# Patient Record
Sex: MALE | Race: OTHER | Employment: FULL TIME | URBAN - METROPOLITAN AREA
[De-identification: names, ages, dates, MRNs, and addresses within clinical notes are randomized per-mention and may not be internally consistent; named-entity substitution may affect disease eponyms.]

---

## 2018-10-08 ENCOUNTER — HOSPITAL ENCOUNTER (OUTPATIENT)
Dept: CT IMAGING | Age: 28
Discharge: HOME | End: 2018-10-08
Payer: COMMERCIAL

## 2018-10-08 DIAGNOSIS — N50.82 SCROTAL PAIN: ICD-10-CM

## 2018-10-08 DIAGNOSIS — R31.21 ASYMPTOMATIC MICROSCOPIC HEMATURIA: ICD-10-CM

## 2018-10-08 DIAGNOSIS — R10.9 ABDOMINAL PAIN, UNSPECIFIED ABDOMINAL LOCATION: ICD-10-CM

## 2018-10-08 PROCEDURE — 74176 CT ABD & PELVIS W/O CONTRAST: CPT

## 2018-10-16 ENCOUNTER — HOSPITAL ENCOUNTER (OUTPATIENT)
Dept: ULTRASOUND IMAGING | Age: 28
Discharge: HOME | End: 2018-10-16
Payer: COMMERCIAL

## 2018-10-16 DIAGNOSIS — N50.82 PAIN IN SCROTUM: ICD-10-CM

## 2018-10-16 PROCEDURE — 76870 US EXAM SCROTUM: CPT

## 2020-02-27 ENCOUNTER — HOSPITAL ENCOUNTER (OUTPATIENT)
Dept: PULMONOLOGY | Age: 30
Discharge: HOME | End: 2020-02-27
Payer: COMMERCIAL

## 2020-02-27 VITALS — RESPIRATION RATE: 18 BRPM | OXYGEN SATURATION: 98 % | HEART RATE: 96 BPM

## 2020-02-27 PROCEDURE — 94726 PLETHYSMOGRAPHY LUNG VOLUMES: CPT

## 2020-02-27 PROCEDURE — 94060 EVALUATION OF WHEEZING: CPT

## 2020-02-27 PROCEDURE — 94200 LUNG FUNCTION TEST (MBC/MVV): CPT

## 2020-02-27 PROCEDURE — 6360000002 HC RX W HCPCS: Performed by: FAMILY MEDICINE

## 2020-02-27 PROCEDURE — 94729 DIFFUSING CAPACITY: CPT

## 2020-02-27 PROCEDURE — 94760 N-INVAS EAR/PLS OXIMETRY 1: CPT

## 2020-02-27 RX ORDER — ALBUTEROL SULFATE 2.5 MG/3ML
2.5 SOLUTION RESPIRATORY (INHALATION) ONCE
Status: COMPLETED | OUTPATIENT
Start: 2020-02-27 | End: 2020-02-27

## 2020-02-27 RX ADMIN — ALBUTEROL SULFATE 2.5 MG: 2.5 SOLUTION RESPIRATORY (INHALATION) at 12:15

## 2020-03-12 ENCOUNTER — OFFICE VISIT (OUTPATIENT)
Dept: ENT CLINIC | Age: 30
End: 2020-03-12
Payer: COMMERCIAL

## 2020-03-12 VITALS — OXYGEN SATURATION: 98 % | DIASTOLIC BLOOD PRESSURE: 76 MMHG | HEART RATE: 64 BPM | SYSTOLIC BLOOD PRESSURE: 110 MMHG

## 2020-03-12 PROCEDURE — 1036F TOBACCO NON-USER: CPT | Performed by: OTOLARYNGOLOGY

## 2020-03-12 PROCEDURE — G8421 BMI NOT CALCULATED: HCPCS | Performed by: OTOLARYNGOLOGY

## 2020-03-12 PROCEDURE — 99203 OFFICE O/P NEW LOW 30 MIN: CPT | Performed by: OTOLARYNGOLOGY

## 2020-03-12 PROCEDURE — G8484 FLU IMMUNIZE NO ADMIN: HCPCS | Performed by: OTOLARYNGOLOGY

## 2020-03-12 PROCEDURE — G8427 DOCREV CUR MEDS BY ELIG CLIN: HCPCS | Performed by: OTOLARYNGOLOGY

## 2020-03-12 RX ORDER — DILTIAZEM HYDROCHLORIDE 60 MG/1
TABLET, FILM COATED ORAL
Status: ON HOLD | COMMUNITY
Start: 2020-03-02 | End: 2020-11-19

## 2020-03-12 RX ORDER — MONTELUKAST SODIUM 10 MG/1
TABLET ORAL
COMMUNITY
Start: 2020-03-02

## 2020-03-12 RX ORDER — CETIRIZINE HYDROCHLORIDE 10 MG/1
TABLET ORAL
COMMUNITY
Start: 2020-01-28

## 2020-03-12 NOTE — PROGRESS NOTES
medication is working but to talk about dosage   YLE- anxiety     OJD- depression See above, no SI/HI   ITX- anxiety This is an initial visit. The patient reports functioning as very difficult. The patient presents with anxious/fearful thoughts, depressed mood, difficulty concentrating, difficulty falling asleep, difficulty staying asleep, diminished interest or pleasure, excessive worry, fatigue, feelings of guilt, loss of appetite, racing thoughts and restlessness. The patient&#39;s risk factors include family history of anxiety and financial worries. The anxiety is aggravated by conflict or stress. The anxiety is associated with irritability. VQX- Asthma The initial visit date was 11/05/2018. Context: nocturnal. Symptom relief is noted with excessive use of rescue agents. Pertinent negatives include irregular heartbeat/palpitations and wheezing. DBD- Depression This is an initial visit. The patient reports functioning as very difficult. The patient presents with anxious/fearful thoughts but denies fatigue. The patient denies any headache, nausea, vomiting and weight gain. NSR- establish care Pt has history of panic attacks , also pt has asthma and takes an inhaler     Past surgical history: None  Family history: No allergic rhinitis or chronic sinusitis  Allergies: None known  Medications: Singulair, Zoloft, Ventolin, Symbicort, Zyrtec  Review of systems: Negative other than the HPI    GENERAL:   The patient appears well developed and well nourished. The head is normocephalic and atraumatic; no facial scars or weakness are noted. The facial skeleton is normal.The voice has a normal quality and tonality to it. EARS:  The pinnae are normal bilaterally. The ear canals are clear with no cerumen or narrowing. Both eardrums are normal with good aeration of the middle ear space. There is no evidence of attic retraction pocket or cholesteatoma.  The umbo and light reflex appear normal.    EYES:   The conjunctivae and lids appear normal. There is full extraocular movement                  JAWS/DENTITION:  There is full ROM of the  TM joints without crepitus either audible or palpable. The dentition is grossly normal, including the gingiva. SALIVARY GLANDS:  The parotid and submandibular glands are normal in appearance. There are no palpable stones or masses. Clear secretions emanate from both Berhane's and [place]'s ducts. There is no periglandular adenopathy. NASAL:  The external nose shows deflection of the bony nasal bridge off the midline slightly to the left. The turbinates are notably edematous and erythematous, but respond well to vasoconstriction. The middle and inferior meatuses appeared clear. No polyps, ulceration, or mass are visualized either naris. The nasal septum is     NASOPHARYNX:  The mirror exam of the nasopharynx shows no mucosal disease or obstruction. ORAL/PHARYNGEAL:   No abnormal dryness or discrete mucosal lesions are seen at the lips, oral cavity, or oropharynx. There is full tongue mobility, and the fungiform and vallate papillae appear normal. The floor of the mouth is unremarkable. . The palatoglossal and palatopharyngeal folds, uvula, and soft palate do not obstruct the oropharynx. NECK:  The neck is supple with full range of motion. The bony and cartilaginous landmarks are normal to palpation. There is no suspicious mass or adenopathy. The thyroid gland is normal to palpation, and the trachea is midline. CHEST/PULMONARY: Effort normal, no stridor. Not tachypneic. No respiratory distress    NEURO: The patient is alert and oriented. The cranial nerves are intact. SKIN: Warm and dry; no pallor    PSYCHIATRIC: Psychiatric: There is a normal mood and affect.  Behavior is normal. Judgment and thought content normal.     Impression:  Chronic nasal obstruction appears to be more physiological than anatomical  No suggestion of secondary infection  Likely contributing to his anxiety and panic issues    Plan:  The patient I had a lengthy discussion regarding the need to address his turbinate edema  Depo-Medrol 1 cc IM  Budesonide  Return in 2 to 3 weeks

## 2020-08-18 ENCOUNTER — HOSPITAL ENCOUNTER (OUTPATIENT)
Dept: MRI IMAGING | Age: 30
Discharge: HOME | End: 2020-08-18
Payer: COMMERCIAL

## 2020-08-18 PROCEDURE — 72148 MRI LUMBAR SPINE W/O DYE: CPT

## 2020-11-18 ENCOUNTER — APPOINTMENT (OUTPATIENT)
Dept: CT IMAGING | Age: 30
DRG: 871 | End: 2020-11-18
Payer: COMMERCIAL

## 2020-11-18 ENCOUNTER — HOSPITAL ENCOUNTER (INPATIENT)
Age: 30
LOS: 5 days | Discharge: HOME | DRG: 871 | End: 2020-11-23
Attending: EMERGENCY MEDICINE | Admitting: INTERNAL MEDICINE
Payer: COMMERCIAL

## 2020-11-18 ENCOUNTER — APPOINTMENT (OUTPATIENT)
Dept: MRI IMAGING | Age: 30
DRG: 871 | End: 2020-11-18
Payer: COMMERCIAL

## 2020-11-18 PROBLEM — G93.41 ACUTE METABOLIC ENCEPHALOPATHY: Status: ACTIVE | Noted: 2020-11-18

## 2020-11-18 PROBLEM — J45.909 ASTHMA: Status: ACTIVE | Noted: 2020-11-18

## 2020-11-18 PROBLEM — R93.0 ABNORMAL CT OF THE HEAD: Status: ACTIVE | Noted: 2020-11-18

## 2020-11-18 PROBLEM — R51.9 HEADACHE: Status: ACTIVE | Noted: 2020-11-18

## 2020-11-18 PROBLEM — R11.2 NAUSEA & VOMITING: Status: ACTIVE | Noted: 2020-11-18

## 2020-11-18 PROBLEM — A41.9 SEPSIS (HCC): Status: ACTIVE | Noted: 2020-11-18

## 2020-11-18 PROBLEM — R19.7 DIARRHEA: Status: ACTIVE | Noted: 2020-11-18

## 2020-11-18 PROBLEM — F41.9 ANXIETY: Status: ACTIVE | Noted: 2020-11-18

## 2020-11-18 PROBLEM — D72.829 LEUKOCYTOSIS: Status: ACTIVE | Noted: 2020-11-18

## 2020-11-18 PROBLEM — R42 DIZZINESS: Status: ACTIVE | Noted: 2020-11-18

## 2020-11-18 LAB
ALBUMIN SERPL-MCNC: 5 G/DL (ref 3.4–5)
ALP BLD-CCNC: 109 U/L (ref 40–129)
ALT SERPL-CCNC: 25 U/L (ref 10–40)
ANION GAP SERPL CALCULATED.3IONS-SCNC: 19 MMOL/L (ref 3–16)
APPEARANCE CSF: CLEAR
APPEARANCE CSF: CLEAR
AST SERPL-CCNC: 27 U/L (ref 15–37)
BASE EXCESS VENOUS: -3.4 MMOL/L (ref -3–3)
BASOPHILS ABSOLUTE: 0.1 K/UL (ref 0–0.2)
BASOPHILS RELATIVE PERCENT: 0.2 %
BETA-HYDROXYBUTYRATE: 0.2 MMOL/L (ref 0–0.27)
BILIRUB SERPL-MCNC: 0.4 MG/DL (ref 0–1)
BILIRUBIN DIRECT: <0.2 MG/DL (ref 0–0.3)
BILIRUBIN, INDIRECT: ABNORMAL MG/DL (ref 0–1)
BUN BLDV-MCNC: 23 MG/DL (ref 7–20)
C-REACTIVE PROTEIN: 2.8 MG/L (ref 0–5.1)
CALCIUM SERPL-MCNC: 10.5 MG/DL (ref 8.3–10.6)
CARBOXYHEMOGLOBIN: 2.8 % (ref 0–1.5)
CHLORIDE BLD-SCNC: 99 MMOL/L (ref 99–110)
CLOT EVALUATION CSF: ABNORMAL
CLOT EVALUATION CSF: NORMAL
CO2: 21 MMOL/L (ref 21–32)
COLOR CSF: COLORLESS
COLOR CSF: COLORLESS
CREAT SERPL-MCNC: 0.9 MG/DL (ref 0.9–1.3)
EOSINOPHILS ABSOLUTE: 0.2 K/UL (ref 0–0.6)
EOSINOPHILS RELATIVE PERCENT: 0.8 %
GFR AFRICAN AMERICAN: >60
GFR NON-AFRICAN AMERICAN: >60
GLUCOSE BLD-MCNC: 192 MG/DL (ref 70–99)
GLUCOSE, CSF: 83 MG/DL (ref 40–80)
HCO3 VENOUS: 22.9 MMOL/L (ref 23–29)
HCT VFR BLD CALC: 53.4 % (ref 40.5–52.5)
HEMOGLOBIN, VEN, REDUCED: 5 %
HEMOGLOBIN: 17.4 G/DL (ref 13.5–17.5)
LACTIC ACID, SEPSIS: 1.6 MMOL/L (ref 0.4–1.9)
LACTIC ACID: 2.6 MMOL/L (ref 0.4–2)
LIPASE: 18 U/L (ref 13–60)
LYMPHOCYTES ABSOLUTE: 2.4 K/UL (ref 1–5.1)
LYMPHOCYTES RELATIVE PERCENT: 10.2 %
MCH RBC QN AUTO: 29.6 PG (ref 26–34)
MCHC RBC AUTO-ENTMCNC: 32.6 G/DL (ref 31–36)
MCV RBC AUTO: 90.8 FL (ref 80–100)
MENINGITIS ENCEPHALITIS PANEL: NORMAL
METHEMOGLOBIN VENOUS: 0 %
MONOCYTES ABSOLUTE: 0.8 K/UL (ref 0–1.3)
MONOCYTES RELATIVE PERCENT: 3.2 %
NEUTROPHILS ABSOLUTE: 20.4 K/UL (ref 1.7–7.7)
NEUTROPHILS RELATIVE PERCENT: 85.6 %
NO DIFFERENTIAL CSF: ABNORMAL
NO DIFFERENTIAL CSF: NORMAL
O2 CONTENT, VEN: 24 VOL %
O2 SAT, VEN: 95 %
O2 THERAPY: ABNORMAL
PCO2, VEN: 44.4 MMHG (ref 40–50)
PDW BLD-RTO: 13.3 % (ref 12.4–15.4)
PH VENOUS: 7.32 (ref 7.35–7.45)
PLATELET # BLD: 229 K/UL (ref 135–450)
PMV BLD AUTO: 10.3 FL (ref 5–10.5)
PO2, VEN: 74.4 MMHG (ref 25–40)
POTASSIUM REFLEX MAGNESIUM: 4.4 MMOL/L (ref 3.5–5.1)
PRO-BNP: 12 PG/ML (ref 0–124)
PROCALCITONIN: 0.04 NG/ML (ref 0–0.15)
PROTEIN CSF: 27 MG/DL (ref 15–45)
RBC # BLD: 5.88 M/UL (ref 4.2–5.9)
RBC CSF: 0 /CUMM
RBC CSF: 1 /CUMM
REPORT: NORMAL
SODIUM BLD-SCNC: 139 MMOL/L (ref 136–145)
TCO2 CALC VENOUS: 55 MMOL/L
TOTAL PROTEIN: 8.8 G/DL (ref 6.4–8.2)
TROPONIN: <0.01 NG/ML
TUBE NUMBER CSF: ABNORMAL
TUBE NUMBER CSF: NORMAL
WBC # BLD: 23.8 K/UL (ref 4–11)
WBC CSF: 1 /CUMM (ref 0–5)
WBC CSF: 2 /CUMM (ref 0–5)

## 2020-11-18 PROCEDURE — 70553 MRI BRAIN STEM W/O & W/DYE: CPT

## 2020-11-18 PROCEDURE — 96368 THER/DIAG CONCURRENT INF: CPT

## 2020-11-18 PROCEDURE — 82010 KETONE BODYS QUAN: CPT

## 2020-11-18 PROCEDURE — 99284 EMERGENCY DEPT VISIT MOD MDM: CPT

## 2020-11-18 PROCEDURE — 96375 TX/PRO/DX INJ NEW DRUG ADDON: CPT

## 2020-11-18 PROCEDURE — 80048 BASIC METABOLIC PNL TOTAL CA: CPT

## 2020-11-18 PROCEDURE — 36415 COLL VENOUS BLD VENIPUNCTURE: CPT

## 2020-11-18 PROCEDURE — 83605 ASSAY OF LACTIC ACID: CPT

## 2020-11-18 PROCEDURE — 74177 CT ABD & PELVIS W/CONTRAST: CPT

## 2020-11-18 PROCEDURE — 70496 CT ANGIOGRAPHY HEAD: CPT

## 2020-11-18 PROCEDURE — 6370000000 HC RX 637 (ALT 250 FOR IP): Performed by: INTERNAL MEDICINE

## 2020-11-18 PROCEDURE — 2060000000 HC ICU INTERMEDIATE R&B

## 2020-11-18 PROCEDURE — 94761 N-INVAS EAR/PLS OXIMETRY MLT: CPT

## 2020-11-18 PROCEDURE — 83690 ASSAY OF LIPASE: CPT

## 2020-11-18 PROCEDURE — 6360000002 HC RX W HCPCS: Performed by: INTERNAL MEDICINE

## 2020-11-18 PROCEDURE — 72158 MRI LUMBAR SPINE W/O & W/DYE: CPT

## 2020-11-18 PROCEDURE — 87483 CNS DNA AMP PROBE TYPE 12-25: CPT

## 2020-11-18 PROCEDURE — 2580000003 HC RX 258: Performed by: INTERNAL MEDICINE

## 2020-11-18 PROCEDURE — 82945 GLUCOSE OTHER FLUID: CPT

## 2020-11-18 PROCEDURE — 6360000002 HC RX W HCPCS: Performed by: PHYSICIAN ASSISTANT

## 2020-11-18 PROCEDURE — 6360000004 HC RX CONTRAST MEDICATION: Performed by: EMERGENCY MEDICINE

## 2020-11-18 PROCEDURE — 009U3ZX DRAINAGE OF SPINAL CANAL, PERCUTANEOUS APPROACH, DIAGNOSTIC: ICD-10-PCS | Performed by: INTERNAL MEDICINE

## 2020-11-18 PROCEDURE — 70450 CT HEAD/BRAIN W/O DYE: CPT

## 2020-11-18 PROCEDURE — 87205 SMEAR GRAM STAIN: CPT

## 2020-11-18 PROCEDURE — 89050 BODY FLUID CELL COUNT: CPT

## 2020-11-18 PROCEDURE — 80076 HEPATIC FUNCTION PANEL: CPT

## 2020-11-18 PROCEDURE — 2580000003 HC RX 258: Performed by: PHYSICIAN ASSISTANT

## 2020-11-18 PROCEDURE — 93005 ELECTROCARDIOGRAM TRACING: CPT | Performed by: EMERGENCY MEDICINE

## 2020-11-18 PROCEDURE — 83036 HEMOGLOBIN GLYCOSYLATED A1C: CPT

## 2020-11-18 PROCEDURE — 84484 ASSAY OF TROPONIN QUANT: CPT

## 2020-11-18 PROCEDURE — 86140 C-REACTIVE PROTEIN: CPT

## 2020-11-18 PROCEDURE — 99223 1ST HOSP IP/OBS HIGH 75: CPT | Performed by: INTERNAL MEDICINE

## 2020-11-18 PROCEDURE — 87070 CULTURE OTHR SPECIMN AEROBIC: CPT

## 2020-11-18 PROCEDURE — 71275 CT ANGIOGRAPHY CHEST: CPT

## 2020-11-18 PROCEDURE — 84145 PROCALCITONIN (PCT): CPT

## 2020-11-18 PROCEDURE — 6360000004 HC RX CONTRAST MEDICATION: Performed by: PHYSICIAN ASSISTANT

## 2020-11-18 PROCEDURE — 2580000003 HC RX 258: Performed by: EMERGENCY MEDICINE

## 2020-11-18 PROCEDURE — 94640 AIRWAY INHALATION TREATMENT: CPT

## 2020-11-18 PROCEDURE — U0003 INFECTIOUS AGENT DETECTION BY NUCLEIC ACID (DNA OR RNA); SEVERE ACUTE RESPIRATORY SYNDROME CORONAVIRUS 2 (SARS-COV-2) (CORONAVIRUS DISEASE [COVID-19]), AMPLIFIED PROBE TECHNIQUE, MAKING USE OF HIGH THROUGHPUT TECHNOLOGIES AS DESCRIBED BY CMS-2020-01-R: HCPCS

## 2020-11-18 PROCEDURE — 84157 ASSAY OF PROTEIN OTHER: CPT

## 2020-11-18 PROCEDURE — 87040 BLOOD CULTURE FOR BACTERIA: CPT

## 2020-11-18 PROCEDURE — 6360000002 HC RX W HCPCS: Performed by: EMERGENCY MEDICINE

## 2020-11-18 PROCEDURE — 96365 THER/PROPH/DIAG IV INF INIT: CPT

## 2020-11-18 PROCEDURE — U0002 COVID-19 LAB TEST NON-CDC: HCPCS

## 2020-11-18 PROCEDURE — 85025 COMPLETE CBC W/AUTO DIFF WBC: CPT

## 2020-11-18 PROCEDURE — 83880 ASSAY OF NATRIURETIC PEPTIDE: CPT

## 2020-11-18 PROCEDURE — 82803 BLOOD GASES ANY COMBINATION: CPT

## 2020-11-18 PROCEDURE — A9577 INJ MULTIHANCE: HCPCS | Performed by: PHYSICIAN ASSISTANT

## 2020-11-18 RX ORDER — PROMETHAZINE HYDROCHLORIDE 25 MG/1
12.5 TABLET ORAL EVERY 6 HOURS PRN
Status: DISCONTINUED | OUTPATIENT
Start: 2020-11-18 | End: 2020-11-23 | Stop reason: HOSPADM

## 2020-11-18 RX ORDER — DIPHENHYDRAMINE HYDROCHLORIDE 50 MG/ML
25 INJECTION INTRAMUSCULAR; INTRAVENOUS ONCE
Status: COMPLETED | OUTPATIENT
Start: 2020-11-18 | End: 2020-11-18

## 2020-11-18 RX ORDER — BUDESONIDE AND FORMOTEROL FUMARATE DIHYDRATE 80; 4.5 UG/1; UG/1
2 AEROSOL RESPIRATORY (INHALATION) 2 TIMES DAILY
Status: DISCONTINUED | OUTPATIENT
Start: 2020-11-18 | End: 2020-11-23 | Stop reason: HOSPADM

## 2020-11-18 RX ORDER — ONDANSETRON 2 MG/ML
4 INJECTION INTRAMUSCULAR; INTRAVENOUS EVERY 6 HOURS PRN
Status: DISCONTINUED | OUTPATIENT
Start: 2020-11-18 | End: 2020-11-23 | Stop reason: HOSPADM

## 2020-11-18 RX ORDER — MONTELUKAST SODIUM 10 MG/1
10 TABLET ORAL NIGHTLY
Status: DISCONTINUED | OUTPATIENT
Start: 2020-11-18 | End: 2020-11-23 | Stop reason: HOSPADM

## 2020-11-18 RX ORDER — BENZTROPINE MESYLATE 1 MG/ML
1 INJECTION INTRAMUSCULAR; INTRAVENOUS ONCE
Status: COMPLETED | OUTPATIENT
Start: 2020-11-18 | End: 2020-11-18

## 2020-11-18 RX ORDER — ACETAMINOPHEN 325 MG/1
650 TABLET ORAL EVERY 6 HOURS PRN
Status: DISCONTINUED | OUTPATIENT
Start: 2020-11-18 | End: 2020-11-23 | Stop reason: HOSPADM

## 2020-11-18 RX ORDER — ALBUTEROL SULFATE 90 UG/1
2 AEROSOL, METERED RESPIRATORY (INHALATION) EVERY 4 HOURS PRN
Status: DISCONTINUED | OUTPATIENT
Start: 2020-11-18 | End: 2020-11-23 | Stop reason: HOSPADM

## 2020-11-18 RX ORDER — POLYETHYLENE GLYCOL 3350 17 G/17G
17 POWDER, FOR SOLUTION ORAL DAILY PRN
Status: DISCONTINUED | OUTPATIENT
Start: 2020-11-18 | End: 2020-11-23 | Stop reason: HOSPADM

## 2020-11-18 RX ORDER — PROCHLORPERAZINE EDISYLATE 5 MG/ML
10 INJECTION INTRAMUSCULAR; INTRAVENOUS ONCE
Status: COMPLETED | OUTPATIENT
Start: 2020-11-18 | End: 2020-11-18

## 2020-11-18 RX ORDER — CETIRIZINE HYDROCHLORIDE 10 MG/1
10 TABLET ORAL DAILY
Status: DISCONTINUED | OUTPATIENT
Start: 2020-11-19 | End: 2020-11-23 | Stop reason: HOSPADM

## 2020-11-18 RX ORDER — SODIUM CHLORIDE 0.9 % (FLUSH) 0.9 %
10 SYRINGE (ML) INJECTION PRN
Status: DISCONTINUED | OUTPATIENT
Start: 2020-11-18 | End: 2020-11-23 | Stop reason: HOSPADM

## 2020-11-18 RX ORDER — PROMETHAZINE HYDROCHLORIDE 25 MG/ML
12.5 INJECTION, SOLUTION INTRAMUSCULAR; INTRAVENOUS EVERY 4 HOURS PRN
Status: DISCONTINUED | OUTPATIENT
Start: 2020-11-18 | End: 2020-11-23 | Stop reason: HOSPADM

## 2020-11-18 RX ORDER — SODIUM CHLORIDE 0.9 % (FLUSH) 0.9 %
10 SYRINGE (ML) INJECTION EVERY 12 HOURS SCHEDULED
Status: DISCONTINUED | OUTPATIENT
Start: 2020-11-18 | End: 2020-11-20 | Stop reason: SDUPTHER

## 2020-11-18 RX ORDER — LORAZEPAM 2 MG/ML
1 INJECTION INTRAMUSCULAR EVERY 4 HOURS PRN
Status: DISCONTINUED | OUTPATIENT
Start: 2020-11-18 | End: 2020-11-23 | Stop reason: HOSPADM

## 2020-11-18 RX ORDER — ACETAMINOPHEN 650 MG/1
650 SUPPOSITORY RECTAL EVERY 6 HOURS PRN
Status: DISCONTINUED | OUTPATIENT
Start: 2020-11-18 | End: 2020-11-23 | Stop reason: HOSPADM

## 2020-11-18 RX ORDER — SODIUM CHLORIDE 0.9 % (FLUSH) 0.9 %
10 SYRINGE (ML) INJECTION PRN
Status: DISCONTINUED | OUTPATIENT
Start: 2020-11-18 | End: 2020-11-20 | Stop reason: SDUPTHER

## 2020-11-18 RX ORDER — SODIUM CHLORIDE, SODIUM LACTATE, POTASSIUM CHLORIDE, AND CALCIUM CHLORIDE .6; .31; .03; .02 G/100ML; G/100ML; G/100ML; G/100ML
30 INJECTION, SOLUTION INTRAVENOUS ONCE
Status: COMPLETED | OUTPATIENT
Start: 2020-11-18 | End: 2020-11-18

## 2020-11-18 RX ORDER — ONDANSETRON 2 MG/ML
4 INJECTION INTRAMUSCULAR; INTRAVENOUS ONCE
Status: COMPLETED | OUTPATIENT
Start: 2020-11-18 | End: 2020-11-18

## 2020-11-18 RX ORDER — SODIUM CHLORIDE 9 MG/ML
INJECTION, SOLUTION INTRAVENOUS CONTINUOUS
Status: DISCONTINUED | OUTPATIENT
Start: 2020-11-18 | End: 2020-11-23

## 2020-11-18 RX ORDER — SODIUM CHLORIDE 0.9 % (FLUSH) 0.9 %
10 SYRINGE (ML) INJECTION EVERY 12 HOURS SCHEDULED
Status: DISCONTINUED | OUTPATIENT
Start: 2020-11-18 | End: 2020-11-23 | Stop reason: HOSPADM

## 2020-11-18 RX ORDER — LORAZEPAM 2 MG/ML
1 INJECTION INTRAMUSCULAR ONCE
Status: COMPLETED | OUTPATIENT
Start: 2020-11-18 | End: 2020-11-18

## 2020-11-18 RX ADMIN — Medication 10 ML: at 23:01

## 2020-11-18 RX ADMIN — ACETAMINOPHEN 650 MG: 325 TABLET ORAL at 23:17

## 2020-11-18 RX ADMIN — DIPHENHYDRAMINE HYDROCHLORIDE 25 MG: 50 INJECTION, SOLUTION INTRAMUSCULAR; INTRAVENOUS at 11:29

## 2020-11-18 RX ADMIN — GADOBENATE DIMEGLUMINE 15 ML: 529 INJECTION, SOLUTION INTRAVENOUS at 20:27

## 2020-11-18 RX ADMIN — VANCOMYCIN HYDROCHLORIDE 1000 MG: 1 INJECTION, POWDER, LYOPHILIZED, FOR SOLUTION INTRAVENOUS at 18:09

## 2020-11-18 RX ADMIN — CEFTRIAXONE 2 G: 2 INJECTION, POWDER, FOR SOLUTION INTRAMUSCULAR; INTRAVENOUS at 15:13

## 2020-11-18 RX ADMIN — IOPAMIDOL 150 ML: 755 INJECTION, SOLUTION INTRAVENOUS at 12:45

## 2020-11-18 RX ADMIN — LORAZEPAM 1 MG: 2 INJECTION, SOLUTION INTRAMUSCULAR; INTRAVENOUS at 11:29

## 2020-11-18 RX ADMIN — AMPICILLIN SODIUM 2 G: 2 INJECTION, POWDER, FOR SOLUTION INTRAMUSCULAR; INTRAVENOUS at 20:37

## 2020-11-18 RX ADMIN — SODIUM CHLORIDE: 9 INJECTION, SOLUTION INTRAVENOUS at 20:37

## 2020-11-18 RX ADMIN — BENZTROPINE MESYLATE 1 MG: 1 INJECTION INTRAMUSCULAR; INTRAVENOUS at 11:29

## 2020-11-18 RX ADMIN — ONDANSETRON 4 MG: 2 INJECTION INTRAMUSCULAR; INTRAVENOUS at 23:17

## 2020-11-18 RX ADMIN — CEFEPIME HYDROCHLORIDE 2 G: 2 INJECTION, POWDER, FOR SOLUTION INTRAVENOUS at 23:00

## 2020-11-18 RX ADMIN — Medication 10 ML: at 23:02

## 2020-11-18 RX ADMIN — DIPHENHYDRAMINE HYDROCHLORIDE 25 MG: 50 INJECTION, SOLUTION INTRAMUSCULAR; INTRAVENOUS at 10:47

## 2020-11-18 RX ADMIN — SODIUM CHLORIDE, POTASSIUM CHLORIDE, SODIUM LACTATE AND CALCIUM CHLORIDE 2178 ML: 600; 310; 30; 20 INJECTION, SOLUTION INTRAVENOUS at 10:46

## 2020-11-18 RX ADMIN — Medication 10 ML: at 20:27

## 2020-11-18 RX ADMIN — MONTELUKAST SODIUM 10 MG: 10 TABLET, COATED ORAL at 20:37

## 2020-11-18 RX ADMIN — ACYCLOVIR SODIUM 750 MG: 50 INJECTION, SOLUTION INTRAVENOUS at 15:43

## 2020-11-18 RX ADMIN — Medication 2 PUFF: at 21:03

## 2020-11-18 RX ADMIN — ONDANSETRON 4 MG: 2 INJECTION INTRAMUSCULAR; INTRAVENOUS at 10:47

## 2020-11-18 RX ADMIN — PROCHLORPERAZINE EDISYLATE 10 MG: 5 INJECTION INTRAMUSCULAR; INTRAVENOUS at 10:47

## 2020-11-18 ASSESSMENT — ENCOUNTER SYMPTOMS
ABDOMINAL PAIN: 0
NAUSEA: 1
CONSTIPATION: 0
WHEEZING: 0
BACK PAIN: 0
SORE THROAT: 0
DIARRHEA: 0
DIARRHEA: 1
RHINORRHEA: 0
EYE REDNESS: 0
EYE DISCHARGE: 0
SHORTNESS OF BREATH: 0
COUGH: 0
TROUBLE SWALLOWING: 0
VOMITING: 1

## 2020-11-18 ASSESSMENT — PAIN SCALES - GENERAL
PAINLEVEL_OUTOF10: 3
PAINLEVEL_OUTOF10: 0

## 2020-11-18 ASSESSMENT — PAIN DESCRIPTION - LOCATION: LOCATION: BACK

## 2020-11-18 ASSESSMENT — PAIN DESCRIPTION - PAIN TYPE: TYPE: ACUTE PAIN

## 2020-11-18 NOTE — ED NOTES
Pt came in with c/o of dizziness that this am after taking a shower. Pt denies cp. Pt has GCS of 15. A/o x 4  Pt is Japanese speaking. Pt was diaphoretic upon arrival. Placed 20 g to left ac. Sent blood to lab. Pt is on a continuous pulse oximetry and telemetry monitoring. Pt continued on cycling blood pressure. Fall risk precautions in place, call light in reach, bed side table within reach, , will continue to monitor.          Aniya Amaya RN  11/18/20 1012

## 2020-11-18 NOTE — ED NOTES
Pt has been supine x 4 hours after LP, No redness or drainage noted to LP site. Pt resting in bed, pt waking up from ativan dose, pt states he feels better after ATB's. Ordered pt meal tray. Pt wife filled out MRI form, faxed to MRI. Pt NSR on tele monitor  Pt is on a continuous pulse oximetry and telemetry monitoring. Pt continued on cycling blood pressure. Fall risk precautions in place, call light in reach, bed side table within reach, bed alarm on, will continue to monitor.          Nereyda Escalona RN  11/18/20 [id number]

## 2020-11-18 NOTE — ACP (ADVANCE CARE PLANNING)
Advanced Care Planning Note. Purpose of Encounter: Advanced care planning in light of acute metabolic encephalopathy  Parties In Attendance: Patient, wife  Decisional Capacity: Yes  Subjective: Patient is dizzy, nauseated and had diarrhea  Objective: Cr 0.9  Goals of Care Determination: Patient wants full support (CPR, vent, surgery, HD, trach, PEG)  Plan:  IVF, IV Abx, MRI Brain and L spine, ID and Neuro consults  Code Status: Full code   Time spent on Advanced care Plannin minutes  Advanced Care Planning Documents: Completed advanced directives on chart, wife is the POA.     Sofie Cummins MD  2020 4:55 PM

## 2020-11-18 NOTE — ED PROVIDER NOTES
[facility]        Pt Name: Matteo Hawkins  MRN: [id number]  [place] 1990  Date of evaluation: 11/18/2020  Provider: Lara Yoo PA-C  PCP: GALEN Chavez CNP     I have seen and evaluated this patient with my supervising physician Trevon Peace MD.    [address] [place] [address]       Chief Complaint   Patient presents with    Emesis     pt in by squad from home- with c/o feeling dizzy/nauseated after getting out of shower this morning- no fever, no cough. HISTORY OF PRESENT ILLNESS   (Location, Timing/Onset, Context/Setting, Quality, Duration, Modifying Factors, Severity, Associated Signs and Symptoms)  Note limiting factors. Matteo Hawkins is a 34 y.o. male presents to the emergency department today for evaluation for headache, dizziness, nausea and vomiting. The patient is able to speak some English, and he would prefer that his wife help translate for him. The wife states that the patient has been experiencing pain to his lower back, and he was seen by [facility] 3 weeks ago and did get an epidural steroid injection. She states that this was to the lower back, and was for pain. She states that the patient tolerated the procedure well and otherwise did not seem to have any complications. She states that intermittently since having this injection the patient has been complaining of headaches, dizziness, nausea and vomiting. She states that the symptoms seem to wax and wane in intensity. She states that he does not like going to doctors. She states that yesterday they were watching TV and was fine. She states that today he got out of the shower and he was complaining of a headache, which was rated as a 10/10, she states that the patient was diaphoretic, and had multiple episodes of emesis. The patient denies any bilious emesis, hematemesis or coffee-ground emesis.   When I evaluate the patient he has been medicated for his headache and he states that his headache is improving at this time. Patient denies feeling dizzy at this time but does report that he felt dizzy after getting out of the shower. The patient denies any chest pain or shortness of breath. He states that he has a chronic cough because of his asthma but denies any change in the cough or sputum production. He states that he is also had some diarrhea, although denies any blood in the stool or black tarry appearance of stool. Denies any fevers. He has no neck pain. He has no back pain. The patient otherwise is asymptomatic at this time. Has no visual changes. No numbness, tingling or weakness. Nursing Notes were all reviewed and agreed with or any disagreements were addressed in the HPI. REVIEW OF SYSTEMS    (2-9 systems for level 4, 10 or more for level 5)     Review of Systems   Constitutional: Negative for activity change, appetite change, chills and fever. HENT: Negative for congestion and rhinorrhea. Respiratory: Negative for cough and shortness of breath. Cardiovascular: Negative for chest pain. Gastrointestinal: Positive for nausea and vomiting. Negative for abdominal pain and diarrhea. Genitourinary: Negative for difficulty urinating, dysuria and hematuria. Musculoskeletal: Negative for neck pain and neck stiffness. Neurological: Positive for dizziness and headaches. Negative for weakness and numbness. Positives and Pertinent negatives as per HPI. Except as noted above in the ROS, all other systems were reviewed and negative. PAST MEDICAL HISTORY   History reviewed. No pertinent past medical history. SURGICAL HISTORY   History reviewed. No pertinent surgical history.       CURRENTMEDICATIONS       Previous Medications    BUDESONIDE (RHINOCORT AQUA) 32 MCG/ACT NASAL SPRAY    2 sprays by Each Nostril route 2 times daily    CETIRIZINE (ZYRTEC) 10 MG TABLET    TK 1 T PO QD    MONTELUKAST (SINGULAIR) 10 MG TABLET    TK 1 T PO QPM    SERTRALINE (ZOLOFT) 50 MG TABLET        SYMBICORT 80-4.5 MCG/ACT AERO    INHALE 2 PUFFS BID QAM AND QPM    VENTOLIN  (90 BASE) MCG/ACT INHALER    INHALE 2 PUFFS INTO LUNGS Q 4 TO 6 H PRF BRONCOSPASMS         ALLERGIES     Patient has no known allergies. FAMILYHISTORY     History reviewed. No pertinent family history. SOCIAL HISTORY       Social History     Tobacco Use    Smoking status: Never Smoker    Smokeless tobacco: Never Used   Substance Use Topics    Alcohol use: Not Currently    Drug use: Not Currently       SCREENINGS             PHYSICAL EXAM    (up to 7 for level 4, 8 or more for level 5)     ED Triage Vitals [11/18/20 0955]   BP Temp Temp Source Pulse Resp SpO2 Height Weight   130/84 -- Oral 76 17 99 % 5' 8\" (1.727 m) 160 lb (72.6 kg)       Physical Exam  Vitals signs and nursing note reviewed. Constitutional:       Appearance: He is well-developed. He is ill-appearing. He is not diaphoretic. HENT:      Head: Normocephalic and atraumatic. Right Ear: External ear normal.      Left Ear: External ear normal.      Nose: Nose normal.      Mouth/Throat:      Mouth: Mucous membranes are moist.      Pharynx: Oropharynx is clear. No posterior oropharyngeal erythema. Eyes:      General:         Right eye: No discharge. Left eye: No discharge. Extraocular Movements: Extraocular movements intact. Conjunctiva/sclera: Conjunctivae normal.      Pupils: Pupils are equal, round, and reactive to light. Neck:      Musculoskeletal: Normal range of motion and neck supple. Trachea: No tracheal deviation. Meningeal: Brudzinski's sign absent. Cardiovascular:      Rate and Rhythm: Normal rate and regular rhythm. Heart sounds: No murmur. Pulmonary:      Effort: Pulmonary effort is normal. No respiratory distress. Breath sounds: Normal breath sounds. No wheezing or rales.    Abdominal:      General: Bowel sounds are normal. There is no distension. Palpations: Abdomen is soft. Tenderness: There is no abdominal tenderness. There is no guarding. Musculoskeletal: Normal range of motion. Skin:     General: Skin is warm and dry. Neurological:      Mental Status: He is alert and oriented to person, place, and time. GCS: GCS eye subscore is 4. GCS verbal subscore is 5. GCS motor subscore is 6. Psychiatric:         Behavior: Behavior normal.         DIAGNOSTIC RESULTS   LABS:    Labs Reviewed   CBC WITH AUTO DIFFERENTIAL - Abnormal; Notable for the following components:       Result Value    WBC 23.8 (*)     Hematocrit 53.4 (*)     Neutrophils Absolute 20.4 (*)     All other components within normal limits    Narrative:     Performed at:  [facility] [address], [place], [address]   Phone [phone]   HEPATIC FUNCTION PANEL - Abnormal; Notable for the following components:     Total Protein 8.8 (*)     All other components within normal limits    Narrative:     Performed at:  [facility] [address], [place], [address]   Phone [phone]   BLOOD GAS, VENOUS - Abnormal; Notable for the following components:    pH, Miguelito 7.322 (*)     pO2, Miguelito 74.4 (*)     HCO3, Venous 22.9 (*)     Base Excess, Miguelito -3.4 (*)     Carboxyhemoglobin 2.8 (*)     All other components within normal limits    Narrative:     Performed at:  [facility] [address], [place], [address]   Phone [phone]   LACTIC ACID, PLASMA - Abnormal; Notable for the following components:    Lactic Acid 2.6 (*)     All other components within normal limits    Narrative:     Performed at:  [facility] [address], [place], [facility] [address]   Phone [phone]   BASIC METABOLIC PANEL W/ REFLEX TO MG FOR LOW K - Abnormal; Notable for the following components:    Anion Gap 19 (*)     Glucose 192 (*)     BUN 23 (*)     All other components within normal limits    Narrative:     Performed at:  [facility]  [address] [facility], [address] [employer]   Phone [phone]   GLUCOSE, CSF - Abnormal; Notable for the following components:    Glucose, CSF 83 (*)     All other components within normal limits    Narrative:     Performed at:  [facility]  [address] [facility],  [place], [address] [employer]   Phone [phone]   CSF CELL COUNT WITH DIFFERENTIAL - Abnormal; Notable for the following components:    RBC, CSF 1 (*)     All other components within normal limits    Narrative:     Performed at:  [facility]  [address] [facility], [address] [employer]   Phone [phone]   HSV PCR    Narrative:     Referred out by:  [facility]  [address] [facility], [address]   Phone [phone]   CULTURE, BLOOD 1   CULTURE, BLOOD 2   CULTURE, CSF    Narrative:     ORDER#: [id number]                          ORDERED BY: Eduardo Venegas  SOURCE: CSF (Spinal Fluid)                 COLLECTED:  11/18/20 13:06  ANTIBIOTICS AT ISSA.:                      RECEIVED :  11/18/20 14:16  Performed at:  [place] [address]   Phone [phone]   MENINGITIS ENCEPHALITIS PANEL CSF, MOLECULAR   TROPONIN    Narrative:     Performed at:  [facility]  [address] [facility], [address]   Phone [phone]   LIPASE    Narrative:     Performed at:  [facility]  [address] [facility], [address]   Phone [phone] PEPTIDE    Narrative:     Performed at:  [facility]  [address] [facility], [address] [employer]   Phone [phone]   PROCALCITONIN    Narrative:     Performed at:  [facility]  [address] [facility], [place], [place]   Phone [phone]   PROTEIN, CSF    Narrative:     Performed at:  [facility]  [address], [place]   Phone [phone]   CSF CELL COUNT WITH DIFFERENTIAL    Narrative:     Performed at:  [facility]  [address], [place]   Phone [phone]   COVID-19   C-REACTIVE PROTEIN   BETA-HYDROXYBUTYRATE   HEMOGLOBIN A1C       All other labs were within normal range or not returned as of this dictation. EKG: All EKG's are interpreted by the Emergency Department Physician in the absence of a cardiologist.  Please see their note for interpretation of EKG. RADIOLOGY:   Non-plain film images such as CT, Ultrasound and MRI are read by the radiologist. Plain radiographic images are visualized and preliminarily interpreted by the ED Provider with the below findings:        Interpretation per the Radiologist below, if available at the time of this note:    CT ABDOMEN PELVIS W IV CONTRAST Additional Contrast? None   Final Result   1. Fluid throughout the colon and rectum should be correlated with any   history of diarrhea. No evident colonic wall thickening to indicate colitis   2. No evidence of intra abdominopelvic abscess         CTA HEAD NECK W CONTRAST   Preliminary Result   Unremarkable CTA of the head and neck. CTA CHEST W WO CONTRAST   Final Result   No acute vascular abnormality is identified. No thoracic aortic dissection   or aneurysm. Minimal bibasilar atelectasis. Otherwise, no acute cardiopulmonary disease. CT HEAD WO CONTRAST   Final Result   1. No acute intracranial abnormality. 2. 3.2 cm posterior fossa extra-axial fluid collection favoring an arachnoid   cyst.   Findings were discussed with SHAGUFTA HENDERSON at 90:19 pm on 11/18/2020.            Ct Head Wo Contrast    Result Date: 11/18/2020  EXAMINATION: CT OF THE HEAD WITHOUT CONTRAST  11/18/2020 12:26 pm TECHNIQUE: CT of the head was performed without the administration of intravenous contrast. Dose modulation, iterative reconstruction, and/or weight based adjustment of the mA/kV was utilized to reduce the radiation dose to as low as reasonably achievable. COMPARISON: None. HISTORY: ORDERING SYSTEM PROVIDED HISTORY: stewart Maguire TECHNOLOGIST PROVIDED HISTORY: Reason for exam:->william ro ich Has a \"code stroke\" or \"stroke alert\" been called? ->No Reason for Exam: syncope Acuity: Unknown Type of Exam: Unknown FINDINGS: BRAIN/VENTRICLES: There is no acute intracerebral hemorrhage. The ventricles and sulci are within normal limits. Within the midline of the posterior fossa, there is a 2.5 x 3.2 cm extra-axial fluid collection favoring an arachnoid cyst. ORBITS: The orbits are unremarkable. SINUSES: The visualized paranasal sinuses and mastoid air cells are clear. SOFT TISSUES/SKULL:  The calvarium is intact. 1. No acute intracranial abnormality. 2. 3.2 cm posterior fossa extra-axial fluid collection favoring an arachnoid cyst. Findings were discussed with SHAGUFTA HENDERSON at 09:60 pm on 11/18/2020. Cta Chest W Wo Contrast    Result Date: 11/18/2020  EXAMINATION: CTA OF THE CHEST WITH AND WITHOUT CONTRAST 11/18/2020 12:26 pm TECHNIQUE: CTA of the chest was performed before and after the administration of intravenous contrast.  Multiplanar reformatted images are provided for review. MIP images are provided for review. Dose modulation, iterative reconstruction, and/or weight based adjustment of the mA/kV was utilized to reduce the radiation dose to as low as reasonably achievable. COMPARISON: None. HISTORY: ORDERING SYSTEM PROVIDED HISTORY: ha, cp , ro prox aortic dissection TECHNOLOGIST PROVIDED HISTORY: Reason for exam:->ha, cp , ro prox aortic dissection Reason for Exam: ha, cp , ro prox aortic dissection Acuity: Unknown Type of Exam: Unknown FINDINGS: Aorta:  On the noncontrast images, there is no abnormal para-hyperdensity. The thoracic aorta is normal in caliber. There is no dissection or aneurysm. The great vessels are widely patent. Mediastinum: There is no mediastinal or hilar adenopathy. There is no pericardial effusion. Lungs/Pleura: There is mild dependent atelectasis in the lung bases. No pleural effusion is identified. Upper Abdomen: Limited images of the upper abdomen are unremarkable. Soft Tissues/Bones: No acute osseous abnormality is identified. The chest wall is unremarkable. No acute vascular abnormality is identified. No thoracic aortic dissection or aneurysm. Minimal bibasilar atelectasis. Otherwise, no acute cardiopulmonary disease. Ct Abdomen Pelvis W Iv Contrast Additional Contrast? None    Result Date: 11/18/2020  EXAMINATION: CT OF THE ABDOMEN AND PELVIS WITH CONTRAST 11/18/2020 12:27 pm TECHNIQUE: CT of the abdomen and pelvis was performed with the administration of intravenous contrast. Multiplanar reformatted images are provided for review. Dose modulation, iterative reconstruction, and/or weight based adjustment of the mA/kV was utilized to reduce the radiation dose to as low as reasonably achievable. COMPARISON: None. HISTORY: ORDERING SYSTEM PROVIDED HISTORY: VOMTING, SEPSIS, RO INTRA-ABDOMINAL ABSCESS TECHNOLOGIST PROVIDED HISTORY: Reason for exam:->VOMTING, SEPSIS, RO INTRA-ABDOMINAL ABSCESS Additional Contrast?->None Reason for Exam: VOMTING, SEPSIS, RO INTRA-ABDOMINAL ABSCESS Acuity: Unknown Type of Exam: Unknown FINDINGS: Lower Chest: Atelectasis in the lung bases Organs: Symmetric normal appearing nephrograms. No hydronephrosis. 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  5 mm left upper pole renal cyst.  Remaining solid organs and gallbladder unremarkable. GI/Bowel: No intrinsic gastrointestinal abnormality demonstrated. Fluid throughout the colon and rectum. No evident colonic wall thickening. Normal appendix Pelvis: Urinary bladder unremarkable. identified. POSTERIOR CIRCULATION:  No abnormality of the distal vertebral arteries, basilar artery, or posterior cerebral arteries are identified. OTHER: No dural venous sinus thrombosis on this non-dedicated study. BRAIN: Refer to the CT of the head from the same date. Unremarkable CTA of the head and neck. PROCEDURES   Unless otherwise noted below, none     Procedures    CRITICAL CARE TIME   Critical Care  There was a high probability of life-threatening clinical deterioration in the patient's condition requiring my urgent intervention. Total critical care time with the patient was 35 minutes excluding separately reportable procedures. Critical care required due to patients sepsis, concern for possible meningitis, requiring multiple CT scans, LP performed by my attending, broad-spectrum antibiotics.       CONSULTS:  None      EMERGENCY DEPARTMENT COURSE and DIFFERENTIAL DIAGNOSIS/MDM:   Vitals:    Vitals:    11/18/20 0955 11/18/20 1000 11/18/20 1500   BP: 130/84 133/60 108/65   Pulse: 76 84 67   Resp: 17 20 18   TempSrc: Oral     SpO2: 99% 100% 100%   Weight: 160 lb (72.6 kg)     Height: 5' 8\" (1.727 m)         Patient was given the following medications:  Medications   sodium chloride flush 0.9 % injection 10 mL (has no administration in time range)   sodium chloride flush 0.9 % injection 10 mL (has no administration in time range)   acyclovir (ZOVIRAX) 750 mg in dextrose 5 % 250 mL IVPB (has no administration in time range)   cefTRIAXone (ROCEPHIN) 2 g IVPB in D5W 50ml minibag (2 g Intravenous New Bag 11/18/20 1513)   vancomycin 1000 mg IVPB in 250 mL D5W addavial (has no administration in time range)   ondansetron (ZOFRAN) injection 4 mg (4 mg Intravenous Given 11/18/20 1047)   lactated ringers bolus (0 mL/kg × 72.6 kg Intravenous Stopped 11/18/20 1321)   prochlorperazine (COMPAZINE) injection 10 mg (10 mg Intravenous Given 11/18/20 1047)   diphenhydrAMINE (BENADRYL) injection 25 mg (25 mg Intravenous Given 11/18/20 1047)   diphenhydrAMINE (BENADRYL) injection 25 mg (25 mg Intravenous Given 11/18/20 1129)   benztropine mesylate (COGENTIN) injection 1 mg (1 mg Intravenous Given 11/18/20 1129)   LORazepam (ATIVAN) injection 1 mg (1 mg Intravenous Given 11/18/20 1129)   iopamidol (ISOVUE-370) 76 % injection 150 mL (150 mLs Intravenous Given 11/18/20 1245)           Briefly, this is a previously healthy 59-year-old male who presents to the emergency department today for evaluation for headache, dizziness, nausea and vomiting. The patient states that he received an epidural injection to his lower back 3 weeks ago and since that time has had these intermittent symptoms. Today got out of the shower was noted to be diaphoretic, worsening headache, with multiple episodes of nausea and vomiting. Physical exam, there are no focal neurological deficits. Negative Brudzinski sign. No meningeal signs. Leukocytosis of 23.8, no evidence of anemia or thrombocytopenia. Lactic acid is 2.6. Fluids, Rocephin was given. MP shows a glucose of 192, CO2 is normal.  Procalcitonin is normal.      CT of head does show a subarachnoid cyst however this is not obstructive, remainder of imaging is unremarkable. Patient was started on Rocephin, vancomycin and acyclovir for concern for possible meningitis, CSF shows 2 white blood cells, 1 red blood cell, otherwise is unremarkable. Patient does have sepsis, and will need to be admitted for further care and evaluation. The hospitalist has agreed for admission. Patient is stable for admission    FINAL IMPRESSION      1. Septicemia ([address]    2. Nonintractable headache, unspecified chronicity pattern, unspecified headache type    3. Non-intractable vomiting with nausea, unspecified vomiting type          DISPOSITION/PLAN   DISPOSITION Decision To Admit 11/18/2020 02:54:21 PM      PATIENT REFERREDTO:  No follow-up provider specified.     DISCHARGE MEDICATIONS:  New Prescriptions    No medications on file       DISCONTINUED MEDICATIONS:  Discontinued Medications    No medications on file              (Please note that portions of this note were completed with a voice recognition program.  Efforts were made to edit the dictations but occasionally words are mis-transcribed.)    Bob Ulrich PA-C (electronically signed)           Bob Ulrich PA-C  11/18/20 6461

## 2020-11-18 NOTE — PROGRESS NOTES
Clinical Pharmacy Note: Pharmacy to Dose Vancomycin    Idalia King is a 34 y.o. male started on Vancomycin for Meningitis r/o; consult received from Dr. Nuzhat Culp to manage therapy. Also receiving the following antibiotics: ampicillin, ceftriaxone. Goal Trough Level: 15-20 mcg/mL    Assessment/Plan:  Initiate vancomycin 1000 mg IV every 8 hours. A vancomycin trough has been ordered for 11/19/20 @ 1730. Changes in regimen will be determined based on culture results, renal function, and clinical response. Pharmacy will continue to monitor and adjust regimen as necessary. Allergies:  Patient has no known allergies. Recent Labs     11/18/20  1008   CREATININE 0.9       Recent Labs     11/18/20  1008   WBC 23.8*       Ht Readings from Last 1 Encounters:   11/18/20 5' 8\" (1.727 m)        Wt Readings from Last 1 Encounters:   11/18/20 160 lb (72.6 kg)         Estimated Creatinine Clearance: 117 mL/min (based on SCr of 0.9 mg/dL).       Thank you for the consult,    Brijesh Frazier, PharmD  Clinical Pharmacist [id number]  11/18/2020'

## 2020-11-18 NOTE — H&P
(ZOLOFT) 50 MG tablet       VENTOLIN  (90 Base) MCG/ACT inhaler INHALE 2 PUFFS INTO LUNGS Q 4 TO 6 H PRF BRONCOSPASMS      SYMBICORT 80-4.5 MCG/ACT AERO INHALE 2 PUFFS BID QAM AND QPM      cetirizine (ZYRTEC) 10 MG tablet TK 1 T PO QD      budesonide (RHINOCORT AQUA) 32 MCG/ACT nasal spray 2 sprays by Each Nostril route 2 times daily 1 Bottle 1       Allergies:  No Known Allergies     Social History:  Patient Lives with wife and kids   reports that he has never smoked. He has never used smokeless tobacco. He reports previous alcohol use. He reports previous drug use. Family History:  family history includes Coronary Art Dis in his mother; Diabetes in his father and mother. Physical Exam:  /65   Pulse 67   Resp 18   Ht 5' 8\" (1.727 m)   Wt 160 lb (72.6 kg)   SpO2 100%   BMI 24.33 kg/m²     General appearance:  Appears comfortable. Well nourished  Eyes: Sclera clear, pupils equal  ENT: Moist mucus membranes, no thrush. Trachea midline. Cardiovascular: Regular rhythm, normal S1, S2. No murmur, gallop, rub. No edema in lower extremities  Respiratory: Clear to auscultation bilaterally, no wheeze, good inspiratory effort  Gastrointestinal: Abdomen soft, non-tender, not distended, normal bowel sounds  Musculoskeletal: No cyanosis in digits, neck supple  Neurology: Grossly intact. Alert and oriented in time, place and person. No speech or motor deficits  Psychiatry: Appropriate affect.  Not agitated  Skin: Warm, dry, normal turgor, no rash  Brisk capillary refill, peripheral pulses palpable   Labs:  CBC:   Lab Results   Component Value Date    WBC 23.8 11/18/2020    RBC 5.88 11/18/2020    HGB 17.4 11/18/2020    HCT 53.4 11/18/2020    MCV 90.8 11/18/2020    MCH 29.6 11/18/2020    MCHC 32.6 11/18/2020    RDW 13.3 11/18/2020     11/18/2020    MPV 10.3 11/18/2020     BMP:    Lab Results   Component Value Date     11/18/2020    K 4.4 11/18/2020    CL 99 11/18/2020    CO2 21 11/18/2020 BUN 23 11/18/2020    CREATININE 0.9 11/18/2020    CALCIUM 10.5 11/18/2020    GFRAA >60 11/18/2020    LABGLOM >60 11/18/2020    GLUCOSE 192 11/18/2020     CT ABDOMEN PELVIS W IV CONTRAST Additional Contrast? None   Final Result   1. Fluid throughout the colon and rectum should be correlated with any   history of diarrhea. No evident colonic wall thickening to indicate colitis   2. No evidence of intra abdominopelvic abscess         CTA HEAD NECK W CONTRAST   Preliminary Result   Unremarkable CTA of the head and neck. CTA CHEST W WO CONTRAST   Final Result   No acute vascular abnormality is identified. No thoracic aortic dissection   or aneurysm. Minimal bibasilar atelectasis. Otherwise, no acute cardiopulmonary disease. CT HEAD WO CONTRAST   Final Result   1. No acute intracranial abnormality. 2. 3.2 cm posterior fossa extra-axial fluid collection favoring an arachnoid   cyst.   Findings were discussed with SHAGUFTA HENDERSON at 60:74 pm on 11/18/2020.         h  Problem List  Principal Problem:    Acute metabolic encephalopathy  Active Problems:    Asthma    Anxiety    Nausea & vomiting    Headache    Dizziness    Leukocytosis    Diarrhea    Abnormal CT of the head    Sepsis ([address]  Resolved Problems:    * No resolved hospital problems. *        Assessment/Plan:   1. Neuro checks  2. Check MRI Brain with contrast  3. Droplet plus isolation until COVID negative  4. Check MRI L spine with contrast to r/o epidural abscess  5. Pharmacy consult to dose Vanco  6. IV Rocephin and Ampicillin for possible meningitis  7. ID consult for leukocytosis  8. Neuro consult for HA  9. Ativan IV PRN seizure  10. Check for C diff and GI Pathogen  11. Check EEG to r/o seizure  12. Sepsis orders      DVT prophylaxis SCD  Code status Full code  Diet General  IV access Peripheral  Dee Catheter No    Admit as inpatient.  I anticipate hospitalization spanning more than two midnights for investigation and treatment of the above medically necessary diagnoses. Discussed with patient and wife. Patient is [place] and [place] speaker.       Vincent Cintron MD    11/18/2020 4:58 PM

## 2020-11-18 NOTE — ED PROVIDER NOTES
Age of Onset    Diabetes Mother     Coronary Art Dis Mother     Diabetes Father      Social History     Socioeconomic History    Marital status:      Spouse name: Not on file    Number of children: Not on file    Years of education: Not on file    Highest education level: Not on file   Occupational History    Not on file   Social Needs    Financial resource strain: Not on file    Food insecurity     Worry: Not on file     Inability: Not on file    Transportation needs     Medical: Not on file     Non-medical: Not on file   Tobacco Use    Smoking status: Never Smoker    Smokeless tobacco: Never Used   Substance and Sexual Activity    Alcohol use: Not Currently    Drug use: Not Currently    Sexual activity: Not on file   Lifestyle    Physical activity     Days per week: Not on file     Minutes per session: Not on file    Stress: Not on file   Relationships    Social connections     Talks on phone: Not on file     Gets together: Not on file     Attends Synagogue service: Not on file     Active member of club or organization: Not on file     Attends meetings of clubs or organizations: Not on file     Relationship status: Not on file    Intimate partner violence     Fear of current or ex partner: Not on file     Emotionally abused: Not on file     Physically abused: Not on file     Forced sexual activity: Not on file   Other Topics Concern    Not on file   Social History Narrative    Not on file     Current Facility-Administered Medications   Medication Dose Route Frequency Provider Last Rate Last Dose    oxyCODONE (ROXICODONE) immediate release tablet 5 mg  5 mg Oral Q6H PRN Tayo Bartholomew MD   5 mg at 11/19/20 0529    sodium chloride flush 0.9 % injection 10 mL  10 mL Intravenous 2 times per day Stefano Marshall MD   10 mL at 11/18/20 2302    sodium chloride flush 0.9 % injection 10 mL  10 mL Intravenous PRN Stefano Marshall MD        budesonide (RINOCORT AQUA) nasal spray 2 spray  2 spray Each Nostril BID PRN Ta Sommer MD        cetirizine (ZYRTEC) tablet 10 mg  10 mg Oral Daily Ta Sommer MD        montelukast (SINGULAIR) tablet 10 mg  10 mg Oral Nightly Ta Sommer MD   10 mg at 11/18/20 2037    sertraline (ZOLOFT) tablet 50 mg  50 mg Oral Daily Ta Sommer MD        budesonide-formoterol (SYMBICORT) 80-4.5 MCG/ACT inhaler 2 puff  2 puff Inhalation BID Ta Sommer MD   2 puff at 11/18/20 2103    albuterol sulfate  (90 Base) MCG/ACT inhaler 2 puff  2 puff Inhalation Q4H PRN Ta Sommer MD        sodium chloride flush 0.9 % injection 10 mL  10 mL Intravenous 2 times per day Ta Sommer MD   10 mL at 11/18/20 2302    sodium chloride flush 0.9 % injection 10 mL  10 mL Intravenous PRN Ta Sommer MD        acetaminophen (TYLENOL) tablet 650 mg  650 mg Oral Q6H PRN Ta Sommer MD   650 mg at 11/18/20 2317    Or    acetaminophen (TYLENOL) suppository 650 mg  650 mg Rectal Q6H PRN Ta Sommer MD        polyethylene glycol (GLYCOLAX) packet 17 g  17 g Oral Daily PRN Ta Sommer MD        promethazine (PHENERGAN) tablet 12.5 mg  12.5 mg Oral Q6H PRN Ta Sommer MD        Or    ondansetron (ZOFRAN) injection 4 mg  4 mg Intravenous Q6H PRN Ta Sommer MD   4 mg at 11/18/20 2317    0.9 % sodium chloride infusion   Intravenous Continuous Ta Sommer MD 75 mL/hr at 11/18/20 2037      promethazine (PHENERGAN) injection 12.5 mg  12.5 mg Intravenous Q4H PRN Ta Sommer MD   12.5 mg at 11/19/20 0529    LORazepam (ATIVAN) injection 1 mg  1 mg Intravenous Q4H PRN Ta Sommer MD        sodium chloride flush 0.9 % injection 10 mL  10 mL Intravenous 2 times per day Ta Sommer MD   10 mL at 11/18/20 2301    sodium chloride flush 0.9 % injection 10 mL  10 mL Intravenous PRN Ta Sommer MD   10 mL at 11/18/20 2027    vancomycin 1000 mg IVPB in 250 mL D5W addavial  15 mg/kg Intravenous Asemiller Turner MD   Stopped at 11/19/20 0340    cefepime (MAXIPIME) 2 g IVPB minibag  2 g Intravenous Q12H Kati Aly MD   Stopped at 11/19/20 0017     No Known Allergies    Nursing Notes Reviewed    Physical Exam:  Triage VS:    ED Triage Vitals   Enc Vitals Group      BP       Pulse       Resp       Temp       Temp src       SpO2       Weight       Height       Head Circumference       Peak Flow       Pain Score       Pain Loc       Pain Edu? Excl. in [address]? My pulse ox interpretation is - normal    General appearance: Pale, diaphoretic, appears ill on arrival  Skin:  Warm. Dry. Eye:  Extraocular movements intact. PERRL   Ears, nose, mouth and throat:  Oral mucosa moist   Neck:  Trachea midline. Extremity:  No swelling. Normal ROM     Heart:  Regular  Perfusion:  intact  Respiratory: Respirations nonlabored. Abdominal:   Non distended. Neurological:  Alert and oriented times 3.  5 out of 5 motor strength in extremities, sensation intact to light touch in extremities, cranial nerves           grossly intact, visual fields intact, negative pronator drift, rapid alternating movements intact    I have reviewed and interpreted all of the currently available lab results from this visit (if applicable):  Results for orders placed or performed during the hospital encounter of 11/18/20   Herpes simplex virus PCR    Specimen: CSF   Result Value Ref Range    HSV Source csf    Meningitis/Encephalitis Panel, CSF    Specimen: CSF   Result Value Ref Range    Meningitis Encephalitis Panel       Meningitis Encephalitis Panel PCR Result: Not Detected  See additional report for complete Meningitis Encephalitis Panel.      Meningitis Encephalitis Panel Report   Result Value Ref Range    Report SEE IMAGE    CBC Auto Differential   Result Value Ref Range    WBC 23.8 (H) 4.0 - 11.0 K/uL    RBC 5.88 4.20 - 5.90 M/uL    Hemoglobin 17.4 13.5 - 17.5 g/dL    Hematocrit 53.4 (H) 40.5 - 52.5 %    MCV 90.8 80.0 - 100.0 fL    MCH 29.6 26.0 - 34.0 pg    MCHC 32.6 31.0 - 36.0 g/dL    RDW 13.3 12.4 - 15.4 %    Platelets 563 408 - 073 K/uL    MPV 10.3 5.0 - 10.5 fL    Neutrophils % 85.6 %    Lymphocytes % 10.2 %    Monocytes % 3.2 %    Eosinophils % 0.8 %    Basophils % 0.2 %    Neutrophils Absolute 20.4 (H) 1.7 - 7.7 K/uL    Lymphocytes Absolute 2.4 1.0 - 5.1 K/uL    Monocytes Absolute 0.8 0.0 - 1.3 K/uL    Eosinophils Absolute 0.2 0.0 - 0.6 K/uL    Basophils Absolute 0.1 0.0 - 0.2 K/uL   Troponin   Result Value Ref Range    Troponin <0.01 <0.01 ng/mL   Hepatic Function Panel   Result Value Ref Range    Total Protein 8.8 (H) 6.4 - 8.2 g/dL    Alb 5.0 3.4 - 5.0 g/dL    Alkaline Phosphatase 109 40 - 129 U/L    ALT 25 10 - 40 U/L    AST 27 15 - 37 U/L    Total Bilirubin 0.4 0.0 - 1.0 mg/dL    Bilirubin, Direct <0.2 0.0 - 0.3 mg/dL    Bilirubin, Indirect see below 0.0 - 1.0 mg/dL   Lipase   Result Value Ref Range    Lipase 18.0 13.0 - 60.0 U/L   Brain Natriuretic Peptide   Result Value Ref Range    Pro-BNP 12 0 - 124 pg/mL   Blood Gas, Venous   Result Value Ref Range    pH, Miguelito 7.322 (L) 7.350 - 7.450    pCO2, Miguelito 44.4 40.0 - 50.0 mmHg    pO2, Miguelito 74.4 (H) 25.0 - 40.0 mmHg    HCO3, Venous 22.9 (L) 23.0 - 29.0 mmol/L    Base Excess, Miguelito -3.4 (L) -3.0 - 3.0 mmol/L    O2 Sat, Miguelito 95 Not Established %    Carboxyhemoglobin 2.8 (H) 0.0 - 1.5 %    MetHgb, Miguelito 0.0 <1.5 %    TC02 (Calc), Miguelito 55 Not Established mmol/L    O2 Content, Miguelito 24 Not Established VOL %    O2 Therapy Unknown     Hemoglobin, Miguelito, Reduced 5 %   Lactic Acid, Plasma   Result Value Ref Range    Lactic Acid 2.6 (H) 0.4 - 2.0 mmol/L   Procalcitonin   Result Value Ref Range    Procalcitonin 0.04 0.00 - 0.15 ng/mL   C-reactive protein   Result Value Ref Range    CRP 2.8 0.0 - 5.1 mg/L   Basic Metabolic Panel w/ Reflex to MG   Result Value Ref Range    Sodium 139 136 - 145 mmol/L    Potassium reflex Magnesium 4.4 3.5 - 5.1 mmol/L    Chloride 99 99 - 110 mmol/L    CO2 21 21 - 32 mmol/L    Anion Gap 19 (H) 3 - 16    Glucose 192 (H) 70 - 99 mg/dL BUN 23 (H) 7 - 20 mg/dL    CREATININE 0.9 0.9 - 1.3 mg/dL    GFR Non-African American >60 >60    GFR African American >60 >60    Calcium 10.5 8.3 - 10.6 mg/dL   Beta-Hydroxybutyrate   Result Value Ref Range    Beta-Hydroxybutyrate 0.20 0.00 - 0.27 mmol/L   Glucose CSF   Result Value Ref Range    Glucose, CSF 83 (H) 40 - 80 mg/dL    Appearance, CSF Clear    Protein CSF   Result Value Ref Range    Protein, CSF 27 15 - 45 mg/dL   CSF cell count with differential   Result Value Ref Range    Color, CSF Colorless Colorless    Tube Number + CELL CT + DIFF-CSF Tube 1     Clot Evaluation CSF see below     WBC, CSF 2 0 - 5 /cumm    RBC, CSF 1 (A) 0 /cumm    No differential CSF see below    CSF cell count with differential   Result Value Ref Range    Color, CSF Colorless Colorless    Appearance, CSF Clear Clear    Tube Number + CELL CT + DIFF-CSF Tube 4     Clot Evaluation CSF see below     WBC, CSF 1 0 - 5 /cumm    RBC, CSF 0 0 /cumm    No differential CSF see below    Lactate, Sepsis   Result Value Ref Range    Lactic Acid, Sepsis 1.6 0.4 - 1.9 mmol/L   Basic Metabolic Panel w/ Reflex to MG   Result Value Ref Range    Sodium 140 136 - 145 mmol/L    Potassium reflex Magnesium 3.7 3.5 - 5.1 mmol/L    Chloride 103 99 - 110 mmol/L    CO2 24 21 - 32 mmol/L    Anion Gap 13 3 - 16    Glucose 143 (H) 70 - 99 mg/dL    BUN 14 7 - 20 mg/dL    CREATININE 1.0 0.9 - 1.3 mg/dL    GFR Non-African American >60 >60    GFR African American >60 >60    Calcium 9.5 8.3 - 10.6 mg/dL   CBC auto differential   Result Value Ref Range    WBC 12.7 (H) 4.0 - 11.0 K/uL    RBC 5.01 4.20 - 5.90 M/uL    Hemoglobin 15.2 13.5 - 17.5 g/dL    Hematocrit 45.1 40.5 - 52.5 %    MCV 90.0 80.0 - 100.0 fL    MCH 30.3 26.0 - 34.0 pg    MCHC 33.7 31.0 - 36.0 g/dL    RDW 13.4 12.4 - 15.4 %    Platelets 434 561 - 665 K/uL    MPV 9.5 5.0 - 10.5 fL    Neutrophils % 76.3 %    Lymphocytes % 18.6 %    Monocytes % 4.4 %    Eosinophils % 0.5 %    Basophils % 0.2 % Neutrophils Absolute 9.7 (H) 1.7 - 7.7 K/uL    Lymphocytes Absolute 2.4 1.0 - 5.1 K/uL    Monocytes Absolute 0.6 0.0 - 1.3 K/uL    Eosinophils Absolute 0.1 0.0 - 0.6 K/uL    Basophils Absolute 0.0 0.0 - 0.2 K/uL   Urine Drug Screen   Result Value Ref Range    Amphetamine Screen, Urine Neg Negative <1000ng/mL    Barbiturate Screen, Ur Neg Negative <200 ng/mL    Benzodiazepine Screen, Urine Neg Negative <200 ng/mL    Cannabinoid Scrn, Ur POSITIVE (A) Negative <50 ng/mL    Cocaine Metabolite Screen, Urine Neg Negative <300 ng/mL    Opiate Scrn, Ur Neg Negative <300 ng/mL    PCP Screen, Urine Neg Negative <25 ng/mL    Methadone Screen, Urine Neg Negative <300 ng/mL    Propoxyphene Scrn, Ur Neg Negative <300 ng/mL    Oxycodone Urine Neg Negative <100 ng/ml    Drug Screen Comment: see below    EKG 12 Lead   Result Value Ref Range    Ventricular Rate 62 BPM    Atrial Rate 62 BPM    P-R Interval 154 ms    QRS Duration 84 ms    Q-T Interval 412 ms    QTc Calculation (Bazett) 418 ms    P Axis 57 degrees    R Axis 53 degrees    T Axis 36 degrees    Diagnosis       Normal sinus rhythm with sinus arrhythmiaST elevation, consider early repolarization, pericarditis, or injury      Radiographs (if obtained):  Radiologist's Report Reviewed:  CT head chest abdomen pelvis: No dissection no PE no abscess no pneumonia no bowel obstruction    Lumbar Puncture Procedure Note    Indication: Suspected meningitis    Consent: The patient was counseled regarding the procedure, it's indications, risks, potential complications and alternatives and any questions were answered. Consent was obtained. Procedure: The patient was placed in the sitting, supported by bed side stand, position and the appropriate landmarks were identified. The area was prepped and draped in the usual sterile fashion. Anesthesia was obtained using 5 cc of 1% Lidocaine without epinephrine.  A spinal needle was inserted at the L4- L5 level with the stylet in place until spinal fluid was returned. Opening pressure was not measured. At this point 5.0 cc of clear cerebral spinal fluid was obtained and sent for appropriate testing. The stylet was then replaced and the needle was withdrawn. A sterile dressing was placed over the site and the patient was placed in the supine position. The patient tolerated the procedure well. Complications: None        MDM:  Patient presented to the ER for evaluation of acute onset of severe headache vertigo sensation of near syncope with diffuse diaphoresis and no active chest pain. He had significant severe headache vomiting dizziness and vertigo. He had no dysmetria no nystagmus no visual field deficit. His NIH stroke scale was 0 on arrival.  He had recently had an epidural steroid injection had similar episode after the initial injection on arrival he was afebrile, but pale diaphoretic with recurrent nausea and vomiting. No rebound or guarding of his abdomen. He had no evidence of dissection PE, intra-abdominal abscess he had incidental arachnoid cyst noted. He had a markedly elevated white count with left shift and therefore the concern for possible meningoencephalitis was performed. CSF showed no infection procalcitonin was normal CRP was normal initial lactate and white count were initially elevated. He received fluid resuscitation prior to the results of the CSF he was initially placed on Rocephin and acyclovir for possible meningoencephalitis. He has no evidence of intra-abdominal abscess. He will be admitted for repeat evaluation of intractable nausea vomiting dizziness does not appear to have evidence of acute stroke he will be evaluated for possible septicemia although his rest of his data is reassuring. This may be a left shift from secondary vomiting. Case discussed with the admitting medical staff. Total critical care time provided today was 35 minutes.  This excludes seperately billable procedures and family discussion time. Critical care time provided for obtaining history, conducting a physical exam, performing and monitoring interventions, ordering, collecting and interpreting tests, and establishing medical decision-making. There was a potential for life/limb threatening pathology requiring close evaluation and intervention with concern for patient decompensation. Clinical Impression:  1. Septicemia ([facility] [address]    2. Nonintractable headache, unspecified chronicity pattern, unspecified headache type    3. Non-intractable vomiting with nausea, unspecified vomiting type    4. Subarachnoid cyst      Disposition referral (if applicable):  No follow-up provider specified. Disposition medications (if applicable):  Current Discharge Medication List          Comment: Please note this report has been produced using speech recognition software and may contain errors related to that system including errors in grammar, punctuation, and spelling, as well as words and phrases that may be inappropriate. Efforts were made to edit the dictations.       Caitie Pagan MD  65/46/40 [id number]       Caitie Pagan MD  74/65/85 [id number]

## 2020-11-19 ENCOUNTER — APPOINTMENT (OUTPATIENT)
Dept: MRI IMAGING | Age: 30
DRG: 871 | End: 2020-11-19
Payer: COMMERCIAL

## 2020-11-19 LAB
AMPHETAMINE SCREEN, URINE: ABNORMAL
ANION GAP SERPL CALCULATED.3IONS-SCNC: 13 MMOL/L (ref 3–16)
BARBITURATE SCREEN URINE: ABNORMAL
BASOPHILS ABSOLUTE: 0 K/UL (ref 0–0.2)
BASOPHILS RELATIVE PERCENT: 0.2 %
BENZODIAZEPINE SCREEN, URINE: ABNORMAL
BUN BLDV-MCNC: 14 MG/DL (ref 7–20)
C DIFF TOXIN/ANTIGEN: NORMAL
CALCIUM SERPL-MCNC: 9.5 MG/DL (ref 8.3–10.6)
CANNABINOID SCREEN URINE: POSITIVE
CHLORIDE BLD-SCNC: 103 MMOL/L (ref 99–110)
CO2: 24 MMOL/L (ref 21–32)
COCAINE METABOLITE SCREEN URINE: ABNORMAL
CREAT SERPL-MCNC: 1 MG/DL (ref 0.9–1.3)
EKG ATRIAL RATE: 62 BPM
EKG DIAGNOSIS: NORMAL
EKG P AXIS: 57 DEGREES
EKG P-R INTERVAL: 154 MS
EKG Q-T INTERVAL: 412 MS
EKG QRS DURATION: 84 MS
EKG QTC CALCULATION (BAZETT): 418 MS
EKG R AXIS: 53 DEGREES
EKG T AXIS: 36 DEGREES
EKG VENTRICULAR RATE: 62 BPM
EOSINOPHILS ABSOLUTE: 0.1 K/UL (ref 0–0.6)
EOSINOPHILS RELATIVE PERCENT: 0.5 %
ESTIMATED AVERAGE GLUCOSE: 111.2 MG/DL
GFR AFRICAN AMERICAN: >60
GFR NON-AFRICAN AMERICAN: >60
GLUCOSE BLD-MCNC: 143 MG/DL (ref 70–99)
HBA1C MFR BLD: 5.5 %
HCT VFR BLD CALC: 45.1 % (ref 40.5–52.5)
HEMOGLOBIN: 15.2 G/DL (ref 13.5–17.5)
LYMPHOCYTES ABSOLUTE: 2.4 K/UL (ref 1–5.1)
LYMPHOCYTES RELATIVE PERCENT: 18.6 %
Lab: ABNORMAL
MCH RBC QN AUTO: 30.3 PG (ref 26–34)
MCHC RBC AUTO-ENTMCNC: 33.7 G/DL (ref 31–36)
MCV RBC AUTO: 90 FL (ref 80–100)
METHADONE SCREEN, URINE: ABNORMAL
MONOCYTES ABSOLUTE: 0.6 K/UL (ref 0–1.3)
MONOCYTES RELATIVE PERCENT: 4.4 %
NEUTROPHILS ABSOLUTE: 9.7 K/UL (ref 1.7–7.7)
NEUTROPHILS RELATIVE PERCENT: 76.3 %
OPIATE SCREEN URINE: ABNORMAL
OXYCODONE URINE: ABNORMAL
PDW BLD-RTO: 13.4 % (ref 12.4–15.4)
PH UA: 5
PHENCYCLIDINE SCREEN URINE: ABNORMAL
PLATELET # BLD: 190 K/UL (ref 135–450)
PMV BLD AUTO: 9.5 FL (ref 5–10.5)
POTASSIUM REFLEX MAGNESIUM: 3.7 MMOL/L (ref 3.5–5.1)
PROPOXYPHENE SCREEN: ABNORMAL
RBC # BLD: 5.01 M/UL (ref 4.2–5.9)
SARS-COV-2, PCR: NOT DETECTED
SODIUM BLD-SCNC: 140 MMOL/L (ref 136–145)
WBC # BLD: 12.7 K/UL (ref 4–11)

## 2020-11-19 PROCEDURE — 85025 COMPLETE CBC W/AUTO DIFF WBC: CPT

## 2020-11-19 PROCEDURE — 6360000004 HC RX CONTRAST MEDICATION: Performed by: INTERNAL MEDICINE

## 2020-11-19 PROCEDURE — 2580000003 HC RX 258: Performed by: INTERNAL MEDICINE

## 2020-11-19 PROCEDURE — 80048 BASIC METABOLIC PNL TOTAL CA: CPT

## 2020-11-19 PROCEDURE — 72156 MRI NECK SPINE W/O & W/DYE: CPT

## 2020-11-19 PROCEDURE — 6360000002 HC RX W HCPCS: Performed by: INTERNAL MEDICINE

## 2020-11-19 PROCEDURE — 36415 COLL VENOUS BLD VENIPUNCTURE: CPT

## 2020-11-19 PROCEDURE — 87506 IADNA-DNA/RNA PROBE TQ 6-11: CPT

## 2020-11-19 PROCEDURE — 80307 DRUG TEST PRSMV CHEM ANLYZR: CPT

## 2020-11-19 PROCEDURE — 99223 1ST HOSP IP/OBS HIGH 75: CPT | Performed by: PSYCHIATRY & NEUROLOGY

## 2020-11-19 PROCEDURE — 6370000000 HC RX 637 (ALT 250 FOR IP): Performed by: INTERNAL MEDICINE

## 2020-11-19 PROCEDURE — 93010 ELECTROCARDIOGRAM REPORT: CPT | Performed by: INTERNAL MEDICINE

## 2020-11-19 PROCEDURE — 87324 CLOSTRIDIUM AG IA: CPT

## 2020-11-19 PROCEDURE — 99233 SBSQ HOSP IP/OBS HIGH 50: CPT | Performed by: INTERNAL MEDICINE

## 2020-11-19 PROCEDURE — 95816 EEG AWAKE AND DROWSY: CPT

## 2020-11-19 PROCEDURE — 2060000000 HC ICU INTERMEDIATE R&B

## 2020-11-19 PROCEDURE — 87505 NFCT AGENT DETECTION GI: CPT

## 2020-11-19 PROCEDURE — 2580000003 HC RX 258: Performed by: EMERGENCY MEDICINE

## 2020-11-19 PROCEDURE — A9577 INJ MULTIHANCE: HCPCS | Performed by: INTERNAL MEDICINE

## 2020-11-19 PROCEDURE — 87449 NOS EACH ORGANISM AG IA: CPT

## 2020-11-19 PROCEDURE — 95816 EEG AWAKE AND DROWSY: CPT | Performed by: PSYCHIATRY & NEUROLOGY

## 2020-11-19 PROCEDURE — 94640 AIRWAY INHALATION TREATMENT: CPT

## 2020-11-19 RX ORDER — FLUOXETINE HYDROCHLORIDE 20 MG/1
20 CAPSULE ORAL DAILY
COMMUNITY
End: 2022-02-24

## 2020-11-19 RX ORDER — ORPHENADRINE CITRATE 30 MG/ML
60 INJECTION INTRAMUSCULAR; INTRAVENOUS EVERY 12 HOURS
Status: COMPLETED | OUTPATIENT
Start: 2020-11-19 | End: 2020-11-20

## 2020-11-19 RX ORDER — KETOROLAC TROMETHAMINE 30 MG/ML
30 INJECTION, SOLUTION INTRAMUSCULAR; INTRAVENOUS EVERY 6 HOURS
Status: COMPLETED | OUTPATIENT
Start: 2020-11-19 | End: 2020-11-21

## 2020-11-19 RX ORDER — HYDROMORPHONE HYDROCHLORIDE 1 MG/ML
1 INJECTION, SOLUTION INTRAMUSCULAR; INTRAVENOUS; SUBCUTANEOUS EVERY 4 HOURS PRN
Status: DISCONTINUED | OUTPATIENT
Start: 2020-11-19 | End: 2020-11-23

## 2020-11-19 RX ORDER — SODIUM CHLORIDE 0.9 % (FLUSH) 0.9 %
10 SYRINGE (ML) INJECTION ONCE
Status: COMPLETED | OUTPATIENT
Start: 2020-11-19 | End: 2020-11-19

## 2020-11-19 RX ORDER — OXYCODONE HYDROCHLORIDE 5 MG/1
5 TABLET ORAL EVERY 6 HOURS PRN
Status: DISCONTINUED | OUTPATIENT
Start: 2020-11-19 | End: 2020-11-19

## 2020-11-19 RX ADMIN — LORAZEPAM 1 MG: 2 INJECTION INTRAMUSCULAR; INTRAVENOUS at 14:01

## 2020-11-19 RX ADMIN — Medication 10 ML: at 21:28

## 2020-11-19 RX ADMIN — OXYCODONE 5 MG: 5 TABLET ORAL at 10:44

## 2020-11-19 RX ADMIN — VANCOMYCIN HYDROCHLORIDE 1000 MG: 1 INJECTION, POWDER, LYOPHILIZED, FOR SOLUTION INTRAVENOUS at 10:40

## 2020-11-19 RX ADMIN — VANCOMYCIN HYDROCHLORIDE 1000 MG: 1 INJECTION, POWDER, LYOPHILIZED, FOR SOLUTION INTRAVENOUS at 02:19

## 2020-11-19 RX ADMIN — ONDANSETRON 4 MG: 2 INJECTION INTRAMUSCULAR; INTRAVENOUS at 14:01

## 2020-11-19 RX ADMIN — MONTELUKAST SODIUM 10 MG: 10 TABLET, COATED ORAL at 21:08

## 2020-11-19 RX ADMIN — KETOROLAC TROMETHAMINE 30 MG: 30 INJECTION, SOLUTION INTRAMUSCULAR at 21:08

## 2020-11-19 RX ADMIN — Medication 10 ML: at 21:11

## 2020-11-19 RX ADMIN — CEFEPIME HYDROCHLORIDE 2 G: 2 INJECTION, POWDER, FOR SOLUTION INTRAVENOUS at 21:21

## 2020-11-19 RX ADMIN — GADOBENATE DIMEGLUMINE 15 ML: 529 INJECTION, SOLUTION INTRAVENOUS at 15:30

## 2020-11-19 RX ADMIN — KETOROLAC TROMETHAMINE 30 MG: 30 INJECTION, SOLUTION INTRAMUSCULAR at 14:00

## 2020-11-19 RX ADMIN — HYDROMORPHONE HYDROCHLORIDE 1 MG: 1 INJECTION, SOLUTION INTRAMUSCULAR; INTRAVENOUS; SUBCUTANEOUS at 16:32

## 2020-11-19 RX ADMIN — Medication 10 ML: at 09:38

## 2020-11-19 RX ADMIN — VANCOMYCIN HYDROCHLORIDE 1000 MG: 1 INJECTION, POWDER, LYOPHILIZED, FOR SOLUTION INTRAVENOUS at 18:38

## 2020-11-19 RX ADMIN — Medication 10 ML: at 15:30

## 2020-11-19 RX ADMIN — ONDANSETRON 4 MG: 2 INJECTION INTRAMUSCULAR; INTRAVENOUS at 09:37

## 2020-11-19 RX ADMIN — Medication 2 PUFF: at 20:35

## 2020-11-19 RX ADMIN — CEFEPIME HYDROCHLORIDE 2 G: 2 INJECTION, POWDER, FOR SOLUTION INTRAVENOUS at 09:39

## 2020-11-19 RX ADMIN — SERTRALINE HYDROCHLORIDE 50 MG: 50 TABLET ORAL at 09:37

## 2020-11-19 RX ADMIN — SODIUM CHLORIDE: 9 INJECTION, SOLUTION INTRAVENOUS at 10:53

## 2020-11-19 RX ADMIN — ORPHENADRINE CITRATE 60 MG: 60 INJECTION INTRAMUSCULAR; INTRAVENOUS at 16:18

## 2020-11-19 RX ADMIN — PROMETHAZINE HYDROCHLORIDE 12.5 MG: 25 INJECTION INTRAMUSCULAR; INTRAVENOUS at 05:29

## 2020-11-19 RX ADMIN — OXYCODONE 5 MG: 5 TABLET ORAL at 05:29

## 2020-11-19 RX ADMIN — CETIRIZINE HYDROCHLORIDE 10 MG: 10 TABLET, FILM COATED ORAL at 09:37

## 2020-11-19 RX ADMIN — Medication 10 ML: at 21:09

## 2020-11-19 ASSESSMENT — ENCOUNTER SYMPTOMS
EYE REDNESS: 0
ABDOMINAL PAIN: 0
CONSTIPATION: 0
RHINORRHEA: 0
NAUSEA: 0
EYE DISCHARGE: 0
BACK PAIN: 0
DIARRHEA: 0
SORE THROAT: 0
TROUBLE SWALLOWING: 0
COUGH: 0
WHEEZING: 0
SHORTNESS OF BREATH: 0

## 2020-11-19 ASSESSMENT — PAIN SCALES - GENERAL
PAINLEVEL_OUTOF10: 9
PAINLEVEL_OUTOF10: 4
PAINLEVEL_OUTOF10: 0
PAINLEVEL_OUTOF10: 6
PAINLEVEL_OUTOF10: 0
PAINLEVEL_OUTOF10: 0
PAINLEVEL_OUTOF10: 6
PAINLEVEL_OUTOF10: 7
PAINLEVEL_OUTOF10: 7
PAINLEVEL_OUTOF10: 6

## 2020-11-19 NOTE — ED NOTES
Pt.is awake, alert  And oriented to self and surroundings. Wife at bedside. Skin warm and dry. Resp easy and regular. Denies any pain or cough at present. Pt. Updated on plan of care.       Bonita Pantoja RN  11/18/20 6821

## 2020-11-19 NOTE — PROGRESS NOTES
Infectious Diseases   Progress Note      Admission Date: 11/18/2020  Hospital Day: Hospital Day: 2   Attending: Micahel Rachel MD  Date of service: 11/19/2020     Chief complaint/ Reason for consult:     · Sepsis with bandemia, metabolic encephalopathy, lactic acidosis-underlying bacteremia suspected, likely staphylococcal  · Severe headaches  · Nausea and vomiting    Microbiology:        I have reviewed allavailable micro lab data and cultures    · Blood culture (2/2) - collected on 11/19/2020: Negative so far  · Stool C. difficile- collected on 11/19/2020: Negative      Antibiotics and immunizations:       Current antibiotics: All antibiotics and their doses were reviewed by me    Recent Abx Admin                   vancomycin 1000 mg IVPB in 250 mL D5W addavial (mg) 1,000 mg New Bag 11/19/20 1040     1,000 mg New Bag  0219    cefepime (MAXIPIME) 2 g IVPB minibag (g) 2 g New Bag 11/19/20 0939     2 g New Bag 11/18/20 2300    ampicillin 2 g ivpb mini bag (g) 2 g New Bag 11/18/20 2037    vancomycin 1000 mg IVPB in 250 mL D5W addavial (mg) 1,000 mg New Bag 11/18/20 1809    acyclovir (ZOVIRAX) 750 mg in dextrose 5 % 250 mL IVPB (mg) 750 mg New Bag 11/18/20 1543    cefTRIAXone (ROCEPHIN) 2 g IVPB in D5W 50ml minibag (g) 2 g New Bag 11/18/20 1513                  Immunization History: All immunization history was reviewed by me today. There is no immunization history on file for this patient. Known drug allergies: All allergies were reviewed and updated    No Known Allergies    Social history:     Social History:  All social andepidemiologic history was reviewed and updated by me today as needed. · Tobacco use:   reports that he has never smoked. He has never used smokeless tobacco.  · Alcohol use:   reports previous alcohol use. · Currently lives in: [address] [place] [address] [address]  reports previous drug use.          Assessment:     The patient is a 34 y.o. old male who  has a past medical history of Anxiety, Asthma, and Chronic back pain. with following problems:    · Sepsis with bandemia, metabolic encephalopathy, lactic acidosis-bacteremia suspected, blood cultures are in process  · Severe headaches-improving  · Nausea and vomiting-improving  · Low procalcitonin 0.04  · Rule out COVID-19  · Diarrhea  · Arachnoid cyst, incidental finding on CT scan of the head  · Cannabis abuse  · Overweight* due to excess calorie intake : Body mass index is 25.64 kg/m². Discussion:      The patient is afebrile. I had put him on IV vancomycin and cefepime yesterday    White cell count is come down to 12,700. Blood cultures from yesterday are in process and have no growth so far. Serum creatinine is 1.0. Urine tox screen was positive for cannabis. COVID-19 PCR test has come back negative as expected. Plan:     Diagnostic Workup:      · Continue to follow  fever curve, WBC count and blood cultures. · Continue to monitor blood counts, liver and renal function. Antimicrobials:    · Will continue empiric vancomycin intravenously  Check Vancomycin trough before the 5th dose. Target vancomycin trough of around 15. Keep vancomycin trough below 20 at all times. Avoid increasing the dose of vancomycin above a total of 4 grams in a 24-hour period in patients younger than 45 years and above 3 grams in a 24-hour period in a patient of age 39 years or older. Continue to monitor serum creatinine and Vanco levels closely, while the patient is on I/v Vancomycin. · Continue IV  cefepime 2 g every 12 hours  · Avoid PICC line. Midline okay if needed for IV access  · We will follow up on the culture results and clinical progress and will make further recommendations accordingly. · Continue close vitals monitoring. · Maintain good glycemic control. · Fall precaution. · DVT prophylaxis. · Discussed all above with patient and RN.       Drug Monitoring:    · Continue monitoring for antibiotic toxicity as follows: CBC, CMP, vancomycin trough  · Continue to watch for following: new or worsening fever, new hypotension, hives, lip swelling and redness or purulence at vascular access sites. I/v access Management:    · Continue to monitor i.v access sites for erythema, induration, discharge or tenderness. · As always, continue efforts to minimize tubes/lines/drains as clinically appropriate to reduce chances of line associated infections. Patient education and counseling:        · The patient was educated in detail about the side-effects of various antibiotics and things to watch for like new rashes, lip swelling, severe reaction, worsening diarrhea, break through fever etc.  · Discussed patient's condition and what to expect. All of the patient's questions were addressed in a satisfactory manner and patient verbalized understanding all instructions. Level of complexity of visit: High     Risk of Complications/Morbidity: High     · Illness(es)/ Infection present that pose threat to life/bodily function. · There is potential for severe exacerbation of infection/side effects of treatment. · Therapy requires intensive monitoring for antimicrobial agent toxicity. TIME SPENT TODAY:     - Spent over  36  minutes on visit (including interval history, physical exam, review of data including labs, cultures, imaging, development and implementation of treatment plan and coordination of complex care). Thank you for involving me in the care of your patient. I will continue to follow. If you have anyadditional questions, please do not hesitate to contact me. Subjective: Interval history: Interval history was obtained from chart review and patient/ RN. The patient is afebrile today. He is more awake today. He is tolerating antibiotics okay. REVIEW OF SYSTEMS:    Review of Systems   Constitutional: Negative for chills, diaphoresis and fever.    HENT: Negative for ear discharge, ear pain, rhinorrhea, sore throat and trouble swallowing. Eyes: Negative for discharge and redness. Respiratory: Negative for cough, shortness of breath and wheezing. Cardiovascular: Negative for chest pain and leg swelling. Gastrointestinal: Negative for abdominal pain, constipation, diarrhea and nausea. Endocrine: Negative for polyuria. Genitourinary: Negative for dysuria, flank pain, frequency, hematuria and urgency. Musculoskeletal: Negative for back pain and myalgias. Skin: Negative for rash. Neurological: Negative for dizziness, seizures and headaches. Hematological: Does not bruise/bleed easily. Psychiatric/Behavioral: Negative for hallucinations and suicidal ideas. All other systems reviewed and are negative. Past Medical History: All past medical history reviewed today. Past Medical History:   Diagnosis Date    Anxiety     Asthma     Chronic back pain        Past Surgical History: All past surgical history was reviewed today. Past Surgical History:   Procedure Laterality Date    MEDICATION INJECTION      Epidural        Family History: All family history was reviewed today. Problem Relation Age of Onset    Diabetes Mother     Coronary Art Dis Mother     Diabetes Father        Objective:       PHYSICAL EXAM:      Vitals:   Vitals:    11/19/20 0530 11/19/20 0815 11/19/20 0930 11/19/20 1345   BP: 124/78  115/70 130/84   Pulse: 72  72 76   Resp: 18  16 18   Temp: 97.9 °F (36.6 °C)  98.4 °F (36.9 °C) 98.1 °F (36.7 °C)   TempSrc: Oral  Oral Oral   SpO2: 98%  99% 98%   Weight:  168 lb 10.4 oz (76.5 kg)     Height:           Physical Exam  Vitals signs and nursing note reviewed. Constitutional:       Appearance: Normal appearance. He is well-developed. HENT:      Head: Normocephalic and atraumatic. Right Ear: External ear normal.      Left Ear: External ear normal.      Nose: Nose normal. No congestion or rhinorrhea.       Mouth/Throat:      Mouth: Mucous membranes are moist. Pharynx: No oropharyngeal exudate or posterior oropharyngeal erythema. Eyes:      General: No scleral icterus. Right eye: No discharge. Left eye: No discharge. Conjunctiva/sclera: Conjunctivae normal.      Pupils: Pupils are equal, round, and reactive to light. Neck:      Musculoskeletal: Normal range of motion and neck supple. No neck rigidity or muscular tenderness. Cardiovascular:      Rate and Rhythm: Normal rate and regular rhythm. Pulses: Normal pulses. Heart sounds: No murmur. No friction rub. Pulmonary:      Effort: Pulmonary effort is normal. No respiratory distress. Breath sounds: Normal breath sounds. No stridor. No wheezing, rhonchi or rales. Abdominal:      General: Bowel sounds are normal.      Palpations: Abdomen is soft. Tenderness: There is no abdominal tenderness. There is no right CVA tenderness, left CVA tenderness, guarding or rebound. Musculoskeletal: Normal range of motion. General: No swelling or tenderness. Lymphadenopathy:      Cervical: No cervical adenopathy. Skin:     General: Skin is warm and dry. Coloration: Skin is not jaundiced. Findings: No erythema or rash. Neurological:      General: No focal deficit present. Mental Status: He is alert and oriented to person, place, and time. Mental status is at baseline. Motor: No abnormal muscle tone. Psychiatric:         Mood and Affect: Mood normal.         Behavior: Behavior normal.         Thought Content: Thought content normal.            Lines: All vascular access sites are healthy with no local erythema, discharge or tenderness. Intake and output:    I/O last 3 completed shifts: In: 2878 [IV Piggyback:2878]  Out: 150 [Urine:150]    Lab Data:   All available labs and old records have been reviewed by me.     CBC:  Recent Labs     11/18/20  1008 11/19/20  0545   WBC 23.8* 12.7*   RBC 5.88 5.01   HGB 17.4 15.2   HCT 53.4* 45.1    190   MCV 90.8 90.0   MCH 29.6 30.3   MCHC 32.6 33.7   RDW 13.3 13.4        BMP:  Recent Labs     11/18/20  1008 11/19/20  0545    140   K 4.4 3.7   CL 99 103   CO2 21 24   BUN 23* 14   CREATININE 0.9 1.0   CALCIUM 10.5 9.5   GLUCOSE 192* 143*        Hepatic Function Panel:   Lab Results   Component Value Date    ALKPHOS 109 11/18/2020    ALT 25 11/18/2020    AST 27 11/18/2020    PROT 8.8 11/18/2020    BILITOT 0.4 11/18/2020    BILIDIR <0.2 11/18/2020    IBILI see below 11/18/2020    LABALBU 5.0 11/18/2020       CPK: No results found for: CKTOTAL  ESR: No results found for: SEDRATE  CRP:   Lab Results   Component Value Date    CRP 2.8 11/18/2020           Imaging: All pertinent images and reports for the current visit were reviewed by me during this visit. MRI BRAIN W WO CONTRAST   Final Result   1. No acute intracranial abnormality. 2. Scattered punctate foci of T2/FLAIR hyperintensity in the peripheral   cerebral white matter are nonspecific. Differential diagnosis includes early   sequela of chronic microvascular ischemia, sequela of prior vasculitis or   chronic migraine headaches, or much less likely demyelinating disease. MRI LUMBAR SPINE W WO CONTRAST   Final Result   1. No acute abnormality of the lumbar spine. 2. Mild left L5 neural foraminal narrowing secondary to a left foraminal   L5-S1 disc protrusion. CT ABDOMEN PELVIS W IV CONTRAST Additional Contrast? None   Final Result   1. Fluid throughout the colon and rectum should be correlated with any   history of diarrhea. No evident colonic wall thickening to indicate colitis   2. No evidence of intra abdominopelvic abscess         CTA HEAD NECK W CONTRAST   Final Result   Unremarkable CTA of the head and neck. CTA CHEST W WO CONTRAST   Final Result   No acute vascular abnormality is identified. No thoracic aortic dissection   or aneurysm. Minimal bibasilar atelectasis. Otherwise, no acute cardiopulmonary disease. CT HEAD WO CONTRAST   Final Result   1. No acute intracranial abnormality. 2. 3.2 cm posterior fossa extra-axial fluid collection favoring an arachnoid   cyst.   Findings were discussed with SHAGUFTA HENDERSON at 95:94 pm on 11/18/2020. MRI CERVICAL SPINE WITH CONTRAST    (Results Pending)       Medications: All current and past medications were reviewed.  orphenadrine  60 mg Intravenous Q12H    ketorolac  30 mg Intravenous Q6H    sodium chloride flush  10 mL Intravenous 2 times per day    cetirizine  10 mg Oral Daily    montelukast  10 mg Oral Nightly    sertraline  50 mg Oral Daily    budesonide-formoterol  2 puff Inhalation BID    sodium chloride flush  10 mL Intravenous 2 times per day    sodium chloride flush  10 mL Intravenous 2 times per day    vancomycin  15 mg/kg Intravenous Q8H    cefepime  2 g Intravenous Q12H        sodium chloride 75 mL/hr at 11/19/20 1053       oxyCODONE, sodium chloride flush, budesonide, albuterol sulfate HFA, sodium chloride flush, acetaminophen **OR** acetaminophen, polyethylene glycol, promethazine **OR** ondansetron, promethazine, LORazepam, sodium chloride flush      Problem list:       Patient Active Problem List   Diagnosis Code    Asthma J45.909    Anxiety F41.9    Nausea & vomiting R11.2    Headache R51.9    Dizziness R42    Acute metabolic encephalopathy J29.04    Leukocytosis D72.829    Diarrhea R19.7    Abnormal CT of the head R93.0    Septicemia (HCC) A41.9    Subarachnoid cyst G93.0    Lactic acid acidosis E87.2    Arachnoid cyst G93.0    Overweight (BMI 25.0-29. 9) E66.3    Cannabis abuse F12.10       Please note that this chart was generated using Dragon dictation software. Although every effort was made to ensure the accuracy of this automated transcription, some errors in transcription may have occurred inadvertently.  If you may need any clarification, please do not hesitate to contact me through [facility] or at the phone number provided below with my electronic signature. Any pictures or media included in this note were obtained after taking informed verbal consent from the patient and with their approval to include those in the patient's medical record.     Cielo Chamberlain MD, MPH  11/19/2020 , 2:12 PM   [facility] Infectious Disease   [address] [place] [address], [address] [place] [address]  Office: [phone]  Fax: [phone]  Clinic days:  Tuesday & Thursday

## 2020-11-19 NOTE — PROCEDURES
[facility] [address]                     [facility], [address]                          ELECTROENCEPHALOGRAM REPORT    PATIENT NAME: Americo Willard                      :        1990  MED REC NO:   [id number]                          ROOM:       [id number]  ACCOUNT NO:   [de-identified]                           ADMIT DATE: 2020  PROVIDER:     Chanda Sousa MD    DATE OF EE2020    SUMMARY:  The background rhythm on this recording consists of  well-developed and well-organized waveforms of 8-10 Hz frequency which  are bilaterally synchronous and symmetrical.  No focal abnormalities  were noted. No spikes or sharp waves were seen. Hyperventilation was  not performed. Photic stimulation did not produce any change. A sleep  recording was not obtained. IMPRESSION:  This EEG obtained during the awake state and drowsiness is  within normal limits.         Radha Ho MD    D: 2020 14:02:17       T: 2020 14:10:51     [email or web address]  Job#: [id number]     Doc#: [id number]    CC:

## 2020-11-19 NOTE — PROGRESS NOTES
Patient arrived from ED via stretcher, alert and orient x 4,RA ,  sat >  denies pain or dizziness, oriented to room and call light, plan of care reviewed with patient with  with pt  verbalizing understanding. All safety precaution put in place.  Plan of care continues

## 2020-11-19 NOTE — ED NOTES
3T able to visualize a rhythm on tele. Pt. Transported to floor per roge Vicente.       Nikki Peraza RN  11/18/20 [id number]

## 2020-11-19 NOTE — CONSULTS
Infectious Diseases   Consult Note        Admission Date: 11/18/2020  Hospital Day: Hospital Day: 1   Attending: Ashley Canela MD  Date of service: 11/18/20     Reason for admission: Acute metabolic encephalopathy [C81.70]  Acute metabolic encephalopathy [Q34.51]    Chief complaint/ Reason for consult: Sepsis    Microbiology:        I have reviewed allavailable micro lab data and cultures    · Blood culture (2/2) - collected on 11/18/2020  In process  · CSF culture  - collected on 11/18/2020: in process      Antibiotics and immunizations:       Current antibiotics: All antibiotics and their doses were reviewed by me    Recent Abx Admin                   ampicillin 2 g ivpb mini bag (g) 2 g New Bag 11/18/20 2037    vancomycin 1000 mg IVPB in 250 mL D5W addavial (mg) 1,000 mg New Bag 11/18/20 1809    acyclovir (ZOVIRAX) 750 mg in dextrose 5 % 250 mL IVPB (mg) 750 mg New Bag 11/18/20 1543    cefTRIAXone (ROCEPHIN) 2 g IVPB in D5W 50ml minibag (g) 2 g New Bag 11/18/20 1513                  Immunization History: All immunization history was reviewed by me today. There is no immunization history on file for this patient. Known drug allergies: All allergies were reviewed and updated    No Known Allergies    Social history:     Social History:  All social andepidemiologic history was reviewed and updated by me today as needed. · Tobacco use:   reports that he has never smoked. He has never used smokeless tobacco.  · Alcohol use:   reports previous alcohol use. · Currently lives in: [address] [place] [address]   ·  reports previous drug use. Assessment:     The patient is a 34 y.o. old male who  has a past medical history of Anxiety, Asthma, and Chronic back pain.  with following problems:    · Sepsis with bandemia, metabolic encephalopathy, lactic acidosis-underlying bacteremia suspected, likely staphylococcal  · Severe headaches  · Nausea and vomiting  · Low procalcitonin 0.04  · Rule out COVID-19  · Diarrhea  · Arachnoid cyst, incidental finding on CT scan of the head      Discussion:      The patient had a steroid injection in his back around 3 weeks ago for back pain. He has been admitted with the nausea and vomiting, headaches, lactic acidosis. The patient had CT scan of the head without contrast done which showed a possible arachnoid cyst    CT angiogram of chest abdomen and pelvis were grossly unremarkable except for diarrheal disease. He had a CT angiogram of the head and neck and MRI of the brain, MRI of the lumbar spine with and without contrast, which did not show any acute infectious process    The patient had a lumbar puncture done in the ER. CSF white cell count was 2, RBC count was 1, CSF glucose was normal at 83 and protein was low at 27. This effectively goes against meningitis or neck encephalitis. Meningitis viral PCR panel was also negative    Plan:     Diagnostic Workup:    · Agree with blood cultures  · Follow-up on CSF culture  · Follow-up on stool GI PCR and C. difficile antigen  · Continue to follow fever curve, WBC count and blood cultures  · Follow up on liverand renal functions closely  · Follow-up on COVID-19 test  · We will order urine tox screen    Antimicrobials:    · Will continue empiric IV vancomycin  Check Vancomycin trough before the 5th dose. Target vancomycin trough of around 15. Keep vancomycin trough below 20 at all times. Avoid increasing the dose of vancomycin above a total of 4 grams in a 24-hour period in patients younger than 45 years and above 3 grams in a 24-hour period in a patient of age 39 years or older. Continue to monitor serum creatinine and Vanco levels closely, while the patient is on I/v Vancomycin.    · We will stop IV ceftriaxone today  · We will start IV cefepime 2 g every 12 hour for empiric gram-negative coverage including Pseudomonas coverage as bacteremia suspected   · Will follow up on the culture results and review progress and will make further recommendations accordingly  · My suspicion for Covid infection is low. CT scan of the chest did not show any pneumonias. If COVID-19 test comes back negative, and the patient remains on room air, he can come out COVID-19 isolation from my standpoint  · Fall precautions  · Pain control  · DVT prophylaxis  · Discussed with RN      Drug Monitoring:    · Continue serial monitoring for antibiotic toxicity as follows: Vanco trough, CMP  · Continue to watch for following: new or worsening fever, hypotension, hives, lip swelling and redness or purulence at vascular access sites. I/v access Management:    · Continue to monitor i.v access sites for erythema, induration, discharge or tenderness. · As always, continue efforts to minimizetubes/lines/drains as clinically appropriate to reduce chances of line associated infections. Current isolation precautions:    Currently active isolation(s): Droplet Plus       Level of complexity of consult: High     Risk of Complications/Morbidity: High     · Illness(es)/ Infection present that pose threat to life/bodily function. · There is potential for severe exacerbation of infection/side effects of treatment. · Therapy requires intensive monitoring for antimicrobial agent toxicity. Thank you for involving me in the care of your patient. I will continue to follow. If you have any additional questions, please do not hesitate to contact me. Subjective:     Presenting complaint in ER:     Chief Complaint   Patient presents with    Emesis     pt in by squad from home- with c/o feeling dizzy/nauseated after getting out of shower this morning- no fever, no cough. HPI: Neil Gracia is a 34 y.o. male patient, who was seen at the request of Dr. Ligia Arcos MD.    History was obtained from chart review and the patient/RN. The patient was admitted on 11/18/2020.  I have been consulted to see the patient for above mentioned reason(s). The patient has multiple medical comorbidities, and presented to the ER for headache nausea vomiting and dizziness. The patient was having low back pain and was seen by orthopedics 3 weeks ago and had epidural steroid injection. He tolerated the procedure well. He was having intermittent headaches, dizziness, nausea and vomiting after the injection and the headache became severe yesterday. The patient had multiple episodes of emesis. The patient was brought to the ER by his wife where he was noted to have elevated white cell count of 22,800. He was admitted. Blood cultures were sent. Lumbar puncture was done. I have been asked for my opinion for management for this patient. Past Medical History: All past medical history reviewed today. Past Medical History:   Diagnosis Date    Anxiety     Asthma     Chronic back pain          Past Surgical History: All pastsurgical history was reviewed today. Past Surgical History:   Procedure Laterality Date    MEDICATION INJECTION      Epidural          Family History: All family history was reviewed today. Problem Relation Age of Onset    Diabetes Mother     Coronary Art Dis Mother     Diabetes Father          Medications: All current and past medications were reviewed. Not in a hospital admission.      sodium chloride flush  10 mL Intravenous 2 times per day    [START ON 11/19/2020] cetirizine  10 mg Oral Daily    montelukast  10 mg Oral Nightly    [START ON 11/19/2020] sertraline  50 mg Oral Daily    budesonide-formoterol  2 puff Inhalation BID    ampicillin IV  2 g Intravenous Q6H    [START ON 11/19/2020] cefTRIAXone (ROCEPHIN) IV  2 g Intravenous Q12H    sodium chloride flush  10 mL Intravenous 2 times per day    sodium chloride flush  10 mL Intravenous 2 times per day    [START ON 11/19/2020] vancomycin  15 mg/kg Intravenous Q8H          REVIEW OF SYSTEMS:   (Obtained with the help of RN as patient is in COVID-19 isolation)    Review of Systems   Constitutional: Positive for fatigue and fever. Negative for chills and diaphoresis. HENT: Negative for ear discharge, ear pain, rhinorrhea, sore throat and trouble swallowing. Eyes: Negative for discharge and redness. Respiratory: Negative for cough, shortness of breath and wheezing. Cardiovascular: Negative for chest pain and leg swelling. Gastrointestinal: Positive for diarrhea, nausea and vomiting. Negative for abdominal pain and constipation. Endocrine: Negative for polyuria. Genitourinary: Negative for dysuria, flank pain, frequency, hematuria and urgency. Musculoskeletal: Negative for back pain and myalgias. Skin: Negative for rash. Neurological: Positive for headaches. Negative for dizziness and seizures. Hematological: Does not bruise/bleed easily. Psychiatric/Behavioral: Negative for hallucinations and suicidal ideas. All other systems reviewed and are negative. Objective:       PHYSICAL EXAM:      Vitals:   Vitals:    11/18/20 2030 11/18/20 2031 11/18/20 2100 11/18/20 2105   BP: 115/64  115/68    Pulse:  74 80    Resp:  21 13 18   Temp:       TempSrc:       SpO2: 98% 98% 98% 98%   Weight:       Height:           Physical Exam      PHYSICAL EXAM:     In-person bedside physical examination deferred. Pursuant to the emergency declaration under the [facility], [address] [place] [address] authority and the [employer] and [employer] General Act, this clinical encounter was conducted to provide necessary medical care.    (Also consistent with new provisions and guidance offered by [facility] on March 18, 2020 in setting of COVID 19 outbreak and in order to preserve personal protective equipment in accordance with the flexibilities announced by CMS on March 30, 2020)   References: [email or web address]://[facility]. [facility]/Portals/0/Resources/COVID-19/3_18%20Telemed%20Guidance%20Updated%20March%2018. pdf?[email or web address]                      https://[facility]. [facility]/Portals/0/Resources/COVID-19/3_18%20Telemed%20Guidance%20Updated%20March%2018. pdf?[email or web address]                      [email or web address] pdf                            General: Awake and oriented to time place and person according to primary service, vitals reviewed  HEENT: Deferred  Cardiovascular: Telemetry data reviewed, rest deferred   Pulmonary: deferred  Abdomen/GI: deferred  Neuro: deferred  Skin: deferred  Musculoskeletal:  deferred  Genitourinary: Deferred  Psych: deferred  Lymphatic/Immunologic: deferred    Intake and output:     I/O last 3 completed shifts: In: 2178 [IV Piggyback:2178]  Out: -     Lab Data:   All available labs were reviewed by me today. CBC:   Recent Labs     11/18/20  1008   WBC 23.8*   RBC 5.88   HGB 17.4   HCT 53.4*      MCV 90.8   MCH 29.6   MCHC 32.6   RDW 13.3        BMP:  Recent Labs     11/18/20  1008      K 4.4   CL 99   CO2 21   BUN 23*   CREATININE 0.9   CALCIUM 10.5   GLUCOSE 192*        Hepatic FunctionPanel:   Lab Results   Component Value Date    ALKPHOS 109 11/18/2020    ALT 25 11/18/2020    AST 27 11/18/2020    PROT 8.8 11/18/2020    BILITOT 0.4 11/18/2020    BILIDIR <0.2 11/18/2020    IBILI see below 11/18/2020    LABALBU 5.0 11/18/2020       CPK: No results found for: CKTOTAL  ESR: No results found for: SEDRATE  CRP:   Lab Results   Component Value Date    CRP 2.8 11/18/2020         Imaging: All pertinent images and reports for the current visit were reviewed by meduring this visit. MRI BRAIN W WO CONTRAST   Final Result   1. No acute intracranial abnormality. 2. Scattered punctate foci of T2/FLAIR hyperintensity in the peripheral   cerebral white matter are nonspecific.   Differential diagnosis includes early sequela of chronic microvascular ischemia, sequela of prior vasculitis or   chronic migraine headaches, or much less likely demyelinating disease. CT ABDOMEN PELVIS W IV CONTRAST Additional Contrast? None   Final Result   1. Fluid throughout the colon and rectum should be correlated with any   history of diarrhea. No evident colonic wall thickening to indicate colitis   2. No evidence of intra abdominopelvic abscess         CTA HEAD NECK W CONTRAST   Final Result   Unremarkable CTA of the head and neck. CTA CHEST W WO CONTRAST   Final Result   No acute vascular abnormality is identified. No thoracic aortic dissection   or aneurysm. Minimal bibasilar atelectasis. Otherwise, no acute cardiopulmonary disease. CT HEAD WO CONTRAST   Final Result   1. No acute intracranial abnormality. 2. 3.2 cm posterior fossa extra-axial fluid collection favoring an arachnoid   cyst.   Findings were discussed with SHAGUFTA HENDERSON at 97:93 pm on 11/18/2020. MRI LUMBAR SPINE W WO CONTRAST    (Results Pending)       Outside records:    Labs, Microbiology, Radiology and pertinent results from Care everywhere, if available, were reviewed as a part ofthe consultation. Problem list:       Patient Active Problem List   Diagnosis Code    Asthma J45.909    Anxiety F41.9    Nausea & vomiting R11.2    Headache R51.9    Dizziness R42    Acute metabolic encephalopathy F77.67    Leukocytosis D72.829    Diarrhea R19.7    Abnormal CT of the head R93.0    Sepsis ([address] A41.9         Please note that this chart was generated using Dragon dictation software. Although every effort was made to ensure the accuracy of this automated transcription, some errors in transcription may have occurred inadvertently. If you may need any clarification, please do not hesitate to contact me through [employer] or at the phone number provided below with my electronic signature.   Any pictures or media included in this note were obtained after taking informed verbal consent from the patient and with their approval to include those in the patient's medical record.       Negra Francis MD, MPH  11/18/20, 9:19 PM [facility] Infectious Disease   [address] [place] [address], [address] [place] [address]  Office: [phone]  Fax: [phone]  Clinic days:  Tuesday & Thursday

## 2020-11-19 NOTE — PLAN OF CARE
Problem: Respiratory  Goal: O2 Sat > 90%  Outcome: Ongoing     Vitals:    11/19/20 0147   BP: 109/70   Pulse: 76   Resp: 18   Temp: 97.9 °F (36.6 °C)   SpO2: 98%    vss  Problem: GI  Goal: No bowel complications  Outcome: Ongoing  Patient C/O nausea , zofran given with effectiveness

## 2020-11-19 NOTE — PROGRESS NOTES
Ordered obtained for  Oxycodone for c/o back pain and headache, phenergan IV given for moderate emesis , patient had loose stool c.diff sample collected, will continue to monitor

## 2020-11-19 NOTE — CONSULTS
NEUROLOGY CONSULTATION     Patient's Name :   Daxa Alberto        YOB: 1990                    Date of Consultation : 11/19/2020 4:43 PM    Referring Physician :  Elise Sanabria MD    Reason for Consultation :  Confusion and generalized weakness    HISTORY OF PRESENT ILLNESS      Daxa Alberto is a 34 y.o. male   History was obtained from patient and dictations in the chart. Additional history was obtained from the patient's wife as well as patient's nurse at the bedside. Patient has history of chronic low back pain asthma and anxiety who was brought to the emergency room with altered mental status dizziness headache nausea and diarrhea. Patient has had watery stools but has not had received any recent antibiotics before admission. Patient had epidural steroid injection done at [facility] for L5 disc disease about 3 weeks ago. Patient apparently had headache in a similar episode after the epidural steroid injection and then improved. Patient then developed some confusion and mental status changes at home and his wife was concerned. He was also extremely anxious and nervous. There was no focal weakness numbness true vertigo or diplopia. Covid testing in the emergency room was negative. Patient had a lumbar puncture in the emergency room which was normal.  Patient has now been on IV antibiotics since she is starting to feel better. REVIEW OF SYSTEMS     He complains of generalized weakness and nausea. He has dizziness. He has episodes of profuse sweating. Denies any chest pain palpitation breathlessness fever or weight loss. Rest of the 14 system review was reviewed and was negative except for the symptoms noted above.     Past Medical History:   Diagnosis Date    Anxiety     Asthma     Chronic back pain      Family History   Problem Relation Age of Onset    Diabetes Mother     Coronary Art Dis Mother    Baudilio Sargent Diabetes Father      Social History     Socioeconomic History    Marital status:      Spouse name: None    Number of children: None    Years of education: None    Highest education level: None   Occupational History    None   Social Needs    Financial resource strain: None    Food insecurity     Worry: None     Inability: None    Transportation needs     Medical: None     Non-medical: None   Tobacco Use    Smoking status: Never Smoker    Smokeless tobacco: Never Used   Substance and Sexual Activity    Alcohol use: Not Currently    Drug use: Not Currently    Sexual activity: None   Lifestyle    Physical activity     Days per week: None     Minutes per session: None    Stress: None   Relationships    Social connections     Talks on phone: None     Gets together: None     Attends Nondenominational service: None     Active member of club or organization: None     Attends meetings of clubs or organizations: None     Relationship status: None    Intimate partner violence     Fear of current or ex partner: None     Emotionally abused: None     Physically abused: None     Forced sexual activity: None   Other Topics Concern    None   Social History Narrative    None     Current Facility-Administered Medications   Medication Dose Route Frequency Provider Last Rate Last Dose    orphenadrine (NORFLEX) injection 60 mg  60 mg Intravenous Q12H Alfonso Hendrickson MD   60 mg at 11/19/20 1618    ketorolac (TORADOL) injection 30 mg  30 mg Intravenous Q6H Alfonso Hendrickson MD   30 mg at 11/19/20 1400    HYDROmorphone HCl PF (DILAUDID) injection 1 mg  1 mg Intravenous Q4H PRN Alfonso Hendrickson MD   1 mg at 11/19/20 1632    sodium chloride flush 0.9 % injection 10 mL  10 mL Intravenous 2 times per day Ashley Canela MD   10 mL at 11/19/20 0938    sodium chloride flush 0.9 % injection 10 mL  10 mL Intravenous PRN Ashley Canela MD        budesonide (RINOCORT AQUA) nasal spray 2 spray  2 spray Each Nostril BID PRN Catracho Monico Nageotte, MD        cetirizine (ZYRTEC) tablet 10 mg  10 mg Oral Daily Jorge Luis Ndiaye MD   10 mg at 11/19/20 5155    montelukast (SINGULAIR) tablet 10 mg  10 mg Oral Nightly Jorge Luis Ndiaye MD   10 mg at 11/18/20 2037    budesonide-formoterol (SYMBICORT) 80-4.5 MCG/ACT inhaler 2 puff  2 puff Inhalation BID Jorge Luis Ndiaye MD   2 puff at 11/18/20 2103    albuterol sulfate  (90 Base) MCG/ACT inhaler 2 puff  2 puff Inhalation Q4H PRN Jorge Luis Ndiaye MD        sodium chloride flush 0.9 % injection 10 mL  10 mL Intravenous 2 times per day Jorge Luis Ndiaye MD   10 mL at 11/18/20 2302    sodium chloride flush 0.9 % injection 10 mL  10 mL Intravenous PRN Jorge Luis Ndiaye MD        acetaminophen (TYLENOL) tablet 650 mg  650 mg Oral Q6H PRN Jorge Luis Ndiaye MD   650 mg at 11/18/20 2317    Or    acetaminophen (TYLENOL) suppository 650 mg  650 mg Rectal Q6H PRN Jorge Luis Ndiaye MD        polyethylene glycol (GLYCOLAX) packet 17 g  17 g Oral Daily PRN Jorge Luis Ndiaye MD        promethazine (PHENERGAN) tablet 12.5 mg  12.5 mg Oral Q6H PRN Jorge Luis Ndiaye MD        Or    ondansetron (ZOFRAN) injection 4 mg  4 mg Intravenous Q6H PRN Jorge Luis Ndiaye MD   4 mg at 11/19/20 1401    0.9 % sodium chloride infusion   Intravenous Continuous Jorge Luis Ndiaye MD 75 mL/hr at 11/19/20 1053      promethazine (PHENERGAN) injection 12.5 mg  12.5 mg Intravenous Q4H PRN Jorge Luis Ndiaye MD   12.5 mg at 11/19/20 0529    LORazepam (ATIVAN) injection 1 mg  1 mg Intravenous Q4H PRN Jorge Luis Ndiaye MD   1 mg at 11/19/20 1401    sodium chloride flush 0.9 % injection 10 mL  10 mL Intravenous 2 times per day Jorge Luis Ndiaye MD   10 mL at 11/18/20 2301    sodium chloride flush 0.9 % injection 10 mL  10 mL Intravenous PRN Jorge Luis Ndiaye MD   10 mL at 11/18/20 2027    vancomycin 1000 mg IVPB in 250 mL D5W addavial  15 mg/kg Intravenous Karely Montoya MD   Stopped at 11/19/20 1140    cefepime (MAXIPIME) 2 g IVPB minibag  2 g Intravenous Q12H Daljit Norris MD   Stopped at 11/19/20 1009 No Known Allergies    PHYSICAL EXAMINATION:  /84   Pulse 76   Temp 98.1 °F (36.7 °C) (Oral)   Resp 18   Ht 5' 8\" (1.727 m)   Wt 168 lb 10.4 oz (76.5 kg)   SpO2 98%   BMI 25.64 kg/m²   Appearance: Well appearing, well nourished and in no distress  Mental Status Exam: Patient is alert, oriented to person, place and time. Recent and remote memory is normal  Fund of Knowledge is normal  Attention/concentration is normal.   Speech : No dysarthria  Language : No aphasia  Funduscopic Exam: sharp disc margins  Cranial Nerves:   II: Visual fields:  Full to confrontation  III: Pupils:  equal, round, reactive to light  III,IV,VI: Extra Ocular Movements are intact. No nystagmus  V: Facial sensation is intact to pin prick and light touch  VII: Facial strength and movements: intact and symmetric smile,cheek puffing and eyebrow elevation  VIII: Hearing:  Intact to finger rub bilaterally  IX: Palate  elevation is symmetric  XI: Shoulder shrug is intact  XII: Tongue movements are normal  Motor:  Muscle tone and bulk are normal.   Strength is symmetrical 5/5 in all four extremities. Sensory: Intact to light touch and  pin prick in all four extremities  Coordination:  Normal  Finger to Nose and Heel to Shin bilaterally    . Reflexes:  DTR +2 and symmetric bilaterally  Plantar response: Flexor bilaterally  Gait: Gait slightly unsteady  Romberg: negative  Vascular: No carotid bruit bilaterally        DATA:  LABS:  General Labs:    CBC:   Lab Results   Component Value Date    WBC 12.7 11/19/2020    RBC 5.01 11/19/2020    HGB 15.2 11/19/2020    HCT 45.1 11/19/2020    MCV 90.0 11/19/2020    MCH 30.3 11/19/2020    MCHC 33.7 11/19/2020    RDW 13.4 11/19/2020     11/19/2020    MPV 9.5 11/19/2020     BMP:    Lab Results   Component Value Date     11/19/2020    K 3.7 11/19/2020     11/19/2020    CO2 24 11/19/2020    BUN 14 11/19/2020    LABALBU 5.0 11/18/2020    CREATININE 1.0 11/19/2020    CALCIUM 9.5 11/19/2020    GFRAA >60 11/19/2020    LABGLOM >60 11/19/2020    GLUCOSE 143 11/19/2020     RADIOLOGY REVIEW:  I have reviewed radiology image(s) and reports(s) of: MRI brain and cervical spine    IMPRESSION :  Probable transient encephalopathy, now improved. Patient has an effusion in the epidural space from the C6 level on downwards. This was seen on the MRI cervical spine. We do not know the lower extent of this lesion since there was no MRI thoracic spine done. Infectious process cannot be totally ruled out especially given the elevated white cell count  MRI brain showed minimal white matter changes which are nonspecific in nature. COVID-19 testing was negative. Lumbar puncture did not show any evidence of CNS infection. Patient Active Problem List   Diagnosis    Asthma    Anxiety    Nausea & vomiting    Headache    Dizziness    Acute metabolic encephalopathy    Leukocytosis    Diarrhea    Abnormal CT of the head    Septicemia (HCC)    Subarachnoid cyst    Lactic acid acidosis    Arachnoid cyst    Overweight (BMI 25.0-29. 9)    Cannabis abuse     RECOMMENDATIONS ;  Discussed in detail with patient and his wife  Explained MRI cervical spine findings and reviewed the images with him  Discussed with patient's nurse. Continue IV antibiotics  I will get neurosurgery consultation as well  Thank you for this consultation. Please note a portion of this chart was generated using dragon dictation software. Although every effort was made to ensure the accuracy of this automated transcription, some errors in transcription may have occurred.

## 2020-11-19 NOTE — PROGRESS NOTES
Dr. Alvaro Fernandez at bedside, msged Dr. Delong Flurry to update on new results on cervical spine.

## 2020-11-19 NOTE — PROGRESS NOTES
Radiology called with MRI results of cervical spine as follows:   Partially imaged nonenhancing epidural fluid extending from C6-7 through    the visualized upper thoracic spine causing moderate narrowing of the thecal    sac.  This fluid has imaging characteristics most consistent with effusion,    although infection cannot be completely excluded.  MRI of the thoracic spine    is recommended to evaluate the caudal extent of this epidural fluid. 2. Mild spinal canal stenosis at C3-4 and C6-7. Paged Dr. Nelson Hands to advise of results.

## 2020-11-19 NOTE — PROGRESS NOTES
[facility]    11/19/2020 12:43 PM        Name: New Hurtado Admitted: 11/18/2020  Primary Care Provider: GALEN Everett CNP (Tel: [phone])    Brief Course:  34 y.o. male with history of asthma, anxiety, chronic LBP who came to ER with altered mental status, dizziness, HA, nausea and diarrhea. Had watery stool. No recent Abx. No recent steroids. Patient had GAVI by [facility] for L5 disc disease about 3 weeks in office. Admitted as inpatient for acute metabolic encephalopathy with sepsis, HA, neck pain and intractable nausea with vomiting. Had LP in ED for suspected meningitis. Started on IV Abx. Followed by ID and Neurology. MRI Brain, C and L spine requested to r/o abscess. C diff negative. GI pathogen sent on 11/18. COVID negative on 11/18. MRI Brain with   1. No acute intracranial abnormality. 2. Scattered punctate foci of T2/FLAIR hyperintensity in the peripheral    cerebral white matter are nonspecific.  Differential diagnosis includes early    sequela of chronic microvascular ischemia, sequela of prior vasculitis or    chronic migraine headaches, or much less likely demyelinating disease. MRI L spine with  1. No acute abnormality of the lumbar spine. 2. Mild left L5 neural foraminal narrowing secondary to a left foraminal    L5-S1 disc protrusion. EEG pending. CC:  AMS, dizziness, HA, nausea, vomiting and diarrhea    Subjective:  . Patient more dizzy with standing and sitting. Has HA and neck pain. C/O back pain. No CP, SOB or fevers. Denies diarrhea today.     Reviewed interval ancillary notes    Current Medications  oxyCODONE (ROXICODONE) immediate release tablet 5 mg, Q6H PRN  orphenadrine (NORFLEX) injection 60 mg, Q12H  ketorolac (TORADOL) injection 30 mg, Q6H  sodium chloride flush 0.9 % injection 10 mL, 2 times per day  sodium chloride flush 0.9 % injection 10 mL, PRN  budesonide (RINOCORT AQUA) nasal spray 2 spray, BID PRN  cetirizine (ZYRTEC) tablet 10 mg, Daily  montelukast (SINGULAIR) tablet 10 mg, Nightly  sertraline (ZOLOFT) tablet 50 mg, Daily  budesonide-formoterol (SYMBICORT) 80-4.5 MCG/ACT inhaler 2 puff, BID  albuterol sulfate  (90 Base) MCG/ACT inhaler 2 puff, Q4H PRN  sodium chloride flush 0.9 % injection 10 mL, 2 times per day  sodium chloride flush 0.9 % injection 10 mL, PRN  acetaminophen (TYLENOL) tablet 650 mg, Q6H PRN    Or  acetaminophen (TYLENOL) suppository 650 mg, Q6H PRN  polyethylene glycol (GLYCOLAX) packet 17 g, Daily PRN  promethazine (PHENERGAN) tablet 12.5 mg, Q6H PRN    Or  ondansetron (ZOFRAN) injection 4 mg, Q6H PRN  0.9 % sodium chloride infusion, Continuous  promethazine (PHENERGAN) injection 12.5 mg, Q4H PRN  LORazepam (ATIVAN) injection 1 mg, Q4H PRN  sodium chloride flush 0.9 % injection 10 mL, 2 times per day  sodium chloride flush 0.9 % injection 10 mL, PRN  vancomycin 1000 mg IVPB in 250 mL D5W addavial, Q8H  cefepime (MAXIPIME) 2 g IVPB minibag, Q12H        Objective:  /70   Pulse 72   Temp 98.4 °F (36.9 °C) (Oral)   Resp 16   Ht 5' 8\" (1.727 m)   Wt 168 lb 10.4 oz (76.5 kg)   SpO2 99%   BMI 25.64 kg/m²     Intake/Output Summary (Last 24 hours) at 11/19/2020 1243  Last data filed at 11/19/2020 0529  Gross per 24 hour   Intake 2878 ml   Output 150 ml   Net 2728 ml      Wt Readings from Last 3 Encounters:   11/19/20 168 lb 10.4 oz (76.5 kg)       General appearance:  Appears comfortable. Well nourished  Eyes: Sclera clear, pupils equal  ENT: Moist mucus membranes, no thrush. Trachea midline. Cardiovascular: Regular rhythm, normal S1, S2. No murmur, gallop, rub.  No edema in lower extremities  Respiratory: Clear to auscultation bilaterally, no wheeze, good inspiratory effort  Gastrointestinal: Abdomen soft, non-tender, not distended, normal bowel sounds  Musculoskeletal: Tender to palpation in BL lower paraspinal lumbar area, BL trapezius and occiput. Neurology: Grossly intact. Alert and oriented in time, place and person. No speech or motor deficits  Psychiatry: Appropriate affect. Not agitated  Skin: Warm, dry, normal turgor, no rash  Brisk capillary refill, peripheral pulses palpable     Labs and Tests:  CBC:   Recent Labs     11/18/20  1008 11/19/20  0545   WBC 23.8* 12.7*   HGB 17.4 15.2    190     BMP:    Recent Labs     11/18/20  1008 11/19/20  0545    140   K 4.4 3.7   CL 99 103   CO2 21 24   BUN 23* 14   CREATININE 0.9 1.0   GLUCOSE 192* 143*     Hepatic:   Recent Labs     11/18/20  1008   AST 27   ALT 25   BILITOT 0.4   ALKPHOS 109     MRI BRAIN W WO CONTRAST   Final Result   1. No acute intracranial abnormality. 2. Scattered punctate foci of T2/FLAIR hyperintensity in the peripheral   cerebral white matter are nonspecific. Differential diagnosis includes early   sequela of chronic microvascular ischemia, sequela of prior vasculitis or   chronic migraine headaches, or much less likely demyelinating disease. MRI LUMBAR SPINE W WO CONTRAST   Final Result   1. No acute abnormality of the lumbar spine. 2. Mild left L5 neural foraminal narrowing secondary to a left foraminal   L5-S1 disc protrusion. CT ABDOMEN PELVIS W IV CONTRAST Additional Contrast? None   Final Result   1. Fluid throughout the colon and rectum should be correlated with any   history of diarrhea. No evident colonic wall thickening to indicate colitis   2. No evidence of intra abdominopelvic abscess         CTA HEAD NECK W CONTRAST   Final Result   Unremarkable CTA of the head and neck. CTA CHEST W WO CONTRAST   Final Result   No acute vascular abnormality is identified. No thoracic aortic dissection   or aneurysm. Minimal bibasilar atelectasis. Otherwise, no acute cardiopulmonary disease. CT HEAD WO CONTRAST   Final Result   1.  No acute intracranial abnormality. 2. 3.2 cm posterior fossa extra-axial fluid collection favoring an arachnoid   cyst.   Findings were discussed with SHAGUFTA HENDERSON at 92:45 pm on 11/18/2020. MRI CERVICAL SPINE WITH CONTRAST    (Results Pending)         Problem List  Principal Problem:    Acute metabolic encephalopathy  Active Problems:    Asthma    Anxiety    Nausea & vomiting    Headache    Dizziness    Leukocytosis    Diarrhea    Abnormal CT of the head    Septicemia (HCC)    Subarachnoid cyst    Lactic acid acidosis    Arachnoid cyst  Resolved Problems:    * No resolved hospital problems. *       Assessment & Plan:   1. Neuro checks completed  2. Check MRI C spine with contrast  3.         DC droplet plus isolation as COVID negative  4. IV Cefepime and Vanco per ID   5. F/U GI pathogen  6. Await EEG to r/o seizure  7. Cont Sepsis orders  8. Start Toradol 30 mg IV q6h X 8 doses  9. Start Norflex 60 mg IV q12h X 3 doses      IV Access: Peripheral  Dee: No  Diet: DIET GENERAL;  Code:Full Code  DVT PPX SCD  Disposition Home    Discussed with patient, nursing and CM. Unclear what is primary problem here. Will see what Neuro and ID think.       Patricia Riggs MD   11/19/2020 12:43 PM

## 2020-11-20 ENCOUNTER — APPOINTMENT (OUTPATIENT)
Dept: MRI IMAGING | Age: 30
DRG: 871 | End: 2020-11-20
Payer: COMMERCIAL

## 2020-11-20 LAB
ANION GAP SERPL CALCULATED.3IONS-SCNC: 8 MMOL/L (ref 3–16)
BASOPHILS ABSOLUTE: 0 K/UL (ref 0–0.2)
BASOPHILS RELATIVE PERCENT: 0.2 %
BUN BLDV-MCNC: 11 MG/DL (ref 7–20)
CALCIUM SERPL-MCNC: 9.3 MG/DL (ref 8.3–10.6)
CHLORIDE BLD-SCNC: 103 MMOL/L (ref 99–110)
CO2: 28 MMOL/L (ref 21–32)
CREAT SERPL-MCNC: 0.8 MG/DL (ref 0.9–1.3)
EOSINOPHILS ABSOLUTE: 0.1 K/UL (ref 0–0.6)
EOSINOPHILS RELATIVE PERCENT: 1.1 %
GFR AFRICAN AMERICAN: >60
GFR NON-AFRICAN AMERICAN: >60
GLUCOSE BLD-MCNC: 104 MG/DL (ref 70–99)
HCT VFR BLD CALC: 45.9 % (ref 40.5–52.5)
HEMOGLOBIN: 15.4 G/DL (ref 13.5–17.5)
LYMPHOCYTES ABSOLUTE: 2.2 K/UL (ref 1–5.1)
LYMPHOCYTES RELATIVE PERCENT: 24.6 %
MAGNESIUM: 1.8 MG/DL (ref 1.8–2.4)
MCH RBC QN AUTO: 30.2 PG (ref 26–34)
MCHC RBC AUTO-ENTMCNC: 33.5 G/DL (ref 31–36)
MCV RBC AUTO: 90 FL (ref 80–100)
MONOCYTES ABSOLUTE: 0.5 K/UL (ref 0–1.3)
MONOCYTES RELATIVE PERCENT: 5.5 %
NEUTROPHILS ABSOLUTE: 6.2 K/UL (ref 1.7–7.7)
NEUTROPHILS RELATIVE PERCENT: 68.6 %
PDW BLD-RTO: 13.5 % (ref 12.4–15.4)
PLATELET # BLD: 190 K/UL (ref 135–450)
PMV BLD AUTO: 10 FL (ref 5–10.5)
POTASSIUM REFLEX MAGNESIUM: 3.4 MMOL/L (ref 3.5–5.1)
RBC # BLD: 5.1 M/UL (ref 4.2–5.9)
SODIUM BLD-SCNC: 139 MMOL/L (ref 136–145)
VANCOMYCIN TROUGH: 13.5 UG/ML (ref 10–20)
WBC # BLD: 9 K/UL (ref 4–11)

## 2020-11-20 PROCEDURE — 2580000003 HC RX 258: Performed by: INTERNAL MEDICINE

## 2020-11-20 PROCEDURE — 72157 MRI CHEST SPINE W/O & W/DYE: CPT

## 2020-11-20 PROCEDURE — 94640 AIRWAY INHALATION TREATMENT: CPT

## 2020-11-20 PROCEDURE — 99233 SBSQ HOSP IP/OBS HIGH 50: CPT | Performed by: INTERNAL MEDICINE

## 2020-11-20 PROCEDURE — 2060000000 HC ICU INTERMEDIATE R&B

## 2020-11-20 PROCEDURE — 99232 SBSQ HOSP IP/OBS MODERATE 35: CPT | Performed by: PSYCHIATRY & NEUROLOGY

## 2020-11-20 PROCEDURE — 94761 N-INVAS EAR/PLS OXIMETRY MLT: CPT

## 2020-11-20 PROCEDURE — 36415 COLL VENOUS BLD VENIPUNCTURE: CPT

## 2020-11-20 PROCEDURE — 6370000000 HC RX 637 (ALT 250 FOR IP): Performed by: INTERNAL MEDICINE

## 2020-11-20 PROCEDURE — 85025 COMPLETE CBC W/AUTO DIFF WBC: CPT

## 2020-11-20 PROCEDURE — A9577 INJ MULTIHANCE: HCPCS | Performed by: INTERNAL MEDICINE

## 2020-11-20 PROCEDURE — 6360000004 HC RX CONTRAST MEDICATION: Performed by: INTERNAL MEDICINE

## 2020-11-20 PROCEDURE — 6360000002 HC RX W HCPCS: Performed by: INTERNAL MEDICINE

## 2020-11-20 PROCEDURE — 80048 BASIC METABOLIC PNL TOTAL CA: CPT

## 2020-11-20 PROCEDURE — 80202 ASSAY OF VANCOMYCIN: CPT

## 2020-11-20 PROCEDURE — 83735 ASSAY OF MAGNESIUM: CPT

## 2020-11-20 RX ORDER — LIDOCAINE HYDROCHLORIDE 10 MG/ML
5 INJECTION, SOLUTION EPIDURAL; INFILTRATION; INTRACAUDAL; PERINEURAL ONCE
Status: DISCONTINUED | OUTPATIENT
Start: 2020-11-20 | End: 2020-11-23 | Stop reason: HOSPADM

## 2020-11-20 RX ORDER — CYCLOBENZAPRINE HCL 10 MG
5 TABLET ORAL 3 TIMES DAILY PRN
Status: DISCONTINUED | OUTPATIENT
Start: 2020-11-20 | End: 2020-11-23 | Stop reason: HOSPADM

## 2020-11-20 RX ORDER — SODIUM CHLORIDE 0.9 % (FLUSH) 0.9 %
10 SYRINGE (ML) INJECTION PRN
Status: DISCONTINUED | OUTPATIENT
Start: 2020-11-20 | End: 2020-11-23 | Stop reason: HOSPADM

## 2020-11-20 RX ORDER — SODIUM CHLORIDE 0.9 % (FLUSH) 0.9 %
10 SYRINGE (ML) INJECTION EVERY 12 HOURS SCHEDULED
Status: DISCONTINUED | OUTPATIENT
Start: 2020-11-20 | End: 2020-11-23 | Stop reason: HOSPADM

## 2020-11-20 RX ORDER — POTASSIUM BICARBONATE 25 MEQ/1
50 TABLET, EFFERVESCENT ORAL
Status: COMPLETED | OUTPATIENT
Start: 2020-11-20 | End: 2020-11-20

## 2020-11-20 RX ORDER — SODIUM CHLORIDE 0.9 % (FLUSH) 0.9 %
10 SYRINGE (ML) INJECTION ONCE
Status: COMPLETED | OUTPATIENT
Start: 2020-11-20 | End: 2020-11-20

## 2020-11-20 RX ADMIN — LORAZEPAM 1 MG: 2 INJECTION INTRAMUSCULAR; INTRAVENOUS at 10:26

## 2020-11-20 RX ADMIN — ORPHENADRINE CITRATE 60 MG: 60 INJECTION INTRAMUSCULAR; INTRAVENOUS at 01:53

## 2020-11-20 RX ADMIN — Medication 10 ML: at 21:44

## 2020-11-20 RX ADMIN — Medication 10 ML: at 21:00

## 2020-11-20 RX ADMIN — POTASSIUM BICARBONATE 50 MEQ: 978 TABLET, EFFERVESCENT ORAL at 12:50

## 2020-11-20 RX ADMIN — KETOROLAC TROMETHAMINE 30 MG: 30 INJECTION, SOLUTION INTRAMUSCULAR at 09:06

## 2020-11-20 RX ADMIN — MONTELUKAST SODIUM 10 MG: 10 TABLET, COATED ORAL at 20:46

## 2020-11-20 RX ADMIN — HYDROMORPHONE HYDROCHLORIDE 1 MG: 1 INJECTION, SOLUTION INTRAMUSCULAR; INTRAVENOUS; SUBCUTANEOUS at 14:40

## 2020-11-20 RX ADMIN — POTASSIUM BICARBONATE 50 MEQ: 978 TABLET, EFFERVESCENT ORAL at 13:12

## 2020-11-20 RX ADMIN — CEFEPIME HYDROCHLORIDE 2 G: 2 INJECTION, POWDER, FOR SOLUTION INTRAVENOUS at 09:41

## 2020-11-20 RX ADMIN — VANCOMYCIN HYDROCHLORIDE 1000 MG: 1 INJECTION, POWDER, LYOPHILIZED, FOR SOLUTION INTRAVENOUS at 12:56

## 2020-11-20 RX ADMIN — KETOROLAC TROMETHAMINE 30 MG: 30 INJECTION, SOLUTION INTRAMUSCULAR at 18:32

## 2020-11-20 RX ADMIN — CEFEPIME HYDROCHLORIDE 2 G: 2 INJECTION, POWDER, FOR SOLUTION INTRAVENOUS at 20:46

## 2020-11-20 RX ADMIN — Medication 2 PUFF: at 07:37

## 2020-11-20 RX ADMIN — Medication 10 ML: at 12:13

## 2020-11-20 RX ADMIN — VANCOMYCIN HYDROCHLORIDE 1000 MG: 1 INJECTION, POWDER, LYOPHILIZED, FOR SOLUTION INTRAVENOUS at 01:54

## 2020-11-20 RX ADMIN — ORPHENADRINE CITRATE 60 MG: 60 INJECTION INTRAMUSCULAR; INTRAVENOUS at 13:02

## 2020-11-20 RX ADMIN — CETIRIZINE HYDROCHLORIDE 10 MG: 10 TABLET, FILM COATED ORAL at 09:06

## 2020-11-20 RX ADMIN — KETOROLAC TROMETHAMINE 30 MG: 30 INJECTION, SOLUTION INTRAMUSCULAR at 01:53

## 2020-11-20 RX ADMIN — GADOBENATE DIMEGLUMINE 15 ML: 529 INJECTION, SOLUTION INTRAVENOUS at 12:12

## 2020-11-20 RX ADMIN — KETOROLAC TROMETHAMINE 30 MG: 30 INJECTION, SOLUTION INTRAMUSCULAR at 12:51

## 2020-11-20 RX ADMIN — Medication 2 PUFF: at 22:34

## 2020-11-20 RX ADMIN — VANCOMYCIN HYDROCHLORIDE 1000 MG: 1 INJECTION, POWDER, LYOPHILIZED, FOR SOLUTION INTRAVENOUS at 21:42

## 2020-11-20 ASSESSMENT — PAIN DESCRIPTION - LOCATION
LOCATION: NECK
LOCATION: HEAD
LOCATION: HEAD;NECK

## 2020-11-20 ASSESSMENT — PAIN SCALES - GENERAL
PAINLEVEL_OUTOF10: 4
PAINLEVEL_OUTOF10: 2
PAINLEVEL_OUTOF10: 4
PAINLEVEL_OUTOF10: 7
PAINLEVEL_OUTOF10: 8
PAINLEVEL_OUTOF10: 7

## 2020-11-20 ASSESSMENT — PAIN DESCRIPTION - ONSET
ONSET: ON-GOING

## 2020-11-20 ASSESSMENT — ENCOUNTER SYMPTOMS
SHORTNESS OF BREATH: 0
EYE DISCHARGE: 0
WHEEZING: 0
DIARRHEA: 0
EYE REDNESS: 0
BACK PAIN: 0
NAUSEA: 0
SORE THROAT: 0
RHINORRHEA: 0
CONSTIPATION: 0
COUGH: 0
ABDOMINAL PAIN: 0
TROUBLE SWALLOWING: 0

## 2020-11-20 ASSESSMENT — PAIN DESCRIPTION - DESCRIPTORS
DESCRIPTORS: HEADACHE;DULL;DISCOMFORT
DESCRIPTORS: DISCOMFORT;HEADACHE
DESCRIPTORS: OTHER (COMMENT)

## 2020-11-20 ASSESSMENT — PAIN DESCRIPTION - PAIN TYPE
TYPE: ACUTE PAIN

## 2020-11-20 ASSESSMENT — PAIN DESCRIPTION - FREQUENCY
FREQUENCY: INTERMITTENT

## 2020-11-20 ASSESSMENT — PAIN DESCRIPTION - ORIENTATION: ORIENTATION: POSTERIOR

## 2020-11-20 ASSESSMENT — PAIN DESCRIPTION - PROGRESSION
CLINICAL_PROGRESSION: NOT CHANGED
CLINICAL_PROGRESSION: NOT CHANGED
CLINICAL_PROGRESSION: GRADUALLY IMPROVING

## 2020-11-20 NOTE — PROGRESS NOTES
Patient in bed with wheels locked in lowest position wi call light and belongings within reach.  Patient says pain feel much , no c/o of nausea and no emesis , no fever or any acute distress, plan of care continue

## 2020-11-20 NOTE — CONSULTS
Neurosurgery Consult Note    Reason for Consult: epidural fluidC6-7 that flows downward. Requesting Provider: Dr. Wily Santana    Subjective:  [address] / HPI:  Nunzio Homans is a 34 y.o. male patient being seen for complaints of confusion, generalized weakness. Patient was history of chronic lower back and left-sided pain who underwent L5 epidural steroid injection about 3 weeks ago. Patient had headache after the injection which improved. Then the wife noted some confusion and mental status changes therefore brought him to the hospital. Lumbar puncture in the ER was normal.  Patient was started on IV antibiotics and symptoms are much better. Patient denies any arm pain, numbness or weakness. He denies any leg numbness or weakness. His wife is on the phone who also provides a history and assist with translation as needed as patient speaks some [place]. Because of some complaints of posterior neck and upper thoracic pain, cervical MRI was obtained and neurosurgical opinion requested. Past Medical History:   Diagnosis Date    Anxiety     Asthma     Chronic back pain      Past Surgical History:   Procedure Laterality Date    MEDICATION INJECTION      Epidural      Patient has no known allergies.     Current Facility-Administered Medications:     orphenadrine (NORFLEX) injection 60 mg, 60 mg, Intravenous, Q12H, Syliva Gowers, MD, 60 mg at 11/20/20 0153    ketorolac (TORADOL) injection 30 mg, 30 mg, Intravenous, Q6H, Syliva Gowers, MD, 30 mg at 11/20/20 0153    HYDROmorphone HCl PF (DILAUDID) injection 1 mg, 1 mg, Intravenous, Q4H PRN, Syliva Gowers, MD, 1 mg at 11/19/20 1632    sodium chloride flush 0.9 % injection 10 mL, 10 mL, Intravenous, 2 times per day, Melanie Ge MD, 10 mL at 11/19/20 2128    sodium chloride flush 0.9 % injection 10 mL, 10 mL, Intravenous, PRN, Melanie Ge MD    budesonide (RINOCORT AQUA) nasal spray 2 spray, 2 spray, Each Nostril, BID PRN, Syliva Gowers, MD Leslie Kale cetirizine (ZYRTEC) tablet 10 mg, 10 mg, Oral, Daily, Corey Owusu MD, 10 mg at 11/19/20 0937    montelukast (SINGULAIR) tablet 10 mg, 10 mg, Oral, Nightly, Corey Owusu MD, 10 mg at 11/19/20 2108    budesonide-formoterol (SYMBICORT) 80-4.5 MCG/ACT inhaler 2 puff, 2 puff, Inhalation, BID, Corey Owusu MD, 2 puff at 11/20/20 0737    albuterol sulfate  (90 Base) MCG/ACT inhaler 2 puff, 2 puff, Inhalation, Q4H PRN, Corey Owusu MD    sodium chloride flush 0.9 % injection 10 mL, 10 mL, Intravenous, 2 times per day, Corey Owusu MD, 10 mL at 11/19/20 2111    sodium chloride flush 0.9 % injection 10 mL, 10 mL, Intravenous, PRN, Corey Owusu MD    acetaminophen (TYLENOL) tablet 650 mg, 650 mg, Oral, Q6H PRN, 650 mg at 11/18/20 2317 **OR** acetaminophen (TYLENOL) suppository 650 mg, 650 mg, Rectal, Q6H PRN, Corey Owusu MD    polyethylene glycol (GLYCOLAX) packet 17 g, 17 g, Oral, Daily PRN, Corey Owusu MD    promethazine (PHENERGAN) tablet 12.5 mg, 12.5 mg, Oral, Q6H PRN **OR** ondansetron (ZOFRAN) injection 4 mg, 4 mg, Intravenous, Q6H PRN, Corey Owusu MD, 4 mg at 11/19/20 1401    0.9 % sodium chloride infusion, , Intravenous, Continuous, Corey Owusu MD, Last Rate: 75 mL/hr at 11/19/20 1053    promethazine (PHENERGAN) injection 12.5 mg, 12.5 mg, Intravenous, Q4H PRN, Corey Owusu MD, 12.5 mg at 11/19/20 0529    LORazepam (ATIVAN) injection 1 mg, 1 mg, Intravenous, Q4H PRN, Corey Owusu MD, 1 mg at 11/19/20 1401    sodium chloride flush 0.9 % injection 10 mL, 10 mL, Intravenous, 2 times per day, Corey Owusu MD, 10 mL at 11/19/20 2109    sodium chloride flush 0.9 % injection 10 mL, 10 mL, Intravenous, PRN, Corey Owusu MD, 10 mL at 11/18/20 2027    vancomycin 1000 mg IVPB in 250 mL D5W addavial, 15 mg/kg, Intravenous, Q8H, Corey Owusu MD, Stopped at 11/20/20 0300    cefepime (MAXIPIME) 2 g IVPB minibag, 2 g, Intravenous, Q12H, Megan Wahl MD, Stopped at 11/19/20 2200  Social History supple  NEUROLOGIC:  Awake, alert, oriented to name, place and time. Cranial nerves II-XII are grossly intact. Motor is 5 out of 5 bilaterally. Sensory is intact. Reflexes symmetric. LABORATORY DATA:   CBC with Differential:    Lab Results   Component Value Date    WBC 12.7 11/19/2020    RBC 5.01 11/19/2020    HGB 15.2 11/19/2020    HCT 45.1 11/19/2020     11/19/2020    MCV 90.0 11/19/2020    MCH 30.3 11/19/2020    MCHC 33.7 11/19/2020    RDW 13.4 11/19/2020    LYMPHOPCT 18.6 11/19/2020    MONOPCT 4.4 11/19/2020    BASOPCT 0.2 11/19/2020    MONOSABS 0.6 11/19/2020    LYMPHSABS 2.4 11/19/2020    EOSABS 0.1 11/19/2020    BASOSABS 0.0 11/19/2020     CMP:    Lab Results   Component Value Date     11/19/2020    K 3.7 11/19/2020     11/19/2020    CO2 24 11/19/2020    BUN 14 11/19/2020    CREATININE 1.0 11/19/2020    GFRAA >60 11/19/2020    LABGLOM >60 11/19/2020    GLUCOSE 143 11/19/2020    PROT 8.8 11/18/2020    LABALBU 5.0 11/18/2020    CALCIUM 9.5 11/19/2020    BILITOT 0.4 11/18/2020    ALKPHOS 109 11/18/2020    AST 27 11/18/2020    ALT 25 11/18/2020     PT/INR:  No results found for: PROTIME, INR  PTT:  No results found for: APTT, PTT[APTT}   CSF  And blood cultures: fluid no growth to date  Covid negative  WBC on admission: 23.8    IMAGING STUDIES:   MRI of the Brain:  Date: 11/18/20; Result:   1. No acute intracranial abnormality. 2. Scattered punctate foci of T2/FLAIR hyperintensity in the peripheral    cerebral white matter are nonspecific.  Differential diagnosis includes early    sequela of chronic microvascular ischemia, sequela of prior vasculitis        MRI of the Cervical-Spine:  Date: 11/19/20; Result:   FINDINGS:    Motion limited evaluation despite repeat imaging attempts.         BONES/ALIGNMENT: There is normal alignment of the spine.  The vertebral body    heights are maintained.  The bone marrow signal appears unremarkable.         SPINAL CORD: Visualized spinal cord has normal signal and morphology.  There    is abnormal T2 hyperintense, T1 isointense to CSF nonenhancing predominantly    dorsal epidural fluid extending from the C6-7 level through the visualized    upper thoracic spinal canal causing moderate narrowing of the thecal sac. This measures up to 7 mm in AP thickness but is incompletely imaged on axial    sequences.         SOFT TISSUES: Paraspinal soft tissues are unremarkable.  No abnormal soft    tissue enhancement is evident.         C2-C3: Disc height and signal maintained.  No neural foraminal narrowing or    spinal canal stenosis.         C3-C4: Disc height and signal maintained.  No neural foraminal narrowing. Mild spinal canal stenosis secondary to a disc bulge.         C4-C5: Disc height and signal maintained.  No neural foraminal narrowing or    spinal canal stenosis.         C5-C6: Disc height and signal maintained.  No neural foraminal narrowing or    spinal canal stenosis.         C6-C7: Disc height and signal maintained.  No neural foraminal narrowing. Mild spinal canal stenosis secondary to a central posterior disc protrusion.         C7-T1: Disc height and signal maintained.  No neural foraminal or spinal    canal stenosis.  Mild narrowing of the thecal sac secondary to predominantly    dorsal epidural fluid.              Impression    1. Partially imaged nonenhancing epidural fluid extending from C6-7 through    the visualized upper thoracic spine causing moderate narrowing of the thecal    sac.  This fluid has imaging characteristics most consistent with effusion,    although infection cannot be completely excluded.  MRI of the thoracic spine    is recommended to evaluate the caudal extent of this epidural fluid. 2. Mild spinal canal stenosis at C3-4 and C6-7.         IMPRESSION/PLAN:  Principal Problem:    Acute metabolic encephalopathy  Plan: improved  Active Problems:    Asthma    Anxiety    Nausea & vomiting  Plan: resolved Headache  Plan: resolved    Dizziness    Leukocytosis    Diarrhea    Abnormal CT of the head    Septicemia (HCC)      Overweight (BMI 25.0-29. 9)    Dr. Elvia Adames and I personally reviewed the patient's cervical MRI which does show dorsal epidural fluid extending from the V2etmmr towards the upper thoracic canal not adequately visualized in the thoracic spine. This is  most consistent with CSF, could also be epidural lipomatosis, or infection cannot be excluded. Agree with thoracic MRI for further evaluation of this. Patient neurologically intact without any motor deficits. Continue antibiotics at the direction of infectious disease. Further recommendations pending review of thoracic MRI. The patient's symptoms, exam, testing and plan of care have been discussed with Dr. Elvia Adames. Thank you again for this consultation. Anil York  11/20/2020     Addendum: I personally reviewed the patient's thoracic MRI with Dr. Elvia Adames. Nonenhancing dorsal epidural fluid signal most consistent with CSF, could consider CSF pulsation. Nonoperative.   Will sign off

## 2020-11-20 NOTE — PROGRESS NOTES
head  · Cannabis abuse  · Overweight* due to excess calorie intake : Body mass index is 25.64 kg/m². Discussion:      The patient is on empiric IV vancomycin and IV cefepime. He is tolerating the antibiotics okay. CSF cultures remain negative as expected. Blood cultures were also negative so far. He is afebrile. White cell count has normalized to 9000 today    He had MRI of cervical and thoracic spine done. C-spine MRI shows a nonenhancing epidural fluid collection extending through C6-C7 and upper thoracic spine concerning for an epidural effusion    Given his initial presentation,, I cannot rule out the possibility of infected effusion without testing date on the epidural effusion fluis    Plan:     Diagnostic Workup:    · Recommend considering diagnostic aspiration of the epidural effusion fluid and sending the fluid for cell count with differential, glucose, protein, Gram stain and bacterial culture, fungal stain and culture, AFB stain and culture  · Continue to follow  fever curve, WBC count and blood cultures. · Continue to monitor blood counts, liver and renal function. Antimicrobials:    · Will continue empiric IV vancomycin  Check Vancomycin trough before the 5th dose. Target vancomycin trough of around 15. Keep vancomycin trough below 20 at all times. Avoid increasing the dose of vancomycin above a total of 4 grams in a 24-hour period in patients younger than 45 years and above 3 grams in a 24-hour period in a patient of age 39 years or older. Continue to monitor serum creatinine and Vanco levels closely, while the patient is on I/v Vancomycin. · Continue IV cefepime 2 g every 12 hours  · Neurosurgery following.   If no surgical intervention planned, will recommend considering CT-guided aspiration of the epidural fluid collection by interventional radiology  · We will follow up on the culture results and clinical progress and will make further recommendations accordingly. · Continue close vitals monitoring. · Maintain good glycemic control. · Fall precautions. Aspiration precautions. · Continue to watch for new fever or diarrhea. · DVT prophylaxis. · Discussed all above with patient and RN. Drug Monitoring:    · Continue monitoring for antibiotic toxicity as follows: CBC, CMP, vancomycin trough  · Continue to watch for following: new or worsening fever, new hypotension, hives, lip swelling and redness or purulence at vascular access sites. I/v access Management:    · Continue to monitor i.v access sites for erythema, induration, discharge or tenderness. · As always, continue efforts to minimize tubes/lines/drains as clinically appropriate to reduce chances of line associated infections. Patient education and counseling:      · The patient was educated in detail about the side-effects of various antibiotics and things to watch for like new rashes, lip swelling, severe reaction, worsening diarrhea, break through fever etc.  · Discussed patient's condition and what to expect. All of the patient's questions were addressed in a satisfactory manner and patient verbalized understanding all instructions. Level of complexity of visit: High     Risk of Complications/Morbidity: High     · Illness(es)/ Infection present that pose threat to life/bodily function. · There is potential for severe exacerbation of infection/side effects of treatment. · Therapy requires intensive monitoring for antimicrobial agent toxicity. Thank you for involving me in the care of your patient. I will continue to follow. If you have anyadditional questions, please do not hesitate to contact me. Subjective: Interval history: Interval history was obtained from chart review and patient/ RN. He is afebrile. He is on empiric antibiotics. He is tolerating it okay     REVIEW OF SYSTEMS:    Review of Systems   Constitutional: Negative for chills, diaphoresis and fever. HENT: Negative for ear discharge, ear pain, rhinorrhea, sore throat and trouble swallowing. Eyes: Negative for discharge and redness. Respiratory: Negative for cough, shortness of breath and wheezing. Cardiovascular: Negative for chest pain and leg swelling. Gastrointestinal: Negative for abdominal pain, constipation, diarrhea and nausea. Endocrine: Negative for polyuria. Genitourinary: Negative for dysuria, flank pain, frequency, hematuria and urgency. Musculoskeletal: Negative for back pain and myalgias. Skin: Negative for rash. Neurological: Negative for dizziness, seizures and headaches. Hematological: Does not bruise/bleed easily. Psychiatric/Behavioral: Negative for hallucinations and suicidal ideas. All other systems reviewed and are negative. Past Medical History: All past medical history reviewed today. Past Medical History:   Diagnosis Date    Anxiety     Asthma     Chronic back pain        Past Surgical History: All past surgical history was reviewed today. Past Surgical History:   Procedure Laterality Date    MEDICATION INJECTION      Epidural        Family History: All family history was reviewed today. Problem Relation Age of Onset    Diabetes Mother     Coronary Art Dis Mother     Diabetes Father        Objective:       PHYSICAL EXAM:      Vitals:   Vitals:    11/20/20 0006 11/20/20 0615 11/20/20 0737 11/20/20 0845   BP: 125/65 116/78  107/69   Pulse: 61 76  68   Resp: 16 16 16 16   Temp: 98.5 °F (36.9 °C) 97.7 °F (36.5 °C)  98 °F (36.7 °C)   TempSrc: Oral Oral  Oral   SpO2: 97% 99% 98% 99%   Weight:       Height:           Physical Exam  Vitals signs and nursing note reviewed. Constitutional:       Appearance: Normal appearance. He is well-developed. HENT:      Head: Normocephalic and atraumatic. Right Ear: External ear normal.      Left Ear: External ear normal.      Nose: Nose normal. No congestion or rhinorrhea.       Mouth/Throat: Mouth: Mucous membranes are moist.      Pharynx: No oropharyngeal exudate or posterior oropharyngeal erythema. Eyes:      General: No scleral icterus. Right eye: No discharge. Left eye: No discharge. Conjunctiva/sclera: Conjunctivae normal.      Pupils: Pupils are equal, round, and reactive to light. Neck:      Musculoskeletal: Normal range of motion and neck supple. No neck rigidity or muscular tenderness. Cardiovascular:      Rate and Rhythm: Normal rate and regular rhythm. Pulses: Normal pulses. Heart sounds: No murmur. No friction rub. Pulmonary:      Effort: Pulmonary effort is normal. No respiratory distress. Breath sounds: Normal breath sounds. No stridor. No wheezing, rhonchi or rales. Abdominal:      General: Bowel sounds are normal.      Palpations: Abdomen is soft. Tenderness: There is no abdominal tenderness. There is no right CVA tenderness, left CVA tenderness, guarding or rebound. Musculoskeletal: Normal range of motion. General: No swelling or tenderness. Lymphadenopathy:      Cervical: No cervical adenopathy. Skin:     General: Skin is warm and dry. Coloration: Skin is not jaundiced. Findings: No erythema or rash. Neurological:      General: No focal deficit present. Mental Status: He is alert and oriented to person, place, and time. Mental status is at baseline. Motor: No abnormal muscle tone. Psychiatric:         Mood and Affect: Mood normal.         Behavior: Behavior normal.         Thought Content: Thought content normal.            Lines: All vascular access sites are healthy with no local erythema, discharge or tenderness. Intake and output:    No intake/output data recorded. Lab Data:   All available labs and old records have been reviewed by me.     CBC:  Recent Labs     11/18/20  1008 11/19/20  0545 11/20/20  0941   WBC 23.8* 12.7* 9.0   RBC 5.88 5.01 5.10   HGB 17.4 15.2 15.4   HCT 53.4* 45.1 45.9    190 190   MCV 90.8 90.0 90.0   MCH 29.6 30.3 30.2   MCHC 32.6 33.7 33.5   RDW 13.3 13.4 13.5        BMP:  Recent Labs     11/18/20  1008 11/19/20  0545 11/20/20  0941    140 139   K 4.4 3.7 3.4*   CL 99 103 103   CO2 21 24 28   BUN 23* 14 11   CREATININE 0.9 1.0 0.8*   CALCIUM 10.5 9.5 9.3   GLUCOSE 192* 143* 104*        Hepatic Function Panel:   Lab Results   Component Value Date    ALKPHOS 109 11/18/2020    ALT 25 11/18/2020    AST 27 11/18/2020    PROT 8.8 11/18/2020    BILITOT 0.4 11/18/2020    BILIDIR <0.2 11/18/2020    IBILI see below 11/18/2020    LABALBU 5.0 11/18/2020       CPK: No results found for: CKTOTAL  ESR: No results found for: SEDRATE  CRP:   Lab Results   Component Value Date    CRP 2.8 11/18/2020           Imaging: All pertinent images and reports for the current visit were reviewed by me during this visit. MRI THORACIC SPINE W WO CONTRAST   Preliminary Result   Nonenhancing epidural fluid intensity in the lower cervical spine and upper   thoracic spine, decreased in conspicuity in the lower cervical spine compared   to prior cervical spine MRI. Imaging features are suggestive of epidural   effusion which has redistributed in the interim. No significant thoracic spinal canal stenosis. MRI CERVICAL SPINE W WO CONTRAST   Final Result   1. Partially imaged nonenhancing epidural fluid extending from C6-7 through   the visualized upper thoracic spine causing moderate narrowing of the thecal   sac. This fluid has imaging characteristics most consistent with effusion,   although infection cannot be completely excluded. MRI of the thoracic spine   is recommended to evaluate the caudal extent of this epidural fluid. 2. Mild spinal canal stenosis at C3-4 and C6-7. MRI BRAIN W WO CONTRAST   Final Result   1. No acute intracranial abnormality. 2. Scattered punctate foci of T2/FLAIR hyperintensity in the peripheral   cerebral white matter are nonspecific. Differential diagnosis includes early   sequela of chronic microvascular ischemia, sequela of prior vasculitis or   chronic migraine headaches, or much less likely demyelinating disease. MRI LUMBAR SPINE W WO CONTRAST   Final Result   1. No acute abnormality of the lumbar spine. 2. Mild left L5 neural foraminal narrowing secondary to a left foraminal   L5-S1 disc protrusion. CT ABDOMEN PELVIS W IV CONTRAST Additional Contrast? None   Final Result   1. Fluid throughout the colon and rectum should be correlated with any   history of diarrhea. No evident colonic wall thickening to indicate colitis   2. No evidence of intra abdominopelvic abscess         CTA HEAD NECK W CONTRAST   Final Result   Unremarkable CTA of the head and neck. CTA CHEST W WO CONTRAST   Final Result   No acute vascular abnormality is identified. No thoracic aortic dissection   or aneurysm. Minimal bibasilar atelectasis. Otherwise, no acute cardiopulmonary disease. CT HEAD WO CONTRAST   Final Result   1. No acute intracranial abnormality. 2. 3.2 cm posterior fossa extra-axial fluid collection favoring an arachnoid   cyst.   Findings were discussed with SHAGUFTA HENDERSON at 16:44 pm on 11/18/2020. Medications: All current and past medications were reviewed.      potassium bicarbonate  50 mEq Oral Q1H    orphenadrine  60 mg Intravenous Q12H    ketorolac  30 mg Intravenous Q6H    cetirizine  10 mg Oral Daily    montelukast  10 mg Oral Nightly    budesonide-formoterol  2 puff Inhalation BID    sodium chloride flush  10 mL Intravenous 2 times per day    vancomycin  15 mg/kg Intravenous Q8H    cefepime  2 g Intravenous Q12H        sodium chloride 75 mL/hr at 11/19/20 1053       HYDROmorphone, budesonide, albuterol sulfate HFA, acetaminophen **OR** acetaminophen, polyethylene glycol, promethazine **OR** ondansetron, promethazine, LORazepam, sodium chloride flush      Problem list: Patient Active Problem List   Diagnosis Code    Asthma J45.909    Anxiety F41.9    Nausea & vomiting R11.2    Headache R51.9    Dizziness R42    Acute metabolic encephalopathy H17.68    Leukocytosis D72.829    Diarrhea R19.7    Abnormal CT of the head R93.0    Septicemia (HCC) A41.9    Subarachnoid cyst G93.0    Lactic acid acidosis E87.2    Arachnoid cyst G93.0    Overweight (BMI 25.0-29. 9) E66.3    Cannabis abuse F12.10       Please note that this chart was generated using Dragon dictation software. Although every effort was made to ensure the accuracy of this automated transcription, some errors in transcription may have occurred inadvertently. If you may need any clarification, please do not hesitate to contact me through EPIC or at the phone number provided below with my electronic signature. Any pictures or media included in this note were obtained after taking informed verbal consent from the patient and with their approval to include those in the patient's medical record.     Delfin Welch MD, MPH  11/20/2020 , 12:50 PM   [facility]   [address] [place] [address], [address] [place] [address]  Office: [phone]  Fax: [phone]  Clinic days:  Tuesday & Thursday

## 2020-11-20 NOTE — PROGRESS NOTES
Clinical Pharmacy Note: Pharmacy to Dose Vancomcyin    Vancomycin Day: 3  Current Dosinmg q8h      Recent Labs     20  0545 20  0941   BUN 14 11       Recent Labs     20  0545 20  0941   CREATININE 1.0 0.8*       Recent Labs     20  0545 20  0941   WBC 12.7* 9.0         Intake/Output Summary (Last 24 hours) at 2020 1332  Last data filed at 2020 1311  Gross per 24 hour   Intake 240 ml   Output --   Net 240 ml         Ht Readings from Last 1 Encounters:   20 5' 8\" (1.727 m)        Wt Readings from Last 1 Encounters:   20 168 lb 10.4 oz (76.5 kg)         Body mass index is 25.64 kg/m². Estimated Creatinine Clearance: 132 mL/min (A) (based on SCr of 0.8 mg/dL (L)). Trough: 13.5 mcg/mL    Assessment/Plan:  Vancomycin level is therapeutic. Level was drawn appropriately in respect to last dose given. A vancomycin trough has been ordered for . Will Continue vancomycin dose 1000mg q8h  Changes in regimen will be determined based on culture results, renal function, and clinical response. Pharmacy will continue to monitor and adjust regimen as necessary.     Thank you for the consult,    eHlena Church, PharmD  2020 1:57 PM

## 2020-11-20 NOTE — PROGRESS NOTES
[facility]    11/20/2020 1:57 PM        Name: Chris Tomlinson . Admitted: 11/18/2020  Primary Care Provider: GALEN Thakkar CNP (Tel: [phone])    Brief Course:  34 y.o. male with history of asthma, anxiety, chronic LBP who came to ER with altered mental status, dizziness, HA, nausea and diarrhea. Had watery stool. No recent Abx. No recent steroids. Patient had GAVI by [facility] for L5 disc disease about 3 weeks in office. Admitted as inpatient for acute metabolic encephalopathy with sepsis, HA, neck pain and intractable nausea with vomiting. Had LP in ED for suspected meningitis. Started on IV Abx. Followed by ID and Neurology. MRI Brain, C and L spine requested to r/o abscess. C diff negative. GI pathogen sent on 11/18. COVID negative on 11/18. MRI Brain with   1. No acute intracranial abnormality. 2. Scattered punctate foci of T2/FLAIR hyperintensity in the peripheral    cerebral white matter are nonspecific.  Differential diagnosis includes early    sequela of chronic microvascular ischemia, sequela of prior vasculitis or    chronic migraine headaches, or much less likely demyelinating disease. MRI L spine with  1. No acute abnormality of the lumbar spine. 2. Mild left L5 neural foraminal narrowing secondary to a left foraminal    L5-S1 disc protrusion. EEG normal.    MRI T spine with  Nonenhancing epidural fluid intensity in the lower cervical spine and upper    thoracic spine, decreased in conspicuity in the lower cervical spine compared    to prior cervical spine MRI.  Imaging features are suggestive of epidural    effusion which has redistributed in the interim.         No significant thoracic spinal canal stenosis. Reviewed with Neurosurgery who do NOT recommend surgical intervention at this time.      PICC requested on 11/20/20 for IV Abx at home per extremities  Respiratory: Clear to auscultation bilaterally, no wheeze, good inspiratory effort  Gastrointestinal: Abdomen soft, non-tender, not distended, normal bowel sounds  Musculoskeletal: Less tender to palpation in BL lower paraspinal lumbar area, BL trapezius and occiput. Neurology: Grossly intact. Alert and oriented in time, place and person. No speech or motor deficits  Psychiatry: Appropriate affect. Not agitated  Skin: Warm, dry, normal turgor, no rash  Brisk capillary refill, peripheral pulses palpable     Labs and Tests:  CBC:   Recent Labs     11/18/20  1008 11/19/20  0545 11/20/20  0941   WBC 23.8* 12.7* 9.0   HGB 17.4 15.2 15.4    190 190     BMP:    Recent Labs     11/18/20  1008 11/19/20  0545 11/20/20  0941    140 139   K 4.4 3.7 3.4*   CL 99 103 103   CO2 21 24 28   BUN 23* 14 11   CREATININE 0.9 1.0 0.8*   GLUCOSE 192* 143* 104*     Hepatic:   Recent Labs     11/18/20  1008   AST 27   ALT 25   BILITOT 0.4   ALKPHOS 109     MRI THORACIC SPINE W WO CONTRAST   Preliminary Result   Nonenhancing epidural fluid intensity in the lower cervical spine and upper   thoracic spine, decreased in conspicuity in the lower cervical spine compared   to prior cervical spine MRI. Imaging features are suggestive of epidural   effusion which has redistributed in the interim. No significant thoracic spinal canal stenosis. MRI CERVICAL SPINE W WO CONTRAST   Final Result   1. Partially imaged nonenhancing epidural fluid extending from C6-7 through   the visualized upper thoracic spine causing moderate narrowing of the thecal   sac. This fluid has imaging characteristics most consistent with effusion,   although infection cannot be completely excluded. MRI of the thoracic spine   is recommended to evaluate the caudal extent of this epidural fluid. 2. Mild spinal canal stenosis at C3-4 and C6-7. MRI BRAIN W WO CONTRAST   Final Result   1. No acute intracranial abnormality.    2. Scattered punctate foci of T2/FLAIR hyperintensity in the peripheral   cerebral white matter are nonspecific. Differential diagnosis includes early   sequela of chronic microvascular ischemia, sequela of prior vasculitis or   chronic migraine headaches, or much less likely demyelinating disease. MRI LUMBAR SPINE W WO CONTRAST   Final Result   1. No acute abnormality of the lumbar spine. 2. Mild left L5 neural foraminal narrowing secondary to a left foraminal   L5-S1 disc protrusion. CT ABDOMEN PELVIS W IV CONTRAST Additional Contrast? None   Final Result   1. Fluid throughout the colon and rectum should be correlated with any   history of diarrhea. No evident colonic wall thickening to indicate colitis   2. No evidence of intra abdominopelvic abscess         CTA HEAD NECK W CONTRAST   Final Result   Unremarkable CTA of the head and neck. CTA CHEST W WO CONTRAST   Final Result   No acute vascular abnormality is identified. No thoracic aortic dissection   or aneurysm. Minimal bibasilar atelectasis. Otherwise, no acute cardiopulmonary disease. CT HEAD WO CONTRAST   Final Result   1. No acute intracranial abnormality. 2. 3.2 cm posterior fossa extra-axial fluid collection favoring an arachnoid   cyst.   Findings were discussed with SHAGUFTA HENDERSON at 38:54 pm on 11/18/2020. Problem List  Principal Problem:    Acute metabolic encephalopathy  Active Problems:    Asthma    Anxiety    Nausea & vomiting    Headache    Dizziness    Leukocytosis    Diarrhea    Abnormal CT of the head    Septicemia (HCC)    Subarachnoid cyst    Lactic acid acidosis    Arachnoid cyst    Overweight (BMI 25.0-29. 9)    Cannabis abuse  Resolved Problems:    * No resolved hospital problems. *       Assessment & Plan:   1. STAT MRI T spine to exclude epidural abscess  2. NS following, do not plan NS intervention at this time  3. PICC line for IV Abx at home  4. IV Cefepime and Vanco per ID   5. F/U GI pathogen  6. Home when ok with all      IV Access: Peripheral  Dee: No  Diet: DIET GENERAL;  Code:Full Code  DVT PPX SCD  Disposition Home    Discussed with patient, Thomas Munoz (NS), nursing and CM. Discussed with wife extensively. Likely long course of IV Abx at home per ID. Home when ok with all.     Farhad Berman MD   11/20/2020 1:57 PM

## 2020-11-20 NOTE — PLAN OF CARE
Problem: Respiratory  Goal: No pulmonary complications  Outcome: Ongoing     Problem: Respiratory  Goal: Supplemental O2 requirements decreased  Outcome: Ongoing     Problem: GI  Goal: No bowel complications  Outcome: Ongoing     Problem: Infection:  Goal: Will remain free from infection  Description: Will remain free from infection  Outcome: Ongoing     Problem: Pain:  Goal: Pain level will decrease  Description: Pain level will decrease  Outcome: Ongoing     Pt is c/o pain. Medicated per MAR. Pt states that pain has improved. VSS on RA.

## 2020-11-20 NOTE — PROGRESS NOTES
NEUROLOGY FOLLOWUP    HISTORY OF PRESENT ILLNESS :     Sarai Conteh is a 34 y.o. male   History was obtained from the patient and from dictations in the chart. Patient states that he is feeling somewhat better. He still has some neck pain headache and body aches. No other new neurological symptoms.     REVIEW OF SYSTEMS       Constitutional:  []   Chills   []  Fatigue   []  Fevers   []  Malaise   []  Weight loss     [x] Denies all of the above    Respiratory:   []  Cough    []  Shortness of breath         [x] Denies all of the above     Cardiovascular:   []  Chest pain    []  Exertional chest pressure/discomfort           [] Palpitations    []  Syncope     [x] Denies all of the above    Past Medical History:   Diagnosis Date    Anxiety     Asthma     Chronic back pain      Family History   Problem Relation Age of Onset    Diabetes Mother     Coronary Art Dis Mother     Diabetes Father      Social History     Socioeconomic History    Marital status:      Spouse name: None    Number of children: None    Years of education: None    Highest education level: None   Occupational History    None   Social Needs    Financial resource strain: None    Food insecurity     Worry: None     Inability: None    Transportation needs     Medical: None     Non-medical: None   Tobacco Use    Smoking status: Never Smoker    Smokeless tobacco: Never Used   Substance and Sexual Activity    Alcohol use: Not Currently    Drug use: Not Currently    Sexual activity: None   Lifestyle    Physical activity     Days per week: None     Minutes per session: None    Stress: None   Relationships    Social connections     Talks on phone: None     Gets together: None     Attends Mandaen service: None     Active member of club or organization: None     Attends meetings of clubs or organizations: None     Relationship status: None    Intimate partner violence     Fear of current or ex partner: None     Emotionally abused: None     Physically abused: None     Forced sexual activity: None   Other Topics Concern    None   Social History Narrative    None        PHYSICAL EXAMINATION      /77   Pulse 74   Temp 98.3 °F (36.8 °C) (Oral)   Resp 16   Ht 5' 8\" (1.727 m)   Wt 168 lb 10.4 oz (76.5 kg)   SpO2 96%   BMI 25.64 kg/m²   This is a well-nourished patient in no acute distress  Patient is awake, alert and oriented x3. Speech is normal.  Pupils are equal round reacting to light. Extraocular movements intact. Face symmetrical. Tongue midline. Motor exam shows normal symmetrical strength. Deep tendon reflexes normal. Plantar reflexes downgoing. Sensory exam normal. Coordination normal. Gait normal. No carotid bruit. No neck stiffness. DATA :  LABS:  General Labs:    CBC:   Lab Results   Component Value Date    WBC 9.0 11/20/2020    RBC 5.10 11/20/2020    HGB 15.4 11/20/2020    HCT 45.9 11/20/2020    MCV 90.0 11/20/2020    MCH 30.2 11/20/2020    MCHC 33.5 11/20/2020    RDW 13.5 11/20/2020     11/20/2020    MPV 10.0 11/20/2020     BMP:    Lab Results   Component Value Date     11/20/2020    K 3.4 11/20/2020     11/20/2020    CO2 28 11/20/2020    BUN 11 11/20/2020    LABALBU 5.0 11/18/2020    CREATININE 0.8 11/20/2020    CALCIUM 9.3 11/20/2020    GFRAA >60 11/20/2020    LABGLOM >60 11/20/2020    GLUCOSE 104 11/20/2020     RADIOLOGY REVIEW:  I have reviewed radiology image(s) and reports(s) of: MRI thoracic spine      IMPRESSION :  Acute encephalopathy, improved.     Epidural effusion involving the cervical and thoracic spine  CSF studies negative for any CNS infection  Patient Active Problem List   Diagnosis    Asthma    Anxiety    Nausea & vomiting    Headache    Dizziness    Acute metabolic encephalopathy    Leukocytosis    Diarrhea    Abnormal CT of the head    Septicemia ([facility] [address]    Subarachnoid cyst    Lactic acid acidosis    Arachnoid cyst    Overweight (BMI 25.0-29. 9)    Cannabis abuse       RECOMMENDATIONS :  Discussed with patient  Continue IV antibiotics  Okay to discharge from neurological standpoint  Follow-up with neurosurgery and infectious disease as an outpatient. Please note a portion of this chart was generated using dragon dictation software. Although every effort was made to ensure the accuracy of this automated transcription, some errors in transcription may have occurred.          Natalie Bernheim M.D.

## 2020-11-20 NOTE — ACP (ADVANCE CARE PLANNING)
Advanced Care Planning Note. Purpose of Encounter: Advanced care planning in light of epidural abscess  Parties In Attendance: Patient, wife  Decisional Capacity: Yes  Subjective: Patient with back pain  Objective: Cr 0.9  Goals of Care Determination: Patient wants full support (CPR, vent, surgery, HD, trach, PEG)  Plan:  IVF, IV Abx, MRI T spine, ID/Neuro/NS consults, PICC  Code Status: Full code   Time spent on Advanced care Plannin minutes  Advanced Care Planning Documents: Completed advanced directives on chart, wife is the POA.     Emperatriz Acosta MD  2020 2:02 PM

## 2020-11-21 LAB
ANION GAP SERPL CALCULATED.3IONS-SCNC: 9 MMOL/L (ref 3–16)
BASOPHILS ABSOLUTE: 0 K/UL (ref 0–0.2)
BASOPHILS RELATIVE PERCENT: 0.3 %
BUN BLDV-MCNC: 11 MG/DL (ref 7–20)
CALCIUM SERPL-MCNC: 9.7 MG/DL (ref 8.3–10.6)
CHLORIDE BLD-SCNC: 103 MMOL/L (ref 99–110)
CO2: 30 MMOL/L (ref 21–32)
CREAT SERPL-MCNC: 0.8 MG/DL (ref 0.9–1.3)
EOSINOPHILS ABSOLUTE: 0.2 K/UL (ref 0–0.6)
EOSINOPHILS RELATIVE PERCENT: 1.9 %
GFR AFRICAN AMERICAN: >60
GFR NON-AFRICAN AMERICAN: >60
GLUCOSE BLD-MCNC: 96 MG/DL (ref 70–99)
HCT VFR BLD CALC: 46.8 % (ref 40.5–52.5)
HEMOGLOBIN: 15.6 G/DL (ref 13.5–17.5)
LYMPHOCYTES ABSOLUTE: 2.8 K/UL (ref 1–5.1)
LYMPHOCYTES RELATIVE PERCENT: 27.7 %
MCH RBC QN AUTO: 30 PG (ref 26–34)
MCHC RBC AUTO-ENTMCNC: 33.3 G/DL (ref 31–36)
MCV RBC AUTO: 90 FL (ref 80–100)
MONOCYTES ABSOLUTE: 0.6 K/UL (ref 0–1.3)
MONOCYTES RELATIVE PERCENT: 5.6 %
NEUTROPHILS ABSOLUTE: 6.6 K/UL (ref 1.7–7.7)
NEUTROPHILS RELATIVE PERCENT: 64.5 %
PDW BLD-RTO: 13.1 % (ref 12.4–15.4)
PLATELET # BLD: 190 K/UL (ref 135–450)
PMV BLD AUTO: 10.1 FL (ref 5–10.5)
POTASSIUM REFLEX MAGNESIUM: 4.1 MMOL/L (ref 3.5–5.1)
RBC # BLD: 5.19 M/UL (ref 4.2–5.9)
SODIUM BLD-SCNC: 142 MMOL/L (ref 136–145)
WBC # BLD: 10.2 K/UL (ref 4–11)

## 2020-11-21 PROCEDURE — 80048 BASIC METABOLIC PNL TOTAL CA: CPT

## 2020-11-21 PROCEDURE — 6370000000 HC RX 637 (ALT 250 FOR IP): Performed by: INTERNAL MEDICINE

## 2020-11-21 PROCEDURE — 85025 COMPLETE CBC W/AUTO DIFF WBC: CPT

## 2020-11-21 PROCEDURE — 94640 AIRWAY INHALATION TREATMENT: CPT

## 2020-11-21 PROCEDURE — 2580000003 HC RX 258: Performed by: INTERNAL MEDICINE

## 2020-11-21 PROCEDURE — 94760 N-INVAS EAR/PLS OXIMETRY 1: CPT

## 2020-11-21 PROCEDURE — 2060000000 HC ICU INTERMEDIATE R&B

## 2020-11-21 PROCEDURE — 36415 COLL VENOUS BLD VENIPUNCTURE: CPT

## 2020-11-21 PROCEDURE — 6360000002 HC RX W HCPCS: Performed by: INTERNAL MEDICINE

## 2020-11-21 RX ORDER — OXYCODONE HYDROCHLORIDE AND ACETAMINOPHEN 5; 325 MG/1; MG/1
1 TABLET ORAL EVERY 4 HOURS PRN
Status: DISCONTINUED | OUTPATIENT
Start: 2020-11-21 | End: 2020-11-22

## 2020-11-21 RX ADMIN — VANCOMYCIN HYDROCHLORIDE 1000 MG: 1 INJECTION, POWDER, LYOPHILIZED, FOR SOLUTION INTRAVENOUS at 05:45

## 2020-11-21 RX ADMIN — OXYCODONE HYDROCHLORIDE AND ACETAMINOPHEN 1 TABLET: 5; 325 TABLET ORAL at 16:59

## 2020-11-21 RX ADMIN — LORAZEPAM 1 MG: 2 INJECTION INTRAMUSCULAR; INTRAVENOUS at 20:52

## 2020-11-21 RX ADMIN — Medication 2 PUFF: at 09:30

## 2020-11-21 RX ADMIN — KETOROLAC TROMETHAMINE 30 MG: 30 INJECTION, SOLUTION INTRAMUSCULAR at 06:00

## 2020-11-21 RX ADMIN — VANCOMYCIN HYDROCHLORIDE 1000 MG: 1 INJECTION, POWDER, LYOPHILIZED, FOR SOLUTION INTRAVENOUS at 13:18

## 2020-11-21 RX ADMIN — VANCOMYCIN HYDROCHLORIDE 1000 MG: 1 INJECTION, POWDER, LYOPHILIZED, FOR SOLUTION INTRAVENOUS at 20:49

## 2020-11-21 RX ADMIN — Medication 2 PUFF: at 20:24

## 2020-11-21 RX ADMIN — MONTELUKAST SODIUM 10 MG: 10 TABLET, COATED ORAL at 22:07

## 2020-11-21 RX ADMIN — CETIRIZINE HYDROCHLORIDE 10 MG: 10 TABLET, FILM COATED ORAL at 09:05

## 2020-11-21 RX ADMIN — KETOROLAC TROMETHAMINE 30 MG: 30 INJECTION, SOLUTION INTRAMUSCULAR at 02:22

## 2020-11-21 RX ADMIN — HYDROMORPHONE HYDROCHLORIDE 1 MG: 1 INJECTION, SOLUTION INTRAMUSCULAR; INTRAVENOUS; SUBCUTANEOUS at 02:23

## 2020-11-21 RX ADMIN — HYDROMORPHONE HYDROCHLORIDE 1 MG: 1 INJECTION, SOLUTION INTRAMUSCULAR; INTRAVENOUS; SUBCUTANEOUS at 09:05

## 2020-11-21 RX ADMIN — Medication 10 ML: at 02:22

## 2020-11-21 RX ADMIN — HYDROMORPHONE HYDROCHLORIDE 1 MG: 1 INJECTION, SOLUTION INTRAMUSCULAR; INTRAVENOUS; SUBCUTANEOUS at 20:48

## 2020-11-21 RX ADMIN — CEFEPIME HYDROCHLORIDE 2 G: 2 INJECTION, POWDER, FOR SOLUTION INTRAVENOUS at 22:08

## 2020-11-21 RX ADMIN — Medication 10 ML: at 20:49

## 2020-11-21 RX ADMIN — CEFEPIME HYDROCHLORIDE 2 G: 2 INJECTION, POWDER, FOR SOLUTION INTRAVENOUS at 10:42

## 2020-11-21 RX ADMIN — Medication 10 ML: at 22:07

## 2020-11-21 ASSESSMENT — PAIN SCALES - GENERAL
PAINLEVEL_OUTOF10: 9
PAINLEVEL_OUTOF10: 7
PAINLEVEL_OUTOF10: 5
PAINLEVEL_OUTOF10: 8
PAINLEVEL_OUTOF10: 7

## 2020-11-21 ASSESSMENT — PAIN DESCRIPTION - FREQUENCY
FREQUENCY: INTERMITTENT
FREQUENCY: INTERMITTENT

## 2020-11-21 ASSESSMENT — PAIN DESCRIPTION - PROGRESSION
CLINICAL_PROGRESSION: NOT CHANGED
CLINICAL_PROGRESSION: NOT CHANGED

## 2020-11-21 ASSESSMENT — PAIN DESCRIPTION - ORIENTATION
ORIENTATION: POSTERIOR
ORIENTATION: POSTERIOR

## 2020-11-21 ASSESSMENT — PAIN DESCRIPTION - LOCATION
LOCATION: NECK
LOCATION: HEAD;NECK

## 2020-11-21 ASSESSMENT — PAIN DESCRIPTION - PAIN TYPE
TYPE: ACUTE PAIN
TYPE: ACUTE PAIN

## 2020-11-21 ASSESSMENT — PAIN - FUNCTIONAL ASSESSMENT
PAIN_FUNCTIONAL_ASSESSMENT: ACTIVITIES ARE NOT PREVENTED
PAIN_FUNCTIONAL_ASSESSMENT: ACTIVITIES ARE NOT PREVENTED

## 2020-11-21 ASSESSMENT — PAIN DESCRIPTION - DESCRIPTORS
DESCRIPTORS: TENDER;DISCOMFORT;DULL
DESCRIPTORS: DISCOMFORT;TIGHTNESS

## 2020-11-21 ASSESSMENT — PAIN DESCRIPTION - ONSET
ONSET: ON-GOING
ONSET: ON-GOING

## 2020-11-21 NOTE — PLAN OF CARE
Problem: Respiratory  Goal: No pulmonary complications  72/43/7526 1432 by Kay Pallas, RN  Outcome: Ongoing     Problem: Respiratory  Goal: No pulmonary complications  85/43/9903 1432 by Kay Pallas, RN  Outcome: Ongoing     Problem: GI  Goal: No bowel complications  05/48/4738 1432 by Kay Pallas, RN  Outcome: Ongoing     Problem: Infection:  Goal: Will remain free from infection  Description: Will remain free from infection  11/21/2020 1432 by Kay Pallas, RN  Outcome: Ongoing     Problem: Safety:  Goal: Free from accidental physical injury  Description: Free from accidental physical injury  11/21/2020 1432 by Kay Pallas, RN  Outcome: Ongoing     Problem: Pain:  Goal: Pain level will decrease  Description: Pain level will decrease  11/21/2020 1432 by Kay Pallas, RN  Outcome: Ongoing

## 2020-11-21 NOTE — ACP (ADVANCE CARE PLANNING)
Advanced Care Planning Note. Purpose of Encounter: Advanced care planning in light of epidural abscess  Parties In Attendance: Patient, wife  Decisional Capacity: Yes  Subjective: Patient with neck pain and HA  Objective: Cr 0.8  Goals of Care Determination: Patient wants full support (CPR, vent, surgery, HD, trach, PEG)  Plan:  IVF, IV Abx, ID/Neuro/NS consults, ID to order IR CT guided drainage for cervical epidural fluid collection  Code Status: Full code   Time spent on Advanced care Plannin minutes  Advanced Care Planning Documents: Completed advanced directives on chart, wife is the POA.     Ruma Mckeon MD  2020 2:03 PM

## 2020-11-21 NOTE — PROGRESS NOTES
[facility] PROGRESS NOTE    11/21/2020 1:59 PM        Name: New Hurtado Admitted: 11/18/2020  Primary Care Provider: GALEN Everett CNP (Tel: [phone])    Brief Course:  34 y.o. male with history of asthma, anxiety, chronic LBP who came to ER with altered mental status, dizziness, HA, nausea and diarrhea. Had watery stool. No recent Abx. No recent steroids. Patient had GAVI by [facility] for L5 disc disease about 3 weeks in office. Admitted as inpatient for acute metabolic encephalopathy with sepsis, HA, neck pain and intractable nausea with vomiting. Had LP in ED for suspected meningitis. Started on IV Abx. Followed by ID and Neurology. MRI Brain, C and L spine requested to r/o abscess. C diff negative. GI pathogen sent on 11/18. COVID negative on 11/18. MRI Brain with   1. No acute intracranial abnormality. 2. Scattered punctate foci of T2/FLAIR hyperintensity in the peripheral    cerebral white matter are nonspecific.  Differential diagnosis includes early    sequela of chronic microvascular ischemia, sequela of prior vasculitis or    chronic migraine headaches, or much less likely demyelinating disease. MRI L spine with  1. No acute abnormality of the lumbar spine. 2. Mild left L5 neural foraminal narrowing secondary to a left foraminal    L5-S1 disc protrusion. EEG normal.    MRI T spine with  Nonenhancing epidural fluid intensity in the lower cervical spine and upper    thoracic spine, decreased in conspicuity in the lower cervical spine compared    to prior cervical spine MRI.  Imaging features are suggestive of epidural    effusion which has redistributed in the interim.         No significant thoracic spinal canal stenosis. Reviewed with Neurosurgery who do NOT recommend surgical intervention at this time.      ID plans to order CT guided drainage for cervical fluid collection to direct antibiotic treatment on 11/23. CC:  AMS, dizziness, HA, nausea, vomiting and diarrhea    Subjective:  . Patient with ongoing HA and neck pain. Chronic back pain. No CP, SOB or fevers. No diarrhea. Wife and patient want to proceed with CT guided drainage ID discussed with them.     Reviewed interval ancillary notes    Current Medications  oxyCODONE-acetaminophen (PERCOCET) 5-325 MG per tablet 1 tablet, Q4H PRN  lidocaine PF 1 % injection 5 mL, Once  sodium chloride flush 0.9 % injection 10 mL, 2 times per day  sodium chloride flush 0.9 % injection 10 mL, PRN  cyclobenzaprine (FLEXERIL) tablet 5 mg, TID PRN  HYDROmorphone HCl PF (DILAUDID) injection 1 mg, Q4H PRN  budesonide (RINOCORT AQUA) nasal spray 2 spray, BID PRN  cetirizine (ZYRTEC) tablet 10 mg, Daily  montelukast (SINGULAIR) tablet 10 mg, Nightly  budesonide-formoterol (SYMBICORT) 80-4.5 MCG/ACT inhaler 2 puff, BID  albuterol sulfate  (90 Base) MCG/ACT inhaler 2 puff, Q4H PRN  acetaminophen (TYLENOL) tablet 650 mg, Q6H PRN    Or  acetaminophen (TYLENOL) suppository 650 mg, Q6H PRN  polyethylene glycol (GLYCOLAX) packet 17 g, Daily PRN  promethazine (PHENERGAN) tablet 12.5 mg, Q6H PRN    Or  ondansetron (ZOFRAN) injection 4 mg, Q6H PRN  0.9 % sodium chloride infusion, Continuous  promethazine (PHENERGAN) injection 12.5 mg, Q4H PRN  LORazepam (ATIVAN) injection 1 mg, Q4H PRN  sodium chloride flush 0.9 % injection 10 mL, 2 times per day  sodium chloride flush 0.9 % injection 10 mL, PRN  vancomycin 1000 mg IVPB in 250 mL D5W addavial, Q8H  cefepime (MAXIPIME) 2 g IVPB minibag, Q12H        Objective:  /67   Pulse 62   Temp 97.5 °F (36.4 °C) (Oral)   Resp 16   Ht 5' 8\" (1.727 m)   Wt 162 lb 4.1 oz (73.6 kg)   SpO2 98%   BMI 24.67 kg/m²     Intake/Output Summary (Last 24 hours) at 11/21/2020 1359  Last data filed at 11/21/2020 1042  Gross per 24 hour   Intake 440 ml   Output --   Net 440 ml      Wt Readings from Last 3 Encounters:   11/21/20 162 lb 4.1 oz (73.6 kg)       General appearance:  Appears comfortable. Well nourished  Eyes: Sclera clear, pupils equal  ENT: Moist mucus membranes, no thrush. Trachea midline. Cardiovascular: Regular rhythm, normal S1, S2. No murmur, gallop, rub. No edema in lower extremities  Respiratory: Clear to auscultation bilaterally, no wheeze, good inspiratory effort  Gastrointestinal: Abdomen soft, non-tender, not distended, normal bowel sounds  Musculoskeletal: Very tender to palpation in cervical area  Neurology: Grossly intact. Alert and oriented in time, place and person. No speech or motor deficits  Psychiatry: Appropriate affect. Not agitated  Skin: Warm, dry, normal turgor, no rash  Brisk capillary refill, peripheral pulses palpable     Labs and Tests:  CBC:   Recent Labs     11/19/20  0545 11/20/20  0941 11/21/20  0505   WBC 12.7* 9.0 10.2   HGB 15.2 15.4 15.6    190 190     BMP:    Recent Labs     11/19/20  0545 11/20/20  0941 11/21/20  0505    139 142   K 3.7 3.4* 4.1    103 103   CO2 24 28 30   BUN 14 11 11   CREATININE 1.0 0.8* 0.8*   GLUCOSE 143* 104* 96     Hepatic:   No results for input(s): AST, ALT, ALB, BILITOT, ALKPHOS in the last 72 hours. MRI THORACIC SPINE W WO CONTRAST   Preliminary Result   Nonenhancing epidural fluid intensity in the lower cervical spine and upper   thoracic spine, decreased in conspicuity in the lower cervical spine compared   to prior cervical spine MRI. Imaging features are suggestive of epidural   effusion which has redistributed in the interim. No significant thoracic spinal canal stenosis. MRI CERVICAL SPINE W WO CONTRAST   Final Result   1. Partially imaged nonenhancing epidural fluid extending from C6-7 through   the visualized upper thoracic spine causing moderate narrowing of the thecal   sac.   This fluid has imaging characteristics most consistent with effusion,   although infection cannot be completely excluded. MRI of the thoracic spine   is recommended to evaluate the caudal extent of this epidural fluid. 2. Mild spinal canal stenosis at C3-4 and C6-7. MRI BRAIN W WO CONTRAST   Final Result   1. No acute intracranial abnormality. 2. Scattered punctate foci of T2/FLAIR hyperintensity in the peripheral   cerebral white matter are nonspecific. Differential diagnosis includes early   sequela of chronic microvascular ischemia, sequela of prior vasculitis or   chronic migraine headaches, or much less likely demyelinating disease. MRI LUMBAR SPINE W WO CONTRAST   Final Result   1. No acute abnormality of the lumbar spine. 2. Mild left L5 neural foraminal narrowing secondary to a left foraminal   L5-S1 disc protrusion. CT ABDOMEN PELVIS W IV CONTRAST Additional Contrast? None   Final Result   1. Fluid throughout the colon and rectum should be correlated with any   history of diarrhea. No evident colonic wall thickening to indicate colitis   2. No evidence of intra abdominopelvic abscess         CTA HEAD NECK W CONTRAST   Final Result   Unremarkable CTA of the head and neck. CTA CHEST W WO CONTRAST   Final Result   No acute vascular abnormality is identified. No thoracic aortic dissection   or aneurysm. Minimal bibasilar atelectasis. Otherwise, no acute cardiopulmonary disease. CT HEAD WO CONTRAST   Final Result   1. No acute intracranial abnormality. 2. 3.2 cm posterior fossa extra-axial fluid collection favoring an arachnoid   cyst.   Findings were discussed with SHAGUFTA HENDERSON at 77:97 pm on 11/18/2020. Problem List  Principal Problem:    Acute metabolic encephalopathy  Active Problems:    Asthma    Anxiety    Nausea & vomiting    Headache    Dizziness    Leukocytosis    Diarrhea    Abnormal CT of the head    Septicemia (HCC)    Subarachnoid cyst    Lactic acid acidosis    Arachnoid cyst    Overweight (BMI 25.0-29. 9)    Cannabis abuse  Resolved Problems:    * No resolved hospital problems. *       Assessment & Plan:   1. NPO p MN on 11/23 for CT guided drainage of cervical collection by IR per ID  2. NS following, do not plan NS intervention at this time  3. ID will order IR CT guided drainage of cervical collection  4. IV Cefepime and Vanco per ID   5. F/U GI pathogen  6. Try Percocet PRN  7. Completed FMLA papers for patient and wife. IV Access: Peripheral  Dee: No  Diet: DIET GENERAL;  Diet NPO, After Midnight  Code:Full Code  DVT PPX SCD  Disposition Home    Discussed with patient and nursing. Discussed with wife extensively again today. ID to place orders for IR CT guided drainage of cervical epidural fluid collection.     Heraclio Scott MD   11/21/2020 1:59 PM

## 2020-11-22 LAB
ANION GAP SERPL CALCULATED.3IONS-SCNC: 10 MMOL/L (ref 3–16)
BASOPHILS ABSOLUTE: 0 K/UL (ref 0–0.2)
BASOPHILS RELATIVE PERCENT: 0.5 %
BLOOD CULTURE, ROUTINE: NORMAL
BUN BLDV-MCNC: 10 MG/DL (ref 7–20)
CALCIUM SERPL-MCNC: 9.7 MG/DL (ref 8.3–10.6)
CHLORIDE BLD-SCNC: 102 MMOL/L (ref 99–110)
CO2: 29 MMOL/L (ref 21–32)
CREAT SERPL-MCNC: 0.7 MG/DL (ref 0.9–1.3)
CULTURE, BLOOD 2: NORMAL
EOSINOPHILS ABSOLUTE: 0.3 K/UL (ref 0–0.6)
EOSINOPHILS RELATIVE PERCENT: 3.7 %
GFR AFRICAN AMERICAN: >60
GFR NON-AFRICAN AMERICAN: >60
GLUCOSE BLD-MCNC: 113 MG/DL (ref 70–99)
HCT VFR BLD CALC: 47.7 % (ref 40.5–52.5)
HEMOGLOBIN: 15.9 G/DL (ref 13.5–17.5)
LYMPHOCYTES ABSOLUTE: 2.7 K/UL (ref 1–5.1)
LYMPHOCYTES RELATIVE PERCENT: 30.8 %
MCH RBC QN AUTO: 30 PG (ref 26–34)
MCHC RBC AUTO-ENTMCNC: 33.3 G/DL (ref 31–36)
MCV RBC AUTO: 90.1 FL (ref 80–100)
MONOCYTES ABSOLUTE: 0.5 K/UL (ref 0–1.3)
MONOCYTES RELATIVE PERCENT: 5.6 %
NEUTROPHILS ABSOLUTE: 5.2 K/UL (ref 1.7–7.7)
NEUTROPHILS RELATIVE PERCENT: 59.4 %
PDW BLD-RTO: 13.4 % (ref 12.4–15.4)
PLATELET # BLD: 190 K/UL (ref 135–450)
PMV BLD AUTO: 9.6 FL (ref 5–10.5)
POTASSIUM REFLEX MAGNESIUM: 4 MMOL/L (ref 3.5–5.1)
RBC # BLD: 5.3 M/UL (ref 4.2–5.9)
SODIUM BLD-SCNC: 141 MMOL/L (ref 136–145)
VANCOMYCIN TROUGH: 14.2 UG/ML (ref 10–20)
WBC # BLD: 8.8 K/UL (ref 4–11)

## 2020-11-22 PROCEDURE — 94640 AIRWAY INHALATION TREATMENT: CPT

## 2020-11-22 PROCEDURE — 97161 PT EVAL LOW COMPLEX 20 MIN: CPT

## 2020-11-22 PROCEDURE — 2580000003 HC RX 258: Performed by: INTERNAL MEDICINE

## 2020-11-22 PROCEDURE — 6370000000 HC RX 637 (ALT 250 FOR IP): Performed by: INTERNAL MEDICINE

## 2020-11-22 PROCEDURE — 2060000000 HC ICU INTERMEDIATE R&B

## 2020-11-22 PROCEDURE — 80202 ASSAY OF VANCOMYCIN: CPT

## 2020-11-22 PROCEDURE — 94761 N-INVAS EAR/PLS OXIMETRY MLT: CPT

## 2020-11-22 PROCEDURE — 97165 OT EVAL LOW COMPLEX 30 MIN: CPT

## 2020-11-22 PROCEDURE — 80048 BASIC METABOLIC PNL TOTAL CA: CPT

## 2020-11-22 PROCEDURE — 97530 THERAPEUTIC ACTIVITIES: CPT

## 2020-11-22 PROCEDURE — 6360000002 HC RX W HCPCS: Performed by: INTERNAL MEDICINE

## 2020-11-22 PROCEDURE — 36415 COLL VENOUS BLD VENIPUNCTURE: CPT

## 2020-11-22 PROCEDURE — 85025 COMPLETE CBC W/AUTO DIFF WBC: CPT

## 2020-11-22 RX ORDER — DIAZEPAM 5 MG/1
5 TABLET ORAL EVERY 6 HOURS PRN
Status: DISCONTINUED | OUTPATIENT
Start: 2020-11-22 | End: 2020-11-23 | Stop reason: HOSPADM

## 2020-11-22 RX ORDER — OXYCODONE HYDROCHLORIDE 5 MG/1
5 TABLET ORAL EVERY 4 HOURS PRN
Status: DISCONTINUED | OUTPATIENT
Start: 2020-11-22 | End: 2020-11-23 | Stop reason: SDUPTHER

## 2020-11-22 RX ORDER — POLYETHYLENE GLYCOL 3350 17 G/17G
17 POWDER, FOR SOLUTION ORAL 2 TIMES DAILY
Status: DISCONTINUED | OUTPATIENT
Start: 2020-11-22 | End: 2020-11-23 | Stop reason: HOSPADM

## 2020-11-22 RX ORDER — DIAZEPAM 5 MG/1
10 TABLET ORAL EVERY 6 HOURS PRN
Status: DISCONTINUED | OUTPATIENT
Start: 2020-11-22 | End: 2020-11-22

## 2020-11-22 RX ORDER — OXYCODONE HYDROCHLORIDE 5 MG/1
10 TABLET ORAL EVERY 4 HOURS PRN
Status: DISCONTINUED | OUTPATIENT
Start: 2020-11-22 | End: 2020-11-23 | Stop reason: SDUPTHER

## 2020-11-22 RX ORDER — FLUOXETINE HYDROCHLORIDE 20 MG/1
20 CAPSULE ORAL DAILY
Status: DISCONTINUED | OUTPATIENT
Start: 2020-11-22 | End: 2020-11-23 | Stop reason: HOSPADM

## 2020-11-22 RX ADMIN — POLYETHYLENE GLYCOL 3350 17 G: 17 POWDER, FOR SOLUTION ORAL at 21:41

## 2020-11-22 RX ADMIN — OXYCODONE HYDROCHLORIDE AND ACETAMINOPHEN 1 TABLET: 5; 325 TABLET ORAL at 05:50

## 2020-11-22 RX ADMIN — CETIRIZINE HYDROCHLORIDE 10 MG: 10 TABLET, FILM COATED ORAL at 09:24

## 2020-11-22 RX ADMIN — MONTELUKAST SODIUM 10 MG: 10 TABLET, COATED ORAL at 21:41

## 2020-11-22 RX ADMIN — VANCOMYCIN HYDROCHLORIDE 1000 MG: 1 INJECTION, POWDER, LYOPHILIZED, FOR SOLUTION INTRAVENOUS at 21:41

## 2020-11-22 RX ADMIN — HYDROMORPHONE HYDROCHLORIDE 1 MG: 1 INJECTION, SOLUTION INTRAMUSCULAR; INTRAVENOUS; SUBCUTANEOUS at 09:24

## 2020-11-22 RX ADMIN — OXYCODONE 5 MG: 5 TABLET ORAL at 12:46

## 2020-11-22 RX ADMIN — FLUOXETINE 20 MG: 20 CAPSULE ORAL at 12:43

## 2020-11-22 RX ADMIN — SODIUM CHLORIDE: 9 INJECTION, SOLUTION INTRAVENOUS at 10:10

## 2020-11-22 RX ADMIN — HYDROMORPHONE HYDROCHLORIDE 1 MG: 1 INJECTION, SOLUTION INTRAMUSCULAR; INTRAVENOUS; SUBCUTANEOUS at 04:03

## 2020-11-22 RX ADMIN — HYDROMORPHONE HYDROCHLORIDE 1 MG: 1 INJECTION, SOLUTION INTRAMUSCULAR; INTRAVENOUS; SUBCUTANEOUS at 21:37

## 2020-11-22 RX ADMIN — CEFEPIME HYDROCHLORIDE 2 G: 2 INJECTION, POWDER, FOR SOLUTION INTRAVENOUS at 09:31

## 2020-11-22 RX ADMIN — OXYCODONE 10 MG: 5 TABLET ORAL at 19:32

## 2020-11-22 RX ADMIN — HYDROMORPHONE HYDROCHLORIDE 1 MG: 1 INJECTION, SOLUTION INTRAMUSCULAR; INTRAVENOUS; SUBCUTANEOUS at 16:32

## 2020-11-22 RX ADMIN — Medication 10 ML: at 21:37

## 2020-11-22 RX ADMIN — ACETAMINOPHEN 650 MG: 325 TABLET ORAL at 21:51

## 2020-11-22 RX ADMIN — POLYETHYLENE GLYCOL 3350 17 G: 17 POWDER, FOR SOLUTION ORAL at 12:43

## 2020-11-22 RX ADMIN — CYCLOBENZAPRINE 5 MG: 10 TABLET, FILM COATED ORAL at 21:51

## 2020-11-22 RX ADMIN — DIAZEPAM 5 MG: 5 TABLET ORAL at 19:32

## 2020-11-22 RX ADMIN — Medication 10 ML: at 21:41

## 2020-11-22 RX ADMIN — Medication 2 PUFF: at 21:07

## 2020-11-22 RX ADMIN — VANCOMYCIN HYDROCHLORIDE 1000 MG: 1 INJECTION, POWDER, LYOPHILIZED, FOR SOLUTION INTRAVENOUS at 05:10

## 2020-11-22 RX ADMIN — VANCOMYCIN HYDROCHLORIDE 1000 MG: 1 INJECTION, POWDER, LYOPHILIZED, FOR SOLUTION INTRAVENOUS at 13:32

## 2020-11-22 RX ADMIN — Medication 2 PUFF: at 09:07

## 2020-11-22 ASSESSMENT — PAIN DESCRIPTION - ONSET
ONSET: ON-GOING

## 2020-11-22 ASSESSMENT — PAIN DESCRIPTION - PROGRESSION
CLINICAL_PROGRESSION: NOT CHANGED

## 2020-11-22 ASSESSMENT — PAIN DESCRIPTION - ORIENTATION
ORIENTATION: POSTERIOR

## 2020-11-22 ASSESSMENT — PAIN DESCRIPTION - DESCRIPTORS
DESCRIPTORS: DISCOMFORT
DESCRIPTORS: DISCOMFORT;DULL
DESCRIPTORS: DISCOMFORT;DULL;PRESSURE
DESCRIPTORS: ACHING

## 2020-11-22 ASSESSMENT — PAIN DESCRIPTION - PAIN TYPE
TYPE: ACUTE PAIN

## 2020-11-22 ASSESSMENT — PAIN DESCRIPTION - LOCATION
LOCATION: HEAD;NECK
LOCATION: NECK;HEAD
LOCATION: HEAD;NECK
LOCATION: HEAD;NECK

## 2020-11-22 ASSESSMENT — PAIN DESCRIPTION - FREQUENCY
FREQUENCY: CONTINUOUS
FREQUENCY: INTERMITTENT
FREQUENCY: INTERMITTENT
FREQUENCY: CONTINUOUS

## 2020-11-22 ASSESSMENT — PAIN SCALES - GENERAL
PAINLEVEL_OUTOF10: 9
PAINLEVEL_OUTOF10: 9
PAINLEVEL_OUTOF10: 8
PAINLEVEL_OUTOF10: 9
PAINLEVEL_OUTOF10: 6
PAINLEVEL_OUTOF10: 5
PAINLEVEL_OUTOF10: 6
PAINLEVEL_OUTOF10: 9

## 2020-11-22 ASSESSMENT — PAIN - FUNCTIONAL ASSESSMENT
PAIN_FUNCTIONAL_ASSESSMENT: PREVENTS OR INTERFERES SOME ACTIVE ACTIVITIES AND ADLS
PAIN_FUNCTIONAL_ASSESSMENT: PREVENTS OR INTERFERES SOME ACTIVE ACTIVITIES AND ADLS

## 2020-11-22 ASSESSMENT — PAIN SCALES - WONG BAKER: WONGBAKER_NUMERICALRESPONSE: 0

## 2020-11-22 NOTE — PROGRESS NOTES
[facility]    11/22/2020 4:45 PM        Name: Daxa Hurtado Admitted: 11/18/2020  Primary Care Provider: GALEN Huddleston CNP (Tel: [phone])    Brief Course:  34 y.o. male with history of asthma, anxiety, chronic LBP who came to ER with altered mental status, dizziness, HA, nausea and diarrhea. Had watery stool. No recent Abx. No recent steroids. Patient had GAVI by [facility] for L5 disc disease about 3 weeks in office. Admitted as inpatient for acute metabolic encephalopathy with sepsis, HA, neck pain and intractable nausea with vomiting. Had LP in ED for suspected meningitis. Started on IV Abx. Followed by ID and Neurology. MRI Brain, C and L spine requested to r/o abscess. C diff negative. GI pathogen sent on 11/18. COVID negative on 11/18. MRI Brain with   1. No acute intracranial abnormality. 2. Scattered punctate foci of T2/FLAIR hyperintensity in the peripheral    cerebral white matter are nonspecific.  Differential diagnosis includes early    sequela of chronic microvascular ischemia, sequela of prior vasculitis or    chronic migraine headaches, or much less likely demyelinating disease. MRI L spine with  1. No acute abnormality of the lumbar spine. 2. Mild left L5 neural foraminal narrowing secondary to a left foraminal    L5-S1 disc protrusion. EEG normal.    MRI T spine with  Nonenhancing epidural fluid intensity in the lower cervical spine and upper    thoracic spine, decreased in conspicuity in the lower cervical spine compared    to prior cervical spine MRI.  Imaging features are suggestive of epidural    effusion which has redistributed in the interim.         No significant thoracic spinal canal stenosis. Reviewed with Neurosurgery who do NOT recommend surgical intervention at this time.      ID plans to order CT guided drainage for cervical fluid collection to direct antibiotic treatment on 11/23. CC:  AMS, dizziness, HA, nausea, vomiting and diarrhea    Subjective:  . Patient with severe HA and neck pain. Now with urinary straining and constipation. No CP, SOB or fevers. No diarrhea. Wife and patient want to proceed with CT guided drainage ID discussed with them by ID.     Reviewed interval ancillary notes    Current Medications  FLUoxetine (PROZAC) capsule 20 mg, Daily  polyethylene glycol (GLYCOLAX) packet 17 g, BID  oxyCODONE (ROXICODONE) immediate release tablet 5 mg, Q4H PRN    Or  oxyCODONE (ROXICODONE) immediate release tablet 10 mg, Q4H PRN  diazePAM (VALIUM) tablet 5 mg, Q6H PRN  lidocaine PF 1 % injection 5 mL, Once  sodium chloride flush 0.9 % injection 10 mL, 2 times per day  sodium chloride flush 0.9 % injection 10 mL, PRN  cyclobenzaprine (FLEXERIL) tablet 5 mg, TID PRN  HYDROmorphone HCl PF (DILAUDID) injection 1 mg, Q4H PRN  budesonide (RINOCORT AQUA) nasal spray 2 spray, BID PRN  cetirizine (ZYRTEC) tablet 10 mg, Daily  montelukast (SINGULAIR) tablet 10 mg, Nightly  budesonide-formoterol (SYMBICORT) 80-4.5 MCG/ACT inhaler 2 puff, BID  albuterol sulfate  (90 Base) MCG/ACT inhaler 2 puff, Q4H PRN  acetaminophen (TYLENOL) tablet 650 mg, Q6H PRN    Or  acetaminophen (TYLENOL) suppository 650 mg, Q6H PRN  polyethylene glycol (GLYCOLAX) packet 17 g, Daily PRN  promethazine (PHENERGAN) tablet 12.5 mg, Q6H PRN    Or  ondansetron (ZOFRAN) injection 4 mg, Q6H PRN  0.9 % sodium chloride infusion, Continuous  promethazine (PHENERGAN) injection 12.5 mg, Q4H PRN  LORazepam (ATIVAN) injection 1 mg, Q4H PRN  sodium chloride flush 0.9 % injection 10 mL, 2 times per day  sodium chloride flush 0.9 % injection 10 mL, PRN  vancomycin 1000 mg IVPB in 250 mL D5W addavial, Q8H  cefepime (MAXIPIME) 2 g IVPB minibag, Q12H        Objective:  /84   Pulse 62   Temp 97.2 °F (36.2 °C) (Oral)   Resp 17   Ht 5' 8\" (1.727 m)   Wt 162 lb extending from C6-7 through   the visualized upper thoracic spine causing moderate narrowing of the thecal   sac. This fluid has imaging characteristics most consistent with effusion,   although infection cannot be completely excluded. MRI of the thoracic spine   is recommended to evaluate the caudal extent of this epidural fluid. 2. Mild spinal canal stenosis at C3-4 and C6-7. MRI BRAIN W WO CONTRAST   Final Result   1. No acute intracranial abnormality. 2. Scattered punctate foci of T2/FLAIR hyperintensity in the peripheral   cerebral white matter are nonspecific. Differential diagnosis includes early   sequela of chronic microvascular ischemia, sequela of prior vasculitis or   chronic migraine headaches, or much less likely demyelinating disease. MRI LUMBAR SPINE W WO CONTRAST   Final Result   1. No acute abnormality of the lumbar spine. 2. Mild left L5 neural foraminal narrowing secondary to a left foraminal   L5-S1 disc protrusion. CT ABDOMEN PELVIS W IV CONTRAST Additional Contrast? None   Final Result   1. Fluid throughout the colon and rectum should be correlated with any   history of diarrhea. No evident colonic wall thickening to indicate colitis   2. No evidence of intra abdominopelvic abscess         CTA HEAD NECK W CONTRAST   Final Result   Unremarkable CTA of the head and neck. CTA CHEST W WO CONTRAST   Final Result   No acute vascular abnormality is identified. No thoracic aortic dissection   or aneurysm. Minimal bibasilar atelectasis. Otherwise, no acute cardiopulmonary disease. CT HEAD WO CONTRAST   Final Result   1. No acute intracranial abnormality. 2. 3.2 cm posterior fossa extra-axial fluid collection favoring an arachnoid   cyst.   Findings were discussed with SHAGUFTA HENDERSON at 17:40 pm on 11/18/2020.                Problem List  Principal Problem:    Acute metabolic encephalopathy  Active Problems:    Asthma    Anxiety    Nausea & vomiting    Headache    Dizziness    Leukocytosis    Diarrhea    Abnormal CT of the head    Septicemia (HCC)    Subarachnoid cyst    Lactic acid acidosis    Arachnoid cyst    Overweight (BMI 25.0-29. 9)    Cannabis abuse    Abnormal MRI, cervical spine  Resolved Problems:    * No resolved hospital problems. *       Assessment & Plan:   1. NPO p MN on 11/23 for CT guided drainage of cervical collection by IR per ID  2. NS following, do not plan NS intervention at this time  3. ID will order IR CT guided drainage of cervical collection  4. IV Cefepime and Vanco per ID   5. F/U GI pathogen  6. Start Valium PRN  7. Cont Dilaudid IV PRN pain  8. Try Oxycodone PRN pain  9. Cont Ativan IV PRN anxiety  10. Miralax bid  11. Cont IVF      IV Access: Peripheral  Dee: No  Diet: DIET GENERAL;  Diet NPO, After Midnight  Code:Full Code  DVT PPX SCD  Disposition Home    Discussed with patient and nursing. Discussed with wife in detail. ID needs to place orders for IR CT guided drainage of cervical epidural fluid collection.     Pam Ny MD   11/22/2020 4:45 PM

## 2020-11-22 NOTE — PROGRESS NOTES
Physical Therapy    Facility/Department: [address]  Initial Assessment/Discharge Summary    NAME: Anitha Asif  : 1990  MRN: [id number]    Date of Service: 2020    Discharge Recommendations:  Anitha Asif scored a 24/24 on the AM-PAC short mobility form. Current research shows that an AM-PAC score of 18 or greater is typically associated with a discharge to the patient's home setting. Based on the patient's AM-PAC score and their current functional mobility deficits, it is recommended that the patient have 2-3 sessions per week of Physical Therapy at d/c to increase the patient's independence. At this time, this patient demonstrates the endurance and safety to discharge home with outpatient PT and a follow up treatment frequency of 2-3x/wk. Please see assessment section for further patient specific details. If patient discharges prior to next session this note will serve as a discharge summary. Please see below for the latest assessment towards goals. Home independently, Outpatient PT      Assessment   Assessment: Patient presents w/ cervical pain and HA following recent L5 GAVI. He is presently functionally independent. He may benefit from outpatient physical therapy for his LBP/hip pain post acute stay. He currently has no acute care needs. Will sign off. Prognosis: Excellent  Decision Making: Low Complexity  Clinical Presentation: Stable. PT Education: Lionel Abbasi [facility]  Patient Education: Patient verbalized understanding. Barriers to Learning: None evident. No Skilled PT: Independent with functional mobility   REQUIRES PT FOLLOW UP: No  Activity Tolerance  Activity Tolerance: Treatment limited secondary to medical complications (free text); Patient Tolerated treatment well       Patient Diagnosis(es): The primary encounter diagnosis was Septicemia ([facility] [address]  Diagnoses of Nonintractable headache, unspecified chronicity pattern, unspecified headache type, Non-intractable vomiting with nausea, unspecified vomiting type, and Subarachnoid cyst were also pertinent to this visit. has a past medical history of Anxiety, Asthma, and Chronic back pain. has a past surgical history that includes Medication Injection. Restrictions  Restrictions/Precautions  Restrictions/Precautions: Fall Risk, General Precautions  Required Braces or Orthoses?: No  Position Activity Restriction  Other position/activity restrictions: Eliecer Borrego is a 34 y.o. male with history of asthma, anxiety, chronic LBP who came to ER with altered mental status, dizziness, HA, nausea and diarrhea. Had watery stool. No recent Abx. No recent steroids. Patient had GAVI by [facility] for L5 disc disease about 3 weeks in office. Had HA and similar episode a day after GAVI. Was unable to stand up or keep eyes open per wife. No melena or hematochezia. Had chills, no fevers. No radiculopathy. No urine or fecal incontinence. Was so hard to arouse that wife had to call 911. No CP, SOB, dysphagia, change in speech or motor weakness. COVID tested in ED. Had LP in ED. Gets dizzy with standing. Has been going to work for [employer]. Got Ativan, Cogentin, Benadryl and Compazine IV in ED. Got IV Vanco and Zovirax in ED. CT Head abnormal in ED. Otherwise complete ROS is negative unless listed above. Vision/Hearing  Vision: Within Functional Limits  Hearing: Within functional limits       Subjective  General  Chart Reviewed: Yes  Patient assessed for rehabilitation services?: Yes  Response To Previous Treatment: Not applicable  Family / Caregiver Present: Yes(spouse)  Diagnosis: acute metabolic encephalopathy  Follows Commands: Within Functional Limits  General Comment  Comments: Patient supine in bed. Subjective  Subjective: Patient reports HA and neck pain which is exacerbated w/ standing activities.   Pain Screening  Patient Currently in Pain: Denies     Orientation  Orientation  Overall Orientation Status: Within Normal Limits     Social/Functional History  Social/Functional History  Lives With: Spouse, Family  Type of Home: House  Home Layout: Two level  Home Access: Stairs to enter with rails  Entrance Stairs - Number of Steps: 13 stairs to 2nd floor w/ left railing, 2 FOUZIA  Entrance Stairs - Rails: Left  Bathroom Shower/Tub: Tub/Shower unit  Bathroom Toilet: Standard  ADL Assistance: Independent  Homemaking Assistance: Independent  Homemaking Responsibilities: Yes  Ambulation Assistance: Independent  Transfer Assistance: Independent  Active : Yes  Additional Comments: Denies falls. Objective  AROM RLE (degrees)  RLE AROM: WNL  AROM LLE (degrees)  LLE AROM : WNL  Strength RLE  Strength RLE: WFL  Strength LLE  Strength LLE: WFL     Bed mobility  Rolling to Left: Modified independent  Supine to Sit: Modified independent  Sit to Supine: Modified independent  Scooting: Modified independent  Transfers  Sit to Stand: Independent  Stand to sit: Independent  Ambulation  Ambulation?: Yes  Ambulation 1  Surface: level tile  Device: No Device  Assistance: Independent  Gait Deviations: None  Distance: 20'     Balance  Posture: Good  Sitting - Static: Good  Sitting - Dynamic: Good  Standing - Static: Good  Standing - Dynamic: Good      Plan   Plan  Plan Comment: Discharge acute PT.   Safety Devices  Type of devices: Call light within reach, Left in bed  Restraints  Initially in place: No    AM-PAC Score  AM-PAC Inpatient Mobility Raw Score : 24 (11/22/20 1515)  AM-PAC Inpatient T-Scale Score : 61.14 (11/22/20 1515)  Mobility Inpatient CMS 0-100% Score: 0 (11/22/20 1515)  Mobility Inpatient CMS G-Code Modifier : [facility] (11/22/20 1515)     Therapy Time   Individual Concurrent Group Co-treatment   Time In       1439   Time Out       1502   Minutes       23   Timed Code Treatment Minutes: [address], [address], [address], [address] [facility]

## 2020-11-22 NOTE — PROGRESS NOTES
disc disease about 3 weeks in office. Had HA and similar episode a day after GAVI. Was unable to stand up or keep eyes open per wife. No melena or hematochezia. Had chills, no fevers. No radiculopathy. No urine or fecal incontinence. Was so hard to arouse that wife had to call 911. No CP, SOB, dysphagia, change in speech or motor weakness. COVID tested in ED. Had LP in ED. Gets dizzy with standing. Has been going to work for [employer]. Got Ativan, Cogentin, Benadryl and Compazine IV in ED. Got IV Vanco and Zovirax in ED. CT Head abnormal in ED. Otherwise complete ROS is negative unless listed above. Subjective   General  Chart Reviewed: Yes  Patient assessed for rehabilitation services?: Yes  Family / Caregiver Present: Yes(wife)  Diagnosis: Acute Metabolic Encephalopathy  Subjective  Subjective: Pt supine in bed asleep on arrival and agreeable for session. Wife present. Patient Currently in Pain: Denies  Vital Signs  Level of Consciousness: Alert  Patient Currently in Pain: Denies  Social/Functional History  Social/Functional History  Lives With: Spouse, Family  Type of Home: House  Home Layout: Two level  Home Access: Stairs to enter with rails  Entrance Stairs - Number of Steps: 13 stairs to 2nd floor w/ left railing, 2 FOUZIA  Entrance Stairs - Rails: Left  Bathroom Shower/Tub: Tub/Shower unit  Bathroom Toilet: Standard  ADL Assistance: Independent  Homemaking Assistance: Independent  Homemaking Responsibilities: Yes  Ambulation Assistance: Independent  Transfer Assistance: Independent  Active : Yes  Additional Comments: Denies falls.        Objective        Orientation  Overall Orientation Status: Within Normal Limits  Observation/Palpation  Posture: Good  Balance  Sitting Balance: Independent  Standing Balance: Modified independent   Functional Mobility  Functional - Mobility Device: No device  Activity: Other  Assist Level: Modified independent   Functional Mobility Comments: Pt ambulated in

## 2020-11-22 NOTE — ACP (ADVANCE CARE PLANNING)
Advanced Care Planning Note. Purpose of Encounter: Advanced care planning in light of epidural abscess  Parties In Attendance: Patient, wife  Decisional Capacity: Yes  Subjective: Patient with neck pain, HA, constipation and urinary straining  Objective: Cr 0.7  Goals of Care Determination: Patient wants full support (CPR, vent, surgery, HD, trach, PEG)  Plan:  IVF, IV Abx, ID/Neuro/NS consults, ID to order IR CT guided drainage for cervical epidural fluid collection  Code Status: Full code   Time spent on Advanced care Plannin minutes  Advanced Care Planning Documents: Completed advanced directives on chart, wife is the POA.     Ramon Forbes MD  2020 4:47 PM

## 2020-11-22 NOTE — PROGRESS NOTES
Clinical Pharmacy Note: Pharmacy to Dose Vancomcyin    Vancomycin Day: 5  Current Dosin mg every 8 hours      Recent Labs     20  0505 20  0359   BUN 11 10       Recent Labs     20  0505 20  0359   CREATININE 0.8* 0.7*       Recent Labs     20  0505 20  0359   WBC 10.2 8.8         Intake/Output Summary (Last 24 hours) at 2020 0749  Last data filed at 2020 1258  Gross per 24 hour   Intake 480 ml   Output --   Net 480 ml         Ht Readings from Last 1 Encounters:   20 5' 8\" (1.727 m)        Wt Readings from Last 1 Encounters:   20 162 lb 4.1 oz (73.6 kg)         Body mass index is 24.67 kg/m². Estimated Creatinine Clearance: 151 mL/min (A) (based on SCr of 0.7 mg/dL (L)). Trough/Random: 14.2    Assessment/Plan:  Vancomycin level is subtherapeutic but will continue to accumulate to a therapeutic level. Level was drawn appropriately in respect to last dose given. Will continue current dosing of 1000 mg every 8 hours  A vancomycin trough has been ordered for  @ 0430, prior to the 16th dose of the current regimen. Changes in regimen will be determined based on culture results, renal function, and clinical response. Pharmacy will continue to monitor and adjust regimen as necessary.     Thank you for the consult,    Kathleen Veronica, PharmD  PGY-1 Pharmacy Resident  [id number]

## 2020-11-22 NOTE — PROGRESS NOTES
Assessment complete. Vitals:    11/21/20 2048   BP: 99/67   Pulse: 59   Resp: 16   Temp: 98.3 °F (36.8 °C)   SpO2: 95%   No SOB or any distress noted. Pt up for shower. New IV started. Pain medication given for pain to his head and neck at 7/10. POC discussed with pt and wife and agreed upon. Call light within reach, pt encouraged to call if any needs arise. No further requests at this time. Will continue to monitor.

## 2020-11-23 VITALS
HEIGHT: 68 IN | SYSTOLIC BLOOD PRESSURE: 117 MMHG | OXYGEN SATURATION: 100 % | BODY MASS INDEX: 24.9 KG/M2 | TEMPERATURE: 97.5 F | WEIGHT: 164.3 LBS | DIASTOLIC BLOOD PRESSURE: 75 MMHG | RESPIRATION RATE: 16 BRPM | HEART RATE: 55 BPM

## 2020-11-23 LAB
ANION GAP SERPL CALCULATED.3IONS-SCNC: 6 MMOL/L (ref 3–16)
BASOPHILS ABSOLUTE: 0 K/UL (ref 0–0.2)
BASOPHILS RELATIVE PERCENT: 0.5 %
BUN BLDV-MCNC: 8 MG/DL (ref 7–20)
CALCIUM SERPL-MCNC: 9.8 MG/DL (ref 8.3–10.6)
CHLORIDE BLD-SCNC: 104 MMOL/L (ref 99–110)
CO2: 32 MMOL/L (ref 21–32)
CREAT SERPL-MCNC: 0.8 MG/DL (ref 0.9–1.3)
EOSINOPHILS ABSOLUTE: 0.5 K/UL (ref 0–0.6)
EOSINOPHILS RELATIVE PERCENT: 6.2 %
GFR AFRICAN AMERICAN: >60
GFR NON-AFRICAN AMERICAN: >60
GLUCOSE BLD-MCNC: 108 MG/DL (ref 70–99)
HCT VFR BLD CALC: 45.9 % (ref 40.5–52.5)
HEMOGLOBIN: 15.4 G/DL (ref 13.5–17.5)
LYMPHOCYTES ABSOLUTE: 2.4 K/UL (ref 1–5.1)
LYMPHOCYTES RELATIVE PERCENT: 30.4 %
MCH RBC QN AUTO: 30.1 PG (ref 26–34)
MCHC RBC AUTO-ENTMCNC: 33.5 G/DL (ref 31–36)
MCV RBC AUTO: 89.7 FL (ref 80–100)
MONOCYTES ABSOLUTE: 0.4 K/UL (ref 0–1.3)
MONOCYTES RELATIVE PERCENT: 5.3 %
NEUTROPHILS ABSOLUTE: 4.6 K/UL (ref 1.7–7.7)
NEUTROPHILS RELATIVE PERCENT: 57.6 %
PDW BLD-RTO: 13.3 % (ref 12.4–15.4)
PLATELET # BLD: 183 K/UL (ref 135–450)
PMV BLD AUTO: 9.8 FL (ref 5–10.5)
POTASSIUM REFLEX MAGNESIUM: 4.1 MMOL/L (ref 3.5–5.1)
RBC # BLD: 5.12 M/UL (ref 4.2–5.9)
SODIUM BLD-SCNC: 142 MMOL/L (ref 136–145)
WBC # BLD: 8 K/UL (ref 4–11)

## 2020-11-23 PROCEDURE — 80048 BASIC METABOLIC PNL TOTAL CA: CPT

## 2020-11-23 PROCEDURE — 6370000000 HC RX 637 (ALT 250 FOR IP): Performed by: INTERNAL MEDICINE

## 2020-11-23 PROCEDURE — 6360000002 HC RX W HCPCS: Performed by: INTERNAL MEDICINE

## 2020-11-23 PROCEDURE — 85025 COMPLETE CBC W/AUTO DIFF WBC: CPT

## 2020-11-23 PROCEDURE — 36415 COLL VENOUS BLD VENIPUNCTURE: CPT

## 2020-11-23 PROCEDURE — 2580000003 HC RX 258: Performed by: INTERNAL MEDICINE

## 2020-11-23 RX ORDER — OXYCODONE HYDROCHLORIDE 5 MG/1
5 TABLET ORAL EVERY 4 HOURS PRN
Status: DISCONTINUED | OUTPATIENT
Start: 2020-11-23 | End: 2020-11-23 | Stop reason: HOSPADM

## 2020-11-23 RX ORDER — SENNA AND DOCUSATE SODIUM 50; 8.6 MG/1; MG/1
2 TABLET, FILM COATED ORAL 2 TIMES DAILY
Status: DISCONTINUED | OUTPATIENT
Start: 2020-11-23 | End: 2020-11-23 | Stop reason: HOSPADM

## 2020-11-23 RX ORDER — NAPROXEN 250 MG/1
250 TABLET ORAL 2 TIMES DAILY WITH MEALS
Qty: 60 TABLET | Refills: 0 | Status: ON HOLD | OUTPATIENT
Start: 2020-11-23 | End: 2021-08-20 | Stop reason: HOSPADM

## 2020-11-23 RX ORDER — OXYCODONE HCL 20 MG/1
20 TABLET, FILM COATED, EXTENDED RELEASE ORAL EVERY 12 HOURS SCHEDULED
Qty: 28 TABLET | Refills: 0 | Status: SHIPPED | OUTPATIENT
Start: 2020-11-23 | End: 2020-12-07

## 2020-11-23 RX ORDER — DIAZEPAM 5 MG/1
5 TABLET ORAL EVERY 6 HOURS PRN
Qty: 30 TABLET | Refills: 0 | Status: SHIPPED | OUTPATIENT
Start: 2020-11-23 | End: 2020-12-03

## 2020-11-23 RX ORDER — BUDESONIDE AND FORMOTEROL FUMARATE DIHYDRATE 80; 4.5 UG/1; UG/1
2 AEROSOL RESPIRATORY (INHALATION) 2 TIMES DAILY
Qty: 1 INHALER | Refills: 0 | Status: SHIPPED | OUTPATIENT
Start: 2020-11-23 | End: 2022-02-24

## 2020-11-23 RX ORDER — PROMETHAZINE HYDROCHLORIDE 12.5 MG/1
12.5 TABLET ORAL EVERY 6 HOURS PRN
Qty: 30 TABLET | Refills: 0 | Status: SHIPPED | OUTPATIENT
Start: 2020-11-23 | End: 2020-12-01

## 2020-11-23 RX ORDER — CYCLOBENZAPRINE HCL 5 MG
5 TABLET ORAL 3 TIMES DAILY PRN
Qty: 30 TABLET | Refills: 0 | Status: SHIPPED | OUTPATIENT
Start: 2020-11-23 | End: 2020-12-03

## 2020-11-23 RX ORDER — OXYCODONE HYDROCHLORIDE 5 MG/1
5 TABLET ORAL EVERY 6 HOURS PRN
Qty: 40 TABLET | Refills: 0 | Status: SHIPPED | OUTPATIENT
Start: 2020-11-23 | End: 2020-12-07

## 2020-11-23 RX ORDER — GREEN TEA/HOODIA GORDONII 315-12.5MG
1 CAPSULE ORAL 2 TIMES DAILY
Qty: 60 TABLET | Refills: 0 | Status: SHIPPED | OUTPATIENT
Start: 2020-11-23 | End: 2020-12-23

## 2020-11-23 RX ORDER — LINEZOLID 600 MG/1
600 TABLET, FILM COATED ORAL 2 TIMES DAILY
Qty: 28 TABLET | Refills: 0 | Status: SHIPPED | OUTPATIENT
Start: 2020-11-23 | End: 2020-12-07

## 2020-11-23 RX ORDER — NAPROXEN 250 MG/1
250 TABLET ORAL 2 TIMES DAILY WITH MEALS
Status: DISCONTINUED | OUTPATIENT
Start: 2020-11-23 | End: 2020-11-23 | Stop reason: HOSPADM

## 2020-11-23 RX ORDER — OXYCODONE HCL 20 MG/1
20 TABLET, FILM COATED, EXTENDED RELEASE ORAL EVERY 12 HOURS SCHEDULED
Status: DISCONTINUED | OUTPATIENT
Start: 2020-11-23 | End: 2020-11-23 | Stop reason: HOSPADM

## 2020-11-23 RX ORDER — OXYCODONE HYDROCHLORIDE 5 MG/1
10 TABLET ORAL EVERY 4 HOURS PRN
Status: DISCONTINUED | OUTPATIENT
Start: 2020-11-23 | End: 2020-11-23 | Stop reason: HOSPADM

## 2020-11-23 RX ORDER — POLYETHYLENE GLYCOL 3350 17 G/17G
17 POWDER, FOR SOLUTION ORAL 2 TIMES DAILY
Qty: 60 EACH | Refills: 0 | Status: SHIPPED | OUTPATIENT
Start: 2020-11-23 | End: 2020-12-23

## 2020-11-23 RX ORDER — SENNA AND DOCUSATE SODIUM 50; 8.6 MG/1; MG/1
2 TABLET, FILM COATED ORAL 2 TIMES DAILY
Qty: 120 TABLET | Refills: 0 | Status: SHIPPED | OUTPATIENT
Start: 2020-11-23 | End: 2020-12-23

## 2020-11-23 RX ORDER — CIPROFLOXACIN 500 MG/1
500 TABLET, FILM COATED ORAL 2 TIMES DAILY
Qty: 28 TABLET | Refills: 0 | Status: SHIPPED | OUTPATIENT
Start: 2020-11-23 | End: 2020-12-07

## 2020-11-23 RX ADMIN — FLUOXETINE 20 MG: 20 CAPSULE ORAL at 10:44

## 2020-11-23 RX ADMIN — VANCOMYCIN HYDROCHLORIDE 1000 MG: 1 INJECTION, POWDER, LYOPHILIZED, FOR SOLUTION INTRAVENOUS at 06:15

## 2020-11-23 RX ADMIN — NAPROXEN 250 MG: 250 TABLET ORAL at 10:43

## 2020-11-23 RX ADMIN — CEFEPIME HYDROCHLORIDE 2 G: 2 INJECTION, POWDER, FOR SOLUTION INTRAVENOUS at 01:47

## 2020-11-23 RX ADMIN — DIAZEPAM 5 MG: 5 TABLET ORAL at 10:44

## 2020-11-23 RX ADMIN — CEFEPIME HYDROCHLORIDE 2 G: 2 INJECTION, POWDER, FOR SOLUTION INTRAVENOUS at 10:43

## 2020-11-23 RX ADMIN — POLYETHYLENE GLYCOL 3350 17 G: 17 POWDER, FOR SOLUTION ORAL at 10:45

## 2020-11-23 RX ADMIN — STANDARDIZED SENNA CONCENTRATE AND DOCUSATE SODIUM 2 TABLET: 8.6; 5 TABLET ORAL at 10:43

## 2020-11-23 RX ADMIN — HYDROMORPHONE HYDROCHLORIDE 1 MG: 1 INJECTION, SOLUTION INTRAMUSCULAR; INTRAVENOUS; SUBCUTANEOUS at 05:59

## 2020-11-23 RX ADMIN — DIAZEPAM 5 MG: 5 TABLET ORAL at 01:48

## 2020-11-23 RX ADMIN — OXYCODONE 10 MG: 5 TABLET ORAL at 08:21

## 2020-11-23 RX ADMIN — OXYCODONE HYDROCHLORIDE 20 MG: 20 TABLET, FILM COATED, EXTENDED RELEASE ORAL at 10:44

## 2020-11-23 RX ADMIN — CETIRIZINE HYDROCHLORIDE 10 MG: 10 TABLET, FILM COATED ORAL at 10:44

## 2020-11-23 RX ADMIN — OXYCODONE 10 MG: 5 TABLET ORAL at 01:48

## 2020-11-23 RX ADMIN — SODIUM CHLORIDE: 9 INJECTION, SOLUTION INTRAVENOUS at 01:45

## 2020-11-23 ASSESSMENT — PAIN SCALES - GENERAL
PAINLEVEL_OUTOF10: 9
PAINLEVEL_OUTOF10: 8
PAINLEVEL_OUTOF10: 9
PAINLEVEL_OUTOF10: 8

## 2020-11-23 NOTE — CARE COORDINATION
SW met with patient's spouse. PT/OT recommending Outpatient PT/OT. Per chart review patient my need home IV antibiotics. Patient's spouse agreeable to [employer] checking IV coverage. Patient's spouse requesting that patient have follow-up appointments with neurology. SW informed patient' RN, Abelardo Woodard. JAYDEN also contacted Jayashree Reyna ([phone]), [employer] liaison. JAYDEN/CM will continue to follow progress and update patient's discharge plan as needed.     Electronically signed by BJ Barkley on 11/23/2020 at 10:09 AM

## 2020-11-23 NOTE — DISCHARGE INSTR - COC
Continuity of Care Form    Patient Name: Ramón Weston   :  1990  MRN:  [id number]    Admit date:  2020  Discharge date:  ***    Code Status Order: Full Code   Advance Directives:   Advance Care Flowsheet Documentation     Date/Time Healthcare Directive Type of Healthcare Directive Copy in [address] Agent's Name Healthcare Agent's Phone Number    [phone]  No, patient does not have an advance directive for healthcare treatment -- -- -- -- --          Admitting Physician:  Nenita Rothman MD  PCP: Felton Kolb, APRN - CNP    Discharging Nurse: [facility] Unit/Room#: 2SO-3839/9454-11  Discharging Unit Phone Number: ***    Emergency Contact:   Extended Emergency Contact Information  [facility] [address] Phone: [phone]  Relation: Spouse    Past Surgical History:  Past Surgical History:   Procedure Laterality Date    MEDICATION INJECTION      Epidural        Immunization History: There is no immunization history on file for this patient. Active Problems:  Patient Active Problem List   Diagnosis Code    Asthma J45.909    Anxiety F41.9    Nausea & vomiting R11.2    Headache R51.9    Dizziness R42    Acute metabolic encephalopathy V93.34    Leukocytosis D72.829    Diarrhea R19.7    Abnormal CT of the head R93.0    Septicemia (HCC) A41.9    Subarachnoid cyst G93.0    Lactic acid acidosis E87.2    Arachnoid cyst G93.0    Overweight (BMI 25.0-29. 9) E66.3    Cannabis abuse F12.10    Abnormal MRI, cervical spine R93.7       Isolation/Infection:   Isolation          No Isolation        Patient Infection Status     Infection Onset Added Last Indicated Last Indicated By Review Planned Expiration Resolved Resolved By    None active    Resolved    C-diff Rule Out 20 GI Bacterial Pathogens By PCR (Ordered)   20 Rule-Out Test Resulted    COVID-19 Rule Out 20 11/18/20 11/18/20 COVID-19 (Ordered)   11/19/20 Rule-Out Test Resulted          Nurse Assessment:  Last Vital Signs: /75   Pulse 55   Temp 97.5 °F (36.4 °C) (Oral)   Resp 16   Ht 5' 8\" (1.727 m)   Wt 164 lb 4.8 oz (74.5 kg)   SpO2 100%   BMI 24.98 kg/m²     Last documented pain score (0-10 scale): Pain Level: 9  Last Weight:   Wt Readings from Last 1 Encounters:   11/23/20 164 lb 4.8 oz (74.5 kg)     Mental Status:  {IP PT MENTAL STATUS:20030}    IV Access:  { NELDA IV ACCESS:594520843}    Nursing Mobility/ADLs:  Walking   {CHP DME SLAD:072879890}  Transfer  {CHP DME TZBW:774998283}  Bathing  {CHP DME OSWB:946711130}  Dressing  {CHP DME SZGE:773781555}  Toileting  {CHP DME YSKS:691451437}  Feeding  {P DME ZCIZ:216811665}  Med Admin  {[facility] DME TPNH:204394386}  Med Delivery   { NELDA MED Delivery:463748689}    Wound Care Documentation and Therapy:        Elimination:  Continence:   · Bowel: {YES / NN:23294}  · Bladder: {YES / VJ:66522}  Urinary Catheter: {Urinary Catheter:102414828}   Colostomy/Ileostomy/Ileal Conduit: {YES / AF:91801}       Date of Last BM: ***    Intake/Output Summary (Last 24 hours) at 11/23/2020 1157  Last data filed at 11/23/2020 0618  Gross per 24 hour   Intake 1162.6 ml   Output --   Net 1162.6 ml     I/O last 3 completed shifts:   In: 1402.6 [P.O.:580; I.V.:822.6]  Out: -     Safety Concerns:     [employer] Safety Concerns:392206186}    Impairments/Disabilities:      [employer] Impairments/Disabilities:706834967}    Nutrition Therapy:  Current Nutrition Therapy:   [employer] Diet List:277557598}    Routes of Feeding: {P DME Other Feedings:844911205}  Liquids: {Slp liquid thickness:19296}  Daily Fluid Restriction: {CHP DME Yes amt example:622473431}  Last Modified Barium Swallow with Video (Video Swallowing Test): {Done Not Done HOFS:592840286}    Treatments at the Time of Hospital Discharge:   Respiratory Treatments: ***  Oxygen Therapy:  {Therapy; copd oxygen:06359}  Ventilator:    [facility] CC Vent [facility]:161299870}    Rehab Therapies: {THERAPEUTIC INTERVENTION:3412464196}  Weight Bearing Status/Restrictions: {[facility] Weight Bearin}  Other Medical Equipment (for information only, NOT a DME order):  {EQUIPMENT:202780395}  Other Treatments: ***    Patient's personal belongings (please select all that are sent with patient):  {CHP DME Belongings:484517168}    RN SIGNATURE:  {Esignature:109040563}    CASE MANAGEMENT/SOCIAL WORK SECTION    Inpatient Status Date: ***    Readmission Risk Assessment Score:  Readmission Risk              Risk of Unplanned Readmission:        13           Discharging to Facility/ Agency   · Name:   · Address:  · Phone:  · Fax:    Dialysis Facility (if applicable)   · Name:  · Address:  · Dialysis Schedule:  · Phone:  · Fax:    / signature: {Esignature:195278804}    PHYSICIAN SECTION    Prognosis: {Prognosis:3439795166}    Condition at Discharge: [address] [place] [address] Patient Condition:755145520}    Rehab Potential (if transferring to Rehab): {Prognosis:6442764688}    Recommended Labs or Other Treatments After Discharge: ***    Physician Certification: I certify the above information and transfer of Analia Tee  is necessary for the continuing treatment of the diagnosis listed and that he requires {Admit to Appropriate Level of Care:63640} for {GREATER/LESS:270368658} 30 days.      Update Admission H&P: {CHP DME Changes in TNADI:024037950}    PHYSICIAN SIGNATURE:  {Esignature:166284547}

## 2020-11-23 NOTE — PROGRESS NOTES
Progress Note - Neurosurgery    cc: follow-up    Subjective:  Analia Tee is a 27 y.o. male. Posterior neck pain and headache. IR has recommended IR CT guided drainage of cervical fluid collection to direct antibiotic treatment. IR declined deferring to neurosurgery. Objective:  Blood pressure 115/70, pulse 55, temperature 98 °F (36.7 °C), temperature source Oral, resp. rate 16, height 5' 8\" (1.727 m), weight 164 lb 4.8 oz (74.5 kg), SpO2 98 %. In: 822.6 [I.V.:822.6]  Out: -     Physical Exam:  General: in no apparent distress and well developed and well nourished  Neuro: alert & oriented x 3 with fluent speech, motor 5 out of 5  Assessment and Plan:    Principal Problem:    Acute metabolic encephalopathy  Plan: improved  Active Problems:    Asthma    Anxiety    Nausea & vomiting    Headache    Dizziness    Leukocytosis    Diarrhea    Abnormal CT of the head    Septicemia (HCC)      Abnormal MRI, cervical spine  Plan: poor quality cervical and thoracicMRIs but no evidence of epidural hematoma or epidural abscess. No neurosurgical intervention indicated. Could consider repeat MRI in several weeks to evaluate for what appears to be CSF or possibly CSF pulsation.       Nenita Crum  11/23/2020

## 2020-11-23 NOTE — PROGRESS NOTES
CLINICAL PHARMACY NOTE: MEDS TO [address] Select Patient?: No  Total # of Prescriptions Filled: 12   The following medications were delivered to the patient:  · senexon 8.6-50  · Acidophilus  · cipro 500  · miralax  · Diazepam  · Flexeril 5  · Oxycodone 5  · oxycontin 20  · dulera  · Naproxen  · Promethazine 12.5  · linezolid 600  Total # of Interventions Completed: 4  Time Spent (min): 120    Additional Documentation:  1. Sub dulera for symbicort, we have free coupon. Let wife know pcp will have to prescribe airduo for refill  2. Counseled wife on serotonin syndrome and potential interaction with fluoxetine, cyclobenzaprine and linezolid  3. Grace Rinne tried to get PA for oxycontin, it was denied. Patient's wife signed for 12 scripts in outpatient pharmacy.

## 2020-11-23 NOTE — DISCHARGE SUMMARY
Hospital Medicine Discharge Summary    Patient: Chris Tomlinson     Gender: male  : 1990   Age: 27 y.o. MRN: [id number]    Admitting Physician: Corey Owusu MD  Discharge Physician: Corey Owusu MD     Code Status: Full Code     Admit Date: 2020   Discharge Date:   20    Disposition:  Home    Discharge Diagnoses: Active Hospital Problems    Diagnosis Date Noted    Abnormal MRI, cervical spine [R93.7]     Overweight (BMI 25.0-29. 9) [E66.3]     Cannabis abuse [F12.10]     Asthma [J45.909] 2020    Anxiety [F41.9] 2020    Nausea & vomiting [R11.2] 2020    Headache [R51.9] 2020    Dizziness [R42] 2020    Acute metabolic encephalopathy [C28.45] 2020    Leukocytosis [D72.829] 2020    Diarrhea [R19.7] 2020    Abnormal CT of the head [R93.0] 2020    Septicemia ([address] [A41.9] 2020    Subarachnoid cyst [G93.0]     Lactic acid acidosis [E87.2]     Arachnoid cyst [G93.0]        Follow-up appointments:  one week    Outpatient to do list: F/U with Neurosurgery, ID, PCP and Neurology    Condition at Discharge:  Stable    Hospital Course:   34 y.o. male with history of asthma, anxiety, chronic LBP who came to ER with altered mental status, dizziness, HA, nausea and diarrhea.  Had watery stool.  No recent Abx.  No recent steroids.  Patient had GAVI by [facility] for L5 disc disease about 3 weeks in office.    Admitted as inpatient for acute metabolic encephalopathy with sepsis, HA, neck pain and intractable nausea with vomiting. Had LP in ED for suspected meningitis. Started on IV Abx. Followed by ID and Neurology. MRI Brain, C and L spine requested to r/o abscess. C diff negative. GI pathogen sent on . COVID negative on .       MRI Brain with   1. No acute intracranial abnormality.     2. Scattered punctate foci of T2/FLAIR hyperintensity in the peripheral    cerebral white matter are nonspecific.  Differential tablet Take 1 tablet by mouth every 6 hours as needed for Pain for up to 14 days. Qty: 40 tablet, Refills: 0    Comments: Reduce doses taken as pain becomes manageable  Associated Diagnoses: Abnormal MRI, cervical spine      naproxen (NAPROSYN) 250 MG tablet Take 1 tablet by mouth 2 times daily (with meals)  Qty: 60 tablet, Refills: 0      diazePAM (VALIUM) 5 MG tablet Take 1 tablet by mouth every 6 hours as needed for Anxiety or Sleep for up to 10 days.   Qty: 30 tablet, Refills: 0    Associated Diagnoses: Abnormal MRI, cervical spine      promethazine (PHENERGAN) 12.5 MG tablet Take 1 tablet by mouth every 6 hours as needed for Nausea  Qty: 30 tablet, Refills: 0      polyethylene glycol (GLYCOLAX) 17 g packet Take 17 g by mouth 2 times daily  Qty: 60 each, Refills: 0      sennosides-docusate sodium (SENOKOT-S) 8.6-50 MG tablet Take 2 tablets by mouth 2 times daily  Qty: 120 tablet, Refills: 0      cyclobenzaprine (FLEXERIL) 5 MG tablet Take 1 tablet by mouth 3 times daily as needed for Muscle spasms  Qty: 30 tablet, Refills: 0    Associated Diagnoses: Abnormal MRI, cervical spine      linezolid (ZYVOX) 600 MG tablet Take 1 tablet by mouth 2 times daily for 14 days  Qty: 28 tablet, Refills: 0      ciprofloxacin (CIPRO) 500 MG tablet Take 1 tablet by mouth 2 times daily for 14 days  Qty: 28 tablet, Refills: 0      Probiotic Acidophilus (FLORANEX) TABS Take 1 tablet by mouth 2 times daily  Qty: 60 tablet, Refills: 0           Current Discharge Medication List      CONTINUE these medications which have CHANGED    Details   budesonide-formoterol (SYMBICORT) 80-4.5 MCG/ACT AERO Inhale 2 puffs into the lungs 2 times daily  Qty: 1 Inhaler, Refills: 0           Current Discharge Medication List      CONTINUE these medications which have NOT CHANGED    Details   FLUoxetine (PROZAC) 20 MG capsule Take 20 mg by mouth daily      montelukast (SINGULAIR) 10 MG tablet TK 1 T PO QPM      VENTOLIN  (90 Base) MCG/ACT inhaler INHALE 2 PUFFS INTO LUNGS Q 4 TO 6 H PRF BRONCOSPASMS      cetirizine (ZYRTEC) 10 MG tablet TK 1 T PO QD      budesonide (RHINOCORT AQUA) 32 MCG/ACT nasal spray 2 sprays by Each Nostril route 2 times daily  Qty: 1 Bottle, Refills: 1           Current Discharge Medication List      STOP taking these medications       sertraline (ZOLOFT) 50 MG tablet Comments:   Reason for Stopping:                 Discharge Exam:    /75   Pulse 55   Temp 97.5 °F (36.4 °C) (Oral)   Resp 16   Ht 5' 8\" (1.727 m)   Wt 164 lb 4.8 oz (74.5 kg)   SpO2 100%   BMI 24.98 kg/m²   General appearance:  Appears comfortable. Well nourished  Eyes: Sclera clear, pupils equal  ENT: Moist mucus membranes, no thrush. Trachea midline. Cardiovascular: Regular rhythm, normal S1, S2. No murmur, gallop, rub. No edema in lower extremities  Respiratory: Clear to auscultation bilaterally, no wheeze, good inspiratory effort  Gastrointestinal: Abdomen soft, non-tender, not distended, normal bowel sounds  Musculoskeletal: Remains tender to palpation in cervical area  Neurology: Grossly intact. Alert and oriented in time, place and person. No speech or motor deficits  Psychiatry: Appropriate affect. Not agitated  Skin: Warm, dry, normal turgor, no rash  Brisk capillary refill, peripheral pulses palpable     Labs:  For convenience and continuity at follow-up the following most recent labs are provided:    Lab Results   Component Value Date    WBC 8.0 11/23/2020    HGB 15.4 11/23/2020    HCT 45.9 11/23/2020    MCV 89.7 11/23/2020     11/23/2020     11/23/2020    K 4.1 11/23/2020     11/23/2020    CO2 32 11/23/2020    BUN 8 11/23/2020    CREATININE 0.8 11/23/2020    CALCIUM 9.8 11/23/2020    ALKPHOS 109 11/18/2020    ALT 25 11/18/2020    AST 27 11/18/2020    BILITOT 0.4 11/18/2020    BILIDIR <0.2 11/18/2020    LABALBU 5.0 11/18/2020     No results found for: INR    Radiology:  Ct Head Wo Contrast    Result Date: 11/18/2020  EXAMINATION: CT OF THE HEAD WITHOUT CONTRAST  11/18/2020 12:26 pm TECHNIQUE: CT of the head was performed without the administration of intravenous contrast. Dose modulation, iterative reconstruction, and/or weight based adjustment of the mA/kV was utilized to reduce the radiation dose to as low as reasonably achievable. COMPARISON: None. HISTORY: ORDERING SYSTEM PROVIDED HISTORY: stewart Gill TECHNOLOGIST PROVIDED HISTORY: Reason for exam:->william ro ich Has a \"code stroke\" or \"stroke alert\" been called? ->No Reason for Exam: syncope Acuity: Unknown Type of Exam: Unknown FINDINGS: BRAIN/VENTRICLES: There is no acute intracerebral hemorrhage. The ventricles and sulci are within normal limits. Within the midline of the posterior fossa, there is a 2.5 x 3.2 cm extra-axial fluid collection favoring an arachnoid cyst. ORBITS: The orbits are unremarkable. SINUSES: The visualized paranasal sinuses and mastoid air cells are clear. SOFT TISSUES/SKULL:  The calvarium is intact. 1. No acute intracranial abnormality. 2. 3.2 cm posterior fossa extra-axial fluid collection favoring an arachnoid cyst. Findings were discussed with SHAGUFTA HENDERSON at 32:84 pm on 11/18/2020. Cta Chest W Wo Contrast    Result Date: 11/18/2020  EXAMINATION: CTA OF THE CHEST WITH AND WITHOUT CONTRAST 11/18/2020 12:26 pm TECHNIQUE: CTA of the chest was performed before and after the administration of intravenous contrast.  Multiplanar reformatted images are provided for review. MIP images are provided for review. Dose modulation, iterative reconstruction, and/or weight based adjustment of the mA/kV was utilized to reduce the radiation dose to as low as reasonably achievable. COMPARISON: None. HISTORY: ORDERING SYSTEM PROVIDED HISTORY: ha, cp , ro prox aortic dissection TECHNOLOGIST PROVIDED HISTORY: Reason for exam:->ha, cp , ro prox aortic dissection Reason for Exam: ha, cp , ro prox aortic dissection Acuity: Unknown Type of Exam: Unknown FINDINGS: Aorta:  On the noncontrast images, there is no abnormal para-hyperdensity. The thoracic aorta is normal in caliber. There is no dissection or aneurysm. The great vessels are widely patent. Mediastinum: There is no mediastinal or hilar adenopathy. There is no pericardial effusion. Lungs/Pleura: There is mild dependent atelectasis in the lung bases. No pleural effusion is identified. Upper Abdomen: Limited images of the upper abdomen are unremarkable. Soft Tissues/Bones: No acute osseous abnormality is identified. The chest wall is unremarkable. No acute vascular abnormality is identified. No thoracic aortic dissection or aneurysm. Minimal bibasilar atelectasis. Otherwise, no acute cardiopulmonary disease. Mri Cervical Spine W Wo Contrast    Result Date: 11/19/2020  EXAMINATION: MRI OF THE CERVICAL SPINE WITHOUT AND WITH CONTRAST  11/19/2020 2:55 pm: TECHNIQUE: Multiplanar multisequence MRI of the cervical spine was performed without and with the administration of intravenous contrast. COMPARISON: None. HISTORY: ORDERING SYSTEM PROVIDED HISTORY: Neck pain after GAVI TECHNOLOGIST PROVIDED HISTORY: Reason for exam:->Neck pain after GAVI Reason for Exam: Coached on motion, scans repeated, neck pain and GAVI. Acuity: Acute Type of Exam: Initial FINDINGS: Motion limited evaluation despite repeat imaging attempts. BONES/ALIGNMENT: There is normal alignment of the spine. The vertebral body heights are maintained. The bone marrow signal appears unremarkable. SPINAL CORD: Visualized spinal cord has normal signal and morphology. There is abnormal T2 hyperintense, T1 isointense to CSF nonenhancing predominantly dorsal epidural fluid extending from the C6-7 level through the visualized upper thoracic spinal canal causing moderate narrowing of the thecal sac. This measures up to 7 mm in AP thickness but is incompletely imaged on axial sequences. SOFT TISSUES: Paraspinal soft tissues are unremarkable.   No abnormal soft tissue enhancement is evident. C2-C3: Disc height and signal maintained. No neural foraminal narrowing or spinal canal stenosis. C3-C4: Disc height and signal maintained. No neural foraminal narrowing. Mild spinal canal stenosis secondary to a disc bulge. C4-C5: Disc height and signal maintained. No neural foraminal narrowing or spinal canal stenosis. C5-C6: Disc height and signal maintained. No neural foraminal narrowing or spinal canal stenosis. C6-C7: Disc height and signal maintained. No neural foraminal narrowing. Mild spinal canal stenosis secondary to a central posterior disc protrusion. C7-T1: Disc height and signal maintained. No neural foraminal or spinal canal stenosis. Mild narrowing of the thecal sac secondary to predominantly dorsal epidural fluid. 1. Partially imaged nonenhancing epidural fluid extending from C6-7 through the visualized upper thoracic spine causing moderate narrowing of the thecal sac. This fluid has imaging characteristics most consistent with effusion, although infection cannot be completely excluded. MRI of the thoracic spine is recommended to evaluate the caudal extent of this epidural fluid. 2. Mild spinal canal stenosis at C3-4 and C6-7.      Mri Thoracic Spine W Wo Contrast    Result Date: 11/21/2020  EXAMINATION: MRI OF THE THORACIC SPINE WITHOUT AND WITH CONTRAST  11/20/2020 10:53 am TECHNIQUE: Multiplanar multisequence MRI of the thoracic spine was performed without and with the administration of intravenous contrast. COMPARISON: Correlation made to cervical spine MRI 11/19/2020 HISTORY: ORDERING SYSTEM PROVIDED HISTORY: Epidural effusion from the cervical spine extending into the thoracic spine TECHNOLOGIST PROVIDED HISTORY: Reason for exam:->Epidural effusion from the cervical spine extending into the thoracic spine Reason for Exam: Abnormal mri here  Inj. 15 ml of multihance  gfr over 60 today Acuity: Acute Type of Exam: Unknown FINDINGS: BONES/ALIGNMENT: Thoracic spine alignment is normal.  Thoracic vertebral bodies are normal in height. No acute thoracic spine fracture. The marrow signal pattern throughout the thoracic spine is within normal limits. SPINAL CORD: No abnormal cord signal is seen. The previously demonstrated lower cervical spine and upper thoracic spine epidural fluid has significantly decreased in conspicuity. Some residual nonenhancing ventral epidural fluid is seen from C6-C7 through T1-T2 disc level. Thin dorsal epidural fluid in the upper thoracic spine measures 2-3 mm in thickness through the T6 level. SOFT TISSUES:  No abnormal enhancement of the thoracic spine. No paraspinal mass identified. DEGENERATIVE CHANGES: No significant spinal canal stenosis or neural foraminal narrowing of the thoracic spine. Nonenhancing epidural fluid intensity in the lower cervical spine and upper thoracic spine, decreased in conspicuity in the lower cervical spine compared to prior cervical spine MRI. Imaging features are suggestive of epidural effusion which has redistributed in the interim. No significant thoracic spinal canal stenosis. Mri Lumbar Spine W Wo Contrast    Result Date: 11/18/2020  EXAMINATION: MRI OF THE LUMBAR SPINE WITHOUT AND WITH CONTRAST  11/18/2020 7:35 pm TECHNIQUE: Multiplanar multisequence MRI of the lumbar spine was performed without and with the administration of intravenous contrast. COMPARISON: None. HISTORY: ORDERING SYSTEM PROVIDED HISTORY: Back pain after epidural, r/o abscess TECHNOLOGIST PROVIDED HISTORY: Reason for exam:->Back pain after epidural, r/o abscess Reason for Exam: Previous epidural injection approx 3wks ago. Back pain. No previous back surgery. 15ml multihance FINDINGS: BONES/ALIGNMENT: There is normal alignment of the spine. The vertebral body heights are maintained. The bone marrow signal appears unremarkable. SPINAL CORD:  The conus terminates normally. SOFT TISSUES: Paraspinal soft tissues are unremarkable. No abnormal enhancement is seen in the lumbar spine. L1-L2: Disc height and signal maintained. No neural foraminal narrowing or spinal canal stenosis. L2-L3: Disc height and signal maintained. No neural foraminal narrowing or spinal canal stenosis. L3-L4: Disc height and signal maintained. No neural foraminal narrowing or spinal canal stenosis. L4-L5: Disc height and signal maintained. No neural foraminal narrowing or spinal canal stenosis. L5-S1: Disc height and signal maintained. Mild left neural foraminal narrowing secondary to a left foraminal disc protrusion. No right neural foraminal narrowing. No spinal canal stenosis. 1. No acute abnormality of the lumbar spine. 2. Mild left L5 neural foraminal narrowing secondary to a left foraminal L5-S1 disc protrusion. Ct Abdomen Pelvis W Iv Contrast Additional Contrast? None    Result Date: 11/18/2020  EXAMINATION: CT OF THE ABDOMEN AND PELVIS WITH CONTRAST 11/18/2020 12:27 pm TECHNIQUE: CT of the abdomen and pelvis was performed with the administration of intravenous contrast. Multiplanar reformatted images are provided for review. Dose modulation, iterative reconstruction, and/or weight based adjustment of the mA/kV was utilized to reduce the radiation dose to as low as reasonably achievable. COMPARISON: None. HISTORY: ORDERING SYSTEM PROVIDED HISTORY: VOMTING, SEPSIS, RO INTRA-ABDOMINAL ABSCESS TECHNOLOGIST PROVIDED HISTORY: Reason for exam:->VOMTING, SEPSIS, RO INTRA-ABDOMINAL ABSCESS Additional Contrast?->None Reason for Exam: VOMTING, SEPSIS, RO INTRA-ABDOMINAL ABSCESS Acuity: Unknown Type of Exam: Unknown FINDINGS: Lower Chest: Atelectasis in the lung bases Organs: Symmetric normal appearing nephrograms. No hydronephrosis. 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  5 mm left upper pole renal cyst.  Remaining solid organs and gallbladder unremarkable. GI/Bowel: No intrinsic gastrointestinal abnormality demonstrated.   Fluid throughout the colon and rectum. No evident colonic wall thickening. Normal appendix Pelvis: Urinary bladder unremarkable. No pelvic fluid Peritoneum/Retroperitoneum: No ascites or pneumoperitoneum. Aorta normal in caliber Bones/Soft Tissues: No acute bony abnormality. L5 pars defects with no anterolisthesis     1. Fluid throughout the colon and rectum should be correlated with any history of diarrhea. No evident colonic wall thickening to indicate colitis 2. No evidence of intra abdominopelvic abscess     Cta Head Neck W Contrast    Result Date: 11/18/2020  EXAMINATION: CTA OF THE HEAD AND NECK WITH CONTRAST 11/18/2020 12:27 pm TECHNIQUE: CTA of the head and neck was performed with the administration of intravenous contrast. Multiplanar reformatted images are provided for review. MIP images are provided for review. Stenosis of the internal carotid arteries measured using NASCET criteria. Dose modulation, iterative reconstruction, and/or weight based adjustment of the mA/kV was utilized to reduce the radiation dose to as low as reasonably achievable. COMPARISON: None HISTORY: ORDERING SYSTEM PROVIDED HISTORY: acute vertigo, weakness, ro dissection, lvo TECHNOLOGIST PROVIDED HISTORY: Reason for exam:->acute vertigo, weakness, ro dissection, lvo Reason for Exam: acute vertigo, weakness, ro dissection, lvo Acuity: Unknown Type of Exam: Unknown Initial evaluation FINDINGS: CTA NECK: AORTIC ARCH/ARCH VESSELS: There is no abnormality of the vessels as they originate from the aortic arch. CAROTID ARTERIES: The common carotid arteries are patent bilaterally without any significant narrowing or stenosis. The internal carotid arteries are patent bilaterally without any narrowing or stenosis. VERTEBRAL ARTERIES: The vertebral arteries are patent bilaterally without any narrowing or stenosis. SOFT TISSUES: No acute abnormality of the soft tissues of the neck. BONES: No acute osseous abnormality.  CTA HEAD: ANTERIOR CIRCULATION:  No abnormality of the internal carotid arteries, middle cerebral arteries, or anterior cerebral arteries are identified. POSTERIOR CIRCULATION:  No abnormality of the distal vertebral arteries, basilar artery, or posterior cerebral arteries are identified. OTHER: No dural venous sinus thrombosis on this non-dedicated study. BRAIN: Refer to the CT of the head from the same date. Unremarkable CTA of the head and neck. Mri Brain W Wo Contrast    Result Date: 11/18/2020  EXAMINATION: MRI OF THE BRAIN WITHOUT AND WITH CONTRAST  11/18/2020 7:15 pm TECHNIQUE: Multiplanar multisequence MRI of the head/brain was performed without and with the administration of intravenous contrast. COMPARISON: None. HISTORY: ORDERING SYSTEM PROVIDED HISTORY: Headache TECHNOLOGIST PROVIDED HISTORY: Reason for exam:->Headache Reason for Exam: severe vertigo dizziness headache earlier today. Feeling better now. 15ml multihance Acuity: Acute FINDINGS: INTRACRANIAL STRUCTURES/VENTRICLES:  There is no acute infarct or mass. There are scattered punctate foci of T2/FLAIR hyperintensity in the peripheral cerebral white matter. 2.5 x 2.5 x 2.8 cm midline retro cerebellar arachnoid cyst is incidentally noted. No mass effect or midline shift. No evidence of an acute intracranial hemorrhage. The ventricles and sulci are normal in size and configuration. The sellar/suprasellar regions appear unremarkable. The normal signal voids within the major intracranial vessels appear maintained. No abnormal focus of enhancement is seen within the brain. ORBITS: Orbital structures are unremarkable. SINUSES: The visualized paranasal sinuses and mastoid air cells are well aerated. BONES/SOFT TISSUES: The bone marrow signal intensity appears normal. The soft tissues demonstrate no acute abnormality. 1. No acute intracranial abnormality.  2. Scattered punctate foci of T2/FLAIR hyperintensity in the peripheral cerebral white matter are nonspecific. Differential diagnosis includes early sequela of chronic microvascular ischemia, sequela of prior vasculitis or chronic migraine headaches, or much less likely demyelinating disease. The patient was seen and examined on day of discharge and this discharge summary is in conjunction with any daily progress note from day of discharge. Time Spent on discharge is 45 minutes  in the examination, evaluation, counseling and review of medications and discharge plan. Note that more than 30 minutes was spent in preparing discharge papers, discussing discharge with patient, medication review, etc.       Signed:    Sofie Cummins MD   11/23/2020      Thank you GALEN Pak CNP for the opportunity to be involved in this patient's care.  If you have any questions or concerns please feel free to contact me at [address] [place] [address]

## 2020-11-23 NOTE — PROGRESS NOTES
Data- discharge order received, pt verbalized agreement to discharge, disposition to previous residence, no needs for HHC/DME. Action- discharge instructions prepared and given to patient and wife, pt verbalized understanding. Medication information packet given r/t NEW and/or CHANGED prescriptions emphasizing name/purpose/side effects, pt verbalized understanding. Discharge instruction summary: Diet- general, Activity- As tolerated, Primary Care Physician as followsGALEN Boyd -[phone] f/u appointment within one week, immunizations reviewed and complete, prescription medications filled [facility] outpatient pharmacy. Response- Pt belongings gathered, IV removed. Disposition is home (no HHC/DME needs), transported with personal belongings and medications, taken to lobby via RN Transport, no complications.

## 2020-11-25 LAB
CSF CULTURE: NORMAL
GRAM STAIN RESULT: NORMAL

## 2020-11-29 LAB
CAMPYLOBACTER JEJUNI/COLI PCR: NOT DETECTED
CAMPYLOBACTER UPSALIENSIS: NOT DETECTED
E COLI SHIGELLA/ENTEROINVASIVE PCR: NOT DETECTED
SALMONELLA PCR: NOT DETECTED
SHIGA TOXIN I: NOT DETECTED
SHIGA TOXIN II: NOT DETECTED

## 2020-12-03 LAB
HERPES SIMPLEX VIRUS BY PCR: NOT DETECTED
HSV SOURCE: NORMAL

## 2021-01-06 ENCOUNTER — TELEPHONE (OUTPATIENT)
Dept: NEUROLOGY | Age: 31
End: 2021-01-06

## 2021-01-06 NOTE — TELEPHONE ENCOUNTER
Wife phoned wanting to make an appt with Dr Yulisa Collins . Per Dr Yulisa Collins pt needs to be seen by neurosurgeon since it is not a neurological problem.   Called pt phone number will not go thru

## 2021-01-20 ENCOUNTER — HOSPITAL ENCOUNTER (OUTPATIENT)
Dept: PHYSICAL THERAPY | Age: 31
Setting detail: THERAPIES SERIES
Discharge: HOME | End: 2021-01-20
Payer: COMMERCIAL

## 2021-01-20 PROCEDURE — 97140 MANUAL THERAPY 1/> REGIONS: CPT | Performed by: PHYSICAL THERAPIST

## 2021-01-20 PROCEDURE — 97161 PT EVAL LOW COMPLEX 20 MIN: CPT | Performed by: PHYSICAL THERAPIST

## 2021-01-20 PROCEDURE — 97110 THERAPEUTIC EXERCISES: CPT | Performed by: PHYSICAL THERAPIST

## 2021-01-20 NOTE — PLAN OF CARE
[address] 89, [address] [facility] [place], [address]  Phone: [phone]   Fax:     [phone]                                                       Physical Therapy Certification    Dear Referring Practitioner: GALEN Saucedo CNP,    We had the pleasure of evaluating the following patient for physical therapy services at [address] [place] [address]. A summary of our findings can be found in the initial assessment below. This includes our plan of care. If you have any questions or concerns regarding these findings, please do not hesitate to contact me at the office phone number checked above. Thank you for the referral.       Physician Signature:_______________________________Date:__________________  By signing above (or electronic signature), therapists plan is approved by physician      Patient: Kathleen Reason   : 1990   MRN: [id number]  Referring Physician: Referring Practitioner: GALEN Saucedo CNP      Evaluation Date: 2021      Medical Diagnosis Information:  Diagnosis: M54.2 (ICD-10-CM) - Cervicalgia   Treatment Diagnosis: Neck pain, reduced range of motion, cervical flexor weakness. Insurance information: PT Insurance Information: [facility]    Precautions/ Contra-indications:NA  Latex Allergy:  [x]NO      []YES  Preferred Language for Healthcare:   []English       [x]Other: Romansh - prefers wife to assist with interpretation    C-SSRS Triggered by Intake questionnaire (Past 2 wk assessment ):   [] No, Questionnaire did not trigger screening. [x] Yes, Patient intake triggered C-SSRS Screening     [x] Completed, no further action required. [] Completed, PCP notified via Epic    SUBJECTIVE:  Pt reports via family  who is his wife that onset of symptoms started around .  Pt was admitted to the ER with symptoms of back pain, headache, vomiting, fever, sweating, numbness, and unexplained weight loss. Upon further workup MD's originally dx with meningitis and was treated with antibiotics for 2 weeks. Pt states that neurologist said it was not meningitis however it is unclear from pt's history what the official dx is. Currently worst pain is low back, occasional pain L side of neck causing L sided cervicogenic headaches, denies pain or symptoms into B UE    Fear avoidance: I should not do physical activities that (might) make my pain worse   [] True   [x] False     Relevant Medical History: Recent: Nausea, vomiting, diarrhea, fever, unexplained weight loss, numbness and tingling, SOB, dizziness vertigo; asthma  Functional Outcome: NDI: raw score = 20; dysfunction = 40%    Pain Scale:3-4/10 at best, 8-9/10 at worst  Easing factors: Ice pack,  hot pack, topical pain medications with minimal relief  Provocative factors: Working as a mailman, otherwise unremarkable    Type: [x]Constant   []Intermittent  [x]Radiating []Localized []other:  Pain that goes from cervical region into lumbar, lumbar pain extends down into L LE, pain last throughout the day. Numbness/Tingling:  Denies in UE,  pain in LE from Low back     Occupation/School: [email or web address]     Living Status/Prior Level of Function: Prior to this injury / incident, pt was independent with ADLs and IADLs    OBJECTIVE:   Palpation:hypertonicity through left sided multifidi, tenderness through sub occipitals      Functional Mobility/Transfers: Unremarkable     Posture: pt demonstrates fwd head posture, protracted shoulders, increased thoracic kyphosis. Bandages/Dressings/Incisions: NA    Gait: (include devices/WB status):  WNL     Dermatomes Normal Abnormal Comments   Top of head (C1) x     Posterior occipital region (C2) x     Side of neck (C3) x     Top of shoulder (C4) x     Lateral deltoid (C5) x     Tip of thumb (C6) x     Distal middle finger (C7) x     Distal fifth finger (C8) x     Medial forearm (T1) x     Lower extremity x         Reflexes - NT Normal Abnormal Comments   C5-6 Biceps      C5-6 Brachioradialis      C7-8 Triceps      Almarazs      S1-2 Seated achilles      S1-2 Prone knee bend      L3-4 Patellar tendon      Clonus      Babinski          CERV ROM     Cervical Flexion 25    Cervical Extension 48    Cervical SB 30L 26R    Cervical rotation 45R 50L         ROM Left Right   Shoulder Flex WNL Pain WNL Pain    Shoulder Abd WNL Pain WNL Pain   Shoulder ER     Shoulder IR               Strength / Myotomes Left Right   Cervical Flexion (C1-2)     Cervical Side-bending (C3)     Shoulder Shrug (C4) 5 5   Shoulder Flex 4 4   Shoulder Scap     Shoulder Abduction (C5) 4+ 4+   Shoulder ER 4+ 4+   Shoulder IR 5 5   Biceps (C6) 5 5   Triceps (C7) 5 5   Wrist Extension (C6) 5 5   Wrist Flexion (C7) 5 5     5 5   Thumb Abduction (C8) 5 5   Finger Abduction (T1) 5 5     Lower extremity myotomes:   []Normal     []Abnormal     Joint mobility: mid cervical side glides   []Normal    [x]Hypo   []Hyper    Orthopaedic Special Tests  Positive  Negative  NT Comments    Hautard's        Rhomberg       Sharps-Olena  x     Cervical Torsion / Body Rotation        C2 Kick  x     Modified Shear       Compression       Distraction                                      [x] Patient history, allergies, meds reviewed. Medical chart reviewed. See intake form. Review Of Systems (ROS):  [x]Performed Review of systems (Integumentary, CardioPulmonary, Neurological) by intake and observation. Intake form has been scanned into medical record. Patient has been instructed to contact their primary care physician regarding ROS issues if not already being addressed at this time.       Co-morbidities/Complexities (which will affect course of rehabilitation):  []None           Arthritic conditions   []Rheumatoid arthritis (M05.9)  []Osteoarthritis (M19.91)   Cardiovascular conditions   []Hypertension (I10)  []Hyperlipidemia (E78.5)  []Angina pectoris (I20)  []Atherosclerosis (I70)   Musculoskeletal conditions   []Disc pathology   []Congenital spine pathologies   []Prior surgical intervention  []Osteoporosis (M81.8)  []Osteopenia (M85.8)   Endocrine conditions   []Hypothyroid (E03.9)  []Hyperthyroid Gastrointestinal conditions   []Constipation (F01.54)   Metabolic conditions   []Morbid obesity (E66.01)  []Diabetes type 1(E10.65) or 2 (E11.65)   []Neuropathy (G60.9)     Pulmonary conditions   [x]Asthma (J45)  []Coughing   []COPD (J44.9)   Psychological Disorders  [x]Anxiety (F41.9)  [x]Depression (F32.9)   []Other:   []Other:          Barriers to/and or personal factors that will affect rehab potential:              []Age  []Sex   []Smoker              []Motivation/Lack of Motivation                        [x]Co-Morbidities              [x]Cognitive Function, education/learning barriers              []Environmental, home barriers              [x]profession/work barriers  [x]past PT/medical experience  []other:  Justification: Pt is primarily Northern Irish speaking and is currently attempting to work without reduction in responsibilities, Pt has also received injections from MD in hospital for back pain with moderate affect. Falls Risk Assessment (30 days):  [x] Falls Risk assessed and no intervention required.   [] Falls Risk assessed and Patient requires intervention due to being higher risk   TUG score (>12s at risk):     [] Falls education provided, including         ASSESSMENT:   Functional Impairments:     [x]Noted cervical/thoracic/GHJ joint hypomobility   []Noted cervical/thoracic/GHJ joint hypermobility   [x]Decreased cervical/UE functional ROM   [x]Noted Headache pain aggravated by neck movements with/without dizziness   []Abnormal reflexes/sensation/myotomal/dermatomal deficits   []Decreased DCF control or ability to hold head up   [x]Decreased RC/scapular/core strength and neuromuscular control    [x]Decreased UE functional strength   []other:      Functional Activity Limitations (from functional questionnaire and intake)   [x]Reduced ability to tolerate prolonged functional positions   [x]Reduced ability or difficulty with changes of positions or transfers between positions   [x]Reduced ability to maintain good posture and demonstrate good body mechanics with sitting, bending, and lifting   [x] Reduced ability or tolerance with driving and/or computer work   [x]Reduced ability to perform lifting, reaching, carrying tasks   [x]Reduced ability to concentrate   [x]Reduced ability to sleep    [x]Reduced ability to tolerate any impact through UE or spine   []Reduced ability to ambulate prolonged functional periods/distances   []other:    Participation Restrictions   []Reduced participation in self care activities   [x]Reduced participation in home management activities   [x]Reduced participation in work activities   [x]Reduced participation in social activities. [x]Reduced participation in sport/recreational activities.     Classification/Subgrouping:   [x]signs/symptoms consistent with neck pain with mobility deficits     []signs/symptoms consistent with neck pain with movement coordinated impairments    [x]signs/symptoms consistent with neck pain with radiating pain    [x]signs/symptoms consistent with neck pain with headaches (cervicogenic)    []Signs/symptoms consistent with nerve root involvement including myotome & dermatome dysfunction   []sign/symptoms consistent with facet dysfunction of cervical and thoracic spine    []signs/symptoms consistent suggesting central cord compression/UMN syndromes   []signs/symptoms consistent with discogenic cervical pain   []signs/symptoms consistent with rib dysfunction   [x]signs/symptoms consistent with postural dysfunction   []signs/symptoms consistent with shoulder pathology    []signs/symptoms consistent with post-surgical status including decreased ROM, strength and function. [x]signs/symptoms consistent with pathology which may benefit from Dry Needling   []signs/symptoms which may limit the use of advanced manual therapy techniques: (Hypertension, recent trauma, intolerance to end range positions, prior TIA, visual issues, UE myotomes loss )     Prognosis/Rehab Potential:      []Excellent   [x]Good    []Fair   []Poor    Tolerance of evaluation/treatment:    []Excellent   [x]Good    []Fair   []Poor    Physical Therapy Evaluation Complexity Justification  [x] A history of present problem with:  [] no personal factors and/or comorbidities that impact the plan of care;  []1-2 personal factors and/or comorbidities that impact the plan of care  [x]3 personal factors and/or comorbidities that impact the plan of care  [x] An examination of body systems using standardized tests and measures addressing any of the following: body structures and functions (impairments), activity limitations, and/or participation restrictions;:  [] a total of 1-2 or more elements   [] a total of 3 or more elements   [x] a total of 4 or more elements   [x] A clinical presentation with:  [x] stable and/or uncomplicated characteristics   [] evolving clinical presentation with changing characteristics  [] unstable and unpredictable characteristics;   [x] Clinical decision making of [x] low, [] moderate, [] high complexity using standardized patient assessment instrument and/or measurable assessment of functional outcome. [x] EVAL (LOW) 92043 (typically 20 minutes face-to-face)  [] EVAL (MOD) 92484 (typically 30 minutes face-to-face)  [] EVAL (HIGH) 79539 (typically 45 minutes face-to-face)  [] RE-EVAL     PLAN:   Frequency/Duration: 2 days per week for 6-8 Weeks:  Interventions:  [x]  Therapeutic exercise including: strength training, ROM, for cervical spine,scapula, core and Upper extremity, including postural re-education.    [x]  NMR activation and proprioception for Deep cervical flexors, periscapular and RC muscles and Core, including postural re-education. [x]  Manual therapy as indicated for C/T spine, ribs, Soft tissue to include: Dry Needling/IASTM, STM, PROM, Gr I-IV mobilizations, manipulation. [x] Modalities as needed that may include: thermal agents, E-stim, Biofeedback, US, iontophoresis as indicated  [x] Patient education on joint protection, postural re-education, activity modification, progression of HEP. HEP instruction: Written HEP instructions provided and reviewed. GOALS:  Patient stated goal: pt would like to get back to playing baseball without limitation. [] Progressing: [] Met: [] Not Met: [] Adjusted    Therapist goals for Patient:   Short Term Goals: To be achieved in: 2 weeks  1. Independent in HEP and progression per patient tolerance, in order to prevent re-injury. [] Progressing: [] Met: [] Not Met: [] Adjusted  2. Patient will have a decrease in pain to facilitate improvement in movement, function, and ADLs as indicated by Functional Deficits. [] Progressing: [] Met: [] Not Met: [] Adjusted    Long Term Goals: To be achieved in: 6-8 weeks  1. Disability index score of 20% or less for the NDI to assist with reaching prior level of function. [] Progressing: [] Met: [] Not Met: [] Adjusted  2. Patient will demonstrate increased [facility] to [facility] of cervical/thoracic spine to allow for proper joint functioning as indicated by patients Functional Deficits. [] Progressing: [] Met: [] Not Met: [] Adjusted  3. Patient will demonstrate an increase in postural awareness and control and activation of  Deep cervical stabilizers to allow for proper functional mobility as indicated by patients Functional Deficits. [] Progressing: [] Met: [] Not Met: [] Adjusted  4. Patient will return to functional activities including lifting and reaching at work without increased symptoms or restriction. [] Progressing: [] Met: [] Not Met: [] Adjusted  5.  Patient will return to playing baseball

## 2021-01-20 NOTE — FLOWSHEET NOTE
[place]  Phone: [phone]   Fax: [phone]    Physical Therapy Treatment Note/ Progress Report:     Date:  2021    Patient Name:  Merlene Davies    :  1990  MRN: [id number]  Restrictions/Precautions:    Medical/Treatment Diagnosis Information:  Diagnosis: M54.2 (ICD-10-CM) - Cervicalgia  Treatment Diagnosis: Neck pain, reduced range of motion, cervical flexor weakness. Insurance/Certification information:  PT Insurance Information: [facility]  Physician Information:  Referring Practitioner: GALEN Lerma CNP  Plan of care signed (Y/N): []  Yes [x]  No     Date of Patient follow up with Physician:      Progress Report: []  Yes  [x]  No     Date Range for reporting period:  Beginnin21  Ending:     Progress report due (10 Rx/or 30 days whichever is less): visit #10 or  (date)     Recertification due (POC duration/ or 90 days whichever is less): visit #16 or 21  (date)     Visit # Insurance Allowable Auth required?  Date Range   1 60 []  Yes  [x]  No      Latex Allergy:  [x]NO      []YES  Preferred Language for Healthcare:   []English       [x]other: Rwandan - prefers wife for interpretation    Functional Scale:       Date assessed:  NDI: raw score =20 ; dysfunction = 40%   21    Pain level:  8-9/10 at worst 3-4/10 at best     SUBJECTIVE:  See eval    OBJECTIVE: See eval   Observation:    Test measurements:      RESTRICTIONS/PRECAUTIONS:    Exercises/Interventions:   Therapeutic Exercise (53307) Resistance / level Sets/sec Reps Notes   Supine Chin Tucks  2 10 Towel roll under head (5s hold)   No moneys  Blue  1  10    Sidebend stretch   30 2    Levator stretch   30 2                                       Therapeutic Activities (80447)                                                 NMR re-education (87999)                                          Manual Intervention (13227)       Cerv mobs/manip Side glides  Mid cervical 2'     Thoracic mobs/manip       CT manip       Rib mobilizations       STM suboccipical 3'     UT/levator stretching L 3'         Patient education:  -pt educated on diagnosis, prognosis and expectations for rehab  -all pt questions were answered    Home Exercise Program:  Access Code: [email or web address]   URL: [email or web address]   Date: 01/20/2021   Prepared by: Alicia Shook     Exercises   Supine Deep Neck Flexor Training - 10 reps - 3 sets - 5 hold - 1x daily - 7x weekly   Standing Shoulder External Rotation with Resistance - 10 reps - 3 sets - 1x daily - 7x weekly   Seated Cervical Sidebending Stretch - 10 reps - 3 sets - 1x daily - 7x weekly   Seated Levator Scapulae Stretch - 10 reps - 3 sets - 1x daily - 7x weekly     Therapeutic Exercise and NMR EXR  [x] (41906) Provided verbal/tactile cueing for activities related to strengthening, flexibility, endurance, ROM  for improvements in cervical, postural, scapular, scapulothoracic and UE control with self care, reaching, carrying, lifting, house/yardwork, driving/computer work.    [] (02420) Provided verbal/tactile cueing for activities related to improving balance, coordination, kinesthetic sense, posture, motor skill, proprioception  to assist with cervical, scapular, scapulothoracic and UE control with self care, reaching, carrying, lifting, house/yardwork, driving/computer work.  [] (64569) Therapist is in constant attendance of 2 or more patients providing skilled therapy interventions, but not providing any significant amount of measurable one-on-one time to either patient, for improvements in cervical, scapular, scapulothoracic and UE control with self care, reaching, carrying, lifting, house/yardwork, driving, computer work.      Therapeutic Activities:    [] (12620 or 30176) Provided verbal/tactile cueing for activities related to improving balance, coordination, kinesthetic sense, posture, motor skill, proprioception and motor activation to allow for proper function of cervical, scapular, scapulothoracic and UE control with self care, carrying, lifting, driving/computer work. Home Exercise Program:    [x] (33370) Reviewed/Progressed HEP activities related to strengthening, flexibility, endurance, ROM of cervical, scapular, scapulothoracic and UE control with self care, reaching, carrying, lifting, house/yardwork, driving/computer work  [] (34684) Reviewed/Progressed HEP activities related to improving balance, coordination, kinesthetic sense, posture, motor skill, proprioception of cervical, scapular, scapulothoracic and UE control with self care, reaching, carrying, lifting, house/yardwork, driving/computer work      Manual Treatments:  PROM / STM / Oscillations-Mobs:  G-I, II, III, IV (PA's, Inf., Post.)  [x] (04104) Provided manual therapy to mobilize soft tissue/joints of cervical/CT, scapular GHJ and UE for the purpose of decreasing headache, modulating pain, promoting relaxation,  increasing ROM, reducing/eliminating soft tissue swelling/inflammation/restriction, improving soft tissue extensibility and allowing for proper ROM for normal function with self care, reaching, carrying, lifting, house/yardwork, driving/computer work    Modalities:      Charges:  Timed Code Treatment Minutes: 24   Total Treatment Minutes: 50       [x] EVAL - LOW (02654)   [] EVAL - MOD (52440)  [] EVAL - HIGH (68693)  [] RE-EVAL (71292)  [x] WQ(18646) x 1     [] Ionto  [] NMR (62798) x       [] Vaso  [x] Manual (38932) x  1     [] Ultrasound  [] TA x        [] Mech Traction (26148)  [] Aquatic Therapy x     [] ES (un) (44902):   [] Home Management Training x  [] ES(attended) (26507)   [] Dry Needling 1-2 muscles (68739):  [] Dry Needling 3+ muscles (370467  [] Group:      [] Other:     GOALS:  Patient stated goal: pt would like to get back to playing baseball without limitation. []? Progressing: []? Met: []? Not Met: []?  Adjusted     Therapist goals for Patient:   Short Term Goals: To be achieved in: 2 weeks  1. Independent in HEP and progression per patient tolerance, in order to prevent re-injury. []? Progressing: []? Met: []? Not Met: []? Adjusted  2. Patient will have a decrease in pain to facilitate improvement in movement, function, and ADLs as indicated by Functional Deficits. []? Progressing: []? Met: []? Not Met: []? Adjusted     Long Term Goals: To be achieved in: 6-8 weeks  1. Disability index score of 20% or less for the NDI to assist with reaching prior level of function. []? Progressing: []? Met: []? Not Met: []? Adjusted  2. Patient will demonstrate increased AROM to [facility] of cervical/thoracic spine to allow for proper joint functioning as indicated by patients Functional Deficits. []? Progressing: []? Met: []? Not Met: []? Adjusted  3. Patient will demonstrate an increase in postural awareness and control and activation of  Deep cervical stabilizers to allow for proper functional mobility as indicated by patients Functional Deficits. []? Progressing: []? Met: []? Not Met: []? Adjusted  4. Patient will return to functional activities including lifting and reaching at work without increased symptoms or restriction. []? Progressing: []? Met: []? Not Met: []? Adjusted  5. Patient will return to playing baseball without increased symptoms or limitation. []? Progressing: []? Met: []? Not Met: []? Adjusted            Overall Progression Towards Functional goals/ Treatment Progress Update:  [] Patient is progressing as expected towards functional goals listed. [] Progression is slowed due to complexities/Impairments listed. [] Progression has been slowed due to co-morbidities.   [x] Plan just implemented, too soon to assess goals progression <30days   [] Goals require adjustment due to lack of progress  [] Patient is not progressing as expected and requires additional follow up with physician  [] Other    Persisting Functional Limitations/Impairments:  [x]Sitting []Standing   [x]Walking []Squatting/bending    []Stairs [x]ADL's    []Transfers [x]Reaching  [x]Housework [x]Lifting  [x]Driving [x]Job related tasks  [x]Sports/Recreation  [x]Sleeping  []Other:    ASSESSMENT:  See eval  Treatment/Activity Tolerance:  [x] Patient able to complete tx  [] Patient limited by fatique  [] Patient limited by pain   [] Patient limited by other medical complications  [] Other:     Prognosis: [] Good [] Fair  [] Poor    Patient Requires Follow-up: [x] Yes  [] No    PLAN: See eval. PT 2x / week for 6-8 weeks. Plan to evaluate LBP at next session and treat in combination  [] Continue per plan of care [] Alter current plan (see comments)  [x] Plan of care initiated [] Hold pending MD visit [] Discharge    Electronically signed by: BRANDI Mg [facility]    Therapist was present, directed the patient's care, made skilled judgement, and was responsible for assessment and treatment of the patient. Note: If patient does not return for scheduled/ recommended follow up visits, this note will serve as a discharge from care along with most recent update on progress.

## 2021-01-21 ENCOUNTER — HOSPITAL ENCOUNTER (OUTPATIENT)
Dept: PHYSICAL THERAPY | Age: 31
Setting detail: THERAPIES SERIES
Discharge: HOME | End: 2021-01-21
Payer: COMMERCIAL

## 2021-01-21 PROCEDURE — 97162 PT EVAL MOD COMPLEX 30 MIN: CPT | Performed by: PHYSICAL THERAPIST

## 2021-01-21 PROCEDURE — 97110 THERAPEUTIC EXERCISES: CPT | Performed by: PHYSICAL THERAPIST

## 2021-01-21 NOTE — PLAN OF CARE
[place], [facility], [address]  Phone: [phone]   Fax: [phone]     Physical Therapy Certification    Dear Referring Practitioner: Meenu AARON CNP,    We had the pleasure of evaluating the following patient for physical therapy services at [address] [place] [address]. A summary of our findings can be found in the initial assessment below. This includes our plan of care. If you have any questions or concerns regarding these findings, please do not hesitate to contact me at the office phone number checked above. Thank you for the referral.       Physician Signature:_______________________________Date:__________________  By signing above (or electronic signature), therapists plan is approved by physician      Patient: Mag England   : 1990   MRN: [id number]  Referring Physician: Referring Practitioner: Meenu AARON CNP      Evaluation Date: 2021      Medical Diagnosis Information:  Diagnosis: Herniated cervical disc (M50.20)   Treatment Diagnosis: Lumbar radiculopathy, facet dysfunction, L low back pain, decreased core and proximal hip strength                                         Insurance information: PT Insurance Information: [facility]     Precautions/ Contra-indications: recent hx of neurological symptoms  Latex Allergy:  [x]NO      []YES  Preferred Language for Healthcare:   []English       [x]Other: Citizen of [place] - prefers wife for interpretation    C-SSRS Triggered by Intake questionnaire (Past 2 wk assessment ):   [] No, Questionnaire did not trigger screening. [x] Yes, Patient intake triggered C-SSRS Screening     [x] Completed, no further action required. [] Completed, PCP notified via Epic    SUBJECTIVE:  Pt reports via family  who is his wife that onset of symptoms started about one year ago but recently has gotten worse as of 2020.   Pt was admitted to the ER November 2020 with symptoms of back pain, headache, vomiting, fever, sweating, numbness, and unexplained weight loss. Upon further workup MD's originally dx with meningitis and was treated with antibiotics for 2 weeks. Pt states that neurologist said it was not meningitis however it is unclear from pt's history what the official dx is. Currently worst pain is low back, occasional pain L side of neck causing L sided cervicogenic headaches, denies pain or symptoms into B UE Pt complains of L sided back pain that extends into left glute and hamstring. Fear avoidance: I should not do physical activities that (might) make my pain worse   [] True   [x] False     Relevant Medical History: Recent: Nausea, vomiting, diarrhea, fever, unexplained weight loss, numbness and tingling, SOB, dizziness vertigo; asthma    Functional Outcome: Oswestry: raw score = 30; dysfunction = 60%    Pain Scale: 9/10  Easing factors: Ice pack,  hot pack, topical pain medications with minimal relief  Provocative factors: Sitting, walking extended periods of time, working and lifting      Type: [x]Constant   []Intermittent  [x]Radiating []Localized []other:     Numbness/Tingling: Into left leg and glute    Occupation/School:     Living Status/Prior Level of Function: Prior to this injury / incident, pt was independent with ADLs and IADLs    OBJECTIVE:   Palpation: Pt tender to palpation in left QL, multifidi, and lumbar extensors. Pt reports tenderness with palpation to spinous process but more pain into L lumbar extensors and L glute. Functional Mobility/Transfers: Pt slow to transition from supine to sit and sit to supine secondary to pain. Posture: Pt demonstrating slouched posture while sitting secondary to pain. Pt demonstrating increased kyphosis and forward head. Pt unable to tolerate seated position for long periods of time.      Inspection: NA    Gait: (include devices/WB status) no AD, Increased stance time on left scour       SLR  x     Crossed SLR  x     Supine to sit       Hip thrust       SI distraction/compression       PA/Spring       Prone Instability test       Prone knee bend       Sacral Spring/thrust  x                [x] Patient history, allergies, meds reviewed. Medical chart reviewed. See intake form. Review Of Systems (ROS):  [x]Performed Review of systems (Integumentary, CardioPulmonary, Neurological) by intake and observation. Intake form has been scanned into medical record. Patient has been instructed to contact their primary care physician regarding ROS issues if not already being addressed at this time.       Co-morbidities/Complexities (which will affect course of rehabilitation):   []None           Arthritic conditions   []Rheumatoid arthritis (M05.9)  []Osteoarthritis (M19.91)   Cardiovascular conditions   []Hypertension (I10)  []Hyperlipidemia (E78.5)  []Angina pectoris (I20)  []Atherosclerosis (I70)   Musculoskeletal conditions   []Disc pathology   []Congenital spine pathologies   []Prior surgical intervention  []Osteoporosis (M81.8)  []Osteopenia (M85.8)   Endocrine conditions   []Hypothyroid (E03.9)  []Hyperthyroid Gastrointestinal conditions   []Constipation (H85.65)   Metabolic conditions   []Morbid obesity (E66.01)  []Diabetes type 1(E10.65) or 2 (E11.65)   []Neuropathy (G60.9)     Pulmonary conditions   [x]Asthma (J45)  []Coughing   []COPD (J44.9)   Psychological Disorders  [x]Anxiety (F41.9)  [x]Depression (F32.9)   []Other:   []Other:           Barriers to/and or personal factors that will affect rehab potential:              []Age  []Sex    []Smoker              []Motivation/Lack of Motivation                        [x]Co-Morbidities              [x]Cognitive Function, education/learning barriers              []Environmental, home barriers              [x]profession/work barriers  [x]past PT/medical experience  []other:  Justification: Pt being dx and treated by neurologist for suspected encephalopathy, Pt unable to work due to full duty lifting responsibilities. Falls Risk Assessment (30 days):  [x] Falls Risk assessed and no intervention required. [] Falls Risk assessed and Patient requires intervention due to being higher risk   TUG score (>12s at risk):     [] Falls education provided, including         ASSESSMENT:   Functional Impairments:     [x]Noted lumbar/proximal hip hypomobility   []Noted lumbosacral and/or generalized hypermobility   [x]Decreased Lumbosacral/hip/LE functional ROM   [x]Decreased core/proximal hip strength and neuromuscular control    []Decreased LE functional strength    []Abnormal reflexes/sensation/myotomal/dermatomal deficits  [x]Reduced balance/proprioceptive control    []other:      Functional Activity Limitations (from functional questionnaire and intake)   [x]Reduced ability to tolerate prolonged functional positions   [x]Reduced ability or difficulty with changes of positions or transfers between positions   [x]Reduced ability to maintain good posture and demonstrate good body mechanics with sitting, bending, and lifting   [x]Reduced ability to sleep   [x] Reduced ability or tolerance with driving and/or computer work   [x]Reduced ability to perform lifting, reaching, carrying tasks   [x]Reduced ability to squat   [x]Reduced ability to forward bend   [x]Reduced ability to ambulate prolonged functional periods/distances/surfaces   [x]Reduced ability to ascend/descend stairs   []other:       Participation Restrictions   []Reduced participation in self care activities   [x]Reduced participation in home management activities   [x]Reduced participation in work activities   [x]Reduced participation in social activities. [x]Reduced participation in sport/recreational activities. Classification:   [x]Signs/symptoms consistent with Lumbar instability/stabilization subgroup.       []Signs/symptoms consistent with Lumbar mobilization/manipulation subgroup, myotomes and dermatomes intact. Meets manipulation criteria. []Signs/symptoms consistent with Lumbar direction specific/centralization subgroup   []Signs/symptoms consistent with Lumbar traction subgroup     [x]Signs/symptoms consistent with lumbar facet dysfunction   []Signs/symptoms consistent with lumbar stenosis type dysfunction   []Signs/symptoms consistent with nerve root involvement including myotome & dermatome dysfunction   []Signs/symptoms consistent with post-surgical status including: decreased ROM, strength and function. []signs/symptoms consistent with pathology which may benefit from Dry needling     []other:      Prognosis/Rehab Potential:      []Excellent   []Good    [x]Fair   []Poor    Tolerance of evaluation/treatment:    []Excellent   []Good    [x]Fair   []Poor     Physical Therapy Evaluation Complexity Justification  [x] A history of present problem with:  [] no personal factors and/or comorbidities that impact the plan of care;  []1-2 personal factors and/or comorbidities that impact the plan of care  [x]3 personal factors and/or comorbidities that impact the plan of care  [x] An examination of body systems using standardized tests and measures addressing any of the following: body structures and functions (impairments), activity limitations, and/or participation restrictions;:  [] a total of 1-2 or more elements   [] a total of 3 or more elements   [x] a total of 4 or more elements   [x] A clinical presentation with:  [] stable and/or uncomplicated characteristics   [x] evolving clinical presentation with changing characteristics  [] unstable and unpredictable characteristics;   [x] Clinical decision making of [] low, [x] moderate, [] high complexity using standardized patient assessment instrument and/or measurable assessment of functional outcome.     [] EVAL (LOW) 85518 (typically 15 minutes face-to-face)  [x] EVAL (MOD) 54662 (typically 30 minutes face-to-face)  [] EVAL (HIGH) 42092 (typically 45 minutes face-to-face)  [] RE-EVAL     PLAN: Begin PT focusing on: proximal hip mobilizations, LB mobs, LB core activation, proximal hip activation, and HEP    Frequency/Duration:  2 days per week for 6-8 Weeks:  Interventions:  [x]  Therapeutic exercise including: strength training, ROM, for LE, Glutes and core   [x]  NMR activation and proprioception for glutes , LE and Core   [x]  Manual therapy as indicated for Hip complex, LE and spine to include: Dry Needling/IASTM, STM, PROM, Gr I-IV mobilizations, manipulation. [x]  Modalities as needed that may include: thermal agents, E-stim, Biofeedback, US, iontophoresis as indicated  [x]  Patient education on joint protection, postural re-education, activity modification, progression of HEP. HEP instruction: Written HEP instructions provided and reviewed. GOALS:  Patient stated goal: return to playing baseball and work without limitation   [] Progressing: [] Met: [] Not Met: [] Adjusted    Therapist goals for Patient:   Short Term Goals: To be achieved in: 2 weeks  1. Independent in HEP and progression per patient tolerance, in order to prevent re-injury. [] Progressing: [] Met: [] Not Met: [] Adjusted  2. Patient will have a decrease in pain to facilitate improvement in movement, function, and ADLs as indicated by Functional Deficits. [] Progressing: [] Met: [] Not Met: [] Adjusted    Long Term Goals: To be achieved in: 6-8 weeks  1. Disability index score of 30% or less for the ELIZABETH to assist with reaching prior level of function. [] Progressing: [] Met: [] Not Met: [] Adjusted  2. Patient will demonstrate increased AROM to WNL, good LS mobility, good hip ROM to allow for proper joint functioning as indicated by patients Functional Deficits. [] Progressing: [] Met: [] Not Met: [] Adjusted  3.  Patient will demonstrate an increase in Strength to good proximal hip and core activation to allow for proper functional mobility as indicated by patients Functional Deficits. [] Progressing: [] Met: [] Not Met: [] Adjusted  4. 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 [] Progressing: [] Met: [] Not Met: [] Adjusted  5. Patient will return to playing baseball without pain or restriction. [] Progressing: [] Met: [] Not Met: [] Adjusted     Electronically signed by:  Jesusita Bass, PT    Kaycee Henderson, SPT     Therapist was present, directed the patient's care, made skilled judgement, and was responsible for assessment and treatment of the patient.

## 2021-01-21 NOTE — FLOWSHEET NOTE
[address]  Phone: [phone]   Fax: [phone]    Physical Therapy Treatment Note/ Progress Report:     Date:  2021    Patient Name:  Maye Szymanski    :  1990  MRN: [id number]  Restrictions/Precautions:    Medical/Treatment Diagnosis Information:  Diagnosis: Herniated cervical disc (M50.20)  Treatment Diagnosis: Lumbar radiculopathy, facet dysfunction, L low back pain, decreased core and proximal hip strength  Insurance/Certification information:  PT Insurance Information: [facility]  Physician Information:  Referring Practitioner: Lance AARON CNP  Plan of care signed (Y/N): []  Yes [x]  No    Date of Patient follow up with Physician:      Progress Report: []  Yes  [x]  No     Date Range for reporting period:  Beginnin21  Ending:     Progress report due (10 Rx/or 30 days whichever is less): visit #10 or      Recertification due (POC duration/ or 90 days whichever is less): visit #16  Or 21    Visit # Insurance Allowable Auth required?  Date Range   1 60 []  Yes  [x]  No      Latex Allergy:  [x]NO      []YES  Preferred Language for Healthcare:   []English       [x]other: English - prefers wife for interpretation    Functional Scale:       Date assessed:  Oswestry: raw score = 30; dysfunction = 60%  21    Pain level:  9/10     SUBJECTIVE:  See eval    OBJECTIVE: See eval    Assess dermatomes/myotomes/reflexes NPV         RESTRICTIONS/PRECAUTIONS: recent hx of neurological symptoms    Exercises/Interventions:     Therapeutic Exercise (95145) Resistance / level Sets / Seconds Reps Notes / Cues   Supine trunk rotation  2 10    Supine TA activation  5  20    Supine Piriformis stretch  30 2                                               Therapeutic Activities (26985)       Bridge iso hold        Supine hip flexion iso                            Neuromuscular Re-ed (34708) proper core and proximal hip recruitment and positioning with ambulation re-education     Home Exercise Program:    [x] (45345) Reviewed/Progressed HEP activities related to strengthening, flexibility, endurance, ROM of core, proximal hip and LE for functional self-care, mobility, lifting and ambulation   [] (29950) Reviewed/Progressed HEP activities related to improving balance, coordination, kinesthetic sense, posture, motor skill, proprioception of core, proximal hip and LE for self care, mobility, lifting, and ambulation      Manual Treatments:  PROM / STM / Oscillations-Mobs:  G-I, II, III, IV (PA's, Inf., Post.)  [x] (69059) Provided manual therapy to mobilize proximal hip and LS spine soft tissue/joints for the purpose of modulating pain, promoting relaxation,  increasing ROM, reducing/eliminating soft tissue swelling/inflammation/restriction, improving soft tissue extensibility and allowing for proper ROM for normal function with self care, mobility, lifting and ambulation. Modalities:       Charges:  Timed Code Treatment Minutes: 15   Total Treatment Minutes: 45       [] EVAL - LOW (02957)   [x] EVAL - MOD (22143)  [] EVAL - HIGH (93532)  [] RE-EVAL (65582)  [x] QC(21868) x 1      [] Ionto  [] NMR (85677) x       [] Vaso  [] Manual (84342) x       [] Ultrasound  [] TA x        [] Mech Traction (51575)  [] Aquatic Therapy x     [] ES (un) (54192):   [] Home Management Training x  [] ES(attended) (72137)   [] Dry Needling 1-2 muscles (35898):  [] Dry Needling 3+ muscles (609612  [] Group:      [] Other:     GOALS:  Patient stated goal: return to playing baseball and 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 Progressing: []? Met: []? Not Met: []? Adjusted     Therapist goals for Patient:   Short Term Goals: To be achieved in: 2 weeks  1. Independent in HEP and progression per patient tolerance, in order to prevent re-injury. []? Progressing: []? Met: []? Not Met: []? Adjusted  2.  Patient will have a decrease in pain to facilitate improvement in movement, function, and ADLs as indicated by Functional Deficits. []? Progressing: []? Met: []? Not Met: []? Adjusted     Long Term Goals: To be achieved in: 6-8 weeks  1. Disability index score of 30% or less for the ELIZABETH to assist with reaching prior level of function. []? Progressing: []? Met: []? Not Met: []? Adjusted  2. Patient will demonstrate increased AROM to WNL, good LS mobility, good hip ROM to allow for proper joint functioning as indicated by patients Functional Deficits. []? Progressing: []? Met: []? Not Met: []? Adjusted  3. Patient will demonstrate an increase in Strength to good proximal hip and core activation to allow for proper functional mobility as indicated by patients Functional Deficits. []? Progressing: []? Met: []? Not Met: []? Adjusted  4. 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 []? Progressing: []? Met: []? Not Met: []? Adjusted  5. Patient will return to playing baseball without pain or restriction. []? Progressing: []? Met: []? Not Met: []? Adjusted         Overall Progression Towards Functional goals/ Treatment Progress Update:  [] Patient is progressing as expected towards functional goals listed. [] Progression is slowed due to complexities/Impairments listed. [] Progression has been slowed due to co-morbidities.   [x] Plan just implemented, too soon to assess goals progression <30days   [] Goals require adjustment due to lack of progress  [] Patient is not progressing as expected and requires additional follow up with physician  [] Other    Persisting Functional Limitations/Impairments:  [x]Sitting [x]Standing   [x]Walking [x]Squatting/bending    [x]Stairs [x]ADL's    [x]Transfers [x]Reaching  [x]Housework [x]Job related tasks  [x]Driving [x]Sports/Recreation   [x]Sleeping []Other:    ASSESSMENT:  See eval  Treatment/Activity Tolerance:  [x] Patient able to complete tx  [] Patient limited by fatique  [] Patient limited by pain  [] Patient limited by other medical complications  [] Other:     Prognosis: [] Good [x] Fair  [] Poor    Patient Requires Follow-up: [x] Yes  [] No    PLAN: See eval. PT 2x / week for 6-8 weeks. [] Continue per plan of care [] Alter current plan (see comments)  [x] Plan of care initiated [] Hold pending MD visit [] Discharge    Electronically signed by: Marva Thornton PT, DPT    Judy Cardona, [facility]    Therapist was present, directed the patient's care, made skilled judgement, and was responsible for assessment and treatment of the patient. Note: If patient does not return for scheduled/ recommended follow up visits, this note will serve as a discharge from care along with most recent update on progress.

## 2021-01-26 ENCOUNTER — HOSPITAL ENCOUNTER (OUTPATIENT)
Dept: PHYSICAL THERAPY | Age: 31
Setting detail: THERAPIES SERIES
Discharge: HOME | End: 2021-01-26
Payer: COMMERCIAL

## 2021-01-26 PROCEDURE — 97110 THERAPEUTIC EXERCISES: CPT

## 2021-01-26 PROCEDURE — 97140 MANUAL THERAPY 1/> REGIONS: CPT

## 2021-01-26 NOTE — FLOWSHEET NOTE
[facility]  Phone: [phone]   Fax: [phone]    Physical Therapy Treatment Note/ Progress Report:     Date:  2021    Patient Name:  Praful Mcintyre    :  1990  MRN: [id number]  Restrictions/Precautions:    Medical/Treatment Diagnosis Information:  Diagnosis: Herniated cervical disc (M50.20)  Treatment Diagnosis: Lumbar radiculopathy, facet dysfunction, L low back pain, decreased core and proximal hip strength  Insurance/Certification information:  PT Insurance Information: [facility]  Physician Information:  Referring Practitioner: Gilbert AARON CNP  Plan of care signed (Y/N): []  Yes [x]  No    Date of Patient follow up with Physician:      Progress Report: []  Yes  [x]  No     Date Range for reporting period:  Beginnin21  Ending:     Progress report due (10 Rx/or 30 days whichever is less): visit #10 or      Recertification due (POC duration/ or 90 days whichever is less): visit #16  Or 21    Visit # Insurance Allowable Auth required? Date Range   2 60 []  Yes  [x]  No      Latex Allergy:  [x]NO      []YES  Preferred Language for Healthcare:   []English       [x]other: Venezuelan - prefers wife for interpretation    Functional Scale:       Date assessed:  Oswestry: raw score = 30; dysfunction = 60%  21    Pain level:  7/10     SUBJECTIVE:  Reports back pain of 7/10 currently. Neck pain feels better but hurts mostly after work, denies neck pain at present time. Reports worked 2 shifts over weekend with minimal pain but had significant increase in pain after shifts. Finished last dose of steroid yesterday.      OBJECTIVE:      Normal Abnormal Comments   inguinal area (L1)    L side-diminshed      anterior mid-thigh (L2)  X       distal ant thigh/med knee (L3)   X       medial lower leg and foot (L4)   X       lateral lower leg and foot (L5)   X       posterior calf (S1)  X        medial calcaneus (S2)  X             Reflexes Normal Abnormal Comments   S1-2 Seated achilles X        S1-2 Prone knee bend         L3-4 Patellar tendon   L side- hyper      Clonus neg neg     Babinski           Strength / Myotomes LEFT RIGHT Comments   Multifidus         Transverse Ab         Hip Flexors (L1-2) 4- w/ pain  5     Hip Abductors 4- w/ pain  4      Hip Adductors      Hip Extensors         Hip Internal Rotators         Hip External Rotators         Quads (L2-4)  5 5     Hamstrings  5 w/ pain  5     Ankle Plantarflexion (S1-2)  5 5     Ankle Dorsiflexion (L4-5) 5  5     Ankle Inversion         Ankle Eversion (S1-2)         Great Toe Extension (L5)           Normal Abnormal Comments   C5-6 Biceps  X       C5-6 Brachioradialis  X       C7-8 Triceps  X       Almarazs  X         R UE neural tension tests: Normal  L UE neural tension tests: + Median, + Ulnar, - Radial    **Objective measures taken by PT student this date under supervision of PT    RESTRICTIONS/PRECAUTIONS: recent hx of neurological symptoms    Exercises/Interventions:     Therapeutic Exercise (37568) Resistance / level Sets / Seconds Reps Notes / Cues   bike  4'  Warm up 1/26 but stopped after 4' due to pt report of increased back pain and LE paresthesia   Supine trunk rotation  2 10    Supine TA activation  5  20    Supine Piriformis stretch  30 2     Seated in chair SB trunk flexion stretch  10\" 10 Added 1/26          Supine Chin Tucks  2 10    blue 1 10     30\" 2     30\" 2                                Therapeutic Activities (21244)       Bridge iso hold   10\" 10 Added 1/26   Supine hip flexion iso    Attempted 1/26 but unable due to c/o of increased L side lateral hip pain and L side low back pain                        Neuromuscular Re-ed (43323)       Supine ball adductor squeeze  10\" 10 Added 1/26                               Manual Intervention (42686)       Prone PA       GISTM/STM       Lumbar Manip       SI Manip       Hip belt mobs       Manual piriformis/glute stretch  3x:30 each  Added 1/26   L 2'      5\" 5     5\" 5           Side glides  Mid cervical 2' 3'    suboccipical      L side 3'                            Pt. Education:  -pt educated on diagnosis, prognosis and expectations for rehab  -all pt questions were answered    Home Exercise Prorgam:  Access Code: [email or web address]   URL: [email or web address]   Date: 01/21/2021   Prepared by: Nieves Dural     Exercises   Supine Lower Trunk Rotation - 10 reps - 3 sets - 10s hold - 1x daily - 7x weekly   Supine Transversus Abdominis Bracing - Hands on Stomach - 10 reps - 3 sets - 5 hold - 1x daily - 7x weekly   Supine Piriformis Stretch with Foot on Ground - 10 reps - 3 sets - 30 hold - 1x daily - 7x weekly       Therapeutic Exercise and NMR EXR  [x] (00444) Provided verbal/tactile cueing for activities related to strengthening, flexibility, endurance, ROM  for improvements in proximal hip and core control with self care, mobility, lifting and ambulation.  [] (77161) Provided verbal/tactile cueing for activities related to improving balance, coordination, kinesthetic sense, posture, motor skill, proprioception  to assist with core control in self care, mobility, lifting, and ambulation.   [] (09510) Therapist is in constant attendance of 2 or more patients providing skilled therapy interventions, but not providing any significant amount of measurable one-on-one time to either patient, for improvements in LE, proximal hip, and core control in self care, mobility, lifting, ambulation and eccentric single leg control.      Therapeutic Activities:    [] (17688 or 39072) Provided verbal/tactile cueing for activities related to improving balance, coordination, kinesthetic sense, posture, motor skill, proprioception and motor activation to allow for proper function  with self care and ADLs  [] (41650) Provided training and instruction to the patient for proper core and proximal hip recruitment and positioning with ambulation re-education     Home Exercise Program:    [x] (21241) Reviewed/Progressed HEP activities related to strengthening, flexibility, endurance, ROM of core, proximal hip and LE for functional self-care, mobility, lifting and ambulation   [] (29097) Reviewed/Progressed HEP activities related to improving balance, coordination, kinesthetic sense, posture, motor skill, proprioception of core, proximal hip and LE for self care, mobility, lifting, and ambulation      Manual Treatments:  PROM / STM / Oscillations-Mobs:  G-I, II, III, IV (PA's, Inf., Post.)  [x] (46482) Provided manual therapy to mobilize proximal hip and LS spine soft tissue/joints for the purpose of modulating pain, promoting relaxation,  increasing ROM, reducing/eliminating soft tissue swelling/inflammation/restriction, improving soft tissue extensibility and allowing for proper ROM for normal function with self care, mobility, lifting and ambulation. Modalities:       Charges:  Timed Code Treatment Minutes: 40   Total Treatment Minutes: 45       [] EVAL - LOW (05228)   [] EVAL - MOD (36231)  [] EVAL - HIGH (84006)  [] RE-EVAL (44894)  [x] DR(66289) x 2      [] Ionto  [] NMR (40272) x       [] Vaso  [x] Manual (86833) x1       [] Ultrasound  [] TA x        [] Mech Traction (30283)  [] Aquatic Therapy x      [] ES (un) (60706):   [] Home Management Training x  [] ES(attended) (84170)   [] Dry Needling 1-2 muscles (80672):  [] Dry Needling 3+ muscles (509007  [] Group:      [] Other:     GOALS:  Patient stated goal: return to playing baseball and 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 Progressing: []? Met: []? Not Met: []? Adjusted     Therapist goals for Patient:   Short Term Goals: To be achieved in: 2 weeks  1. Independent in HEP and progression per patient tolerance, in order to prevent re-injury. []? Progressing: []? Met: []? Not Met: []? Adjusted  2.  Patient will have a decrease in pain to facilitate improvement in movement, function, and ADLs as indicated by Functional Deficits. []? Progressing: []? Met: []? Not Met: []? Adjusted     Long Term Goals: To be achieved in: 6-8 weeks  1. Disability index score of 30% or less for the ELIZABETH to assist with reaching prior level of function. []? Progressing: []? Met: []? Not Met: []? Adjusted  2. Patient will demonstrate increased AROM to WNL, good LS mobility, good hip ROM to allow for proper joint functioning as indicated by patients Functional Deficits. []? Progressing: []? Met: []? Not Met: []? Adjusted  3. Patient will demonstrate an increase in Strength to good proximal hip and core activation to allow for proper functional mobility as indicated by patients Functional Deficits. []? Progressing: []? Met: []? Not Met: []? Adjusted  4. 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 []? Progressing: []? Met: []? Not Met: []? Adjusted  5. Patient will return to playing baseball without pain or restriction. []? Progressing: []? Met: []? Not Met: []? Adjusted         Overall Progression Towards Functional goals/ Treatment Progress Update:  [] Patient is progressing as expected towards functional goals listed. [] Progression is slowed due to complexities/Impairments listed. [] Progression has been slowed due to co-morbidities.   [x] Plan just implemented, too soon to assess goals progression <30days   [] Goals require adjustment due to lack of progress  [] Patient is not progressing as expected and requires additional follow up with physician  [] Other    Persisting Functional Limitations/Impairments:  [x]Sitting [x]Standing   [x]Walking [x]Squatting/bending    [x]Stairs [x]ADL's    [x]Transfers [x]Reaching  [x]Housework [x]Job related tasks  [x]Driving [x]Sports/Recreation   [x]Sleeping []Other:    ASSESSMENT:  Pt demonstrates significant tightness throughout hip and low back with passive manual and active self stretching with cueing to not push into pain ranges. Pt demonstrates core muscle weakness especially with upgrade to bridges due to gluteal and abdominal weakness. Pt complains of pain mostly with left side lateral hip pain with exercises and stretches with complaint of popping in anterior L hip with hip flexion. Limited manual therapy and cervical exercises this date due to need to perform testing at start of session and report of increased back pain and need to focus on pain control in low back this date. Continue to progress exercise routine to focus on improving stretching, strengthening and improved tolerance to performing daily functional and work related tasks without restrictions from pain and motion deficits. Treatment/Activity Tolerance:  [x] Patient able to complete tx  [] Patient limited by fatique  [] Patient limited by pain  [] Patient limited by other medical complications  [] Other:     Prognosis: [] Good [x] Fair  [] Poor    Patient Requires Follow-up: [x] Yes  [] No    PLAN: See eval. PT 2x / week for 6-8 weeks. [x] Continue per plan of care [] Alter current plan (see comments)  [] Plan of care initiated [] Hold pending MD visit [] Discharge    Electronically signed by: Jono [id number]    Wili Perez, PT    Charlyn Lesch, SPT  Therapist was present, directed the patient's care, made skilled judgement, and was responsible for assessment and treatment of the patient. Note: If patient does not return for scheduled/ recommended follow up visits, this note will serve as a discharge from care along with most recent update on progress.

## 2021-01-28 ENCOUNTER — HOSPITAL ENCOUNTER (OUTPATIENT)
Dept: PHYSICAL THERAPY | Age: 31
Setting detail: THERAPIES SERIES
Discharge: HOME | End: 2021-01-28
Payer: COMMERCIAL

## 2021-01-28 PROCEDURE — 97110 THERAPEUTIC EXERCISES: CPT | Performed by: PHYSICAL THERAPIST

## 2021-01-28 PROCEDURE — 97140 MANUAL THERAPY 1/> REGIONS: CPT | Performed by: PHYSICAL THERAPIST

## 2021-01-28 NOTE — FLOWSHEET NOTE
medial calcaneus (S2)  X               Reflexes Normal Abnormal Comments   S1-2 Seated achilles X        S1-2 Prone knee bend         L3-4 Patellar tendon   L side- hyper      Clonus neg neg     Babinski           Strength / Myotomes LEFT RIGHT Comments   Multifidus         Transverse Ab         Hip Flexors (L1-2) 4- w/ pain  5     Hip Abductors 4- w/ pain  4      Hip Adductors      Hip Extensors         Hip Internal Rotators         Hip External Rotators         Quads (L2-4)  5 5     Hamstrings  5 w/ pain  5     Ankle Plantarflexion (S1-2)  5 5     Ankle Dorsiflexion (L4-5) 5  5     Ankle Inversion         Ankle Eversion (S1-2)         Great Toe Extension (L5)           Normal Abnormal Comments   C5-6 Biceps  X       C5-6 Brachioradialis  X       C7-8 Triceps  X       Almarazs  X         R UE neural tension tests: Normal  L UE neural tension tests: + Median, + Ulnar, - Radial    **Objective measures taken by PT student this date under supervision of PT    RESTRICTIONS/PRECAUTIONS: recent hx of neurological symptoms    Exercises/Interventions:     Therapeutic Exercise (18838) Resistance / level Sets / Seconds Reps Notes / Anca Ripper. 5mph5'Warm up since discomfort with bikeSupine trunk rotation  2 10    Supine TA activation  5  20    Supine Piriformis stretch  30 2     Added 1/26   Lateral flexion over swiss ball   3x30s  Each    Supine Chin Tucks  2 10                                         Therapeutic Activities (51471)       Bridge iso hold   10\" 10 Added 1/26                        Neuromuscular Re-ed (73423)       Supine ball adductor squeeze  10\" 10 Added 1/26                               Manual Intervention (06149)       Prone PA       STM left QL and left glute  12'     Lumbar Manip       SI Manip       Hip belt mobs       Manual piriformis/glute stretch  3x:30 each  Added 1/26   Manual hip flexor stretch  3x30s each  Sidelying                                                 Pt. Education:  -all pt questions were answered    Home Exercise Prorgam:  Access Code: [email or web address]   URL: [email or web address]   Date: 01/21/2021   Prepared by: Lanny Luis     Exercises   Supine Lower Trunk Rotation - 10 reps - 3 sets - 10s hold - 1x daily - 7x weekly   Supine Transversus Abdominis Bracing - Hands on Stomach - 10 reps - 3 sets - 5 hold - 1x daily - 7x weekly   Supine Piriformis Stretch with Foot on Ground - 10 reps - 3 sets - 30 hold - 1x daily - 7x weekly       Therapeutic Exercise and NMR EXR  [x] (71477) Provided verbal/tactile cueing for activities related to strengthening, flexibility, endurance, ROM  for improvements in proximal hip and core control with self care, mobility, lifting and ambulation.  [] (67347) Provided verbal/tactile cueing for activities related to improving balance, coordination, kinesthetic sense, posture, motor skill, proprioception  to assist with core control in self care, mobility, lifting, and ambulation.   [] (95993) Therapist is in constant attendance of 2 or more patients providing skilled therapy interventions, but not providing any significant amount of measurable one-on-one time to either patient, for improvements in LE, proximal hip, and core control in self care, mobility, lifting, ambulation and eccentric single leg control.      Therapeutic Activities:    [] (85363 or 84587) Provided verbal/tactile cueing for activities related to improving balance, coordination, kinesthetic sense, posture, motor skill, proprioception and motor activation to allow for proper function  with self care and ADLs  [] (65962) Provided training and instruction to the patient for proper core and proximal hip recruitment and positioning with ambulation re-education     Home Exercise Program:    [x] (06636) Reviewed/Progressed HEP activities related to strengthening, flexibility, endurance, ROM of core, proximal hip and LE for functional self-care, mobility, lifting and ambulation   [] (65471) Reviewed/Progressed HEP activities related to improving balance, coordination, kinesthetic sense, posture, motor skill, proprioception of core, proximal hip and LE for self care, mobility, lifting, and ambulation      Manual Treatments:  PROM / STM / Oscillations-Mobs:  G-I, II, III, IV (PA's, Inf., Post.)  [x] (82698) Provided manual therapy to mobilize proximal hip and LS spine soft tissue/joints for the purpose of modulating pain, promoting relaxation,  increasing ROM, reducing/eliminating soft tissue swelling/inflammation/restriction, improving soft tissue extensibility and allowing for proper ROM for normal function with self care, mobility, lifting and ambulation. Modalities:       Charges:  Timed Code Treatment Minutes: 40   Total Treatment Minutes: 45       [] EVAL - LOW (54197)   [] EVAL - MOD (47363)  [] EVAL - HIGH (85038)  [] RE-EVAL (95219)  [x] KT(39516) x 2      [] Ionto  [] NMR (48713) x       [] Vaso  [x] Manual (22171) x1       [] Ultrasound  [] TA x        [] Mech Traction (36510)  [] Aquatic Therapy x      [] ES (un) (65473):   [] Home Management Training x  [] ES(attended) (52472)   [] Dry Needling 1-2 muscles (66673):  [] Dry Needling 3+ muscles (178771  [] Group:      [] Other:     GOALS:  Patient stated goal: return to playing baseball and 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 Progressing: []? Met: []? Not Met: []? Adjusted     Therapist goals for Patient:   Short Term Goals: To be achieved in: 2 weeks  1. Independent in HEP and progression per patient tolerance, in order to prevent re-injury. []? Progressing: []? Met: []? Not Met: []? Adjusted  2. Patient will have a decrease in pain to facilitate improvement in movement, function, and ADLs as indicated by Functional Deficits. []? Progressing: []? Met: []? Not Met: []? Adjusted     Long Term Goals: To be achieved in: 6-8 weeks  1. Disability index score of 30% or less for the ELIZABETH to assist with reaching prior level of function.    []? Progressing: []? Met: []? Not Met: []? Adjusted  2. Patient will demonstrate increased AROM to WNL, good LS mobility, good hip ROM to allow for proper joint functioning as indicated by patients Functional Deficits. []? Progressing: []? Met: []? Not Met: []? Adjusted  3. Patient will demonstrate an increase in Strength to good proximal hip and core activation to allow for proper functional mobility as indicated by patients Functional Deficits. []? Progressing: []? Met: []? Not Met: []? Adjusted  4. 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 []? Progressing: []? Met: []? Not Met: []? Adjusted  5. Patient will return to playing baseball without pain or restriction. []? Progressing: []? Met: []? Not Met: []? Adjusted         Overall Progression Towards Functional goals/ Treatment Progress Update:  [] Patient is progressing as expected towards functional goals listed. [] Progression is slowed due to complexities/Impairments listed. [] Progression has been slowed due to co-morbidities. [x] Plan just implemented, too soon to assess goals progression <30days   [] Goals require adjustment due to lack of progress  [] Patient is not progressing as expected and requires additional follow up with physician  [] Other    Persisting Functional Limitations/Impairments:  [x]Sitting [x]Standing   [x]Walking [x]Squatting/bending    [x]Stairs [x]ADL's    [x]Transfers [x]Reaching  [x]Housework [x]Job related tasks  [x]Driving [x]Sports/Recreation   [x]Sleeping []Other:    ASSESSMENT:  Pt tolerated session with moderate pain in low back with 1/2 kneeling pallof press. Pt still demonstrating increased muscular tension in L QL and lumbar extensors, was able to stretch over physioball with good effect. Pt did not tolerate fwd flexion swiss ball stretch, as it increased pain in the left side of his low back.  Continue to address symptoms and mitigate pain

## 2021-02-02 ENCOUNTER — APPOINTMENT (OUTPATIENT)
Dept: PHYSICAL THERAPY | Age: 31
End: 2021-02-02
Payer: COMMERCIAL

## 2021-02-03 ENCOUNTER — HOSPITAL ENCOUNTER (OUTPATIENT)
Dept: PHYSICAL THERAPY | Age: 31
Setting detail: THERAPIES SERIES
Discharge: HOME | End: 2021-02-03
Payer: COMMERCIAL

## 2021-02-03 NOTE — PROGRESS NOTES
[facility]    Physical Therapy  Cancellation/No-show Note  Patient Name:  Pattie Peng  :  1990   Date:  2/3/2021    Cancelled visits to date: 1   2/3  No-shows to date: 0    For today's appointment patient:  [x]  Cancelled  []  Rescheduled appointment  []  No-show     Reason given by patient:  [x]  Patient ill  []  Conflicting appointment  []  No transportation    []  Conflict with work  []  No reason given  []  Other:     Comments:      Phone call information:   []  Phone call made today to patient at _ time at number provided:      []  Patient answered, conversation as follows:    []  Patient did not answer, message left as follows:  []  Phone call not made today  [x]  Phone call not needed - pt contacted us to cancel and provided reason for cancellation.      Electronically signed by:  Radha Soto PTA

## 2021-02-05 ENCOUNTER — HOSPITAL ENCOUNTER (OUTPATIENT)
Dept: PHYSICAL THERAPY | Age: 31
Setting detail: THERAPIES SERIES
Discharge: HOME | End: 2021-02-05
Payer: COMMERCIAL

## 2021-02-05 NOTE — PROGRESS NOTES
[facility]    Physical Therapy  Cancellation/No-show Note  Patient Name:  Ana Eldridge  :  1990   Date:  2021    Cancelled visits to date: 1   2/3  No-shows to date:     For today's appointment patient:  []  Cancelled  []  Rescheduled appointment  [x]  No-show     Reason given by patient:  []  Patient ill  []  Conflicting appointment  []  No transportation    []  Conflict with work  []  No reason given  []  Other:     Comments:      Phone call information:   [x]  Phone call made today to patient at 2:09 time at number provided:      []  Patient answered, conversation as follows:    [x]  Patient did not answer, message left as follows: unable to leave a message; attempted both numbers in chart: one number \"cannot be reaching\" other number busy signal  []  Phone call not made today  []  Phone call not needed - pt contacted us to cancel and provided reason for cancellation.      Electronically signed by:  Luciano Caceres PT

## 2021-02-09 ENCOUNTER — HOSPITAL ENCOUNTER (OUTPATIENT)
Dept: PHYSICAL THERAPY | Age: 31
Setting detail: THERAPIES SERIES
Discharge: HOME | End: 2021-02-09
Payer: COMMERCIAL

## 2021-02-10 ENCOUNTER — HOSPITAL ENCOUNTER (OUTPATIENT)
Dept: PHYSICAL THERAPY | Age: 31
Setting detail: THERAPIES SERIES
Discharge: HOME | End: 2021-02-10
Payer: COMMERCIAL

## 2021-02-10 PROCEDURE — 97140 MANUAL THERAPY 1/> REGIONS: CPT | Performed by: PHYSICAL THERAPIST

## 2021-02-10 PROCEDURE — 97110 THERAPEUTIC EXERCISES: CPT | Performed by: PHYSICAL THERAPIST

## 2021-02-10 NOTE — FLOWSHEET NOTE
[facility]  Phone: [phone]   Fax: [phone]    Physical Therapy Treatment Note/ Progress Report:     Date:  2/10/2021    Patient Name:  Rola Cornelius    :  1990  MRN: [id number]  Restrictions/Precautions:    Medical/Treatment Diagnosis Information:  Diagnosis: Herniated cervical disc (M50.20)  Treatment Diagnosis: Lumbar radiculopathy, facet dysfunction, L low back pain, decreased core and proximal hip strength  Insurance/Certification information:  PT Insurance Information: Gael  Physician Information:  Referring Practitioner: Yong AARON CNP  Plan of care signed (Y/N): []  Yes [x]  No    Date of Patient follow up with Physician:      Progress Report: []  Yes  [x]  No     Date Range for reporting period:  Beginnin21  Ending:     Progress report due (10 Rx/or 30 days whichever is less): visit #10 or      Recertification due (POC duration/ or 90 days whichever is less): visit #16  Or 21    Visit # Insurance Allowable Auth required? Date Range   4 60 []  Yes  [x]  No      Latex Allergy:  [x]NO      []YES  Preferred Language for Healthcare:   []English       [x]other: Egyptian - prefers wife for interpretation    Functional Scale:       Date assessed:  Oswestry: raw score = 30; dysfunction = 60%  21    Pain level:  7/10     SUBJECTIVE:   Pt. Reports that his pain is a little worse today. He had a long day at work yesterday. He has not been able to complete HEP due to time constraints. Currently pt. Reports pain in low back and into L lateral hip as well as L side of neck. Pt. Denies headaches this date.        OBJECTIVE:      Normal Abnormal Comments   inguinal area (L1)    L side-diminished      anterior mid-thigh (L2)  X       distal ant thigh/med knee (L3)   X       medial lower leg and foot (L4)   X       lateral lower leg and foot (L5)   X       posterior calf (S1)  X        medial calcaneus (S2)  X               Reflexes Normal Abnormal Comments   S1-2 Seated achilles X        S1-2 Prone knee bend         L3-4 Patellar tendon   L side- hyper      Clonus neg neg     Babinski           Strength / Myotomes LEFT RIGHT Comments   Multifidus         Transverse Ab         Hip Flexors (L1-2) 4- w/ pain  5     Hip Abductors 4- w/ pain  4      Hip Adductors      Hip Extensors         Hip Internal Rotators         Hip External Rotators         Quads (L2-4)  5 5     Hamstrings  5 w/ pain  5     Ankle Plantarflexion (S1-2)  5 5     Ankle Dorsiflexion (L4-5) 5  5     Ankle Inversion         Ankle Eversion (S1-2)         Great Toe Extension (L5)           Normal Abnormal Comments   C5-6 Biceps  X       C5-6 Brachioradialis  X       C7-8 Triceps  X       Almarazs  X         R UE neural tension tests: Normal  L UE neural tension tests: + Median, + Ulnar, - Radial    **Objective measures taken by PT student this date under supervision of PT    RESTRICTIONS/PRECAUTIONS: recent hx of neurological symptoms    Exercises/Interventions: All exercises performed bilaterally  Therapeutic Exercise (85563) Resistance / level Sets / Seconds Reps Notes / Devon Lozano. 5mph5'Warm up since discomfort with bikeSupine trunk rotation  2 10    Supine TA activation  5  20 Tactile facilitation   Supine Piriformis stretch  30 2     Added 1/26   Lateral flexion over swiss ball   3x30s  Each    Supine Chin Tucks  5\" 10             sidelying clams  2 10                         Therapeutic Activities (60533)       Bridge iso hold  W/ BS 5\" 10 Added 1/26                        Neuromuscular Re-ed (00424)                                          Manual Intervention (67544)       Prone PA       STM left QL and left glute, left trap, and left levator  12'     Lumbar Manip       SI Manip       Hip belt mobs       Manual piriformis/glute stretch  3x:30 each  Added 1/26   Manual hip flexor stretch    Sidelying resume npv Pt. Education:  -all pt questions were answered    Home Exercise Prorgam:  Access Code: [email or web address]   URL: [email or web address]   Date: 01/21/2021   Prepared by: Skip Rut     Exercises   Supine Lower Trunk Rotation - 10 reps - 3 sets - 10s hold - 1x daily - 7x weekly   Supine Transversus Abdominis Bracing - Hands on Stomach - 10 reps - 3 sets - 5 hold - 1x daily - 7x weekly   Supine Piriformis Stretch with Foot on Ground - 10 reps - 3 sets - 30 hold - 1x daily - 7x weekly       Therapeutic Exercise and NMR EXR  [x] (25187) Provided verbal/tactile cueing for activities related to strengthening, flexibility, endurance, ROM  for improvements in proximal hip and core control with self care, mobility, lifting and ambulation.  [] (67471) Provided verbal/tactile cueing for activities related to improving balance, coordination, kinesthetic sense, posture, motor skill, proprioception  to assist with core control in self care, mobility, lifting, and ambulation.   [] (11596) Therapist is in constant attendance of 2 or more patients providing skilled therapy interventions, but not providing any significant amount of measurable one-on-one time to either patient, for improvements in LE, proximal hip, and core control in self care, mobility, lifting, ambulation and eccentric single leg control.      Therapeutic Activities:    [] (90717 or 61200) Provided verbal/tactile cueing for activities related to improving balance, coordination, kinesthetic sense, posture, motor skill, proprioception and motor activation to allow for proper function  with self care and ADLs  [] (59998) Provided training and instruction to the patient for proper core and proximal hip recruitment and positioning with ambulation re-education     Home Exercise Program:    [x] (51815) Reviewed/Progressed HEP activities related to strengthening, flexibility, endurance, ROM of core, proximal hip and LE for functional self-care, mobility, prior level of function. []? Progressing: []? Met: []? Not Met: []? Adjusted  2. Patient will demonstrate increased AROM to WNL, good LS mobility, good hip ROM to allow for proper joint functioning as indicated by patients Functional Deficits. []? Progressing: []? Met: []? Not Met: []? Adjusted  3. Patient will demonstrate an increase in Strength to good proximal hip and core activation to allow for proper functional mobility as indicated by patients Functional Deficits. []? Progressing: []? Met: []? Not Met: []? Adjusted  4. 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 []? Progressing: []? Met: []? Not Met: []? Adjusted  5. Patient will return to playing baseball without pain or restriction. []? Progressing: []? Met: []? Not Met: []? Adjusted         Overall Progression Towards Functional goals/ Treatment Progress Update:  [] Patient is progressing as expected towards functional goals listed. [] Progression is slowed due to complexities/Impairments listed. [] Progression has been slowed due to co-morbidities. [x] Plan just implemented, too soon to assess goals progression <30days   [] Goals require adjustment due to lack of progress  [] Patient is not progressing as expected and requires additional follow up with physician  [] Other    Persisting Functional Limitations/Impairments:  [x]Sitting [x]Standing   [x]Walking [x]Squatting/bending    [x]Stairs [x]ADL's    [x]Transfers [x]Reaching  [x]Housework [x]Job related tasks  [x]Driving [x]Sports/Recreation   [x]Sleeping []Other:    ASSESSMENT:  Pt tolerated treatment well however was experiencing clicking in L hip with clams but was unable to reproduce click upon palpation. Pt responded well to supine core activation with minimal to no pain. Presence of increased muscle tightness and trigger point in L upper trapezius and L QL.  Pt was able to achieve relief after STM to both trap and QL. Pt encouraged to continue stretching and mobilizing independently especially on days his is not working. Pt educated on optimal positioning during work and suggested placing towel roll at the level of PSIS to facilitate better lumbar extension. 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 Treatment/Activity Tolerance:  [x] Patient able to complete tx  [] Patient limited by fatique  [] Patient limited by pain  [] Patient limited by other medical complications  [] Other:     Prognosis: [] Good [x] Fair  [] Poor    Patient Requires Follow-up: [x] Yes  [] No    PLAN: See eval. PT 2x / week for 6-8 weeks. [x] Continue per plan of care [] Alter current plan (see comments)  [] Plan of care initiated [] Hold pending MD visit [] Discharge    Electronically signed by: Alexys Saldana SPT  Therapist was present, directed the patient's care, made skilled judgement, and was responsible for assessment and treatment of the patient. Note: If patient does not return for scheduled/ recommended follow up visits, this note will serve as a discharge from care along with most recent update on progress.

## 2021-02-12 ENCOUNTER — HOSPITAL ENCOUNTER (OUTPATIENT)
Dept: PHYSICAL THERAPY | Age: 31
Setting detail: THERAPIES SERIES
Discharge: HOME | End: 2021-02-12
Payer: COMMERCIAL

## 2021-02-12 NOTE — PROGRESS NOTES
[address] [facility]    Physical Therapy  Cancellation/No-show Note  Patient Name:  Rola Cornelius  :  1990   Date:  2021    Cancelled visits to date: 2   2/3,   No-shows to date: ,     For today's appointment patient:  []  Cancelled  []  Rescheduled appointment  [x]  No-show     Reason given by patient:  []  Patient ill  []  Conflicting appointment  []  No transportation    []  Conflict with work  []  No reason given  []  Other:     Comments:      Phone call information:   [x]  Phone call made today to patient at 255 time at number provided:      [x]  Patient answered, conversation as follows: spoke with pt. Wife. States that pt. Was in too much pain to come to therapy today and forgot to call. Reminded of next appointment. []  Patient did not answer, message left as follows:   []  Phone call not made today  []  Phone call not needed - pt contacted us to cancel and provided reason for cancellation.      Electronically signed by:  Ramon Mccarthy PT

## 2021-02-17 ENCOUNTER — HOSPITAL ENCOUNTER (OUTPATIENT)
Dept: PHYSICAL THERAPY | Age: 31
Setting detail: THERAPIES SERIES
Discharge: HOME | End: 2021-02-17
Payer: COMMERCIAL

## 2021-02-17 PROCEDURE — 97110 THERAPEUTIC EXERCISES: CPT

## 2021-02-17 PROCEDURE — 97140 MANUAL THERAPY 1/> REGIONS: CPT

## 2021-02-17 NOTE — FLOWSHEET NOTE
[facility]  Phone: [phone]   Fax: [phone]    Physical Therapy Treatment Note/ Progress Report:     Date:  2021    Patient Name:  Pepe Li    :  1990  MRN: [id number]  Restrictions/Precautions:    Medical/Treatment Diagnosis Information:  Diagnosis: Herniated cervical disc (M50.20)  Treatment Diagnosis: Lumbar radiculopathy, facet dysfunction, L low back pain, decreased core and proximal hip strength  Insurance/Certification information:  PT Insurance Information: [facility]  Physician Information:  Referring Practitioner: Gabby AARON CNP  Plan of care signed (Y/N): []  Yes [x]  No    Date of Patient follow up with Physician:      Progress Report: []  Yes  [x]  No     Date Range for reporting period:  Beginnin21  Ending:     Progress report due (10 Rx/or 30 days whichever is less): visit #10 or 80     Recertification due (POC duration/ or 90 days whichever is less): visit #16  Or 21    Visit # Insurance Allowable Auth required? Date Range   5 60 []  Yes  [x]  No      Latex Allergy:  [x]NO      []YES  Preferred Language for Healthcare:   []English       [x]other: Sudanese - prefers wife for interpretation    Functional Scale:       Date assessed:  Oswestry: raw score = 30; dysfunction = 60%  21    Pain level:  5-6/10     SUBJECTIVE:   Pt reports he is feeling a little better today. He feels better because he has not worked the past 2 days. After work when his pain is flared up he manages it by taking a hot shower, massage, and stretching. His bed has a massage setting, but he also pays for massage. He has not mentioned this before, but after working 2-3 days his L hamstring is very painful to walk on. He returns to work tomorrow. He does not currently have a head ache. He has been doing his HEP when he is having less pain. Currently pt.  Reports pain in low back and into L lateral hip as well as L side of neck. Pt. Denies headaches this date. OBJECTIVE: 1/26     Normal Abnormal Comments   inguinal area (L1)    L side-diminished      anterior mid-thigh (L2)  X       distal ant thigh/med knee (L3)   X       medial lower leg and foot (L4)   X       lateral lower leg and foot (L5)   X       posterior calf (S1)  X        medial calcaneus (S2)  X               Reflexes Normal Abnormal Comments   S1-2 Seated achilles X        S1-2 Prone knee bend         L3-4 Patellar tendon   L side- hyper      Clonus neg neg     Babinski           Strength / Myotomes LEFT RIGHT Comments   Multifidus         Transverse Ab         Hip Flexors (L1-2) 4- w/ pain  5     Hip Abductors 4- w/ pain  4      Hip Adductors      Hip Extensors         Hip Internal Rotators         Hip External Rotators         Quads (L2-4)  5 5     Hamstrings  5 w/ pain  5     Ankle Plantarflexion (S1-2)  5 5     Ankle Dorsiflexion (L4-5) 5  5     Ankle Inversion         Ankle Eversion (S1-2)         Great Toe Extension (L5)           Normal Abnormal Comments   C5-6 Biceps  X       C5-6 Brachioradialis  X       C7-8 Triceps  X       Almarazs  X         R UE neural tension tests: Normal  L UE neural tension tests: + Median, + Ulnar, - Radial    **Objective measures taken by PT student this date under supervision of PT    RESTRICTIONS/PRECAUTIONS: recent hx of neurological symptoms    Exercises/Interventions:     All exercises performed bilaterally  Therapeutic Exercise (57528) Resistance / level Sets / Seconds Reps Notes / Cues   Treadmill2.0 mph5'Warm up since discomfort with bikeSupine trunk rotation  2 10    Supine TA activation  5  20 Tactile facilitation   Supine Piriformis stretch  30 2     Added 1/26   Lateral flexion over swiss ball   3x30s  Each    Lumbar extension with swiss ball  30\" 2 Added 2/17   Supine Chin Tucks  5\" 10             sidelying clams  2 10                         Therapeutic Activities (14424)       Bridge iso hold  W/ BS 5\" 10 Added 1/26   Swissball wall squat  1 10 Added 2/17                 Neuromuscular Re-ed (29033)                                          Manual Intervention (65237)       Prone PA       STM left QL and left glute, left trap, and left levator  10'     Lumbar Manip       SI Manip       Hip belt mobs       Manual piriformis/glute stretch  3x:30 each  Added 1/26   Manual hip flexor stretch  3x30s each  Sidelying resumed 2/17                                                Pt. Education:  -all pt questions were answered    Home Exercise Prorgam:  Access Code: [email or web address]   URL: [email or web address]   Date: 01/21/2021   Prepared by: True Hodgkins     Exercises   Supine Lower Trunk Rotation - 10 reps - 3 sets - 10s hold - 1x daily - 7x weekly   Supine Transversus Abdominis Bracing - Hands on Stomach - 10 reps - 3 sets - 5 hold - 1x daily - 7x weekly   Supine Piriformis Stretch with Foot on Ground - 10 reps - 3 sets - 30 hold - 1x daily - 7x weekly       Therapeutic Exercise and NMR EXR  [x] (12542) Provided verbal/tactile cueing for activities related to strengthening, flexibility, endurance, ROM  for improvements in proximal hip and core control with self care, mobility, lifting and ambulation.  [] (58770) Provided verbal/tactile cueing for activities related to improving balance, coordination, kinesthetic sense, posture, motor skill, proprioception  to assist with core control in self care, mobility, lifting, and ambulation.   [] (36637) Therapist is in constant attendance of 2 or more patients providing skilled therapy interventions, but not providing any significant amount of measurable one-on-one time to either patient, for improvements in LE, proximal hip, and core control in self care, mobility, lifting, ambulation and eccentric single leg control.      Therapeutic Activities:    [] (99218 or 40953) Provided verbal/tactile cueing for activities related to improving balance, coordination, kinesthetic sense, posture, motor skill, proprioception and motor activation to allow for proper function  with self care and ADLs  [] (72899) Provided training and instruction to the patient for proper core and proximal hip recruitment and positioning with ambulation re-education     Home Exercise Program:    [x] (88932) Reviewed/Progressed HEP activities related to strengthening, flexibility, endurance, ROM of core, proximal hip and LE for functional self-care, mobility, lifting and ambulation   [] (87704) Reviewed/Progressed HEP activities related to improving balance, coordination, kinesthetic sense, posture, motor skill, proprioception of core, proximal hip and LE for self care, mobility, lifting, and ambulation      Manual Treatments:  PROM / STM / Oscillations-Mobs:  G-I, II, III, IV (PA's, Inf., Post.)  [x] (61719) Provided manual therapy to mobilize proximal hip and LS spine soft tissue/joints for the purpose of modulating pain, promoting relaxation,  increasing ROM, reducing/eliminating soft tissue swelling/inflammation/restriction, improving soft tissue extensibility and allowing for proper ROM for normal function with self care, mobility, lifting and ambulation. Modalities:       Charges:  Timed Code Treatment Minutes: 47   Total Treatment Minutes: 47       [] EVAL - LOW (44056)   [] EVAL - MOD (92810)  [] EVAL - HIGH (60385)  [] RE-EVAL (40727)  [x] GV(38803) x 2      [] Ionto  [] NMR (21503) x       [] Vaso  [x] Manual (63232) x1       [] Ultrasound  [] TA x        [] Mech Traction (52843)  [] Aquatic Therapy x      [] ES (un) (58839):   [] Home Management Training x  [] ES(attended) (08051)   [] Dry Needling 1-2 muscles (21834):  [] Dry Needling 3+ muscles (804144  [] Group:      [] Other:     GOALS:  Patient stated goal: return to playing baseball and 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 Progressing: []? Met: []? Not Met: []? Adjusted     Therapist goals for Patient:   Short Term Goals: To be achieved in: 2 weeks  1.  Independent in HEP and progression per patient tolerance, in order to prevent re-injury. []? Progressing: []? Met: []? Not Met: []? Adjusted  2. Patient will have a decrease in pain to facilitate improvement in movement, function, and ADLs as indicated by Functional Deficits. []? Progressing: []? Met: []? Not Met: []? Adjusted     Long Term Goals: To be achieved in: 6-8 weeks  1. Disability index score of 30% or less for the ELIZABETH to assist with reaching prior level of function. []? Progressing: []? Met: []? Not Met: []? Adjusted  2. Patient will demonstrate increased AROM to WNL, good LS mobility, good hip ROM to allow for proper joint functioning as indicated by patients Functional Deficits. []? Progressing: []? Met: []? Not Met: []? Adjusted  3. Patient will demonstrate an increase in Strength to good proximal hip and core activation to allow for proper functional mobility as indicated by patients Functional Deficits. []? Progressing: []? Met: []? Not Met: []? Adjusted  4. 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 []? Progressing: []? Met: []? Not Met: []? Adjusted  5. Patient will return to playing baseball without pain or restriction. []? Progressing: []? Met: []? Not Met: []? Adjusted         Overall Progression Towards Functional goals/ Treatment Progress Update:  [] Patient is progressing as expected towards functional goals listed. [] Progression is slowed due to complexities/Impairments listed. [] Progression has been slowed due to co-morbidities.   [x] Plan just implemented, too soon to assess goals progression <30days   [] Goals require adjustment due to lack of progress  [] Patient is not progressing as expected and requires additional follow up with physician  [] Other    Persisting Functional Limitations/Impairments:  [x]Sitting [x]Standing   [x]Walking [x]Squatting/bending    [x]Stairs [x]ADL's [x]Transfers [x]Reaching  [x]Housework [x]Job related tasks  [x]Driving [x]Sports/Recreation   [x]Sleeping []Other:    ASSESSMENT:  Pt tolerated treatment well; he did experience an audible cavitation with supine lumbar rotation and reports decreased pain afterwards. Pt responded well to lumbar extension with swiss ball and swiss ball squat with minimal to no pain. Presence of increased muscle tightness and trigger point in L upper trapezius and L QL. Pt was able to achieve relief after STM to both trap and QL. Pt encouraged to continue stretching and mobilizing independently especially on days his is not working. Briefly reviewed posture ed for sitting for work and bending and lifting for work. 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 Treatment/Activity Tolerance:  [x] Patient able to complete tx  [] Patient limited by fatique  [] Patient limited by pain  [] Patient limited by other medical complications  [] Other:     Prognosis: [] Good [x] Fair  [] Poor    Patient Requires Follow-up: [x] Yes  [] No    PLAN: See eval. PT 2x / week for 6-8 weeks. [x] Continue per plan of care [] Alter current plan (see comments)  [] Plan of care initiated [] Hold pending MD visit [] Discharge    Electronically signed by: Mignon Alcantara PT, DPT          Note: If patient does not return for scheduled/ recommended follow up visits, this note will serve as a discharge from care along with most recent update on progress.

## 2021-02-22 ENCOUNTER — HOSPITAL ENCOUNTER (OUTPATIENT)
Dept: PHYSICAL THERAPY | Age: 31
Setting detail: THERAPIES SERIES
Discharge: HOME | End: 2021-02-22
Payer: COMMERCIAL

## 2021-02-22 PROCEDURE — 97140 MANUAL THERAPY 1/> REGIONS: CPT

## 2021-02-22 PROCEDURE — 97110 THERAPEUTIC EXERCISES: CPT

## 2021-02-22 NOTE — FLOWSHEET NOTE
foot (L5)   X       posterior calf (S1)  X        medial calcaneus (S2)  X               Reflexes Normal Abnormal Comments   S1-2 Seated achilles X        S1-2 Prone knee bend         L3-4 Patellar tendon   L side- hyper      Clonus neg neg     Babinski           Strength / Myotomes LEFT RIGHT Comments   Multifidus         Transverse Ab         Hip Flexors (L1-2) 4- w/ pain  5     Hip Abductors 4- w/ pain  4      Hip Adductors      Hip Extensors         Hip Internal Rotators         Hip External Rotators         Quads (L2-4)  5 5     Hamstrings  5 w/ pain  5     Ankle Plantarflexion (S1-2)  5 5     Ankle Dorsiflexion (L4-5) 5  5     Ankle Inversion         Ankle Eversion (S1-2)         Great Toe Extension (L5)           Normal Abnormal Comments   C5-6 Biceps  X       C5-6 Brachioradialis  X       C7-8 Triceps  X       Almarazs  X         R UE neural tension tests: Normal  L UE neural tension tests: + Median, + Ulnar, - Radial      RESTRICTIONS/PRECAUTIONS: recent hx of neurological symptoms    Exercises/Interventions: All exercises performed bilaterally  Therapeutic Exercise (24901) Resistance / level Sets / Seconds Reps Notes / Cues   Warm up since discomfort with bikeSupine trunk rotation  2 10    Supine TA activation  5  20 Tactile facilitation   Supine Piriformis stretch  30 2     Added 1/26   Lateral flexion over swiss ball   3x30s  Each    Lumbar extension with swiss ball  30\" 2 Added 2/17     5\" 10             sidelying clams  2 10    Prone HS curls - bilat. 2 10 Added 2/22 for increased lumbar ROM                 Therapeutic Activities (82328)       Bridge iso hold  W/ BS 5\" 10 Added 1/26   Swissball wall squat  1 10 Added 2/17                 Neuromuscular Re-ed (83181)       Supine abd. iso w/marches  2 10 Added 2/22   Prone alt. LE raises    Attempted 2/22 but unable, even with modifications, due to increased pain on L side.                          Manual Intervention ([phone])       Prone PA       STM proximal hip recruitment and positioning with ambulation re-education     Home Exercise Program:    [x] (53492) Reviewed/Progressed HEP activities related to strengthening, flexibility, endurance, ROM of core, proximal hip and LE for functional self-care, mobility, lifting and ambulation   [] (23766) Reviewed/Progressed HEP activities related to improving balance, coordination, kinesthetic sense, posture, motor skill, proprioception of core, proximal hip and LE for self care, mobility, lifting, and ambulation      Manual Treatments:  PROM / STM / Oscillations-Mobs:  G-I, II, III, IV (PA's, Inf., Post.)  [x] (76175) Provided manual therapy to mobilize proximal hip and LS spine soft tissue/joints for the purpose of modulating pain, promoting relaxation,  increasing ROM, reducing/eliminating soft tissue swelling/inflammation/restriction, improving soft tissue extensibility and allowing for proper ROM for normal function with self care, mobility, lifting and ambulation. Modalities:       Charges:  Timed Code Treatment Minutes: 45   Total Treatment Minutes: 45       [] EVAL - LOW (77491)   [] EVAL - MOD (52039)  [] EVAL - HIGH (35215)  [] RE-EVAL (56766)  [x] YR(48990) x 2      [] Ionto  [] NMR (99338) x       [] Vaso  [x] Manual (13515) x1       [] Ultrasound  [] TA x        [] Mech Traction (37861)  [] Aquatic Therapy x      [] ES (un) (55690):   [] Home Management Training x  [] ES(attended) (36538)   [] Dry Needling 1-2 muscles (05274):  [] Dry Needling 3+ muscles (449028  [] Group:      [] Other:     GOALS:  Patient stated goal: return to playing baseball and 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 Progressing: []? Met: []? Not Met: []? Adjusted     Therapist goals for Patient:   Short Term Goals: To be achieved in: 2 weeks  1. Independent in HEP and progression per patient tolerance, in order to prevent re-injury. []? Progressing: []? Met: []? Not Met: []? Adjusted  2.  Patient will have a decrease in pain to facilitate date and manual spinal A/P and M/L mobilizations with hypomobility noted around L 4-5. Pt demonstrates weakness throughout core with exercises this date with muscle fatigue and weakness noted in transverse abdominals with difficulty maintaining contraction with exercises. Increased pain noted with attempted prone exercises this date on L side. Tightness noted along QL and paraspinals with manual STM as well as some tightness noted along piriformis. Continue to upgrade exercises as tolerated to improve tolerance to work related activity without pain and restrictions. Treatment/Activity Tolerance:  [x] Patient able to complete tx  [] Patient limited by fatique  [] Patient limited by pain  [] Patient limited by other medical complications  [] Other:     Prognosis: [] Good [x] Fair  [] Poor    Patient Requires Follow-up: [x] Yes  [] No    PLAN: See eval. PT 2x / week for 6-8 weeks. [x] Continue per plan of care [] Alter current plan (see comments)  [] Plan of care initiated [] Hold pending MD visit [] Discharge    Electronically signed by: Eliud Esteban          Note: If patient does not return for scheduled/ recommended follow up visits, this note will serve as a discharge from care along with most recent update on progress.

## 2021-02-24 ENCOUNTER — HOSPITAL ENCOUNTER (OUTPATIENT)
Dept: PHYSICAL THERAPY | Age: 31
Setting detail: THERAPIES SERIES
Discharge: HOME | End: 2021-02-24
Payer: COMMERCIAL

## 2021-02-24 PROCEDURE — 97140 MANUAL THERAPY 1/> REGIONS: CPT | Performed by: PHYSICAL THERAPIST

## 2021-02-24 PROCEDURE — 97110 THERAPEUTIC EXERCISES: CPT | Performed by: PHYSICAL THERAPIST

## 2021-02-24 NOTE — FLOWSHEET NOTE
neg neg     Babinski           Strength / Myotomes LEFT RIGHT Comments   Multifidus         Transverse Ab         Hip Flexors (L1-2) 4- w/ pain  5     Hip Abductors 4- w/ pain  4      Hip Adductors      Hip Extensors         Hip Internal Rotators         Hip External Rotators         Quads (L2-4)  5 5     Hamstrings  5 w/ pain  5     Ankle Plantarflexion (S1-2)  5 5     Ankle Dorsiflexion (L4-5) 5  5     Ankle Inversion         Ankle Eversion (S1-2)         Great Toe Extension (L5)           Normal Abnormal Comments   C5-6 Biceps  X       C5-6 Brachioradialis  X       C7-8 Triceps  X       Almarazs  X         R UE neural tension tests: Normal  L UE neural tension tests: + Median, + Ulnar, - Radial      RESTRICTIONS/PRECAUTIONS: recent hx of neurological symptoms    Exercises/Interventions: All exercises performed bilaterally  Therapeutic Exercise (01250) Resistance / level Sets / Seconds Reps Notes / Cues   Treadmill 2.0 mph 5' Warm up since discomfort with bikeSupine trunk rotation  2 10    Supine TA activation  5  20 Tactile facilitation   Supine Piriformis stretch  30 2     Added 1/26   Lateral flexion over swiss ball   3x30s  Each    Lumbar extension with swiss ball  30\" 2 Added 2/17                 sidelying clams     Prone HS curls - bilat. Added 2/22 for increased lumbar ROM   Pallof press  2 10    Deadbug OH band TA activation  2 10    Stick Fight   2 30s    Therapeutic Activities (23725)       Bridge iso hold  W/ BS Added 1/26   Swissball wall squat  Added 2/17               Neuromuscular Re-ed (04577)       Supine abd. iso w/marches  Added 2/22   Prone alt. LE raises    Attempted 2/22 but unable, even with modifications, due to increased pain on L side.                          Manual Intervention (00738)       Prone PA  2'   L1-5   STM left QL and left glute, left trap, and left levator  13'     Lumbar Manip       SI Manip       Hip belt mobs       Manual piriformis/glute stretch  3x:30 each  Added 1/26   Manual hip flexor stretch  3x30s each  Sidelying resumed 2/17                                                Pt. Education:  -all pt questions were answered    Home Exercise Prorgam:  Access Code: [email or web address]   URL: [email or web address]   Date: 01/21/2021   Prepared by: Edie Kwon     Exercises   Supine Lower Trunk Rotation - 10 reps - 3 sets - 10s hold - 1x daily - 7x weekly   Supine Transversus Abdominis Bracing - Hands on Stomach - 10 reps - 3 sets - 5 hold - 1x daily - 7x weekly   Supine Piriformis Stretch with Foot on Ground - 10 reps - 3 sets - 30 hold - 1x daily - 7x weekly       Therapeutic Exercise and NMR EXR  [x] (69753) Provided verbal/tactile cueing for activities related to strengthening, flexibility, endurance, ROM  for improvements in proximal hip and core control with self care, mobility, lifting and ambulation.  [] (56351) Provided verbal/tactile cueing for activities related to improving balance, coordination, kinesthetic sense, posture, motor skill, proprioception  to assist with core control in self care, mobility, lifting, and ambulation.   [] (70781) Therapist is in constant attendance of 2 or more patients providing skilled therapy interventions, but not providing any significant amount of measurable one-on-one time to either patient, for improvements in LE, proximal hip, and core control in self care, mobility, lifting, ambulation and eccentric single leg control.      Therapeutic Activities:    [] (68682 or 91255) Provided verbal/tactile cueing for activities related to improving balance, coordination, kinesthetic sense, posture, motor skill, proprioception and motor activation to allow for proper function  with self care and ADLs  [] (55727) Provided training and instruction to the patient for proper core and proximal hip recruitment and positioning with ambulation re-education     Home Exercise Program:    [x] (02456) Reviewed/Progressed HEP activities related to strengthening, flexibility, endurance, ROM of core, proximal hip and LE for functional self-care, mobility, lifting and ambulation   [] (76369) Reviewed/Progressed HEP activities related to improving balance, coordination, kinesthetic sense, posture, motor skill, proprioception of core, proximal hip and LE for self care, mobility, lifting, and ambulation      Manual Treatments:  PROM / STM / Oscillations-Mobs:  G-I, II, III, IV (PA's, Inf., Post.)  [x] (63984) Provided manual therapy to mobilize proximal hip and LS spine soft tissue/joints for the purpose of modulating pain, promoting relaxation,  increasing ROM, reducing/eliminating soft tissue swelling/inflammation/restriction, improving soft tissue extensibility and allowing for proper ROM for normal function with self care, mobility, lifting and ambulation. Modalities:       Charges:  Timed Code Treatment Minutes: 30   Total Treatment Minutes: 35       [] EVAL - LOW (97019)   [] EVAL - MOD (49423)  [] EVAL - HIGH (03457)  [] RE-EVAL (05415)  [x] AZ(59948) x 1      [] Ionto  [] NMR (77918) x       [] Vaso  [x] Manual (61211) x1       [] Ultrasound  [] TA x        [] Mech Traction (20184)  [] Aquatic Therapy x      [] ES (un) (24982):   [] Home Management Training x  [] ES(attended) (84866)   [] Dry Needling 1-2 muscles (68527):  [] Dry Needling 3+ muscles (584055  [] Group:      [] Other:     GOALS:  Patient stated goal: return to playing baseball and 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 Progressing: []? Met: []? Not Met: []? Adjusted     Therapist goals for Patient:   Short Term Goals: To be achieved in: 2 weeks  1. Independent in HEP and progression per patient tolerance, in order to prevent re-injury. []? Progressing: []? Met: []? Not Met: []? Adjusted  2. Patient will have a decrease in pain to facilitate improvement in movement, function, and ADLs as indicated by Functional Deficits. []? Progressing: []? Met: []? Not Met: []? Adjusted     Long Term Goals:  To be achieved in: 6-8 weeks  1. Disability index score of 30% or less for the ELIZABETH to assist with reaching prior level of function. []? Progressing: []? Met: []? Not Met: []? Adjusted  2. Patient will demonstrate increased AROM to WNL, good LS mobility, good hip ROM to allow for proper joint functioning as indicated by patients Functional Deficits. []? Progressing: []? Met: []? Not Met: []? Adjusted  3. Patient will demonstrate an increase in Strength to good proximal hip and core activation to allow for proper functional mobility as indicated by patients Functional Deficits. []? Progressing: []? Met: []? Not Met: []? Adjusted  4. 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 []? Progressing: []? Met: []? Not Met: []? Adjusted  5. Patient will return to playing baseball without pain or restriction. []? Progressing: []? Met: []? Not Met: []? Adjusted         Overall Progression Towards Functional goals/ Treatment Progress Update:  [] Patient is progressing as expected towards functional goals listed. [] Progression is slowed due to complexities/Impairments listed. [] Progression has been slowed due to co-morbidities. [x] Plan just implemented, too soon to assess goals progression <30days   [] Goals require adjustment due to lack of progress  [] Patient is not progressing as expected and requires additional follow up with physician  [] Other    Persisting Functional Limitations/Impairments:  [x]Sitting [x]Standing   [x]Walking [x]Squatting/bending    [x]Stairs [x]ADL's    [x]Transfers [x]Reaching  [x]Housework [x]Job related tasks  [x]Driving [x]Sports/Recreation   [x]Sleeping []Other:    ASSESSMENT:  Pt arrived late this date so limited in therapeutic exercises. Pt tolerated treatment well and was in less pain this date. Added more core stabilization and strengthening this visit with good tolerance.  Pt does report fatigue and tightness with pallof press likely due to incorrect muscle firing and fatigue. Pt educated on correct core stabilization and that his back pain is likely due to poor mechanics and fatigue while at work. Pt's pain has centralized from cervical/thoracic and r sided low back to just L sided lumbar spine with occasional symptoms into left gluteal region. 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 Plan to reassess progress NPV      Treatment/Activity Tolerance:  [x] Patient able to complete tx  [] Patient limited by fatique  [] Patient limited by pain  [] Patient limited by other medical complications  [] Other:     Prognosis: [] Good [x] Fair  [] Poor    Patient Requires Follow-up: [x] Yes  [] No    PLAN: See eval. PT 2x / week for 6-8 weeks. [x] Continue per plan of care [] Alter current plan (see comments)  [] Plan of care initiated [] Hold pending MD visit [] Discharge    Electronically signed by: Harley Farnsworth, SPT  Therapist was present, directed the patient's care, made skilled judgement, and was responsible for assessment and treatment of the patient. Note: If patient does not return for scheduled/ recommended follow up visits, this note will serve as a discharge from care along with most recent update on progress.

## 2021-03-01 ENCOUNTER — HOSPITAL ENCOUNTER (OUTPATIENT)
Dept: PHYSICAL THERAPY | Age: 31
Setting detail: THERAPIES SERIES
Discharge: HOME | End: 2021-03-01
Payer: COMMERCIAL

## 2021-03-01 NOTE — PROGRESS NOTES
[address] [facility]    Physical Therapy  Cancellation/No-show Note  Patient Name:  Juventino Askew  :  1990   Date:  3/1/2021    Cancelled visits to date: 3   2/3, , 3/1  No-shows to date: ,     For today's appointment patient:  [x]  Cancelled  []  Rescheduled appointment  []  No-show     Reason given by patient:  []  Patient ill  []  Conflicting appointment  []  No transportation    []  Conflict with work  []  No reason given  [x]  Other:  Cancelled due to school conflict   Comments:      Phone call information:   []  Phone call made today to patient at ___ time at number provided:      []  Patient answered, conversation as follows:    []  Patient did not answer, message left as follows:   []  Phone call not made today  [x]  Phone call not needed - pt contacted us to cancel and provided reason for cancellation.      Electronically signed by:  Leon Medina, LACEY

## 2021-03-03 ENCOUNTER — HOSPITAL ENCOUNTER (OUTPATIENT)
Dept: PHYSICAL THERAPY | Age: 31
Setting detail: THERAPIES SERIES
Discharge: HOME | End: 2021-03-03
Payer: COMMERCIAL

## 2021-03-03 PROCEDURE — 97110 THERAPEUTIC EXERCISES: CPT | Performed by: PHYSICAL THERAPIST

## 2021-03-03 PROCEDURE — 97140 MANUAL THERAPY 1/> REGIONS: CPT | Performed by: PHYSICAL THERAPIST

## 2021-03-03 NOTE — FLOWSHEET NOTE
1/26     Normal Abnormal Comments   inguinal area (L1)    L side-diminished      anterior mid-thigh (L2)  X       distal ant thigh/med knee (L3)   X       medial lower leg and foot (L4)   X       lateral lower leg and foot (L5)   X       posterior calf (S1)  X        medial calcaneus (S2)  X               Reflexes Normal Abnormal Comments   S1-2 Seated achilles X        S1-2 Prone knee bend         L3-4 Patellar tendon   L side- hyper      Clonus neg neg     Babinski           Strength / Myotomes LEFT RIGHT Comments   Multifidus         Transverse Ab         Hip Flexors (L1-2) 4+ w/ pain  5     Hip Abductors 4- w/ pain  4      Hip Adductors      Hip Extensors         Hip Internal Rotators         Hip External Rotators         Quads (L2-4)  5 5     Hamstrings  5   5     Ankle Plantarflexion (S1-2)  5 5     Ankle Dorsiflexion (L4-5) 5  5     Ankle Inversion         Ankle Eversion (S1-2)         Great Toe Extension (L5)           Normal Abnormal Comments   C5-6 Biceps  X       C5-6 Brachioradialis  X       C7-8 Triceps  X       Almarazs  X         R UE neural tension tests: Normal  L UE neural tension tests: + Median, + Ulnar, - Radial      RESTRICTIONS/PRECAUTIONS: recent hx of neurological symptoms    Exercises/Interventions: All exercises performed bilaterally  Therapeutic Exercise (07529) Resistance / level Sets / Seconds Reps Notes / Cues   Warm up since discomfort with bikeSupine trunk rotation  2 10    Supine TA activation  5  20 Tactile facilitation   Supine Piriformis stretch  30 2     Added 1/26   Lateral flexion over swiss ball   3x30s  Each    Lumbar extension with swiss ball  30\" 2 Added 2/17                 sidelying clams Green 2 10    Prone HS curls - bilat.   Added 2/22 for increased lumbar ROM   Pallof press  2 10    Deadbug OH band TA activation     Stick Fight             Therapeutic Activities (50266)       Bridge iso hold  W/ BS Added 1/26   Swissball wall squat  Added 2/17   Lateral band walks  2 10    Inclined plank with pertubations  2 30           Neuromuscular Re-ed (35528)       Supine abd. iso w/marches  Added 2/22   Prone alt. LE raises    Attempted 2/22 but unable, even with modifications, due to increased pain on L side. Manual Intervention (20121)       Prone PA  2'   L1-5   STM left QL and left glute, left trap, and left levator  18'     Lumbar Manip       SI Manip       Hip belt mobs       Manual piriformis/glute stretch  3x:30 each  Added 1/26   Manual hip flexor stretch  3x30s each  Sidelying resumed 2/17                                                Pt. Education:  -all pt questions were answered    Home Exercise Prorgam:  Access Code: [email or web address]   URL: [email or web address]   Date: 01/21/2021   Prepared by: Gopi Lao     Exercises   Supine Lower Trunk Rotation - 10 reps - 3 sets - 10s hold - 1x daily - 7x weekly   Supine Transversus Abdominis Bracing - Hands on Stomach - 10 reps - 3 sets - 5 hold - 1x daily - 7x weekly   Supine Piriformis Stretch with Foot on Ground - 10 reps - 3 sets - 30 hold - 1x daily - 7x weekly       Therapeutic Exercise and NMR EXR  [x] (54400) Provided verbal/tactile cueing for activities related to strengthening, flexibility, endurance, ROM  for improvements in proximal hip and core control with self care, mobility, lifting and ambulation.  [] (39487) Provided verbal/tactile cueing for activities related to improving balance, coordination, kinesthetic sense, posture, motor skill, proprioception  to assist with core control in self care, mobility, lifting, and ambulation.   [] (49539) Therapist is in constant attendance of 2 or more patients providing skilled therapy interventions, but not providing any significant amount of measurable one-on-one time to either patient, for improvements in LE, proximal hip, and core control in self care, mobility, lifting, ambulation and eccentric single leg control. Therapeutic Activities:    [] (46687 or 20360) Provided verbal/tactile cueing for activities related to improving balance, coordination, kinesthetic sense, posture, motor skill, proprioception and motor activation to allow for proper function  with self care and ADLs  [] (21831) Provided training and instruction to the patient for proper core and proximal hip recruitment and positioning with ambulation re-education     Home Exercise Program:    [x] (39754) Reviewed/Progressed HEP activities related to strengthening, flexibility, endurance, ROM of core, proximal hip and LE for functional self-care, mobility, lifting and ambulation   [] (05159) Reviewed/Progressed HEP activities related to improving balance, coordination, kinesthetic sense, posture, motor skill, proprioception of core, proximal hip and LE for self care, mobility, lifting, and ambulation      Manual Treatments:  PROM / STM / Oscillations-Mobs:  G-I, II, III, IV (PA's, Inf., Post.)  [x] (05115) Provided manual therapy to mobilize proximal hip and LS spine soft tissue/joints for the purpose of modulating pain, promoting relaxation,  increasing ROM, reducing/eliminating soft tissue swelling/inflammation/restriction, improving soft tissue extensibility and allowing for proper ROM for normal function with self care, mobility, lifting and ambulation.      Modalities:       Charges:  Timed Code Treatment Minutes: 45   Total Treatment Minutes: 45       [] EVAL - LOW (18951)   [] EVAL - MOD (28285)  [] EVAL - HIGH (90094)  [] RE-EVAL (05639)  [x] VU(02867) x 1      [] Ionto  [] NMR (78498) x       [] Vaso  [x] Manual (00168) x2       [] Ultrasound  [] TA x        [] Mech Traction (31219)  [] Aquatic Therapy x      [] ES (un) (95766):   [] Home Management Training x  [] ES(attended) (61745)   [] Dry Needling 1-2 muscles (30901):  [] Dry Needling 3+ muscles (724276  [] Group:      [] Other:     GOALS:  Patient stated goal: return to playing baseball and 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 Progressing: []? Met: [x]? Not Met: []? Adjusted     Therapist goals for Patient:   Short Term Goals: To be achieved in: 2 weeks  1. Independent in HEP and progression per patient tolerance, in order to prevent re-injury. []? Progressing: [x]? Met: []? Not Met: []? Adjusted  2. Patient will have a decrease in pain to facilitate improvement in movement, function, and ADLs as indicated by Functional Deficits. [x]? Progressing: []? Met: []? Not Met: []? Adjusted     Long Term Goals: To be achieved in: 6-8 weeks  1. Disability index score of 30% or less for the ELIZABETH to assist with reaching prior level of function. []? Progressing: []? Met: []? Not Met: []? Adjusted  2. Patient will demonstrate increased AROM to WNL, good LS mobility, good hip ROM to allow for proper joint functioning as indicated by patients Functional Deficits. [x]? Progressing: []? Met: []? Not Met: []? Adjusted  3. Patient will demonstrate an increase in Strength to good proximal hip and core activation to allow for proper functional mobility as indicated by patients Functional Deficits. [x]? Progressing: []? Met: []? Not Met: []? Adjusted  4. 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 []? Progressing: []? Met: [x]? Not Met: []? Adjusted  5. Patient will return to playing baseball without pain or restriction. []? Progressing: []? Met: [x]? Not Met: []? Adjusted         Overall Progression Towards Functional goals/ Treatment Progress Update:  [] Patient is progressing as expected towards functional goals listed. [x] Progression is slowed due to complexities/Impairments listed. [x] Progression has been slowed due to co-morbidities.   [] Plan just implemented, too soon to assess goals progression <30days   [] Goals require adjustment due to lack of progress  [] Patient is not progressing as expected and requires additional follow up with physician  [] Other    Persisting Functional Limitations/Impairments:  [x]Sitting [x]Standing   [x]Walking [x]Squatting/bending    [x]Stairs [x]ADL's    []Transfers [x]Reaching  [x]Housework [x]Job related tasks  [x]Driving [x]Sports/Recreation   [x]Sleeping []Other:    ASSESSMENT: Pt tolerated treatment well and was in less pain this date. Pt still demonstrating decreased cervical and thoracic pain however lumbar pain still present with multiple planes of motion. Pt's strength has increased with the exception of hip ABD on left side. Discussed scheduling appointment with MD if pain does not resolve with continued therapy, pt agreeable to this plan. Pt demonstrates increased muscle tone in left lumbar spine and glute musculature that was improved with STM but a feeling of \"pressure\" was still present. 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 Treatment/Activity Tolerance:  [x] Patient able to complete tx  [] Patient limited by fatique  [] Patient limited by pain  [] Patient limited by other medical complications  [] Other:     Prognosis: [] Good [x] Fair  [] Poor    Patient Requires Follow-up: [x] Yes  [] No    PLAN: See eval. PT 2x / week for 6-8 weeks. [x] Continue per plan of care [] Alter current plan (see comments)  [] Plan of care initiated [] Hold pending MD visit [] Discharge    Electronically signed by: Vonnie Cabrera, SPT  Therapist was present, directed the patient's care, made skilled judgement, and was responsible for assessment and treatment of the patient. Note: If patient does not return for scheduled/ recommended follow up visits, this note will serve as a discharge from care along with most recent update on progress.

## 2021-03-08 ENCOUNTER — HOSPITAL ENCOUNTER (OUTPATIENT)
Dept: PHYSICAL THERAPY | Age: 31
Setting detail: THERAPIES SERIES
Discharge: HOME | End: 2021-03-08
Payer: COMMERCIAL

## 2021-03-08 PROCEDURE — 97140 MANUAL THERAPY 1/> REGIONS: CPT

## 2021-03-08 PROCEDURE — 97110 THERAPEUTIC EXERCISES: CPT

## 2021-03-08 NOTE — FLOWSHEET NOTE
Concetta Lira  Phone: [phone]   Fax: [phone]    Physical Therapy Treatment Note/ Progress Report:     Date:  3/8/2021    Patient Name:  Pama Goltz    :  1990  MRN: [id number]  Restrictions/Precautions:    Medical/Treatment Diagnosis Information:  Diagnosis: Herniated cervical disc (M50.20)  Treatment Diagnosis: Lumbar radiculopathy, facet dysfunction, L low back pain, decreased core and proximal hip strength  Insurance/Certification information:  PT Insurance Information: [facility]  Physician Information:  Referring Practitioner: Chencho AARON CNP  Plan of care signed (Y/N): []  Yes [x]  No    Date of Patient follow up with Physician:      Progress Report: [x]  Yes  []  No     Date Range for reporting period:  Beginnin21  Ending: 3/3/21     Progress report due (10 Rx/or 30 days whichever is less): visit #17 or 73    Recertification due (POC duration/ or 90 days whichever is less): visit #16  Or 21    Visit # Insurance Allowable Auth required? Date Range   8 60 []  Yes  [x]  No      Latex Allergy:  [x]NO      []YES  Preferred Language for Healthcare:   []English       [x]other: [place]an - ok to use English if wife not present    Functional Scale:       Date assessed:  Oswestry: raw score = 30; dysfunction = 60%  21    Pain level: 4/10     SUBJECTIVE:  States he is trying to look for a new job, still having pain at work with rotating, lifting/pushing/ pulling. Overall has less pain and is walking better but work continues to be painful.      OBJECTIVE:   3/3    ROM   Comments   Lumbar Flex Mid shin pain      Lumbar Ext WFL       ROM LEFT RIGHT Comments   Lumbar Side Bend  lateral joint line  Pain Below lateral joint no pain     Lumbar Rotation restricted  Slight pain restricted symmetrical   Hip Flexion [facility] WFL     Hip Abd [facility]     Hip ER [facility]     Hip IR [facility] WFL     Hip Extension [facility] [place]     Knee Ext       Knee Flex         Hamstring Flex         Piriformis Restricted Restricted                           1/26   Normal Abnormal Comments   inguinal area (L1)    L side-diminished      anterior mid-thigh (L2)  X       distal ant thigh/med knee (L3)   X       medial lower leg and foot (L4)   X       lateral lower leg and foot (L5)   X       posterior calf (S1)  X        medial calcaneus (S2)  X               Reflexes Normal Abnormal Comments   S1-2 Seated achilles X        S1-2 Prone knee bend         L3-4 Patellar tendon   L side- hyper      Clonus neg neg     Babinski           Strength / Myotomes LEFT RIGHT Comments   Multifidus         Transverse Ab         Hip Flexors (L1-2) 4+ w/ pain  5     Hip Abductors 4- w/ pain  4      Hip Adductors      Hip Extensors         Hip Internal Rotators         Hip External Rotators         Quads (L2-4)  5 5     Hamstrings  5   5     Ankle Plantarflexion (S1-2)  5 5     Ankle Dorsiflexion (L4-5) 5  5     Ankle Inversion         Ankle Eversion (S1-2)         Great Toe Extension (L5)           Normal Abnormal Comments   C5-6 Biceps  X       C5-6 Brachioradialis  X       C7-8 Triceps  X       Almarazs  X         R UE neural tension tests: Normal  L UE neural tension tests: + Median, + Ulnar, - Radial      RESTRICTIONS/PRECAUTIONS: recent hx of neurological symptoms    Exercises/Interventions: All exercises performed bilaterally  Therapeutic Exercise (90544) Resistance / level Sets / Seconds Reps Notes / Cues   Supine trunk rotation  2 10    Supine TA activation  10\"  20 Tactile facilitation   Supine Piriformis stretch  30 2     Added 1/26   Lateral flexion over swiss ball   3x30s  Each    Lumbar extension with swiss ball  30\" 2 Added 2/17   sidelying clams Green 2 10    Prone HS curls - bilat.   Added 2/22 for increased lumbar ROM     2 10    Deadbug OH band TA activation     Stick Fight             Therapeutic Activities (84376)       Bridge iso hold  W/ BS Added 1/26   Swissball wall squat  Added 2/17    2 10     2 30           Neuromuscular Re-ed (92516)       Supine abd. iso w/marches  Added 2/22   Prone alt. LE raises    Attempted 2/22 but unable, even with modifications, due to increased pain on L side. Manual Intervention (97534)       Prone PA  2'   L1-5   STM left QL and left glute, left trap, and left levator  10'     Lumbar Manip       SI Manip       Hip belt mobs       Manual piriformis/glute stretch  3x:30 each  Added 1/26   Manual hip flexor stretch  3x30s each  Sidelying resumed 2/17                                                Pt. Education:  -all pt questions were answered    Home Exercise Prorgam:  Access Code: [email or web address]   URL: [email or web address]   Date: 01/21/2021   Prepared by: Toan Boston     Exercises   Supine Lower Trunk Rotation - 10 reps - 3 sets - 10s hold - 1x daily - 7x weekly   Supine Transversus Abdominis Bracing - Hands on Stomach - 10 reps - 3 sets - 5 hold - 1x daily - 7x weekly   Supine Piriformis Stretch with Foot on Ground - 10 reps - 3 sets - 30 hold - 1x daily - 7x weekly       Therapeutic Exercise and NMR EXR  [x] (06724) Provided verbal/tactile cueing for activities related to strengthening, flexibility, endurance, ROM  for improvements in proximal hip and core control with self care, mobility, lifting and ambulation.  [] (07456) Provided verbal/tactile cueing for activities related to improving balance, coordination, kinesthetic sense, posture, motor skill, proprioception  to assist with core control in self care, mobility, lifting, and ambulation.   [] (81668) Therapist is in constant attendance of 2 or more patients providing skilled therapy interventions, but not providing any significant amount of measurable one-on-one time to either patient, for improvements in LE, proximal hip, and core control in self care, mobility, lifting, ambulation and eccentric single leg control.      Therapeutic Activities:    [] (12267 or ) Provided verbal/tactile cueing for activities related to improving balance, coordination, kinesthetic sense, posture, motor skill, proprioception and motor activation to allow for proper function  with self care and ADLs  [] (17373) Provided training and instruction to the patient for proper core and proximal hip recruitment and positioning with ambulation re-education     Home Exercise Program:    [x] (71886) Reviewed/Progressed HEP activities related to strengthening, flexibility, endurance, ROM of core, proximal hip and LE for functional self-care, mobility, lifting and ambulation   [] (67272) Reviewed/Progressed HEP activities related to improving balance, coordination, kinesthetic sense, posture, motor skill, proprioception of core, proximal hip and LE for self care, mobility, lifting, and ambulation      Manual Treatments:  PROM / STM / Oscillations-Mobs:  G-I, II, III, IV (PA's, Inf., Post.)  [x] (08601) Provided manual therapy to mobilize proximal hip and LS spine soft tissue/joints for the purpose of modulating pain, promoting relaxation,  increasing ROM, reducing/eliminating soft tissue swelling/inflammation/restriction, improving soft tissue extensibility and allowing for proper ROM for normal function with self care, mobility, lifting and ambulation. Modalities:       Charges:  Timed Code Treatment Minutes: 45   Total Treatment Minutes: 45       [] EVAL - LOW (68053)   [] EVAL - MOD (59238)  [] EVAL - HIGH (68861)  [] RE-EVAL (55858)  [x] EW(70686) x 2      [] Ionto  [] NMR (32597) x       [] Vaso  [x] Manual (62274) x1       [] Ultrasound  [] TA x        [] Mech Traction (75425)  [] Aquatic Therapy x      [] ES (un) (24247):   [] Home Management Training x  [] ES(attended) (50336)   [] Dry Needling 1-2 muscles (09590):  [] Dry Needling 3+ muscles (267096)  [] Group:      [] Other:     GOALS:  Patient stated goal: return to playing baseball and 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 Progressing: []? Met: [x]? Not Met: []? Adjusted     Therapist goals for Patient:   Short Term Goals: To be achieved in: 2 weeks  1. Independent in HEP and progression per patient tolerance, in order to prevent re-injury. []? Progressing: [x]? Met: []? Not Met: []? Adjusted  2. Patient will have a decrease in pain to facilitate improvement in movement, function, and ADLs as indicated by Functional Deficits. [x]? Progressing: []? Met: []? Not Met: []? Adjusted     Long Term Goals: To be achieved in: 6-8 weeks  1. Disability index score of 30% or less for the ELIZABETH to assist with reaching prior level of function. []? Progressing: []? Met: []? Not Met: []? Adjusted  2. Patient will demonstrate increased AROM to WNL, good LS mobility, good hip ROM to allow for proper joint functioning as indicated by patients Functional Deficits. [x]? Progressing: []? Met: []? Not Met: []? Adjusted  3. Patient will demonstrate an increase in Strength to good proximal hip and core activation to allow for proper functional mobility as indicated by patients Functional Deficits. [x]? Progressing: []? Met: []? Not Met: []? Adjusted  4. 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 []? Progressing: []? Met: [x]? Not Met: []? Adjusted  5. Patient will return to playing baseball without pain or restriction. []? Progressing: []? Met: [x]? Not Met: []? Adjusted         Overall Progression Towards Functional goals/ Treatment Progress Update:  [] Patient is progressing as expected towards functional goals listed. [x] Progression is slowed due to complexities/Impairments listed. [x] Progression has been slowed due to co-morbidities.   [] Plan just implemented, too soon to assess goals progression <30days   [] Goals require adjustment due to lack of progress  [] Patient is not progressing as expected and requires additional follow up with physician  [] Other    Persisting Functional Limitations/Impairments:  [x]Sitting [x]Standing   [x]Walking [x]Squatting/bending    [x]Stairs [x]ADL's    []Transfers [x]Reaching  [x]Housework [x]Job related tasks  [x]Driving [x]Sports/Recreation   [x]Sleeping []Other:    ASSESSMENT:  Pt performed exercises this date without report of pain but continues to struggle to maintain proper abdominal bracing due to limitations in abdominal strength and endurance. Manual STM this date reveals some increased QL tightness on R side compared to L side with increased hip flexor tightness noted on R side as well. Pt reports L side PSIS pain with R side manual hip flexor stretch which was alleviated by manual pressure applied to R side PSIS. Added prone quad stretch to HEP this date to improve flexibility, will plan to follow up with potential change in symptoms at next visit. Continue to work on improving abdominal and core strengthening as well as flexibility to decrease low back pain and improve tolerance to work activity. Treatment/Activity Tolerance:  [x] Patient able to complete tx  [] Patient limited by fatique  [] Patient limited by pain  [] Patient limited by other medical complications  [] Other:     Prognosis: [] Good [x] Fair  [] Poor    Patient Requires Follow-up: [x] Yes  [] No    PLAN: See eval. PT 2x / week for 6-8 weeks. [x] Continue per plan of care [] Alter current plan (see comments)  [] Plan of care initiated [] Hold pending MD visit [] Discharge    Electronically signed by: Laura Dailey    Note: If patient does not return for scheduled/ recommended follow up visits, this note will serve as a discharge from care along with most recent update on progress.

## 2021-03-10 ENCOUNTER — HOSPITAL ENCOUNTER (OUTPATIENT)
Dept: PHYSICAL THERAPY | Age: 31
Setting detail: THERAPIES SERIES
Discharge: HOME | End: 2021-03-10
Payer: COMMERCIAL

## 2021-03-10 PROCEDURE — 97110 THERAPEUTIC EXERCISES: CPT

## 2021-03-10 PROCEDURE — 97140 MANUAL THERAPY 1/> REGIONS: CPT

## 2021-03-10 NOTE — FLOWSHEET NOTE
Concetta Draper  Phone: [phone]   Fax: [phone]    Physical Therapy Treatment Note/ Progress Report:     Date:  3/10/2021    Patient Name:  Brett Fabian    :  1990  MRN: [id number]  Restrictions/Precautions:    Medical/Treatment Diagnosis Information:  Diagnosis: Herniated cervical disc (M50.20)  Treatment Diagnosis: Lumbar radiculopathy, facet dysfunction, L low back pain, decreased core and proximal hip strength  Insurance/Certification information:  PT Insurance Information: [facility]  Physician Information:  Referring Practitioner: Norberto AARON, CNP  Plan of care signed (Y/N): []  Yes [x]  No    Date of Patient follow up with Physician:      Progress Report: [x]  Yes  []  No     Date Range for reporting period:  Beginnin21  Ending: 3/3/21     Progress report due (10 Rx/or 30 days whichever is less): visit #17 or 99    Recertification due (POC duration/ or 90 days whichever is less): visit #16  Or 21    Visit # Insurance Allowable Auth required? Date Range   9 60 []  Yes  [x]  No      Latex Allergy:  [x]NO      []YES  Preferred Language for Healthcare:   []English       [x]other: [place] citizen - ok to use English if wife not present    Functional Scale:       Date assessed:  Oswestry: raw score = 30; dysfunction = 60%  21    Pain level: 3-410     SUBJECTIVE:  Reports trying to do prone quad stretch at home but had increased back pain. Washed car yesterday and had severe pain in left side low back/hip to mid hamstring.      OBJECTIVE:   3/10:  -prone knee flexion test (+) legs even to R long  -in standing: illiac crest symmetrical, L PSIS shallow & painful  -forward flexion: increased excursion R PSIS than L in both seated and standing  -prone knee flexion test even bilaterally after bilateral MET isometrics     3/3    ROM   Comments   Lumbar Flex Mid shin pain      Lumbar Ext WFL       ROM LEFT RIGHT Comments   Lumbar Side Bend  lateral joint line  Pain Below lateral joint no pain     Lumbar Rotation restricted  Slight pain restricted symmetrical   Hip Flexion [facility] WFL     Hip Abd [facility]     Hip ER [facility]     Hip IR [facility] WFL     Hip Extension [facility] WFL     Knee Ext         Knee Flex         Hamstring Flex         Piriformis Restricted Restricted                           1/26   Normal Abnormal Comments   inguinal area (L1)    L side-diminished      anterior mid-thigh (L2)  X       distal ant thigh/med knee (L3)   X       medial lower leg and foot (L4)   X       lateral lower leg and foot (L5)   X       posterior calf (S1)  X        medial calcaneus (S2)  X               Reflexes Normal Abnormal Comments   S1-2 Seated achilles X        S1-2 Prone knee bend         L3-4 Patellar tendon   L side- hyper      Clonus neg neg     Babinski           Strength / Myotomes LEFT RIGHT Comments   Multifidus         Transverse Ab         Hip Flexors (L1-2) 4+ w/ pain  5     Hip Abductors 4- w/ pain  4      Hip Adductors      Hip Extensors         Hip Internal Rotators         Hip External Rotators         Quads (L2-4)  5 5     Hamstrings     5   5     Ankle Plantarflexion (S1-2)  5 5     Ankle Dorsiflexion (L4-5)   5  5     Ankle Inversion         Ankle Eversion (S1-2)         Great Toe Extension (L5)           Normal Abnormal Comments   C5-6 Biceps  X       C5-6 Brachioradialis  X       C7-8 Triceps  X       Almarazs  X         R UE neural tension tests: Normal  L UE neural tension tests: + Median, + Ulnar, - Radial      RESTRICTIONS/PRECAUTIONS: recent hx of neurological symptoms    Exercises/Interventions:     All exercises performed bilaterally  Therapeutic Exercise (30354) Resistance / level Sets / Seconds Reps Notes / Cues   Supine trunk rotation  2 10    Child pose stretch  5\" 10 Added 3/10   Cat/camel stretch  5\" 10 Added 3/10   Supine TA activation  10\"  20 Tactile facilitation     30 2     Added 1/26     3x30s  Each     30\" 2 Added 2/17 Green 2 10     Added 2/22 for increased lumbar ROM    2 10        Stick Fight             Therapeutic Activities (87724)        W/ BS Added 1/26     Added 2/17    2 10     2 30           Neuromuscular Re-ed (09565)       Supine abd. iso w/marches  2 10 Added 2/22   Prone alt. LE raises    Attempted 2/22 but unable, even with modifications, due to increased pain on L side. Prone gluteal squeezes    Attempted 3/10 but unable due to increased reproduction of pan and burning symptoms                 Manual Intervention (52827)       Prone PA  2'   L1-5   STM left QL and left glute, left trap, and left levator  10'     Lumbar Manip       SI Manip       Hip belt mobs        3x:30 each  Added 1/26    3x30s each  Sidelying resumed 2/17      MET for Sacrum alt add/abd   5\" 5                                  Pt. Education:  -all pt questions were answered    Home Exercise Prorgam:  Access Code: [email or web address]   URL: [email or web address]   Date: 01/21/2021   Prepared by: Claudette Sayres     Exercises   Supine Lower Trunk Rotation - 10 reps - 3 sets - 10s hold - 1x daily - 7x weekly   Supine Transversus Abdominis Bracing - Hands on Stomach - 10 reps - 3 sets - 5 hold - 1x daily - 7x weekly   Supine Piriformis Stretch with Foot on Ground - 10 reps - 3 sets - 30 hold - 1x daily - 7x weekly       Therapeutic Exercise and NMR EXR  [x] (44224) Provided verbal/tactile cueing for activities related to strengthening, flexibility, endurance, ROM  for improvements in proximal hip and core control with self care, mobility, lifting and ambulation.  [] (73707) Provided verbal/tactile cueing for activities related to improving balance, coordination, kinesthetic sense, posture, motor skill, proprioception  to assist with core control in self care, mobility, lifting, and ambulation.   [] (27478) Therapist is in constant attendance of 2 or more patients providing skilled therapy interventions, but not providing any significant amount of measurable one-on-one time to either patient, for improvements in LE, proximal hip, and core control in self care, mobility, lifting, ambulation and eccentric single leg control. Therapeutic Activities:    [] (14178 or 73824) Provided verbal/tactile cueing for activities related to improving balance, coordination, kinesthetic sense, posture, motor skill, proprioception and motor activation to allow for proper function  with self care and ADLs  [] (41037) Provided training and instruction to the patient for proper core and proximal hip recruitment and positioning with ambulation re-education     Home Exercise Program:    [x] (66383) Reviewed/Progressed HEP activities related to strengthening, flexibility, endurance, ROM of core, proximal hip and LE for functional self-care, mobility, lifting and ambulation   [] (70338) Reviewed/Progressed HEP activities related to improving balance, coordination, kinesthetic sense, posture, motor skill, proprioception of core, proximal hip and LE for self care, mobility, lifting, and ambulation      Manual Treatments:  PROM / STM / Oscillations-Mobs:  G-I, II, III, IV (PA's, Inf., Post.)  [x] (64707) Provided manual therapy to mobilize proximal hip and LS spine soft tissue/joints for the purpose of modulating pain, promoting relaxation,  increasing ROM, reducing/eliminating soft tissue swelling/inflammation/restriction, improving soft tissue extensibility and allowing for proper ROM for normal function with self care, mobility, lifting and ambulation.      Modalities:       Charges:  Timed Code Treatment Minutes: 35   Total Treatment Minutes: 45       [] EVAL - LOW (54202)   [] EVAL - MOD (73155)  [] EVAL - HIGH (25562)  [] RE-EVAL (76903)  [x] HN(66230) x 1      [] Ionto  [] NMR (43111) x       [] Vaso  [x] Manual (97394) x1       [] Ultrasound  [] TA x        [] Mech Traction (70795)  [] Aquatic Therapy x      [] ES (un) (07954):   [] Home Management Training x  [] ES(attended) (12452)   [] Dry Needling 1-2 muscles (19155):  [] Dry Needling 3+ muscles (158302)  [] Group:      [] Other:     GOALS:  Patient stated goal: return to playing baseball and 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 Progressing: []? Met: [x]? Not Met: []? Adjusted     Therapist goals for Patient:   Short Term Goals: To be achieved in: 2 weeks  1. Independent in HEP and progression per patient tolerance, in order to prevent re-injury. []? Progressing: [x]? Met: []? Not Met: []? Adjusted  2. Patient will have a decrease in pain to facilitate improvement in movement, function, and ADLs as indicated by Functional Deficits. [x]? Progressing: []? Met: []? Not Met: []? Adjusted     Long Term Goals: To be achieved in: 6-8 weeks  1. Disability index score of 30% or less for the ELIZABETH to assist with reaching prior level of function. []? Progressing: []? Met: []? Not Met: []? Adjusted  2. Patient will demonstrate increased AROM to WNL, good LS mobility, good hip ROM to allow for proper joint functioning as indicated by patients Functional Deficits. [x]? Progressing: []? Met: []? Not Met: []? Adjusted  3. Patient will demonstrate an increase in Strength to good proximal hip and core activation to allow for proper functional mobility as indicated by patients Functional Deficits. [x]? Progressing: []? Met: []? Not Met: []? Adjusted  4. 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 []? Progressing: []? Met: [x]? Not Met: []? Adjusted  5. Patient will return to playing baseball without pain or restriction. []? Progressing: []? Met: [x]? Not Met: []? Adjusted         Overall Progression Towards Functional goals/ Treatment Progress Update:  [] Patient is progressing as expected towards functional goals listed. [x] Progression is slowed due to complexities/Impairments listed. [x] Progression has been slowed due to co-morbidities.   [] Plan just implemented, too soon to assess goals progression <30days   [] Goals require adjustment due to lack of progress  [] Patient is not progressing as expected and requires additional follow up with physician  [] Other    Persisting Functional Limitations/Impairments:  [x]Sitting [x]Standing   [x]Walking [x]Squatting/bending    [x]Stairs [x]ADL's    []Transfers [x]Reaching  [x]Housework [x]Job related tasks  [x]Driving [x]Sports/Recreation   [x]Sleeping []Other:    ASSESSMENT:  Pt experienced pain with prone gluteal squeezes this date with report of increased pain and burning into R glute and into proximal hamstring/hip. Pt with noted decrease in pain at end of session this date and manual mobilizations compared to beginning of session but with muscle fatigue noted. Pt reports decreased pain and pressure with manual mobilizations to L side PSIS and L 4-5. Educated pt on performing self MET at home 1x/daily and monitoring symptoms for next follow up visit. Will continue to work on improving overall lumbosacral mobility general core stability as well as improve general flexibility in order to tolerate functional work tasks and daily activity without report of back pain. See objective section above for PT assessment of pt this date. Treatment/Activity Tolerance:  [x] Patient able to complete tx  [] Patient limited by fatique  [] Patient limited by pain  [] Patient limited by other medical complications  [] Other:     Prognosis: [] Good [x] Fair  [] Poor    Patient Requires Follow-up: [x] Yes  [] No    PLAN: See eval. PT 2x / week for 6-8 weeks. [x] Continue per plan of care [] Alter current plan (see comments)  [] Plan of care initiated [] Hold pending MD visit [] Discharge    Electronically signed by: Tony Cervantes    Note: If patient does not return for scheduled/ recommended follow up visits, this note will serve as a discharge from care along with most recent update on progress.

## 2021-03-15 ENCOUNTER — HOSPITAL ENCOUNTER (OUTPATIENT)
Dept: PHYSICAL THERAPY | Age: 31
Setting detail: THERAPIES SERIES
Discharge: HOME | End: 2021-03-15
Payer: COMMERCIAL

## 2021-03-15 NOTE — PROGRESS NOTES
[address] [facility]    Physical Therapy  Cancellation/No-show Note  Patient Name:  Madyson Carter  :  1990   Date:  3/15/2021    Cancelled visits to date: 4   2/3, , 3/1, 3/15  No-shows to date: 2 ,     For today's appointment patient:  [x]  Cancelled  []  Rescheduled appointment  []  No-show     Reason given by patient:  []  Patient ill  []  Conflicting appointment  []  No transportation    []  Conflict with work  []  No reason given  [x]  Other:  Was called into work   Comments:      Phone call information:   []  Phone call made today to patient at ___ time at number provided:      []  Patient answered, conversation as follows:    []  Patient did not answer, message left as follows:   []  Phone call not made today  [x]  Phone call not needed - pt contacted us to cancel and provided reason for cancellation.      Electronically signed by:  Tavia Miranda PTA

## 2021-03-17 ENCOUNTER — HOSPITAL ENCOUNTER (OUTPATIENT)
Dept: PHYSICAL THERAPY | Age: 31
Setting detail: THERAPIES SERIES
Discharge: HOME | End: 2021-03-17
Payer: COMMERCIAL

## 2021-03-17 PROCEDURE — 97530 THERAPEUTIC ACTIVITIES: CPT | Performed by: PHYSICAL THERAPIST

## 2021-03-17 PROCEDURE — 97140 MANUAL THERAPY 1/> REGIONS: CPT | Performed by: PHYSICAL THERAPIST

## 2021-03-17 PROCEDURE — 97110 THERAPEUTIC EXERCISES: CPT | Performed by: PHYSICAL THERAPIST

## 2021-03-17 NOTE — FLOWSHEET NOTE
Concetta Lira  Phone: [phone]   Fax: [phone]    Physical Therapy Treatment Note/ Progress Report:     Date:  3/17/2021    Patient Name:  Justin Crespo    :  1990  MRN: [id number]  Restrictions/Precautions:    Medical/Treatment Diagnosis Information:  Diagnosis: Herniated cervical disc (M50.20)  Treatment Diagnosis: Lumbar radiculopathy, facet dysfunction, L low back pain, decreased core and proximal hip strength  Insurance/Certification information:  PT Insurance Information: [facility]  Physician Information:  Referring Practitioner: Pippa AARON CNP  Plan of care signed (Y/N): []  Yes [x]  No    Date of Patient follow up with Physician:      Progress Report: []  Yes  [x]  No     Date Range for reporting period:  Beginnin21  Ending: 3/3/21     Progress report due (10 Rx/or 30 days whichever is less): visit #17 or 53    Recertification due (POC duration/ or 90 days whichever is less): visit #16  Or 21    Visit # Insurance Allowable Auth required? Date Range   10 60 []  Yes  [x]  No      Latex Allergy:  [x]NO      []YES  Preferred Language for Healthcare:   []English       [x]other: [place] - ok to use English if wife not present    Functional Scale:       Date assessed:  Oswestry: raw score = 30; dysfunction = 60%  21    Pain level: 2-3/10     SUBJECTIVE:  Pt reports he is feeling better and has applied for a new job recently so that he does not have to continue to do the same work causing him pain.      OBJECTIVE:   3/10:  -prone knee flexion test (+) legs even to R long  -in standing: illiac crest symmetrical, L PSIS shallow & painful  -forward flexion: increased excursion R PSIS than L in both seated and standing  -prone knee flexion test even bilaterally after bilateral MET isometrics     3/3    ROM   Comments   Lumbar Flex Mid shin pain      Lumbar Ext WFL       ROM LEFT RIGHT Comments   Lumbar Side Bend lateral joint line  Pain Below lateral joint no pain     Lumbar Rotation restricted  Slight pain restricted symmetrical   Hip Flexion [facility] WFL     Hip Abd [facility]     Hip ER [facility]     Hip IR [facility] WFL     Hip Extension [facility] WFL     Knee Ext         Knee Flex         Hamstring Flex         Piriformis Restricted Restricted                           1/26   Normal Abnormal Comments   inguinal area (L1)    L side-diminished      anterior mid-thigh (L2)  X       distal ant thigh/med knee (L3)   X       medial lower leg and foot (L4)   X       lateral lower leg and foot (L5)   X       posterior calf (S1)  X        medial calcaneus (S2)  X               Reflexes Normal Abnormal Comments   S1-2 Seated achilles X        S1-2 Prone knee bend         L3-4 Patellar tendon   L side- hyper      Clonus neg neg     Babinski           Strength / Myotomes LEFT RIGHT Comments   Multifidus         Transverse Ab         Hip Flexors (L1-2) 4+ w/ pain  5     Hip Abductors 4- w/ pain  4      Hip Adductors      Hip Extensors         Hip Internal Rotators         Hip External Rotators         Quads (L2-4)  5 5     Hamstrings     5   5     Ankle Plantarflexion (S1-2)  5 5     Ankle Dorsiflexion (L4-5)   5  5     Ankle Inversion         Ankle Eversion (S1-2)         Great Toe Extension (L5)           Normal Abnormal Comments   C5-6 Biceps  X       C5-6 Brachioradialis  X       C7-8 Triceps  X       Almarazs  X         R UE neural tension tests: Normal  L UE neural tension tests: + Median, + Ulnar, - Radial      RESTRICTIONS/PRECAUTIONS: recent hx of neurological symptoms    Exercises/Interventions:     All exercises performed bilaterally  Therapeutic Exercise (72949) Resistance / level Sets / Seconds Reps Notes / Cues   Supine trunk rotation     Child pose stretch  5\" 10 Added 3/10   Cat/camel stretch  5\" 10 Added 3/10   Supine TA activation  10\"  20 Tactile facilitation     30 2     Added 1/26     3x30s  Each     30\" 2 Added 2/17   Green 2 10 Added 2/22 for increased lumbar ROM    2 10    Deadbug [place] band TA activationpurple 2 10    Stick Fight             Therapeutic Activities (46800)        W/ BS Added 1/26     Added 2/17   Lateral band walks  2 10    Inclined plank with pertubations  2 30    TRX standing QL stretch  2 10 L LE crossed behind R TRX straps in hands sidebend stretch away. Neuromuscular Re-ed (90947)       Supine abd. iso w/marches  Added 2/22   Prone alt. LE raises    Attempted 2/22 but unable, even with modifications, due to increased pain on L side. Prone gluteal squeezes    Attempted 3/10 but unable due to increased reproduction of pan and burning symptoms                 Manual Intervention ([phone])       Prone PA     L1-5   STM left QL and left glute, left TFL,   6'     Lumbar Manip       SI Manip       Hip belt mobs       Manual piriformis/glute stretch 3x30 each  Added 1/26   Manual hip flexor stretch   3x30 each  Sidelying resumed 2/17                                         Pt. Education:  -all pt questions were answered    Home Exercise Prorgam:  Access Code: [email or web address]   URL: [email or web address]   Date: 01/21/2021   Prepared by: Guillermo Footman     Exercises   Supine Lower Trunk Rotation - 10 reps - 3 sets - 10s hold - 1x daily - 7x weekly   Supine Transversus Abdominis Bracing - Hands on Stomach - 10 reps - 3 sets - 5 hold - 1x daily - 7x weekly   Supine Piriformis Stretch with Foot on Ground - 10 reps - 3 sets - 30 hold - 1x daily - 7x weekly       Therapeutic Exercise and NMR EXR  [x] (84410) Provided verbal/tactile cueing for activities related to strengthening, flexibility, endurance, ROM  for improvements in proximal hip and core control with self care, mobility, lifting and ambulation.  [] (84853) Provided verbal/tactile cueing for activities related to improving balance, coordination, kinesthetic sense, posture, motor skill, proprioception  to assist with core control in self care, mobility, lifting, and (69146) x1       [] Ultrasound  [x] TA x1       [] Mech Traction (79419)  [] Aquatic Therapy x      [] ES (un) (53069):   [] Home Management Training x  [] ES(attended) (61202)   [] Dry Needling 1-2 muscles (20622):  [] Dry Needling 3+ muscles (143189)  [] Group:      [] Other:     GOALS:  Patient stated goal: return to playing baseball and 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 Progressing: []? Met: [x]? Not Met: []? Adjusted     Therapist goals for Patient:   Short Term Goals: To be achieved in: 2 weeks  1. Independent in HEP and progression per patient tolerance, in order to prevent re-injury. []? Progressing: [x]? Met: []? Not Met: []? Adjusted  2. Patient will have a decrease in pain to facilitate improvement in movement, function, and ADLs as indicated by Functional Deficits. [x]? Progressing: []? Met: []? Not Met: []? Adjusted     Long Term Goals: To be achieved in: 6-8 weeks  1. Disability index score of 30% or less for the ELIZABETH to assist with reaching prior level of function. []? Progressing: []? Met: []? Not Met: []? Adjusted  2. Patient will demonstrate increased AROM to WNL, good LS mobility, good hip ROM to allow for proper joint functioning as indicated by patients Functional Deficits. [x]? Progressing: []? Met: []? Not Met: []? Adjusted  3. Patient will demonstrate an increase in Strength to good proximal hip and core activation to allow for proper functional mobility as indicated by patients Functional Deficits. [x]? Progressing: []? Met: []? Not Met: []? Adjusted  4. 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 []? Progressing: []? Met: [x]? Not Met: []? Adjusted  5. Patient will return to playing baseball without pain or restriction. []? Progressing: []? Met: [x]? Not Met: []?  Adjusted         Overall Progression Towards Functional goals/ Treatment Progress Update:  [] Patient is progressing as expected towards functional goals listed. [x] Progression is slowed due to complexities/Impairments listed. [x] Progression has been slowed due to co-morbidities. [] Plan just implemented, too soon to assess goals progression <30days   [] Goals require adjustment due to lack of progress  [] Patient is not progressing as expected and requires additional follow up with physician  [] Other    Persisting Functional Limitations/Impairments:  [x]Sitting [x]Standing   [x]Walking [x]Squatting/bending    [x]Stairs [x]ADL's    []Transfers [x]Reaching  [x]Housework [x]Job related tasks  [x]Driving [x]Sports/Recreation   [x]Sleeping []Other:    ASSESSMENT:  Pt comes into therapy with overall pain level decreased and reports he is getting a new job shortly to help with activity modification. Pt demonstrates severe discomfort in left hip and left lumbar region during resisted iso hip flexion and sidebend stretch off table. Pt tolerated TRX QL stretch much better and was able to hold for full 30s instead of taking rest breaks secondary to pain. Pt requires cueing for core activation during incline plank march and modified dead bug. Pt leaves feeling as if he has gained ROM and no complaints of pain but does state that his hip tightens on him. Pt will continue to benefit from skill PT in order to return to working without increased symptoms or restriction. Treatment/Activity Tolerance:  [x] Patient able to complete tx  [] Patient limited by fatique  [] Patient limited by pain  [] Patient limited by other medical complications  [] Other:     Prognosis: [] Good [x] Fair  [] Poor    Patient Requires Follow-up: [x] Yes  [] No    PLAN: See eval. PT 2x / week for 6-8 weeks.    [x] Continue per plan of care [] Alter current plan (see comments)  [] Plan of care initiated [] Hold pending MD visit [] Discharge    Electronically signed by: Luciano Caceres, PT    LYLE Richter    Therapist was present, directed the patient's care, made skilled judgement, and was responsible for assessment and treatment of the patient. Note: If patient does not return for scheduled/ recommended follow up visits, this note will serve as a discharge from care along with most recent update on progress.

## 2021-03-22 ENCOUNTER — HOSPITAL ENCOUNTER (OUTPATIENT)
Dept: PHYSICAL THERAPY | Age: 31
Setting detail: THERAPIES SERIES
Discharge: HOME | End: 2021-03-22
Payer: COMMERCIAL

## 2021-03-22 PROCEDURE — 97530 THERAPEUTIC ACTIVITIES: CPT

## 2021-03-22 PROCEDURE — 97110 THERAPEUTIC EXERCISES: CPT

## 2021-03-22 PROCEDURE — 97140 MANUAL THERAPY 1/> REGIONS: CPT

## 2021-03-22 NOTE — FLOWSHEET NOTE
[place] , [facility]  Phone: [phone]   Fax: [phone]    Physical Therapy Treatment Note/ Progress Report:     Date:  3/22/2021    Patient Name:  Kathleen Hilario    :  1990  MRN: [id number]  Restrictions/Precautions:    Medical/Treatment Diagnosis Information:  Diagnosis: Herniated cervical disc (M50.20)  Treatment Diagnosis: Lumbar radiculopathy, facet dysfunction, L low back pain, decreased core and proximal hip strength  Insurance/Certification information:  PT Insurance Information: [facility]  Physician Information:  Referring Practitioner: Luis Felipe AARON CNP  Plan of care signed (Y/N): []  Yes [x]  No    Date of Patient follow up with Physician:      Progress Report: []  Yes  [x]  No     Date Range for reporting period:  Beginnin21  Ending: 3/3/21     Progress report due (10 Rx/or 30 days whichever is less): visit #17 or 28    Recertification due (POC duration/ or 90 days whichever is less): visit #16  Or 21    Visit # Insurance Allowable Auth required? Date Range   11 60 []  Yes  [x]  No      Latex Allergy:  [x]NO      []YES  Preferred Language for Healthcare:   []English       [x]other: Bermudian - ok to use English if wife not present    Functional Scale:       Date assessed:  Oswestry: raw score = 30; dysfunction = 60%  21    Pain level: 3-4/10     SUBJECTIVE:  Pt states sitting remains most painful at this time. Waiting to hear back regarding new job. Is trying to apply for FMLA for current job to take time off.      OBJECTIVE:   3/10:  -prone knee flexion test (+) legs even to R long  -in standing: illiac crest symmetrical, L PSIS shallow & painful  -forward flexion: increased excursion R PSIS than L in both seated and standing  -prone knee flexion test even bilaterally after bilateral MET isometrics     3/3    ROM   Comments   Lumbar Flex Mid shin pain      Lumbar Ext WFL       ROM LEFT RIGHT Comments   Lumbar Side Bend  lateral joint line  Pain Below lateral joint no pain     Lumbar Rotation restricted  Slight pain restricted symmetrical   Hip Flexion [facility] WFL     Hip Abd [facility] WFL     Hip ER [facility]     Hip IR [facility] WFL     Hip Extension [facility] WFL     Knee Ext         Knee Flex         Hamstring Flex         Piriformis Restricted Restricted                           1/26   Normal Abnormal Comments   inguinal area (L1)    L side-diminished      anterior mid-thigh (L2)  X       distal ant thigh/med knee (L3)   X       medial lower leg and foot (L4)   X       lateral lower leg and foot (L5)   X       posterior calf (S1)  X        medial calcaneus (S2)  X               Reflexes Normal Abnormal Comments   S1-2 Seated achilles X        S1-2 Prone knee bend         L3-4 Patellar tendon   L side- hyper      Clonus neg neg     Babinski           Strength / Myotomes LEFT RIGHT Comments   Multifidus         Transverse Ab         Hip Flexors (L1-2) 4+ w/ pain  5     Hip Abductors 4- w/ pain  4      Hip Adductors      Hip Extensors         Hip Internal Rotators         Hip External Rotators         Quads (L2-4)  5 5     Hamstrings     5   5     Ankle Plantarflexion (S1-2)  5 5     Ankle Dorsiflexion (L4-5)   5  5     Ankle Inversion         Ankle Eversion (S1-2)         Great Toe Extension (L5)           Normal Abnormal Comments   C5-6 Biceps  X       C5-6 Brachioradialis  X       C7-8 Triceps  X       Almarazs  X         R UE neural tension tests: Normal  L UE neural tension tests: + Median, + Ulnar, - Radial      RESTRICTIONS/PRECAUTIONS: recent hx of neurological symptoms    Exercises/Interventions:     All exercises performed bilaterally  Therapeutic Exercise (27450) Resistance / level Sets / Seconds Reps Notes / Cues   Supine trunk rotation     Child pose stretch  5\" 10 Added 3/10   Cat/camel stretch  5\" 10 Added 3/10   Supine TA activation  10\"  20 Tactile facilitation   Supine Piriformis stretch  30 2     Added 1/26     3x30s  Each 30\" 2 Added 2/17   Green 2 10     Added 2/22 for increased lumbar ROM    2 10    Deadbug OH band TA activation  SB 2 10    Stick Fight             Therapeutic Activities (86380)        W/ BS Added 1/26     Added 2/17   Lateral band walks blue 2 10    Inclined plank with pertubations  2 30    TRX standing QL stretch  2 10 L LE crossed behind R TRX straps in hands sidebend stretch away. Neuromuscular Re-ed (20224)       Supine abd. iso w/marches  Added 2/22   Prone alt. LE raises    Attempted 2/22 but unable, even with modifications, due to increased pain on L side. Prone gluteal squeezes    Attempted 3/10 but unable due to increased reproduction of pan and burning symptoms                 Manual Intervention ([phone])       Prone PA     L1-5   STM left QL and left glute, left TFL,   6'     Lumbar Manip       SI Manip       Hip belt mobs       Manual piriformis/glute stretch 3x30 each  Added 1/26   Manual hip flexor stretch   3x30 each  Sidelying resumed 2/17                                         Pt. Education:  -all pt questions were answered    Home Exercise Prorgam:  Access Code: [email or web address]   URL: [email or web address]   Date: 01/21/2021   Prepared by: Kandice Campos     Exercises   Supine Lower Trunk Rotation - 10 reps - 3 sets - 10s hold - 1x daily - 7x weekly   Supine Transversus Abdominis Bracing - Hands on Stomach - 10 reps - 3 sets - 5 hold - 1x daily - 7x weekly   Supine Piriformis Stretch with Foot on Ground - 10 reps - 3 sets - 30 hold - 1x daily - 7x weekly       Therapeutic Exercise and NMR EXR  [x] (09209) Provided verbal/tactile cueing for activities related to strengthening, flexibility, endurance, ROM  for improvements in proximal hip and core control with self care, mobility, lifting and ambulation.  [] (97528) Provided verbal/tactile cueing for activities related to improving balance, coordination, kinesthetic sense, posture, motor skill, proprioception  to assist with core control in self care, mobility, lifting, and ambulation.   [] (44043) Therapist is in constant attendance of 2 or more patients providing skilled therapy interventions, but not providing any significant amount of measurable one-on-one time to either patient, for improvements in LE, proximal hip, and core control in self care, mobility, lifting, ambulation and eccentric single leg control. Therapeutic Activities:    [] (43916 or 06913) Provided verbal/tactile cueing for activities related to improving balance, coordination, kinesthetic sense, posture, motor skill, proprioception and motor activation to allow for proper function  with self care and ADLs  [] (84105) Provided training and instruction to the patient for proper core and proximal hip recruitment and positioning with ambulation re-education     Home Exercise Program:    [x] (30802) Reviewed/Progressed HEP activities related to strengthening, flexibility, endurance, ROM of core, proximal hip and LE for functional self-care, mobility, lifting and ambulation   [] (53939) Reviewed/Progressed HEP activities related to improving balance, coordination, kinesthetic sense, posture, motor skill, proprioception of core, proximal hip and LE for self care, mobility, lifting, and ambulation      Manual Treatments:  PROM / STM / Oscillations-Mobs:  G-I, II, III, IV (PA's, Inf., Post.)  [x] (12057) Provided manual therapy to mobilize proximal hip and LS spine soft tissue/joints for the purpose of modulating pain, promoting relaxation,  increasing ROM, reducing/eliminating soft tissue swelling/inflammation/restriction, improving soft tissue extensibility and allowing for proper ROM for normal function with self care, mobility, lifting and ambulation.      Modalities:       Charges:  Timed Code Treatment Minutes: 45   Total Treatment Minutes: 45       [] EVAL - LOW (98991)   [] EVAL - MOD (24015)  [] EVAL - HIGH (34125)  [] RE-EVAL (44078)  [x] KA(21164) x 1      [] Ionto  [] NMR (15345) x       [] Vaso  [x] Manual (17053) x1       [] Ultrasound  [x] TA x1       [] Mech Traction (13560)  [] Aquatic Therapy x      [] ES (un) (89848):   [] Home Management Training x  [] ES(attended) (82136)   [] Dry Needling 1-2 muscles (38328):  [] Dry Needling 3+ muscles (035656)  [] Group:      [] Other:     GOALS:  Patient stated goal: return to playing baseball and 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 Progressing: []? Met: [x]? Not Met: []? Adjusted     Therapist goals for Patient:   Short Term Goals: To be achieved in: 2 weeks  1. Independent in HEP and progression per patient tolerance, in order to prevent re-injury. []? Progressing: [x]? Met: []? Not Met: []? Adjusted  2. Patient will have a decrease in pain to facilitate improvement in movement, function, and ADLs as indicated by Functional Deficits. [x]? Progressing: []? Met: []? Not Met: []? Adjusted     Long Term Goals: To be achieved in: 6-8 weeks  1. Disability index score of 30% or less for the ELIZABETH to assist with reaching prior level of function. []? Progressing: []? Met: []? Not Met: []? Adjusted  2. Patient will demonstrate increased AROM to WNL, good LS mobility, good hip ROM to allow for proper joint functioning as indicated by patients Functional Deficits. [x]? Progressing: []? Met: []? Not Met: []? Adjusted  3. Patient will demonstrate an increase in Strength to good proximal hip and core activation to allow for proper functional mobility as indicated by patients Functional Deficits. [x]? Progressing: []? Met: []? Not Met: []? Adjusted  4. 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 []? Progressing: []? Met: [x]? Not Met: []? Adjusted  5. Patient will return to playing baseball without pain or restriction. []? Progressing: []? Met: [x]? Not Met: []?  Adjusted         Overall Progression Towards Functional goals/ Treatment Progress Update:  [] Patient is progressing as expected towards functional goals listed. [x] Progression is slowed due to complexities/Impairments listed. [x] Progression has been slowed due to co-morbidities. [] Plan just implemented, too soon to assess goals progression <30days   [] Goals require adjustment due to lack of progress  [] Patient is not progressing as expected and requires additional follow up with physician  [] Other    Persisting Functional Limitations/Impairments:  [x]Sitting [x]Standing   [x]Walking [x]Squatting/bending    [x]Stairs [x]ADL's    []Transfers [x]Reaching  [x]Housework [x]Job related tasks  [x]Driving [x]Sports/Recreation   [x]Sleeping []Other:    ASSESSMENT:  Pt continues to demonstrate tightness along QL and tenderness around superior greater trochanter on L side with manual STM. Pt demonstrates improving core strength however continues to need cueing for proper maintenance of core contraction for lumbar stability. Pt continues to demonstrate lumbar/pelvic instability and need for continued therapy to promote increased core strength and to provide increased stability for decreased pain and improved tolerance to sitting and performing work tasks without pain. Treatment/Activity Tolerance:  [x] Patient able to complete tx  [] Patient limited by fatique  [] Patient limited by pain  [] Patient limited by other medical complications  [] Other:     Prognosis: [] Good [x] Fair  [] Poor    Patient Requires Follow-up: [x] Yes  [] No    PLAN: See eval. PT 2x / week for 6-8 weeks. [x] Continue per plan of care [] Alter current plan (see comments)  [] Plan of care initiated [] Hold pending MD visit [] Discharge    Electronically signed by: Daniela Higginbotham, [id number]      Note: If patient does not return for scheduled/ recommended follow up visits, this note will serve as a discharge from care along with most recent update on progress.

## 2021-03-24 ENCOUNTER — HOSPITAL ENCOUNTER (OUTPATIENT)
Dept: PHYSICAL THERAPY | Age: 31
Setting detail: THERAPIES SERIES
Discharge: HOME | End: 2021-03-24
Payer: COMMERCIAL

## 2021-03-24 PROCEDURE — 97530 THERAPEUTIC ACTIVITIES: CPT | Performed by: PHYSICAL THERAPIST

## 2021-03-24 PROCEDURE — 97110 THERAPEUTIC EXERCISES: CPT | Performed by: PHYSICAL THERAPIST

## 2021-03-24 NOTE — FLOWSHEET NOTE
RIGHT Comments   Lumbar Side Bend  lateral joint line  Pain Below lateral joint no pain     Lumbar Rotation restricted  Slight pain restricted symmetrical   Hip Flexion [facility] WFL     Hip Abd [facility]     Hip ER [facility]     Hip IR [facility] WFL     Hip Extension [facility] WFL     Knee Ext         Knee Flex         Hamstring Flex         Piriformis Restricted Restricted                           1/26   Normal Abnormal Comments   inguinal area (L1)    L side-diminished      anterior mid-thigh (L2)  X       distal ant thigh/med knee (L3)   X       medial lower leg and foot (L4)   X       lateral lower leg and foot (L5)   X       posterior calf (S1)  X        medial calcaneus (S2)  X               Reflexes Normal Abnormal Comments   S1-2 Seated achilles X        S1-2 Prone knee bend         L3-4 Patellar tendon   L side- hyper      Clonus neg neg     Babinski           Strength / Myotomes LEFT RIGHT Comments   Multifidus         Transverse Ab         Hip Flexors (L1-2) 4+ w/ pain  5     Hip Abductors 4- w/ pain  4      Hip Adductors      Hip Extensors         Hip Internal Rotators         Hip External Rotators         Quads (L2-4)  5 5     Hamstrings     5   5     Ankle Plantarflexion (S1-2)  5 5     Ankle Dorsiflexion (L4-5)   5  5     Ankle Inversion         Ankle Eversion (S1-2)         Great Toe Extension (L5)           Normal Abnormal Comments   C5-6 Biceps  X       C5-6 Brachioradialis  X       C7-8 Triceps  X       Almarazs  X         R UE neural tension tests: Normal  L UE neural tension tests: + Median, + Ulnar, - Radial      RESTRICTIONS/PRECAUTIONS: recent hx of neurological symptoms    Exercises/Interventions:     All exercises performed bilaterally  Therapeutic Exercise (03516) Resistance / level Sets / Seconds Reps Notes / Cues   Treadmill 3mph 5'     Active warmup  15yds 5'  Knee hugs, leg swing, quad stretch, lunge with rotation, lateral lunge   Modified pidgeon stretch  30s 2    1/2 kneeling hip flexor stretch  30s 2 Supine trunk rotation     Child pose stretch  5\" 10 Added 3/10   Cat/camel stretch  5\" 10 Added 3/10   Supine TA activation  10\"  20 Tactile facilitation   Supine Piriformis stretch  30 2     Added 1/26     3x30s  Each     30\" 2 Added 2/17   Green 2 10     Added 2/22 for increased lumbar ROM    2 10    Deadbug OH band TA activation  SB 2 10    Stick Fight             Therapeutic Activities (93332)        W/ BS Added 1/26     Added 2/17   Lateral band walks blue 2 10    Inclined plank with pertubations  2 30    TRX standing QL stretch  2 10 L LE crossed behind R TRX straps in hands sidebend stretch away. Side plank knees bent  2 30s    Neuromuscular Re-ed (19150)       Supine abd. iso w/marches  Added 2/22   Prone alt. LE raises    Attempted 2/22 but unable, even with modifications, due to increased pain on L side. Prone gluteal squeezes    Attempted 3/10 but unable due to increased reproduction of pan and burning symptoms                 Manual Intervention ([id number])       Prone PA     L1-5   STM left QL and left glute, left TFL,   6'     Lumbar Manip       SI Manip       Hip belt mobs       Manual piriformis/glute stretch 3x30 each  Added 1/26   Manual hip flexor stretch   3x30 each  Sidelying resumed 2/17                                         Pt. Education:  -all pt questions were answered    Home Exercise Prorgam:  Access Code: [email or web address]   URL: [email or web address]   Date: 01/21/2021   Prepared by: Columba Castle     Exercises   Supine Lower Trunk Rotation - 10 reps - 3 sets - 10s hold - 1x daily - 7x weekly   Supine Transversus Abdominis Bracing - Hands on Stomach - 10 reps - 3 sets - 5 hold - 1x daily - 7x weekly   Supine Piriformis Stretch with Foot on Ground - 10 reps - 3 sets - 30 hold - 1x daily - 7x weekly       Therapeutic Exercise and NMR EXR  [x] (65107) Provided verbal/tactile cueing for activities related to strengthening, flexibility, endurance, ROM  for improvements in proximal hip and core control with self care, mobility, lifting and ambulation.  [] (98035) Provided verbal/tactile cueing for activities related to improving balance, coordination, kinesthetic sense, posture, motor skill, proprioception  to assist with core control in self care, mobility, lifting, and ambulation.   [] (52159) Therapist is in constant attendance of 2 or more patients providing skilled therapy interventions, but not providing any significant amount of measurable one-on-one time to either patient, for improvements in LE, proximal hip, and core control in self care, mobility, lifting, ambulation and eccentric single leg control.      Therapeutic Activities:    [] (73495 or 56419) Provided verbal/tactile cueing for activities related to improving balance, coordination, kinesthetic sense, posture, motor skill, proprioception and motor activation to allow for proper function  with self care and ADLs  [] (09801) Provided training and instruction to the patient for proper core and proximal hip recruitment and positioning with ambulation re-education     Home Exercise Program:    [x] (11795) Reviewed/Progressed HEP activities related to strengthening, flexibility, endurance, ROM of core, proximal hip and LE for functional self-care, mobility, lifting and ambulation   [] (47664) Reviewed/Progressed HEP activities related to improving balance, coordination, kinesthetic sense, posture, motor skill, proprioception of core, proximal hip and LE for self care, mobility, lifting, and ambulation      Manual Treatments:  PROM / STM / Oscillations-Mobs:  G-I, II, III, IV (PA's, Inf., Post.)  [x] (16213) Provided manual therapy to mobilize proximal hip and LS spine soft tissue/joints for the purpose of modulating pain, promoting relaxation,  increasing ROM, reducing/eliminating soft tissue swelling/inflammation/restriction, improving soft tissue extensibility and allowing for proper ROM for normal function with self care, mobility, lifting and ambulation. Modalities:       Charges:  Timed Code Treatment Minutes: 45   Total Treatment Minutes: 45       [] EVAL - LOW (50094)   [] EVAL - MOD (40094)  [] EVAL - HIGH (96037)  [] RE-EVAL (74976)  [x] FJ(91800) x 2      [] Ionto  [] NMR (69063) x       [] Vaso  [] Manual (93255) x       [] Ultrasound  [x] TA x1       [] Mech Traction (82162)  [] Aquatic Therapy x      [] ES (un) (59349):   [] Home Management Training x  [] ES(attended) (66509)   [] Dry Needling 1-2 muscles (10274):  [] Dry Needling 3+ muscles (518580)  [] Group:      [] Other:     GOALS:  Patient stated goal: return to playing baseball and 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 Progressing: []? Met: [x]? Not Met: []? Adjusted     Therapist goals for Patient:   Short Term Goals: To be achieved in: 2 weeks  1. Independent in HEP and progression per patient tolerance, in order to prevent re-injury. []? Progressing: [x]? Met: []? Not Met: []? Adjusted  2. Patient will have a decrease in pain to facilitate improvement in movement, function, and ADLs as indicated by Functional Deficits. [x]? Progressing: []? Met: []? Not Met: []? Adjusted     Long Term Goals: To be achieved in: 6-8 weeks  1. Disability index score of 30% or less for the ELIZABETH to assist with reaching prior level of function. []? Progressing: []? Met: []? Not Met: []? Adjusted  2. Patient will demonstrate increased AROM to WNL, good LS mobility, good hip ROM to allow for proper joint functioning as indicated by patients Functional Deficits. [x]? Progressing: []? Met: []? Not Met: []? Adjusted  3. Patient will demonstrate an increase in Strength to good proximal hip and core activation to allow for proper functional mobility as indicated by patients Functional Deficits. [x]? Progressing: []? Met: []? Not Met: []? Adjusted  4. 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 []?  Progressing: []? Met: [x]? Not Met: []? Adjusted  5. Patient will return to playing baseball without pain or restriction. []? Progressing: []? Met: [x]? Not Met: []? Adjusted         Overall Progression Towards Functional goals/ Treatment Progress Update:  [] Patient is progressing as expected towards functional goals listed. [x] Progression is slowed due to complexities/Impairments listed. [x] Progression has been slowed due to co-morbidities. [] Plan just implemented, too soon to assess goals progression <30days   [] Goals require adjustment due to lack of progress  [] Patient is not progressing as expected and requires additional follow up with physician  [] Other    Persisting Functional Limitations/Impairments:  []Sitting [x]Standing   [x]Walking [x]Squatting/bending    [x]Stairs [x]ADL's    []Transfers [x]Reaching  [x]Housework [x]Job related tasks  []Driving [x]Sports/Recreation   []Sleeping []Other:    ASSESSMENT:  Pt demonstrates decrease in symptoms this date and tolerated active mobility stretches well without pain. Pt did experience pain with glute bridges in L PSIS. Upon trying LA traction and Sacral MET there was no change in symptoms. Pt demonstrates QL tightness that is relieved with TRX stretching. Pt continues to demonstrate core weakness and instability suggest implementing exercises that are static to limit pelvic symptoms. Pt will continue to benefit from skilled PT in order to stabilize core so that patient may return to work tasks without increase in symptoms. Treatment/Activity Tolerance:  [x] Patient able to complete tx  [] Patient limited by fatique  [] Patient limited by pain  [] Patient limited by other medical complications  [] Other:     Prognosis: [] Good [x] Fair  [] Poor    Patient Requires Follow-up: [x] Yes  [] No    PLAN: See eval. PT 2x / week for 6-8 weeks.    [x] Continue per plan of care [] Alter current plan (see comments)  [] Plan of care initiated [] Hold pending MD visit [] Discharge    Electronically signed by: LYLE Garrido    Therapist was present, directed the patient's care, made skilled judgement, and was responsible for assessment and treatment of the patient. Note: If patient does not return for scheduled/ recommended follow up visits, this note will serve as a discharge from care along with most recent update on progress.

## 2021-04-14 ENCOUNTER — HOSPITAL ENCOUNTER (OUTPATIENT)
Dept: PHYSICAL THERAPY | Age: 31
Setting detail: THERAPIES SERIES
Discharge: HOME | End: 2021-04-14
Payer: COMMERCIAL

## 2021-04-14 PROCEDURE — 97530 THERAPEUTIC ACTIVITIES: CPT | Performed by: PHYSICAL THERAPIST

## 2021-04-14 PROCEDURE — 97110 THERAPEUTIC EXERCISES: CPT | Performed by: PHYSICAL THERAPIST

## 2021-04-14 NOTE — PROGRESS NOTES
[facility]  Phone: [phone]   Fax: [phone]    Physical Therapy Treatment Note/ Progress Report:     Date:  2021    Patient Name:  Luiza Wilson    :  1990  MRN: [id number]  Restrictions/Precautions:    Medical/Treatment Diagnosis Information:  Diagnosis: Herniated cervical disc (M50.20)  Treatment Diagnosis: Lumbar radiculopathy, facet dysfunction, L low back pain, decreased core and proximal hip strength  Insurance/Certification information:  PT Insurance Information: [facility]  Physician Information:  Referring Practitioner: Ravinder AARON CNP  Plan of care signed (Y/N): []  Yes [x]  No    Date of Patient follow up with Physician:      Progress Report: [x]  Yes  []  No     Date Range for reporting period:  Beginning: 3/3/21  Endin21    Progress report due (10 Rx/or 30 days whichever is less): visit #24 or 91    Recertification due (POC duration/ or 90 days whichever is less): visit #16  Or 21    Visit # Insurance Allowable Auth required? Date Range   13 60 []  Yes  [x]  No      Latex Allergy:  [x]NO      []YES  Preferred Language for Healthcare:   []English       [x]other: Romanian - ok to use English if wife not present    Functional Scale:       Date assessed:  Oswestry: raw score = 30; dysfunction = 60%  21    Pain level: 6-7/10     SUBJECTIVE:  Pt states he is still at [employer] job and is still awaiting transfer to new job. States he worked on Saturday an had to call off  and Monday due to pain. States he is off the rest of the week from work. Pt reports \"when I sit for too long it feels like this bone (ischial tuberosity on L side) is getting pushed up into my back and that's what causes most of my pain\". Pt is reporting increased pain this date in back.        OBJECTIVE:          ROM   Comments   Lumbar Flex Mid shin L lower lumbar pain    Lumbar Ext WFL       ROM LEFT RIGHT Comments   Lumbar Progressing: []? Met: []? Not Met: []? Adjusted  4. 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 []? Progressing: []? Met: [x]? Not Met: []? Adjusted  5. Patient will return to playing baseball without pain or restriction. []? Progressing: []? Met: [x]? Not Met: []? Adjusted         Overall Progression Towards Functional goals/ Treatment Progress Update:  [] Patient is progressing as expected towards functional goals listed. [x] Progression is slowed due to complexities/Impairments listed. [x] Progression has been slowed due to co-morbidities. [] Plan just implemented, too soon to assess goals progression <30days   [] Goals require adjustment due to lack of progress  [] Patient is not progressing as expected and requires additional follow up with physician  [] Other    Persisting Functional Limitations/Impairments:  []Sitting [x]Standing   [x]Walking [x]Squatting/bending    [x]Stairs [x]ADL's    []Transfers [x]Reaching  [x]Housework [x]Job related tasks  []Driving [x]Sports/Recreation   []Sleeping []Other:    ASSESSMENT:  Pt. Tolerated therapy today with minimal complaints. Pt. Continues to demonstrates QL tightness and tenderness. Improving mobility. Pt. José Miguel Shy by good mornings and required cueing to maintain correct technique. Plan to continue to work toward strengthening and mobility. Focus NPV on lifting technique for work. Will work toward transition to home strengthening program over next couple of visits. Treatment/Activity Tolerance:  [x] Patient able to complete tx  [] Patient limited by fatique  [] Patient limited by pain  [] Patient limited by other medical complications  [] Other:     Prognosis: [] Good [x] Fair  [] Poor    Patient Requires Follow-up: [x] Yes  [] No    PLAN: See eval. PT 2x / week for 6-8 weeks.    [x] Continue per plan of care [] Alter current plan (see comments)  [] Plan of care initiated [] Hold pending MD visit [] Discharge    Electronically signed by: LACEY Welch, PT      Note: If patient does not return for scheduled/ recommended follow up visits, this note will serve as a discharge from care along with most recent update on progress.

## 2021-04-19 ENCOUNTER — HOSPITAL ENCOUNTER (OUTPATIENT)
Dept: PHYSICAL THERAPY | Age: 31
Setting detail: THERAPIES SERIES
Discharge: HOME | End: 2021-04-19
Payer: COMMERCIAL

## 2021-04-19 PROCEDURE — 97530 THERAPEUTIC ACTIVITIES: CPT

## 2021-04-19 PROCEDURE — 97110 THERAPEUTIC EXERCISES: CPT

## 2021-04-19 NOTE — FLOWSHEET NOTE
[facility]  Phone: [phone]   Fax: [phone]    Physical Therapy Treatment Note/ Progress Report:     Date:  2021    Patient Name:  Gabriel Morse    :  1990  MRN: [id number]  Restrictions/Precautions:    Medical/Treatment Diagnosis Information:  Diagnosis: Herniated cervical disc (M50.20)  Treatment Diagnosis: Lumbar radiculopathy, facet dysfunction, L low back pain, decreased core and proximal hip strength  Insurance/Certification information:  PT Insurance Information: [facility]  Physician Information:  Referring Practitioner: Sara AARON CNP  Plan of care signed (Y/N): []  Yes [x]  No    Date of Patient follow up with Physician:      Progress Report: []  Yes  [x]  No     Date Range for reporting period:  Beginning: 3/3/21  Endin21    Progress report due (10 Rx/or 30 days whichever is less): visit #24 or     Recertification due (POC duration/ or 90 days whichever is less): visit #16  Or 21    Visit # Insurance Allowable Auth required? Date Range   14 60 []  Yes  [x]  No      Latex Allergy:  [x]NO      []YES  Preferred Language for Healthcare:   []English       [x]other: Canadian - ok to use English if wife not present    Functional Scale:       Date assessed:  Oswestry: raw score = 30; dysfunction = 60%  21    Pain level: 1-2/10    SUBJECTIVE:  Pt reports decrease in pain at this visit, but states he has not worked the past week. Feels a vibration in L side of back.       OBJECTIVE:          ROM   Comments   Lumbar Flex Mid shin L lower lumbar pain    Lumbar Ext WFL       ROM LEFT RIGHT Comments   Lumbar Side Bend symmetrical symmetrical     Lumbar Rotation restricted   restricted        Strength / Myotomes LEFT RIGHT Comments   Hip Flexors (L1-2) 4+ 4+     Hip Abductors 4+ 4+     Quads (L2-4)  5 5     Hamstrings     5   5       RESTRICTIONS/PRECAUTIONS: recent hx of neurological symptoms    Exercises/Interventions: All exercises performed bilaterally  Therapeutic Exercise (65364) Resistance / level Sets / Seconds Reps Notes / Cues        Knee hugs, leg swing, quad stretch, lunge with rotation, lateral lunge   bike  5'     1/2 kneeling hip flexor stretch  30s 2                Child pose stretch  5\" 10 Added 3/10   Cat/camel stretch  5\" 10 Added 3/10   Supine TA activation  10\"  20 Tactile facilitation   Supine Piriformis stretch  30 2     Added 1/26      Added 2/22 for increased lumbar ROM         Prone EOB leg extension  2 10 Performed EOB due to pain when performed supine                        Therapeutic Activities (86048)        W/ BS Added 1/26     Added 2/17   Lateral band walks blue 2 10       L LE crossed behind R TRX straps in hands sidebend stretch away. Good morning  2 10 Pole for feedback and appropriate hip hinge technique          Lifting technique training       Floor to waist/Pivot 20#  x15 each Cueing for proper posture/ mechanics. Limit trunk twisting   Push               Neuromuscular Re-ed (05641)                                          Manual Intervention (60436)       Prone PA     L1-5   STM left QL and left glute  3'      Added 1/26    Sidelying resumed 2/17                                         Pt. Education:  -all pt questions were answered    Home Exercise Prorgam:  Access Code: [email or web address]   URL: [email or web address]   Date: 01/21/2021   Prepared by: Serge Perez     Exercises   Supine Lower Trunk Rotation - 10 reps - 3 sets - 10s hold - 1x daily - 7x weekly   Supine Transversus Abdominis Bracing - Hands on Stomach - 10 reps - 3 sets - 5 hold - 1x daily - 7x weekly   Supine Piriformis Stretch with Foot on Ground - 10 reps - 3 sets - 30 hold - 1x daily - 7x weekly       Therapeutic Exercise and NMR EXR  [x] (40944) Provided verbal/tactile cueing for activities related to strengthening, flexibility, endurance, ROM  for improvements in proximal hip lifting and ambulation. Modalities:       Charges:  Timed Code Treatment Minutes: 45   Total Treatment Minutes: 45       [] EVAL - LOW (07525)   [] EVAL - MOD (03379)  [] EVAL - HIGH (49794)  [] RE-EVAL (37471)  [x] ZE(00071) x 2      [] Ionto  [] NMR (57452) x       [] Vaso  [] Manual (41940) x       [] Ultrasound  [x] TA x1       [] Mech Traction (04452)  [] Aquatic Therapy x      [] ES (un) (43251):   [] Home Management Training x  [] ES(attended) (33260)   [] Dry Needling 1-2 muscles (75732):  [] Dry Needling 3+ muscles (686618)  [] Group:      [] Other:     GOALS:  Patient stated goal: return to playing baseball and 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 Progressing: []? Met: [x]? Not Met: []? Adjusted     Therapist goals for Patient:   Short Term Goals: To be achieved in: 2 weeks  1. Independent in HEP and progression per patient tolerance, in order to prevent re-injury. []? Progressing: [x]? Met: []? Not Met: []? Adjusted  2. Patient will have a decrease in pain to facilitate improvement in movement, function, and ADLs as indicated by Functional Deficits. [x]? Progressing: []? Met: []? Not Met: []? Adjusted     Long Term Goals: To be achieved in: 6-8 weeks  1. Disability index score of 30% or less for the ELIZABETH to assist with reaching prior level of function. []? Progressing: []? Met: []? Not Met: []? Adjusted  2. Patient will demonstrate increased AROM to WNL, good LS mobility, good hip ROM to allow for proper joint functioning as indicated by patients Functional Deficits. [x]? Progressing: []? Met: []? Not Met: []? Adjusted  3. Patient will demonstrate an increase in Strength to good proximal hip and core activation to allow for proper functional mobility as indicated by patients Functional Deficits. [x]? Progressing: []? Met: []? Not Met: []? Adjusted  4. 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 []? Progressing: []? Met: [x]? Not Met: []? Adjusted  5. Patient will return to playing baseball without pain or restriction. []? Progressing: []? Met: [x]? Not Met: []? Adjusted         Overall Progression Towards Functional goals/ Treatment Progress Update:  [] Patient is progressing as expected towards functional goals listed. [x] Progression is slowed due to complexities/Impairments listed. [x] Progression has been slowed due to co-morbidities. [] Plan just implemented, too soon to assess goals progression <30days   [] Goals require adjustment due to lack of progress  [] Patient is not progressing as expected and requires additional follow up with physician  [] Other    Persisting Functional Limitations/Impairments:  []Sitting [x]Standing   [x]Walking [x]Squatting/bending    [x]Stairs [x]ADL's    []Transfers [x]Reaching  [x]Housework [x]Job related tasks  []Driving [x]Sports/Recreation   []Sleeping []Other:  ASSESSMENT:  Pt was able to tolerate exercises with decrease in pain at this visit. Worked on lifting techniques this visit  to decrease symptoms and discomfort and improving posture while at work in order to prevent re injury. Performed stagger squat while lifting in order to decrease pull on back while lifting. Pt required cueing not to rotate trunk while performing pivot lift, but instead turn whole body in order perform with proper body mechanics and to not increase pain and symptoms in low back. Continue to progress toward independent HEP with improved mechanics for work related tasks with decreased risk of injury. Treatment/Activity Tolerance:  [x] Patient able to complete tx  [] Patient limited by fatique  [] Patient limited by pain  [] Patient limited by other medical complications  [] Other:     Prognosis: [] Good [x] Fair  [] Poor    Patient Requires Follow-up: [x] Yes  [] No    PLAN: See eval. PT 2x / week for 6-8 weeks.    [x] Continue per plan of care [] Alter current plan (see comments)  [] Plan of care initiated [] Hold pending MD visit [] Discharge    Electronically signed by: LACEY Gross SPTA          Note: If patient does not return for scheduled/ recommended follow up visits, this note will serve as a discharge from care along with most recent update on progress.

## 2021-04-21 ENCOUNTER — HOSPITAL ENCOUNTER (OUTPATIENT)
Dept: PHYSICAL THERAPY | Age: 31
Setting detail: THERAPIES SERIES
Discharge: HOME | End: 2021-04-21
Payer: COMMERCIAL

## 2021-04-26 ENCOUNTER — HOSPITAL ENCOUNTER (OUTPATIENT)
Dept: PHYSICAL THERAPY | Age: 31
Setting detail: THERAPIES SERIES
Discharge: HOME | End: 2021-04-26
Payer: COMMERCIAL

## 2021-04-26 NOTE — FLOWSHEET NOTE
[facility]    Physical Therapy  Cancellation/No-show Note  Patient Name:  Violette Calvo  :  1990   Date:  2021    Cancelled visits to date: 11   2/3, , 3/1, 3/15,   No-shows to date: 3 2/5, ,     For today's appointment patient:  []  Cancelled  []  Rescheduled appointment  [x]  No-show     Reason given by patient:  []  Patient ill  []  Conflicting appointment  []  No transportation - car battery dead    []  Conflict with work  [x]  No reason given  []  Other:     Comments:      Phone call information:   [x]  Phone call made today to patient at 5:04pm at number provided:  [phone]    []  Patient answered, conversation as follows:    [x]  Patient did not answer, message left as follows: Called pt and discussed being discharged this date. This was pt's last scheduled appt and PT had planned to discharge pt this date. Left message that pt will need new script to return to therapy if needed. []  Phone call not made today  []  Phone call not needed - pt contacted us to cancel and provided reason for cancellation.      Electronically signed by:  Patsy Chapman PTA

## 2021-06-21 ENCOUNTER — OFFICE VISIT (OUTPATIENT)
Dept: ORTHOPEDIC SURGERY | Age: 31
End: 2021-06-21
Payer: COMMERCIAL

## 2021-06-21 VITALS — HEIGHT: 70 IN | WEIGHT: 170 LBS | BODY MASS INDEX: 24.34 KG/M2

## 2021-06-21 DIAGNOSIS — S73.192A TEAR OF LEFT ACETABULAR LABRUM, INITIAL ENCOUNTER: Primary | ICD-10-CM

## 2021-06-21 DIAGNOSIS — M25.552 LEFT HIP PAIN: ICD-10-CM

## 2021-06-21 PROCEDURE — 1036F TOBACCO NON-USER: CPT | Performed by: ORTHOPAEDIC SURGERY

## 2021-06-21 PROCEDURE — 99204 OFFICE O/P NEW MOD 45 MIN: CPT | Performed by: ORTHOPAEDIC SURGERY

## 2021-06-21 PROCEDURE — G8427 DOCREV CUR MEDS BY ELIG CLIN: HCPCS | Performed by: ORTHOPAEDIC SURGERY

## 2021-06-21 PROCEDURE — G8420 CALC BMI NORM PARAMETERS: HCPCS | Performed by: ORTHOPAEDIC SURGERY

## 2021-06-21 RX ORDER — GABAPENTIN 100 MG/1
100 CAPSULE ORAL 2 TIMES DAILY
Qty: 60 CAPSULE | Refills: 0 | Status: SHIPPED | OUTPATIENT
Start: 2021-06-21 | End: 2021-07-22

## 2021-06-21 NOTE — PROGRESS NOTES
Date:  2021    Name:  Luiza Wilson  Address:  [address] [place] [address]    :  1990      Age:   27 y.o.    SSN:  [id number]      Medical Record Number:  [id number]    Reason for Visit:    Chief Complaint    Hip Pain (NP. LEFT HIP PAIN)      DOS:2021     HPI: Luiza Wilson is a 27 y.o. male here today for left hip and buttock pain for the past 2 to 3 years. He was previously seen at [facility] and worked up for radiculopathy with MRI EMG and epidural steroid injection. This was done the month of 2020. He had a complication thereafter were it caused a skin reaction up his entire back that required a visit to the ED. Currently, he has anterolateral hip pain worse with sitting but also buttock pain. Its worse with walking turning and twisting. 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 The pain radiates down the outside of the thigh can sometimes be felt down the calf. Feels a Throbbing discomfort generally. There was a bit of a language barrier but his wife did act as a . They speak Danish. Bernard Villanueva is originally from [place].  He enjoys playing baseball but has been unable to do so due to this discomfort. He denies any history of childhood hip disease. Although he currently does have clicking catching in front of his left hip. Pain Assessment  Location of Pain: Other (Comment) (HIP)  Location Modifiers: Left  Severity of Pain: 7  Quality of Pain: Cracking, Popping, Grinding, Aching, Sharp  Frequency of Pain: Constant  Aggravating Factors: Standing, Walking, Stairs, Other (Comment) (SITTING, CARRYING, LIFTING, DRIVING)  Limiting Behavior: Yes  Relieving Factors: Rest, Nsaids  Result of Injury: No  Work-Related Injury: No  Are there other pain locations you wish to document?: No  ROS: Review of systems reviewed from Patient History Form completed today and available in the patient's chart under the Media tab.        Past Medical History:   Diagnosis Date    Anxiety     Asthma     Chronic back pain         Past Surgical History:   Procedure Laterality Date    MEDICATION INJECTION      Epidural        Family History   Problem Relation Age of Onset    Diabetes Mother     Coronary Art Dis Mother     Diabetes Father        Social History     Socioeconomic History    Marital status:      Spouse name: None    Number of children: None    Years of education: None    Highest education level: None   Occupational History    None   Tobacco Use    Smoking status: Never Smoker    Smokeless tobacco: Never Used   Substance and Sexual Activity    Alcohol use: Not Currently    Drug use: Not Currently    Sexual activity: None   Other Topics Concern    None   Social History Narrative    None     Social Determinants of Health     Financial Resource Strain:     Difficulty of Paying Living Expenses:    Food Insecurity:     Worried About Running Out of Food in the Last Year:     Ran Out of Food in the Last Year:    Transportation Needs:     Lack of Transportation (Medical):  Lack of Transportation (Non-Medical):    Physical Activity:     Days of Exercise per Week:     Minutes of Exercise per Session:    Stress:     Feeling of Stress :    Social Connections:     Frequency of Communication with Friends and Family:     Frequency of Social Gatherings with Friends and Family:     Attends Mormon Services:     Active Member of Clubs or Organizations:     Attends Club or Organization Meetings:     Marital Status:    Intimate Partner Violence:     Fear of Current or Ex-Partner:     Emotionally Abused:     Physically Abused:     Sexually Abused:        Current Outpatient Medications   Medication Sig Dispense Refill    diclofenac (VOLTAREN) 50 MG EC tablet Take 1 tablet by mouth 2 times daily 60 tablet 1    gabapentin (NEURONTIN) 100 MG capsule Take 1 capsule by mouth 2 times daily for 30 days.  Intended supply: 30 days 60 capsule 0    naproxen (NAPROSYN) 250 MG tablet Take 1 tablet by mouth 2 times daily (with meals) 60 tablet 0    budesonide-formoterol (SYMBICORT) 80-4.5 MCG/ACT AERO Inhale 2 puffs into the lungs 2 times daily 1 Inhaler 0    FLUoxetine (PROZAC) 20 MG capsule Take 20 mg by mouth daily      montelukast (SINGULAIR) 10 MG tablet TK 1 T PO QPM      VENTOLIN  (90 Base) MCG/ACT inhaler INHALE 2 PUFFS INTO LUNGS Q 4 TO 6 H PRF BRONCOSPASMS      cetirizine (ZYRTEC) 10 MG tablet TK 1 T PO QD      budesonide (RHINOCORT AQUA) 32 MCG/ACT nasal spray 2 sprays by Each Nostril route 2 times daily 1 Bottle 1     No current facility-administered medications for this visit. No Known Allergies    Vital signs:  Ht 5' 10\" (1.778 m)   Wt 170 lb (77.1 kg)   BMI 24.39 kg/m²        Constitutional: The physical examination finds the patient to be well-developed and well-nourished. The patient is alert and oriented x3 and was cooperative throughout the visit. Neuro: no focal deficits noted. Normal mood, judgement, decision making  Eyes: sclera clear, EOMI  Ears: Normal external ear  Mouth:  No perioral lesions  Pulm: Respirations unlabored and regular  Pulse: Extremities well perfused, warm, capillary refill < 2 seconds  Musculoskeletal:    Hip Examination: Left    Skin/Inspection: no skin lesions, cellulitis, or extreme edema in the lower extremities. Standing/Walking: MILDLY antalgic gait, no pelvic tilt, Trendelenburg sign. No use of assistive devices    Sitting Exam: 5/5 Hip Flexor Strength, 5/5 Abductor Strength, 5/5 Adductor strength, Negative Straight Leg Raise    Supine Exam: Non tender around the ASIS, AIIS  Flexion arc 0 to 100deg, with no pain or difficulty. Special Tests: Positive deep Flexion Test, positive FADIR for anterior hip pain but also for buttock pain, MILD pain with VENKAT, NEG resisted sit-up (Athletic Pubalgia). Side Lying Exam: Non-tender at greater trochanter, abductor musculature, nor TFL origin.  Abductor side leg raise 4/5 strength. Positive OberTest    Prone Exam: Positive hip flexion contracture, internal rotation to 25 deg, external rotation to 45 deg. Non-tender at the ischial tuberosity nor proximal hamstring. Weak deep rotators on resistive testing    Diagnostics:  Radiology:       Pertinent imaging reviewed, images only - no report available. Radiographs were obtained and reviewed in the office; 3 views: AP Pelvis and left hip 45-deg Nuñez View, and left False Profile View    Tonnis stGstrstastdstest:st st1st LCEA: Highly elevated with pincer with LCEA of 45deg  Alpha Angle: 58deg alpha angle on the AP, suggesting predominantly a lateral based CAM.   Cross over-sign is focal  Other: positive Coxa Profunda, Negative Protrusio    Impression: moderate lateral cam VERONICA with severe pincer VERONICA and subspine impingement. xrays today appeared more as an outlet view therefore hard to assess completely for coverage      Assessment: Patient is a 27 y.o. male left hip pain and buttock pain related to possible femoral acetabular impingement but also possibly related to radiating discomfort from lumbar radiculopathy and/or SI joint dysfunction, all on the left side. Impression:  Visit Diagnoses       Codes    Tear of left acetabular labrum, initial encounter    -  Primary S73.192A    Left hip pain     M25.552          Office Procedures:  Orders Placed This Encounter   Medications    diclofenac (VOLTAREN) 50 MG EC tablet     Sig: Take 1 tablet by mouth 2 times daily     Dispense:  60 tablet     Refill:  1    gabapentin (NEURONTIN) 100 MG capsule     Sig: Take 1 capsule by mouth 2 times daily for 30 days.  Intended supply: 30 days     Dispense:  60 capsule     Refill:  0     Orders Placed This Encounter   Procedures    XR HIP LEFT (2-3 VIEWS)     Standing Status:   Future     Number of Occurrences:   1     Standing Expiration Date:   6/21/2022    MRI HIP LEFT WO CONTRAST     Standing Status:   Future     Standing Expiration Date: 6/21/2022     Scheduling Instructions:      [address], [address], [address]            PHONE: [phone]      FAX: [phone]            MRI LEFT HIP W/O CONTRAST       R/O LABRAL TEAR             MRI SCHEDULED FOR: NEEDS SCHEDULED            PLEASE NOTE: IMAGING RESULTS ARE NOT GIVEN OVER THE PHONE. AN IN-OFFICE APPOINTMENT IS REQUIRED UNLESS OTHER ARRANGEMENTS ARE PREVIOUSLY MADE. PLEASE SCHEDULE THIS PRIOR TO LEAVING THE OFFICE TODAY. Plan:  Pertinent imaging was reviewed. The etiology, natural history, and treatment options for the disorder were discussed. The roles of activity medication, antiinflammatories, injections, bracing, physical therapy, and surgical interventions were all described to the patient and questions were answered. We believe patient is a candidate for a left hip MRI to rule out intra-articular pathology of his hip    Meanwhile I would like to give him a course of diclofenac and gabapentin to see if it can calm down some inflammation around either his hip and/or sciatic nerve. We will require new AP pelvis xrays (1 view only) at his next office visit, during which we will also review the MRI and perhaps offer a hip US guided freezing injection. All the patient's questions were answered while in the clinic. The patient is understanding of all instructions and agrees with the plan. Approximately 45 minutes was spent on patient education and coordinating care. Follow up in: Return for MRI RESULTS . Hip Self assessment forms      Sincerely,    Matt Crowe MD [facility]   [address] [place], [address]  Email: [email or web address]  Office: [phone]    06/21/21  11:31 AM    This dictation was performed with a verbal recognition program (DRAGON) and it was checked for errors. It is possible that there are still dictated errors within this office note. If so, please bring any errors to my attention for an addendum. All efforts were made to ensure that this office note is accurate.

## 2021-06-22 ENCOUNTER — TELEPHONE (OUTPATIENT)
Dept: ORTHOPEDIC SURGERY | Age: 31
End: 2021-06-22

## 2021-06-30 ENCOUNTER — OFFICE VISIT (OUTPATIENT)
Dept: ORTHOPEDIC SURGERY | Age: 31
End: 2021-06-30
Payer: COMMERCIAL

## 2021-06-30 ENCOUNTER — TELEPHONE (OUTPATIENT)
Dept: ORTHOPEDIC SURGERY | Age: 31
End: 2021-06-30

## 2021-06-30 VITALS — HEIGHT: 70 IN | WEIGHT: 170 LBS | BODY MASS INDEX: 24.34 KG/M2

## 2021-06-30 DIAGNOSIS — S73.192D TEAR OF LEFT ACETABULAR LABRUM, SUBSEQUENT ENCOUNTER: Primary | ICD-10-CM

## 2021-06-30 PROCEDURE — 1036F TOBACCO NON-USER: CPT | Performed by: ORTHOPAEDIC SURGERY

## 2021-06-30 PROCEDURE — G8420 CALC BMI NORM PARAMETERS: HCPCS | Performed by: ORTHOPAEDIC SURGERY

## 2021-06-30 PROCEDURE — G8427 DOCREV CUR MEDS BY ELIG CLIN: HCPCS | Performed by: ORTHOPAEDIC SURGERY

## 2021-06-30 PROCEDURE — 20610 DRAIN/INJ JOINT/BURSA W/O US: CPT | Performed by: ORTHOPAEDIC SURGERY

## 2021-06-30 PROCEDURE — 99214 OFFICE O/P EST MOD 30 MIN: CPT | Performed by: ORTHOPAEDIC SURGERY

## 2021-06-30 RX ORDER — LIDOCAINE HYDROCHLORIDE 10 MG/ML
8 INJECTION, SOLUTION INFILTRATION; PERINEURAL ONCE
Status: COMPLETED | OUTPATIENT
Start: 2021-06-30 | End: 2021-06-30

## 2021-06-30 RX ORDER — BUPIVACAINE HYDROCHLORIDE 2.5 MG/ML
3 INJECTION, SOLUTION INFILTRATION; PERINEURAL ONCE
Status: COMPLETED | OUTPATIENT
Start: 2021-06-30 | End: 2021-06-30

## 2021-06-30 RX ADMIN — LIDOCAINE HYDROCHLORIDE 8 ML: 10 INJECTION, SOLUTION INFILTRATION; PERINEURAL at 14:40

## 2021-06-30 RX ADMIN — BUPIVACAINE HYDROCHLORIDE 7.5 MG: 2.5 INJECTION, SOLUTION INFILTRATION; PERINEURAL at 14:38

## 2021-06-30 NOTE — PROGRESS NOTES
 Number of children: Not on file    Years of education: Not on file    Highest education level: Not on file   Occupational History    Not on file   Tobacco Use    Smoking status: Never Smoker    Smokeless tobacco: Never Used   Substance and Sexual Activity    Alcohol use: Not Currently    Drug use: Not Currently    Sexual activity: Not on file   Other Topics Concern    Not on file   Social History Narrative    Not on file     Social Determinants of Health     Financial Resource Strain:     Difficulty of Paying Living Expenses:    Food Insecurity:     Worried About Running Out of Food in the Last Year:     [address] in the Last Year:    Transportation Needs:     Lack of Transportation (Medical):  Lack of Transportation (Non-Medical):    Physical Activity:     Days of Exercise per Week:     Minutes of Exercise per Session:    Stress:     Feeling of Stress :    Social Connections:     Frequency of Communication with Friends and Family:     Frequency of Social Gatherings with Friends and Family:     Attends Religion Services:     Active Member of Clubs or Organizations:     Attends Club or Organization Meetings:     Marital Status:    Intimate Partner Violence:     Fear of Current or Ex-Partner:     Emotionally Abused:     Physically Abused:     Sexually Abused:        Current Outpatient Medications   Medication Sig Dispense Refill    diclofenac (VOLTAREN) 50 MG EC tablet Take 1 tablet by mouth 2 times daily 60 tablet 1    gabapentin (NEURONTIN) 100 MG capsule Take 1 capsule by mouth 2 times daily for 30 days.  Intended supply: 30 days 60 capsule 0    naproxen (NAPROSYN) 250 MG tablet Take 1 tablet by mouth 2 times daily (with meals) 60 tablet 0    budesonide-formoterol (SYMBICORT) 80-4.5 MCG/ACT AERO Inhale 2 puffs into the lungs 2 times daily 1 Inhaler 0    FLUoxetine (PROZAC) 20 MG capsule Take 20 mg by mouth daily      montelukast (SINGULAIR) 10 MG tablet TK 1 T PO QPM imaging reviewed, images only - no report available. Radiographs were obtained and reviewed in the office; 3 views: AP Pelvis and left hip 45-deg Nuñez View, and left False Profile View    Tonnis stGstrstastdstest:st st1st LCEA: Highly elevated with pincer with LCEA of 45deg  Alpha Angle: 58deg alpha angle on the AP, suggesting predominantly a lateral based CAM.   Cross over-sign is focal  Other: positive Coxa Profunda, Negative Protrusio    Impression: moderate lateral cam VERONICA with severe pincer VERONICA and subspine impingement. xrays today appeared more as an outlet view therefore hard to assess completely for coverage    MRI Left Hip 6/28/2021:   CONCLUSION:   Signal in the labral tip superiorly for which further investigation may be considered as the    degree of reactive change in the joint including the absence of an effusion or swelling might    warrant contrast-enhanced MRI to determine contribution to the patient's clinical syndrome. See   [facility] image for context. Assessment: Patient is a 27 y.o. male left hip pain and buttock pain related to possible femoral acetabular impingement but also possibly related to radiating discomfort from lumbar radiculopathy and/or SI joint dysfunction, all on the left side. Impression:  Visit Diagnoses       Codes    Tear of left acetabular labrum, subsequent encounter    -  Primary S73.192D          Office Procedures:  Orders Placed This Encounter   Medications    lidocaine 1 % injection 8 mL    bupivacaine (MARCAINE) 0.25 % injection 7.5 mg     Orders Placed This Encounter   Procedures    US GUIDED NEEDLE PLACEMENT     Standing Status:   Future     Number of Occurrences:   1     Standing Expiration Date:   6/30/2022    NE ARTHROCENTESIS ASPIR&/INJ MAJOR JT/BURSA W/O US       Plan:  We believe patient is a candidate for a left hip diagnostic freezing injection of his left hip. The patient was given the option of performing today's injection using ultrasound guidance.  We discussed the pros and cons of using the ultrasound for guidance. The patient chose to proceed, and today's injection was performed using Logic E ultrasound unit with a variable frequency (10 MHz) linear transducer for localization and needle placement. The image was saved for the medical record. Procedure: The indications and risks of steroid injection as well as treatment alternatives were discussed with the patient who consented to the procedure. Under sterile conditions and after informed consent was obtained the patient was given an injection into the LEFT hip joint with ultrasound guidance. Using a 20 gauge needle, 3 cc of 1% lidocaine and 3 ccof 0.5% ropivacaine (Naropin) were placed in the hip after it was prepped with chlorhexidine and needle localization was confirmed on ultrasound. This resulted in good relief of symptoms, and flexion/rotation tests of the involved hip shortly after the injection were  less provocative for the anterior hip pain. But still had some buttock pain at the SI joint. There were no complications. The patient was advised to ice the hip  this evening and to avoid vigorous activities for the next 2 days. They were advised to call us if there was any erythema, enduration, swelling or increasing pain. Marlene Damico may be a candidate for left hip arthroscopy and VERONICA surgery. However, I first want to gauge his response to the intra-articular freezing injection, and as well ask my colleague Dr Jerel Mendoza for a possible cortisone injection of his left SI joint, which may serve both a diagnostic/therapeutic purpose. I would then like to see Marlene Damico back in 1 week to review the findings and discuss his response to both injections and determine whether VERONICA surgery of his left hip is appropriate given the labral tear. All the patient's questions were answered while in the clinic. The patient is understanding of all instructions and agrees with the plan.     Approximately 45 minutes was spent on patient education and coordinating care. Follow up in: No follow-ups on file. Hip Self assessment forms      Sincerely,    Mendy Aldridge MD [facility]   [address], [address]  Email: [email or web address]  Office: [phone]    06/30/21  4:35 PM    This dictation was performed with a verbal recognition program (DRAGON) and it was checked for errors. It is possible that there are still dictated errors within this office note. If so, please bring any errors to my attention for an addendum. All efforts were made to ensure that this office note is accurate.

## 2021-06-30 NOTE — TELEPHONE ENCOUNTER
WORKING ON FMLA WAITING TO HEAR FORM MICHEAL REGARDING RTW DATE    EMAILED COMPLETED FMLA TO PATIENT AT [email or web address] PER PATIENT SIGNED RELEASE

## 2021-07-06 ENCOUNTER — OFFICE VISIT (OUTPATIENT)
Dept: ORTHOPEDIC SURGERY | Age: 31
End: 2021-07-06
Payer: COMMERCIAL

## 2021-07-06 VITALS — HEIGHT: 70 IN | BODY MASS INDEX: 24.33 KG/M2 | WEIGHT: 169.97 LBS

## 2021-07-06 DIAGNOSIS — M25.859 FEMORAL ACETABULAR IMPINGEMENT: ICD-10-CM

## 2021-07-06 DIAGNOSIS — M25.552 LEFT HIP PAIN: ICD-10-CM

## 2021-07-06 DIAGNOSIS — M53.3 PAIN OF LEFT SACROILIAC JOINT: Primary | ICD-10-CM

## 2021-07-06 PROCEDURE — G8427 DOCREV CUR MEDS BY ELIG CLIN: HCPCS | Performed by: INTERNAL MEDICINE

## 2021-07-06 PROCEDURE — 99202 OFFICE O/P NEW SF 15 MIN: CPT | Performed by: INTERNAL MEDICINE

## 2021-07-06 PROCEDURE — G8420 CALC BMI NORM PARAMETERS: HCPCS | Performed by: INTERNAL MEDICINE

## 2021-07-06 PROCEDURE — 20611 DRAIN/INJ JOINT/BURSA W/US: CPT | Performed by: INTERNAL MEDICINE

## 2021-07-06 NOTE — LETTER
[address]  [address] [address]  Phone: [phone]  Fax: [phone]           Marty Frausto MD      July 8, 2021     Patient: Idalia King   MR Number: [id number]   YOB: 1990   Date of Visit: 7/6/2021       Dear Mela Amado: Thank you for referring Idalia King to me for evaluation/treatment. Below are the relevant portions of my assessment and plan of care. Impression: . Encounter Diagnoses   Name Primary?  Pain of left sacroiliac joint Yes    Femoral acetabular impingement     Left hip pain               Plan:     Monitor his response to the SI joint block and hope for durable response  Maximize SI joint function and treatment to include pelvic stabilization program  Consider ultrasound-guided steroid injection for escalating pain levels or refractory pain  Consider advanced imaging of the sacroiliac joint-CT scan as needed  Follow-up with [facility] as scheduled      The nature of the finding, probable diagnosis and likely treatment was thoroughly discussed with the patient. The options, risks, complications, alternative treatment as well as some of the differential diagnosis was discussed. The patient was thoroughly informed and all questions were answered. the patient indicated understanding and satisfaction with the discussion. Orders:        Orders Placed This Encounter   Procedures    US GUIDED NEEDLE PLACEMENT     Standing Status:   Future     Number of Occurrences:   1     Standing Expiration Date:   7/6/2022     Order Specific Question:   Reason for exam:     Answer:   SIJ injection    DC ARTHROCENTESIS ASPIR&/INJ MAJOR JT/BURSA W/O US           Disclaimer: \"This note was dictated with voice recognition software. Though review and correction are routine, we apologize for any errors. \"           If you have questions, please do not hesitate to call me.  I look forward to following Astrid Rice along with you.     Sincerely,    MD Sophia Dubon MD    CC providers:  Jolly Nation MD  [address] [facility]  [place] [id number]  Via In Basket

## 2021-07-07 ENCOUNTER — OFFICE VISIT (OUTPATIENT)
Dept: ORTHOPEDIC SURGERY | Age: 31
End: 2021-07-07
Payer: COMMERCIAL

## 2021-07-07 VITALS — WEIGHT: 169 LBS | RESPIRATION RATE: 12 BRPM | HEIGHT: 70 IN | BODY MASS INDEX: 24.2 KG/M2

## 2021-07-07 DIAGNOSIS — M25.852 FEMOROACETABULAR IMPINGEMENT OF LEFT HIP: ICD-10-CM

## 2021-07-07 DIAGNOSIS — S73.192D TEAR OF LEFT ACETABULAR LABRUM, SUBSEQUENT ENCOUNTER: Primary | ICD-10-CM

## 2021-07-07 PROCEDURE — 1036F TOBACCO NON-USER: CPT | Performed by: ORTHOPAEDIC SURGERY

## 2021-07-07 PROCEDURE — G8420 CALC BMI NORM PARAMETERS: HCPCS | Performed by: ORTHOPAEDIC SURGERY

## 2021-07-07 PROCEDURE — G8427 DOCREV CUR MEDS BY ELIG CLIN: HCPCS | Performed by: ORTHOPAEDIC SURGERY

## 2021-07-07 PROCEDURE — 99214 OFFICE O/P EST MOD 30 MIN: CPT | Performed by: ORTHOPAEDIC SURGERY

## 2021-07-07 RX ORDER — BUPIVACAINE HYDROCHLORIDE 2.5 MG/ML
3 INJECTION, SOLUTION INFILTRATION; PERINEURAL ONCE
Status: COMPLETED | OUTPATIENT
Start: 2021-07-07 | End: 2021-07-07

## 2021-07-07 RX ORDER — LIDOCAINE HYDROCHLORIDE 10 MG/ML
5 INJECTION, SOLUTION INFILTRATION; PERINEURAL ONCE
Status: COMPLETED | OUTPATIENT
Start: 2021-07-07 | End: 2021-07-07

## 2021-07-07 RX ADMIN — LIDOCAINE HYDROCHLORIDE 5 ML: 10 INJECTION, SOLUTION INFILTRATION; PERINEURAL at 16:35

## 2021-07-07 RX ADMIN — BUPIVACAINE HYDROCHLORIDE 7.5 MG: 2.5 INJECTION, SOLUTION INFILTRATION; PERINEURAL at 16:31

## 2021-07-07 NOTE — PROGRESS NOTES
Problem Relation Age of Onset    Diabetes Mother     Coronary Art Dis Mother     Diabetes Father        Social History     Socioeconomic History    Marital status:      Spouse name: None    Number of children: None    Years of education: None    Highest education level: None   Occupational History    None   Tobacco Use    Smoking status: Never Smoker    Smokeless tobacco: Never Used   Substance and Sexual Activity    Alcohol use: Not Currently    Drug use: Not Currently    Sexual activity: None   Other Topics Concern    None   Social History Narrative    None     Social Determinants of Health     Financial Resource Strain:     Difficulty of Paying Living Expenses:    Food Insecurity:     Worried About Running Out of Food in the Last Year:     Ran Out of Food in the Last Year:    Transportation Needs:     Lack of Transportation (Medical):  Lack of Transportation (Non-Medical):    Physical Activity:     Days of Exercise per Week:     Minutes of Exercise per Session:    Stress:     Feeling of Stress :    Social Connections:     Frequency of Communication with Friends and Family:     Frequency of Social Gatherings with Friends and Family:     Attends [employer] Services:     Active Member of Clubs or Organizations:     Attends Club or Organization Meetings:     Marital Status:    Intimate Partner Violence:     Fear of Current or Ex-Partner:     Emotionally Abused:     Physically Abused:     Sexually Abused:        Current Outpatient Medications   Medication Sig Dispense Refill    diclofenac (VOLTAREN) 50 MG EC tablet Take 1 tablet by mouth 2 times daily 60 tablet 1    gabapentin (NEURONTIN) 100 MG capsule Take 1 capsule by mouth 2 times daily for 30 days.  Intended supply: 30 days 60 capsule 0    naproxen (NAPROSYN) 250 MG tablet Take 1 tablet by mouth 2 times daily (with meals) 60 tablet 0    budesonide-formoterol (SYMBICORT) 80-4.5 MCG/ACT AERO Inhale 2 puffs into the lungs 2 times daily 1 Inhaler 0    FLUoxetine (PROZAC) 20 MG capsule Take 20 mg by mouth daily      montelukast (SINGULAIR) 10 MG tablet TK 1 T PO QPM      VENTOLIN  (90 Base) MCG/ACT inhaler INHALE 2 PUFFS INTO LUNGS Q 4 TO 6 H PRF BRONCOSPASMS      cetirizine (ZYRTEC) 10 MG tablet TK 1 T PO QD      budesonide (RHINOCORT AQUA) 32 MCG/ACT nasal spray 2 sprays by Each Nostril route 2 times daily 1 Bottle 1     Current Facility-Administered Medications   Medication Dose Route Frequency Provider Last Rate Last Admin    bupivacaine (MARCAINE) 0.25 % injection 7.5 mg  3 mL Intra-articular Once Kerrie Camera, MD        lidocaine 1 % injection 5 mL  5 mL Subcutaneous Once Kerrie Camera, MD           No Known Allergies    Vital signs:  Resp 12   Ht 5' 10\" (1.778 m)   Wt 169 lb (76.7 kg)   BMI 24.25 kg/m²        Constitutional: The physical examination finds the patient to be well-developed and well-nourished. The patient is alert and oriented x3 and was cooperative throughout the visit. Neuro: no focal deficits noted. Normal mood, judgement, decision making  Eyes: sclera clear, EOMI  Ears: Normal external ear  Mouth:  No perioral lesions  Pulm: Respirations unlabored and regular  Pulse: Extremities well perfused, warm, capillary refill < 2 seconds  Musculoskeletal:    Hip Examination: Left    Skin/Inspection: no skin lesions, cellulitis, or extreme edema in the lower extremities. Standing/Walking: MILDLY antalgic gait, no pelvic tilt, Trendelenburg sign. No use of assistive devices    Sitting Exam: 5/5 Hip Flexor Strength, 5/5 Abductor Strength, 5/5 Adductor strength, Negative Straight Leg Raise    Supine Exam: Non tender around the ASIS, AIIS  Flexion arc 0 to 100deg, with no pain or difficulty.   Special Tests: Positive deep Flexion Test, positive FADIR for anterior hip pain but also for buttock pain with clicking, moderate pain with VENKAT, NEG resisted sit-up (Athletic Pubalgia). Side Lying Exam: Non-tender at greater trochanter, abductor musculature, nor TFL origin. Abductor side leg raise 4/5 strength. Positive OberTest    Prone Exam: Positive hip flexion contracture, internal rotation to 25 deg, external rotation to 45 deg. Non-tender at the ischial tuberosity nor proximal hamstring. Weak deep rotators on resistive testing    Diagnostics:  Radiology:       Pertinent imaging reviewed, images only - no report available. Radiographs were obtained and reviewed in the office; 3 views: AP Pelvis and left hip 45-deg Nuñez View, and left False Profile View    Tonnis stGstrstastdstest:st st1st LCEA: Highly elevated with pincer with LCEA of 45deg  Alpha Angle: 58deg alpha angle on the AP, suggesting predominantly a lateral based CAM.   Cross over-sign is focal  Other: positive Coxa Profunda, Negative Protrusio    Impression: moderate lateral cam VERONICA with severe pincer VERONICA and subspine impingement. xrays today appeared more as an outlet view therefore hard to assess completely for coverage    MRI Left Hip 6/28/2021:   CONCLUSION:   Signal in the labral tip superiorly for which further investigation may be considered as the    degree of reactive change in the joint including the absence of an effusion or swelling might    warrant contrast-enhanced MRI to determine contribution to the patient's clinical syndrome. See   [facility] for context. Assessment: Patient is a 27 y.o. male left hip pain and buttock pain related to femoral acetabular impingement and compensatory left sided SI joint dysfunction    Impression:  Visit Diagnoses       Codes    Tear of left acetabular labrum, subsequent encounter    -  Primary S73.192D    Femoroacetabular impingement of left hip     M25.852          Office Procedures:  No orders of the defined types were placed in this encounter. No orders of the defined types were placed in this encounter. Plan:   This hip pain has persisted despite extensive nonoperative treatment focused on hip conditioning, mobility exercises, and activity modification. Lisa Cabral has radiologic evidence of a labral tear. The patient meets the 4 criteria for arthroscopic surgery of the left hip. This includes groin dominant pain, reproducible on exam, with imaging confirming labral tear and VERONICA, and relief with an intra-articular freezing injection of the hip administered in the office through US-guidance. We recommended a LEFT hip arthroscopy labral repair , acetabuloplasty and osteochondroplasty. Risks, benefits, advantages, disadvantages and potential complications as well as the anticipated postoperative course were discussed. We outlined the 80 - 90% success rate of the operation. The risks of infection, bleeding, nerve injury, perineal numbness, sexual dysfunction, hip stiffness, and the possibility of persistent pain and prolonged recovery. We also discussed the involved rehabilitation timelines, no driving for 2 weeks, No need for a hip brace, the general trajectory of improvement after hip surgery (pain relief, activities of daily living, return to sport), full hip loading and strengthening commencing at 3 months, and up to 6 - 7 months before full return to sport and unrestricted activities. The patient agreed to pursue this treatment option. All questions were addressed to their satisfaction. We will discuss with Dr Shashank Sheest the possibility of a cortisone treatment injection in his left SI joint before the time of his hip arthroscopy, to avoid further aggravation due to traction and the early recovery period. I will defer to his expertise regarding the utility of this injection. I will see Lisa Cabral at his next post-operative visit. All the patient's questions were answered while in the clinic. The patient is understanding of all instructions and agrees with the plan. Approximately 45 minutes was spent on patient education and coordinating care. Follow up in: No follow-ups on file. Hip Self assessment forms      Sincerely,    Jolly Nation MD [address] [facility]   [address], [place], [facility]  Email: [email or web address]  Office: [phone]    07/07/21  2:01 PM    This dictation was performed with a verbal recognition program (DRAGON) and it was checked for errors. It is possible that there are still dictated errors within this office note. If so, please bring any errors to my attention for an addendum. All efforts were made to ensure that this office note is accurate.

## 2021-07-07 NOTE — LETTER
[address]  [address] [facility] [address]  Phone: [phone]  Fax: [phone]    Stanley Salgado MD        July 7, 2021     Patient: Myriam Almonte   YOB: 1990   Date of Visit: 7/7/2021       To Whom It May Concern: It is my medical opinion that Myriam Almonte should remain out of work from 6/21/21 through 10/17/21 for the medical treatment of his right hip. If you have any questions or concerns, please don't hesitate to call.     Sincerely,        Stanley Salgado MD

## 2021-07-08 NOTE — PROGRESS NOTES
Chief Complaint:   Chief Complaint   Patient presents with    Hip Pain     left SIJ, referred by Dr. Radha Mcrae for injection          History of Present Illness:       Patient is a 27 y.o. male presents with the above complaint. The symptoms began 2 yearsago and started without an injury. He is seen in consultation at request of Dr. Kboe Bruce for consideration of ultrasound-guided injection of the left SI joint. He underwent Marcaine block under ultrasound of the left hip with therapeutic benefit however he continues to experience left-sided posterior pelvic pain. The patient describes a throbbing pain that does radiate. The symptoms are constant  and are show no change since the onset. The symptoms of posterior pelvic pain  do not show a neurogenic claudication pattern and is worsened by Walking turning and twisting and improved with none. There is not new onset weakness or progressive weakness of the lower extremities that has developed. The patient denies new onset bowel or bladder dysfunction. There is no history of previous spinal trauma. Pain localizes to the left SI joint region    Pain levels: 7     There is no  lower limb pain. The patient has no history or autoimmune disease, inflammatory arthropathy or crystal arthropathy. Past Medical History:        Past Medical History:   Diagnosis Date    Anxiety     Asthma     Chronic back pain          Past Surgical History:   Procedure Laterality Date    MEDICATION INJECTION      Epidural          Present Medications:         Current Outpatient Medications   Medication Sig Dispense Refill    diclofenac (VOLTAREN) 50 MG EC tablet Take 1 tablet by mouth 2 times daily 60 tablet 1    gabapentin (NEURONTIN) 100 MG capsule Take 1 capsule by mouth 2 times daily for 30 days.  Intended supply: 30 days 60 capsule 0    naproxen (NAPROSYN) 250 MG tablet Take 1 tablet by mouth 2 times daily (with meals) 60 tablet 0    budesonide-formoterol (SYMBICORT) 80-4.5 MCG/ACT AERO Inhale 2 puffs into the lungs 2 times daily 1 Inhaler 0    FLUoxetine (PROZAC) 20 MG capsule Take 20 mg by mouth daily      montelukast (SINGULAIR) 10 MG tablet TK 1 T PO QPM      VENTOLIN  (90 Base) MCG/ACT inhaler INHALE 2 PUFFS INTO LUNGS Q 4 TO 6 H PRF BRONCOSPASMS      cetirizine (ZYRTEC) 10 MG tablet TK 1 T PO QD      budesonide (RHINOCORT AQUA) 32 MCG/ACT nasal spray 2 sprays by Each Nostril route 2 times daily 1 Bottle 1     No current facility-administered medications for this visit. Allergies:      No Known Allergies     Social History:         Social History     Socioeconomic History    Marital status:      Spouse name: Not on file    Number of children: Not on file    Years of education: Not on file    Highest education level: Not on file   Occupational History    Not on file   Tobacco Use    Smoking status: Never Smoker    Smokeless tobacco: Never Used   Substance and Sexual Activity    Alcohol use: Not Currently    Drug use: Not Currently    Sexual activity: Not on file   Other Topics Concern    Not on file   Social History Narrative    Not on file     Social Determinants of Health     Financial Resource Strain:     Difficulty of Paying Living Expenses:    Food Insecurity:     Worried About Running Out of Food in the Last Year:     [address] in the Last Year:    Transportation Needs:     Lack of Transportation (Medical):      Lack of Transportation (Non-Medical):    Physical Activity:     Days of Exercise per Week:     Minutes of Exercise per Session:    Stress:     Feeling of Stress :    Social Connections:     Frequency of Communication with Friends and Family:     Frequency of Social Gatherings with Friends and Family:     Attends [employer]oration[employer] Services:     Active Member of Clubs or Organizations:     Attends Club or Organization Meetings:     Marital Status:    Intimate Partner Violence:     Fear of Current or Ex-Partner:     Emotionally Abused:     Physically Abused:     Sexually Abused:         Review of Symptoms:    Pertinent items are noted in HPI    Review of systems reviewed from Patient History Form dated on 6/21/2021 and   available in the patient's chart under the Media tab. Vital Signs: There were no vitals filed for this visit. General Exam:     Constitutional: Patient is adequately groomed with no evidence of malnutrition  Mental Status: The patient is oriented to time, place and person. The patient's mood and affect are appropriate. Vascular: Examination reveals no swelling or calf tenderness. Peripheral pulses are palpable and 2+. Lymphatics: no lymphadenopathy of the cervical or axillary regions or upper extremity      Physical Exam: Pelvis/ hip      Primary Exam:    Inspection: No deformity atrophy appreciable effusion      Palpation: There is focal tenderness over the left SI joint      Strength: Normal with SLR      Skin: There are no rashes, ulcerations or lesions. Gait: Nonantalgic      Neurovascular - non focal and intact       Additional Comments:        Additional Examinations:                   Office Imaging Results/Procedures PerformedToday:            Office Procedures:     Orders Placed This Encounter   Procedures    US GUIDED NEEDLE PLACEMENT     Standing Status:   Future     Number of Occurrences:   1     Standing Expiration Date:   7/6/2022     Order Specific Question:   Reason for exam:     Answer:   SIJ injection    AR ARTHROCENTESIS ASPIR&/INJ MAJOR JT/BURSA W/O US     Ultrasound-guided   LT  sacroiliac joint injection  Logiq E ultrasound 5 MHz    The patient was positioned prone on examination table with the abdomen supported with a pillow. Diagnostic ultrasound was performed to identify the inferior aspect of the sacroiliac joint. The linear probe was utilized. After identifying the proper anatomic location a sterile prep was performed.   Using longitudinal technique and medial to lateral approach 25-gauge needle was advanced subcutaneously and intramuscularly under direct guidance and approximately 5 cc of 1% lidocaine was injected. Needle was withdrawn a 22-gauge spinal needle was then advanced in the same needle tract and the needle  was advanced into the sacroiliac joint adjusting the position of the needle tip as needed to ensure intra-articular entry. Loss of resistance was appreciated and the injection was completed with 3 cc of 0.25% Marcaine. Images stored. Band-Aid to seal the puncture wound    Technically successful injection    Positive anesthetic response          Other Outside Imaging and Testing Personally Reviewed:    US GUIDED NEEDLE PLACEMENT    Result Date: 7/6/2021  Radiology result is complete; follow up with provider / physician office for radiology results              Assessment   Impression: . Encounter Diagnoses   Name Primary?  Pain of left sacroiliac joint Yes    Femoral acetabular impingement     Left hip pain               Plan:     Monitor his response to the SI joint block and hope for durable response  Maximize SI joint function and treatment to include pelvic stabilization program  Consider ultrasound-guided steroid injection for escalating pain levels or refractory pain  Consider advanced imaging of the sacroiliac joint-CT scan as needed  Follow-up with [facility] as scheduled      The nature of the finding, probable diagnosis and likely treatment was thoroughly discussed with the patient. The options, risks, complications, alternative treatment as well as some of the differential diagnosis was discussed. The patient was thoroughly informed and all questions were answered. the patient indicated understanding and satisfaction with the discussion.       Orders:        Orders Placed This Encounter   Procedures    US GUIDED NEEDLE PLACEMENT     Standing Status:   Future     Number of Occurrences:   1 Standing Expiration Date:   7/6/2022     Order Specific Question:   Reason for exam:     Answer:   SIJ injection    [place] ARTHROCENTESIS ASPIR&/INJ MAJOR JT/BURSA W/O US           Disclaimer: \"This note was dictated with voice recognition software. Though review and correction are routine, we apologize for any errors. \"

## 2021-07-19 DIAGNOSIS — S73.192A TEAR OF LEFT ACETABULAR LABRUM, INITIAL ENCOUNTER: ICD-10-CM

## 2021-07-19 DIAGNOSIS — M25.552 LEFT HIP PAIN: ICD-10-CM

## 2021-07-20 ENCOUNTER — TELEPHONE (OUTPATIENT)
Dept: ORTHOPEDIC SURGERY | Age: 31
End: 2021-07-20

## 2021-07-20 ENCOUNTER — OFFICE VISIT (OUTPATIENT)
Dept: ORTHOPEDIC SURGERY | Age: 31
End: 2021-07-20
Payer: COMMERCIAL

## 2021-07-20 VITALS — BODY MASS INDEX: 24.21 KG/M2 | HEIGHT: 70 IN | WEIGHT: 169.09 LBS

## 2021-07-20 DIAGNOSIS — M25.852 FEMOROACETABULAR IMPINGEMENT OF LEFT HIP: ICD-10-CM

## 2021-07-20 DIAGNOSIS — S33.6XXS SPRAIN OF SACROILIAC JOINT, SEQUELA: ICD-10-CM

## 2021-07-20 DIAGNOSIS — M53.3 PAIN OF LEFT SACROILIAC JOINT: ICD-10-CM

## 2021-07-20 PROCEDURE — 1036F TOBACCO NON-USER: CPT | Performed by: INTERNAL MEDICINE

## 2021-07-20 PROCEDURE — G8420 CALC BMI NORM PARAMETERS: HCPCS | Performed by: INTERNAL MEDICINE

## 2021-07-20 PROCEDURE — G8427 DOCREV CUR MEDS BY ELIG CLIN: HCPCS | Performed by: INTERNAL MEDICINE

## 2021-07-20 PROCEDURE — 20611 DRAIN/INJ JOINT/BURSA W/US: CPT | Performed by: INTERNAL MEDICINE

## 2021-07-20 RX ORDER — LIDOCAINE HYDROCHLORIDE 10 MG/ML
5 INJECTION, SOLUTION INFILTRATION; PERINEURAL ONCE
Status: COMPLETED | OUTPATIENT
Start: 2021-07-20 | End: 2021-07-20

## 2021-07-20 RX ORDER — METHYLPREDNISOLONE ACETATE 40 MG/ML
40 INJECTION, SUSPENSION INTRA-ARTICULAR; INTRALESIONAL; INTRAMUSCULAR; SOFT TISSUE ONCE
Status: COMPLETED | OUTPATIENT
Start: 2021-07-20 | End: 2021-07-20

## 2021-07-20 RX ORDER — BUPIVACAINE HYDROCHLORIDE 2.5 MG/ML
5 INJECTION, SOLUTION INFILTRATION; PERINEURAL ONCE
Status: COMPLETED | OUTPATIENT
Start: 2021-07-20 | End: 2021-07-20

## 2021-07-20 RX ADMIN — METHYLPREDNISOLONE ACETATE 40 MG: 40 INJECTION, SUSPENSION INTRA-ARTICULAR; INTRALESIONAL; INTRAMUSCULAR; SOFT TISSUE at 16:43

## 2021-07-20 RX ADMIN — BUPIVACAINE HYDROCHLORIDE 12.5 MG: 2.5 INJECTION, SOLUTION INFILTRATION; PERINEURAL at 16:42

## 2021-07-20 RX ADMIN — LIDOCAINE HYDROCHLORIDE 5 ML: 10 INJECTION, SOLUTION INFILTRATION; PERINEURAL at 16:43

## 2021-07-20 NOTE — PROGRESS NOTES
Chief Complaint:   Chief Complaint   Patient presents with    Lower Back Pain     left SIJ, pain returned at bedtime day of L SIJ marcaine block, but overall less intense          History of Present Illness:       Patient is a 27 y.o. male returns follow up for the above complaint. The patient was last seen approximately 2 weeksago. The symptoms are worsening since the last visit. The patient has had no further testing for the problem. He experienced definite anesthetic response from the initial Marcaine block and this alleviated his posterior pelvic pain. Unfortunately the pain is resurfaced. He has discussed this with Dr. Marge Blanchard and is scheduled to undergo elective surgery for VERONICA. He is referred to us like us to consider intra-articular steroid injection for more durable response    Patient denies any constitutional symptoms    Pain localized to the left SI joint         Past Medical History:        Past Medical History:   Diagnosis Date    Anxiety     Asthma     Chronic back pain         Present Medications:         Current Outpatient Medications   Medication Sig Dispense Refill    diclofenac (VOLTAREN) 50 MG EC tablet Take 1 tablet by mouth 2 times daily 60 tablet 1    gabapentin (NEURONTIN) 100 MG capsule Take 1 capsule by mouth 2 times daily for 30 days.  Intended supply: 30 days 60 capsule 0    naproxen (NAPROSYN) 250 MG tablet Take 1 tablet by mouth 2 times daily (with meals) 60 tablet 0    budesonide-formoterol (SYMBICORT) 80-4.5 MCG/ACT AERO Inhale 2 puffs into the lungs 2 times daily 1 Inhaler 0    FLUoxetine (PROZAC) 20 MG capsule Take 20 mg by mouth daily      montelukast (SINGULAIR) 10 MG tablet TK 1 T PO QPM      VENTOLIN  (90 Base) MCG/ACT inhaler INHALE 2 PUFFS INTO LUNGS Q 4 TO 6 H PRF BRONCOSPASMS      cetirizine (ZYRTEC) 10 MG tablet TK 1 T PO QD      budesonide (RHINOCORT AQUA) 32 MCG/ACT nasal spray 2 sprays by Each Nostril route 2 times daily 1 Bottle 1     No current facility-administered medications for this visit. Allergies:      No Known Allergies        Review of Systems:    Pertinent items are noted in HPI         Vital Signs: There were no vitals filed for this visit. General Exam:     Constitutional: Patient is adequately groomed with no evidence of malnutrition    Physical Exam: Pelvis      Primary Exam:    Inspection: No deformity attributable effusion      Palpation: Focal over the left SI joint        Skin: There are no rashes, ulcerations or lesions. Gait: Nonantalgic      Neurovascular - non focal and intact       Additional Comments:        Additional Examinations:                Office Imaging Results/Procedures PerformedToday:            Office Procedures:     Orders Placed This Encounter   Procedures    US GUIDED NEEDLE PLACEMENT     Standing Status:   Future     Number of Occurrences:   1     Standing Expiration Date:   7/20/2022     Order Specific Question:   Reason for exam:     Answer:   SIJ CSI    OR ARTHROCENTESIS ASPIR&/INJ MAJOR JT/BURSA W/O US     Ultrasound-guided   LT  sacroiliac joint injection  LogRe.nooble E ultrasound 5 MHz     The patient was positioned prone on examination table with the abdomen supported with a pillow. Diagnostic ultrasound was performed to identify the inferior aspect of the sacroiliac joint. The linear probe was utilized.     After identifying the proper anatomic location a sterile prep was performed. Using longitudinal technique and medial to lateral approach 25-gauge needle was advanced subcutaneously and intramuscularly under direct guidance and approximately 5 cc of 1% lidocaine was injected. Needle was withdrawn a 22-gauge spinal needle was then advanced in the same needle tract and the needle  was advanced into the sacroiliac joint adjusting the position of the needle tip as needed to ensure intra-articular entry.   Loss of resistance was appreciated and the injection was completed with 1 cc Depo-Medrol and 2 cc of 0.25% Marcaine. Images stored.     Band-Aid to seal the puncture wound     Technically successful injection     Positive anesthetic response      Other Outside Imaging and Testing Personally Reviewed:         Site: [facility] #: [id number] #: [id number] Procedure: MR Left Hip w/o Contrast ; Reason for Exam: Dx, left acetabular labrum tear, left hip pain    This document is confidential medical information.  Unauthorized disclosure or use of this information is prohibited by law. If you are not the intended recipient of this document, please advise us by calling immediately [phone].       [facility] [place] [address] [address]           Patient Name: Eliecer Borrego   Case ID: [id number]   Patient : 1990   Referring Physician: Onesimo Poole MD   Exam Date: 2021   Exam Description: MR Left Hip w/o Contrast            HISTORY:  Left acetabular region pain.  Evaluate for labral tear.       TECHNICAL FACTORS:  Long- and short-axis fat- and water-weighted images were performed.       COMPARISON:  None.       FINDINGS:  Three-quarter depth tear of the labrum along its tip is noted superiorly.  Effusion    is scant.       Given the paucity of reactive change in the hip, this would be an exceptional case were MR    arthrography may yield further information regarding the character of the signal in the    superolateral labral periphery.       Alpha angle measures 52 degrees.       Mild intra- and perifoveal swelling.       Abductors, adductors, flexors and extensors are unremarkable.       No signs of entrapment neuropathy.  No marrow myelophthisis.  No soft tissue mass identified.       CONCLUSION:   Signal in the labral tip superiorly for which further investigation may be considered as the    degree of reactive change in the joint including the absence of an effusion or swelling might    warrant contrast-enhanced MRI to determine contribution to the patient's clinical syndrome. See   Aida Cotton image for context.       Thank you for the opportunity to provide your interpretation.               Parth Dyer. Benedicto Kaufman MD       A: SONIA/smiley 06/28/2021 7:49 AM   T: SMILEY 06/28/2021 6:31 AM           Assessment   Impression: . Encounter Diagnoses   Name Primary?  Sprain of sacroiliac joint, sequela     Pain of left sacroiliac joint     Femoroacetabular impingement of left hip               Plan:     Postinjection protocol  Follow-up with Dr. Uma Teresa as scheduled         Orders:        Orders Placed This Encounter   Procedures    US GUIDED NEEDLE PLACEMENT     Standing Status:   Future     Number of Occurrences:   1     Standing Expiration Date:   7/20/2022     Order Specific Question:   Reason for exam:     Answer:   SIJ CSI    [place] ARTHROCENTESIS ASPIR&/INJ MAJOR JT/BURSA W/O US         Radu Mckeon MD.      Disclaimer: \"This note was dictated with voice recognition software. Though review and correction are routine, we apologize for any errors. \"

## 2021-07-20 NOTE — TELEPHONE ENCOUNTER
Requested Prescriptions     Pending Prescriptions Disp Refills    gabapentin (NEURONTIN) 100 MG capsule [Pharmacy Med Name: GABAPENTIN 100MG CAPSULES] 60 capsule 0     Sig: TAKE 1 CAPSULE BY MOUTH TWICE DAILY FOR 30 DAYS     Patient last seen 07/07/21 and medication last filled 06/21/21:      Plan: This hip pain has persisted despite extensive nonoperative treatment focused on hip conditioning, mobility exercises, and activity modification. Desean Arellano has radiologic evidence of a labral tear. The patient meets the 4 criteria for arthroscopic surgery of the left hip. This includes groin dominant pain, reproducible on exam, with imaging confirming labral tear and VERONICA, and relief with an intra-articular freezing injection of the hip administered in the office through US-guidance.     We recommended a LEFT hip arthroscopy labral repair , acetabuloplasty and osteochondroplasty.     Risks, benefits, advantages, disadvantages and potential complications as well as the anticipated postoperative course were discussed. We outlined the 80 - 90% success rate of the operation. The risks of infection, bleeding, nerve injury, perineal numbness, sexual dysfunction, hip stiffness, and the possibility of persistent pain and prolonged recovery. We also discussed the involved rehabilitation timelines, no driving for 2 weeks, No need for a hip brace, the general trajectory of improvement after hip surgery (pain relief, activities of daily living, return to sport), full hip loading and strengthening commencing at 3 months, and up to 6 - 7 months before full return to sport and unrestricted activities. The patient agreed to pursue this treatment option. All questions were addressed to their satisfaction.     We will discuss with Dr Marianna Hutson the possibility of a cortisone treatment injection in his left SI joint before the time of his hip arthroscopy, to avoid further aggravation due to traction and the early recovery period.  I will defer to his expertise regarding the utility of this injection.

## 2021-07-21 ENCOUNTER — TELEPHONE (OUTPATIENT)
Dept: ORTHOPEDIC SURGERY | Age: 31
End: 2021-07-21

## 2021-07-21 NOTE — TELEPHONE ENCOUNTER
EMAILED COMPLETED FMLA TO PATIENT @ [email or web address]  USING PHONE #  PROVIDE ON REQUEST WAS NOT USABLE TO CONTACT PATIENT FOR  [employer]

## 2021-07-22 ENCOUNTER — TELEPHONE (OUTPATIENT)
Dept: ORTHOPEDIC SURGERY | Age: 31
End: 2021-07-22

## 2021-07-22 RX ORDER — GABAPENTIN 100 MG/1
CAPSULE ORAL
Qty: 60 CAPSULE | Refills: 2 | Status: SHIPPED | OUTPATIENT
Start: 2021-07-22 | End: 2022-01-24 | Stop reason: ALTCHOICE

## 2021-07-22 NOTE — TELEPHONE ENCOUNTER
Other Spoke with Mahogany Irby [facility] ph. [phone].

## 2021-08-04 NOTE — TELEPHONE ENCOUNTER
Auth: # [id number]    Date: 8/17/2021 thru 11/15/2021  Type of SX:  Outpatient  Location: [facility]  CPT: 01374, 89521   DX Code: L99.702E, M25.852  SX area: Lt hip  Insurance: [facility]

## 2021-08-16 ENCOUNTER — HOSPITAL ENCOUNTER (OUTPATIENT)
Dept: PHYSICAL THERAPY | Age: 31
Setting detail: THERAPIES SERIES
Discharge: HOME | End: 2021-08-16
Payer: COMMERCIAL

## 2021-08-16 ENCOUNTER — OFFICE VISIT (OUTPATIENT)
Dept: ORTHOPEDIC SURGERY | Age: 31
End: 2021-08-16
Payer: COMMERCIAL

## 2021-08-16 VITALS — RESPIRATION RATE: 12 BRPM | HEIGHT: 70 IN | WEIGHT: 169 LBS | BODY MASS INDEX: 24.2 KG/M2

## 2021-08-16 DIAGNOSIS — M25.852 FEMOROACETABULAR IMPINGEMENT OF LEFT HIP: Primary | ICD-10-CM

## 2021-08-16 DIAGNOSIS — S73.192D TEAR OF LEFT ACETABULAR LABRUM, SUBSEQUENT ENCOUNTER: ICD-10-CM

## 2021-08-16 PROCEDURE — L1686 HO POST-OP HIP ABDUCTION: HCPCS | Performed by: ORTHOPAEDIC SURGERY

## 2021-08-16 PROCEDURE — 97530 THERAPEUTIC ACTIVITIES: CPT

## 2021-08-16 PROCEDURE — 97161 PT EVAL LOW COMPLEX 20 MIN: CPT

## 2021-08-16 PROCEDURE — 99214 OFFICE O/P EST MOD 30 MIN: CPT | Performed by: ORTHOPAEDIC SURGERY

## 2021-08-16 PROCEDURE — G8420 CALC BMI NORM PARAMETERS: HCPCS | Performed by: ORTHOPAEDIC SURGERY

## 2021-08-16 PROCEDURE — G8427 DOCREV CUR MEDS BY ELIG CLIN: HCPCS | Performed by: ORTHOPAEDIC SURGERY

## 2021-08-16 PROCEDURE — 1036F TOBACCO NON-USER: CPT | Performed by: ORTHOPAEDIC SURGERY

## 2021-08-16 PROCEDURE — MISCCOLD COLD THERAPY UNIT AND PAD: Performed by: ORTHOPAEDIC SURGERY

## 2021-08-16 RX ORDER — OXYCODONE HYDROCHLORIDE AND ACETAMINOPHEN 5; 325 MG/1; MG/1
1 TABLET ORAL EVERY 6 HOURS PRN
Qty: 20 TABLET | Refills: 0 | Status: SHIPPED | OUTPATIENT
Start: 2021-08-16 | End: 2021-08-21

## 2021-08-16 RX ORDER — TRAMADOL HYDROCHLORIDE 50 MG/1
50 TABLET ORAL EVERY 6 HOURS PRN
Qty: 20 TABLET | Refills: 0 | Status: SHIPPED | OUTPATIENT
Start: 2021-08-16 | End: 2021-08-21

## 2021-08-16 RX ORDER — ASPIRIN 81 MG/1
81 TABLET ORAL 2 TIMES DAILY
Qty: 28 TABLET | Refills: 0 | Status: SHIPPED | OUTPATIENT
Start: 2021-08-16 | End: 2022-01-24 | Stop reason: ALTCHOICE

## 2021-08-16 RX ORDER — NAPROXEN 500 MG/1
500 TABLET ORAL 2 TIMES DAILY WITH MEALS
Qty: 28 TABLET | Refills: 0 | Status: SHIPPED | OUTPATIENT
Start: 2021-08-16 | End: 2022-01-24 | Stop reason: ALTCHOICE

## 2021-08-16 RX ORDER — SENNOSIDES 8.6 MG
1 TABLET ORAL 2 TIMES DAILY
Qty: 14 TABLET | Refills: 0 | Status: SHIPPED | OUTPATIENT
Start: 2021-08-16 | End: 2021-08-23

## 2021-08-16 NOTE — FLOWSHEET NOTE
[employer]    Physical Therapy Treatment Note/ Progress Report:     Date:  2021    Patient Name:  Analia Tee    :  1990  MRN: [id number]  Medical/Treatment Diagnosis Information:  · Diagnosis: M25.852 (ICD-10-CM) - Femoroacetabular impingement of left hipS73.192D (ICD-10-CM) - Tear of left acetabular labrum, subsequent encounter  · Treatment Diagnosis: M25.852 (ICD-10-CM) - Femoroacetabular impingement of left hipS73.192D (ICD-10-CM) - Tear of left acetabular labrum, subsequent encounter  Insurance/Certification information:  PT Insurance Information: [facility]  Physician Information:  Referring Practitioner: Yessica Chapin MD  Plan of care signed (Y/N):     Date of Patient follow up with Physician:      Progress Report: []  Yes  []  No     Functional Scale:  LEFS:   Date: 21    Date Range for reporting period:  Beginnin21  Ending:      Progress report due (10 Rx/or 30 days whichever is less):      Recertification due (POC duration/ or 90 days whichever is less):      Visit # Insurance Allowable Auth Needed   1 60 []Yes    []No     Pain level:  5-9/10     SUBJECTIVE:  See eval    OBJECTIVE: See eval   Observation:    Test measurements:      RESTRICTIONS/PRECAUTIONS:  DOS 21 LEFT HIP ARTHROSCOPY LABRAL REPAIR, ACETABULOPLASTY AND OSTEOCHONDROPLASTY    Exercises/Interventions:     Therapeutic Ex Resistance Sets/sec Reps Notes                                                                                         Therapeutic Activities 15'             Education on Gait with crutches and WB precautions, surgical precautions, stair training                                                  Manual Intervention       Knee mobs/PROM       Tib/Fem Mobs       Patella Mobs       Ankle mobs                     NMR re-education                                                                          Therapeutic Exercise and NMR EXR  [] (96518) Provided verbal/tactile cueing for activities related to strengthening, flexibility, endurance, ROM for improvements in LE, proximal hip, and core control with self care, mobility, lifting, ambulation.  [] (17740) Provided verbal/tactile cueing for activities related to improving balance, coordination, kinesthetic sense, posture, motor skill, proprioception  to assist with LE, proximal hip, and core control in self care, mobility, lifting, ambulation and eccentric single leg control.      NMR and Therapeutic Activities:    [x] (10562 or 53816) Provided verbal/tactile cueing for activities related to improving balance, coordination, kinesthetic sense, posture, motor skill, proprioception and motor activation to allow for proper function of core, proximal hip and LE with self care and ADLs  [] (16218) Gait Re-education- Provided training and instruction to the patient for proper LE, core and proximal hip recruitment and positioning and eccentric body weight control with ambulation re-education including up and down stairs     Home Exercise Program:    [x] (47775) Reviewed/Progressed HEP activities related to strengthening, flexibility, endurance, ROM of core, proximal hip and LE for functional self-care, mobility, lifting and ambulation/stair navigation   [] (09737)Reviewed/Progressed HEP activities related to improving balance, coordination, kinesthetic sense, posture, motor skill, proprioception of core, proximal hip and LE for self care, mobility, lifting, and ambulation/stair navigation      Manual Treatments:  PROM / STM / Oscillations-Mobs:  G-I, II, III, IV (PA's, Inf., Post.)  [] (73958) Provided manual therapy to mobilize LE, proximal hip and/or LS spine soft tissue/joints for the purpose of modulating pain, promoting relaxation,  increasing ROM, reducing/eliminating soft tissue swelling/inflammation/restriction, improving soft tissue extensibility and allowing for proper ROM for normal function with self care, mobility, lifting and ambulation. Modalities:      Charges:  Timed Code Treatment Minutes: 15   Total Treatment Minutes: 45       [x] EVAL (LOW) 40424 (typically 20 minutes face-to-face)  [] EVAL (MOD) 71060 (typically 30 minutes face-to-face)  [] EVAL (HIGH) 62918 (typically 45 minutes face-to-face)  [] RE-EVAL     [] CY(61195) x     [] IONTO (33204)  [] NMR (22207) x     [] VASO (85148)  [] Manual (06295) x     [] Other:  [x] TA (36495)x   1  [] Mech Traction (48112)  [] ES(attended) (84646)     [] ES (un) (65281): If BWC Please Indicate Time In/Out and Total Minutes  CPT Code Time in Time out Total Min                                           GOALS:  Patient stated goal: To experience less pain during walking. [] Progressing: [] Met: [] Not Met: [] Adjusted    Therapist goals for Patient:   Short Term Goals: To be achieved in: 2 weeks  1. Independent in HEP and progression per patient tolerance, in order to prevent re-injury. [] Progressing: [] Met: [] Not Met: [] Adjusted  2. Patient will have a decrease in pain to facilitate improvement in movement, function, and ADLs as indicated by Functional Deficits. [] Progressing: [] Met: [] Not Met: [] Adjusted    Long Term Goals: To be achieved in: 10-12 weeks  1. Disability index score of 50% or less for the LEFS to assist with reaching prior level of function. [] Progressing: [] Met: [] Not Met: [] Adjusted  2. Patient will demonstrate increased AROM to WNL to allow for proper joint functioning as indicated by patients Functional Deficits. [] Progressing: [] Met: [] Not Met: [] Adjusted  3. Patient will demonstrate an increase in Strength to good proximal hip strength and control, within 5lb HHD in LE to allow for proper functional mobility as indicated by patients Functional Deficits. [] Progressing: [] Met: [] Not Met: [] Adjusted  4.  Patient will return to ambulating, standing, squatting, and prolonged sitting, and functional activities without increased symptoms or restriction. [] Progressing: [] Met: [] Not Met: [] Adjusted  5. To return to walking without pain. (patient specific functional goal)    [] Progressing: [] Met: [] Not Met: [] Adjusted     Progression Towards Functional goals:  [] Patient is progressing as expected towards functional goals listed. [] Progression is slowed due to complexities listed. [] Progression has been slowed due to co-morbidities. [x] Plan just implemented, too soon to assess goals progression  [] Other:     ASSESSMENT:  See eval    Return to Play: (if applicable)   []  Stage 1: Intro to Strength   []  Stage 2: Return to Run and Strength   []  Stage 3: Return to Jump and Strength   []  Stage 4: Dynamic Strength and Agility   []  Stage 5: Sport Specific Training     []  Ready to Return to Play, Meets All Above Stages   []  Not Ready for Return to Sports   Comments:            Treatment/Activity Tolerance:  [x] Patient tolerated treatment well [] Patient limited by fatique  [] Patient limited by pain  [] Patient limited by other medical complications  [] Other:     Overall Progression Towards Functional goals/ Treatment Progress Update:  [] Patient is progressing as expected towards functional goals listed. [] Progression is slowed due to complexities/Impairments listed. [] Progression has been slowed due to co-morbidities.   [x] Plan just implemented, too soon to assess goals progression <30days   [] Goals require adjustment due to lack of progress  [] Patient is not progressing as expected and requires additional follow up with physician  [] Other    Prognosis for POC: [x] Good [] Fair  [] Poor    Patient requires continued skilled intervention: [x] Yes  [] No        PLAN: See eval  [] Continue per plan of care [] Alter current plan (see comments)  [x] Plan of care initiated [] Hold pending MD visit [] Discharge    Electronically signed by: Natasha Amos PT    Note: If patient

## 2021-08-16 NOTE — PLAN OF CARE
R L R   MMT (of 5)             Hip Abd (#) 9.5 24.4              LEFS (raw) 14/80       OV entered              Balance: See above. Reflexes/Sensation:    [x]Dermatomes/Myotomes intact    [x]Reflexes equal and normal bilaterally   []Other:    Joint mobility:  Joint mobility of the L hip reduced due to pain. []Normal    [x]Hypo   []Hyper    Palpation:     Functional Mobility/Transfers: Patient reports pain with ambulating, standing, squatting, and prolonged sitting. Posture: Unremarkable     Bandages/Dressings/Incisions:     Gait: (include devices/WB status) Antalgic gait on L with reduced hip flexion with slight circumduction of L hip during swing phase. Orthopedic Special Tests:                        [x] Patient history, allergies, meds reviewed. Medical chart reviewed. See intake form. Review Of Systems (ROS):  [x]Performed Review of systems (Integumentary, CardioPulmonary, Neurological) by intake and observation. Intake form has been scanned into medical record. Patient has been instructed to contact their primary care physician regarding ROS issues if not already being addressed at this time.       Co-morbidities/Complexities (which will affect course of rehabilitation):   []None           Arthritic conditions   []Rheumatoid arthritis (M05.9)  []Osteoarthritis (M19.91)   Cardiovascular conditions   []Hypertension (I10)  []Hyperlipidemia (E78.5)  []Angina pectoris (I20)  []Atherosclerosis (I70)   Musculoskeletal conditions   []Disc pathology   []Congenital spine pathologies   []Prior surgical intervention  []Osteoporosis (M81.8)  []Osteopenia (M85.8)   Endocrine conditions   []Hypothyroid (E03.9)  []Hyperthyroid Gastrointestinal conditions   []Constipation (M08.02)   Metabolic conditions   []Morbid obesity (E66.01)  []Diabetes type 1(E10.65) or 2 (E11.65)   []Neuropathy (G60.9)     Pulmonary conditions   []Asthma (J45)  []Coughing   []COPD (J44.9)   Psychological Disorders  [x]Anxiety (F41.9)  [x]Depression (F32.9)   []Other:   []Other:          Barriers to/and or personal factors that will affect rehab potential:              []Age  []Sex              []Motivation/Lack of Motivation                        []Co-Morbidities              []Cognitive Function, education/learning barriers              []Environmental, home barriers              []profession/work barriers  []past PT/medical experience  []other:  Justification:     Falls Risk Assessment (30 days):   [x] Falls Risk assessed and no intervention required. [] Falls Risk assessed and Patient requires intervention due to being higher risk   TUG score (>12s at risk):     [] Falls education provided, including         ASSESSMENT: Patient is a 27year old male who is being seen pre-operatively who is going to undergo LEFT HIP ARTHROSCOPY LABRAL REPAIR, ACETABULOPLASTY AND OSTEOCHONDROPLASTY on 8/20/21. Patient educated on gait with crutches and WB precautions, surgical precautions, brace usage, and stair training. Patient and wife voiced understanding.       Functional Impairments:     [x]Noted lumbar/proximal hip/LE joint hypomobility   [x]Decreased LE functional ROM   [x]Decreased core/proximal hip strength and neuromuscular control   [x]Decreased LE functional strength   [x]Reduced balance/proprioceptive control   []other:      Functional Activity Limitations (from functional questionnaire and intake)   [x]Reduced ability to tolerate prolonged functional positions   [x]Reduced ability or difficulty with changes of positions or transfers between positions   []Reduced ability to maintain good posture and demonstrate good body mechanics with sitting, bending, and lifting   [x]Reduced ability to sleep   [x] Reduced ability or tolerance with driving and/or computer work   [x]Reduced ability to perform lifting, carrying tasks   [x]Reduced ability to squat   [x]Reduced ability to forward bend   [x]Reduced ability to ambulate prolonged functional periods/distances/surfaces   [x]Reduced ability to ascend/descend stairs   [x]Reduced ability to run, hop, cut or jump   []other:    Participation Restrictions   [x]Reduced participation in self care activities   [x]Reduced participation in home management activities   [x]Reduced participation in work activities   [x]Reduced participation in social activities. [x]Reduced participation in sport/recreation activities. Classification :    []Signs/symptoms consistent with post-surgical status including decreased ROM, strength and function.    []Signs/symptoms consistent with joint sprain/strain   []Signs/symptoms consistent with patella-femoral syndrome   []Signs/symptoms consistent with knee OA/hip OA   [x]Signs/symptoms consistent with internal derangement of knee/Hip   []Signs/symptoms consistent with functional hip weakness/NMR control      []Signs/symptoms consistent with tendinitis/tendinosis    []signs/symptoms consistent with pathology which may benefit from Dry needling      []other:      Prognosis/Rehab Potential:      []Excellent   [x]Good    []Fair   []Poor    Tolerance of evaluation/treatment:    []Excellent   [x]Good    []Fair   []Poor    Physical Therapy Evaluation Complexity Justification  [x] A history of present problem with:  [] no personal factors and/or comorbidities that impact the plan of care;  [x]1-2 personal factors and/or comorbidities that impact the plan of care  []3 personal factors and/or comorbidities that impact the plan of care  [x] An examination of body systems using standardized tests and measures addressing any of the following: body structures and functions (impairments), activity limitations, and/or participation restrictions;:  [x] a total of 1-2 or more elements   [] a total of 3 or more elements   [] a total of 4 or more elements   [x] A clinical presentation with:  [x] stable and/or uncomplicated characteristics   [] evolving clinical presentation with changing characteristics  [] unstable and unpredictable characteristics;   [x] Clinical decision making of [x] low, [] moderate, [] high complexity using standardized patient assessment instrument and/or measurable assessment of functional outcome. [x] EVAL (LOW) 89064 (typically 30 minutes face-to-face)  [] EVAL (MOD) 62171 (typically 30 minutes face-to-face)  [] EVAL (HIGH) 15019 (typically 45 minutes face-to-face)  [] RE-EVAL     PLAN:   Frequency/Duration:  1-2 days per week for 10-12 Weeks:  Interventions:  [x]  Therapeutic exercise including: strength training, ROM, for Lower extremity and core   [x]  NMR activation and proprioception for LE, Glutes and Core   [x]  Manual therapy as indicated for LE, Hip and spine to include: Dry Needling/IASTM, STM, PROM, Gr I-IV mobilizations, manipulation. [x] Modalities as needed that may include: thermal agents, E-stim, Biofeedback, US, iontophoresis as indicated  [x] Patient education on joint protection, postural re-education, activity modification, progression of HEP. HEP instruction: Patient instructed to practice putting on/taking off brace, ambulating with crutches, and use of crutches on stairs with WB precaution. GOALS:  Patient stated goal: To experience less pain during walking. [] Progressing: [] Met: [] Not Met: [] Adjusted    Therapist goals for Patient:   Short Term Goals: To be achieved in: 2 weeks  1. Independent in HEP and progression per patient tolerance, in order to prevent re-injury. [] Progressing: [] Met: [] Not Met: [] Adjusted  2. Patient will have a decrease in pain to facilitate improvement in movement, function, and ADLs as indicated by Functional Deficits. [] Progressing: [] Met: [] Not Met: [] Adjusted    Long Term Goals: To be achieved in: 10-12 weeks  1. Disability index score of 50% or less for the LEFS to assist with reaching prior level of function. [] Progressing: [] Met: [] Not Met: [] Adjusted  2.  Patient will demonstrate increased AROM to WNL to allow for proper joint functioning as indicated by patients Functional Deficits. [] Progressing: [] Met: [] Not Met: [] Adjusted  3. Patient will demonstrate an increase in Strength to good proximal hip strength and control, within 5lb HHD in LE to allow for proper functional mobility as indicated by patients Functional Deficits. [] Progressing: [] Met: [] Not Met: [] Adjusted  4. Patient will return to ambulating, standing, squatting, and prolonged sitting, and functional activities without increased symptoms or restriction. [] Progressing: [] Met: [] Not Met: [] Adjusted  5.  To return to walking without pain. (patient specific functional goal)    [] Progressing: [] Met: [] Not Met: [] Adjusted     Electronically signed by:  Jordon Dacosta PT

## 2021-08-16 NOTE — PROGRESS NOTES
Date:  2021    Name:  Myriam Almonte  Address:  [address] [place] [address]    :  1990      Age:   27 y.o.    SSN:  [id number]      Medical Record Number:  [id number]    Reason for Visit:    Chief Complaint    Hip Pain (Pre-op left hip scope )      DOS:2021     HPI: Myriam Almonte is a 27 y.o. male here today for his preoperative appointment for an upcoming left hip arthroscopy, pincer decompression and labral repair surgery. He had relief with a temporary freezing injection of his hip. He also had a cortisone injection into his SI joint with Dr. John Paul Mckeon which has provided him with prolonged relief. He currently has anterolateral C sign distribution hip pain worse with prolonged sitting or walking. He is not currently working as had to take time off work as it is aggravating his hip labral tear. He works for [employer]. He has left hip and buttock pain for the past 2 to 3 years. He was previously seen at [facility] and worked up for radiculopathy with MRI EMG and epidural steroid injection. This was done the month of 2020. He had a complication thereafter were it caused a metabolic encephalopathy and a small cervical epidural abscess that required IV antibiotic treatment. Pain Assessment  Location of Pain: Other (Comment)  Location Modifiers: Left  Severity of Pain: 5  Quality of Pain: Sharp, Dull  Duration of Pain: Persistent  Frequency of Pain: Constant  Aggravating Factors: Other (Comment), Walking, Standing, Bending (sitting)  Limiting Behavior: Yes  Relieving Factors: Rest, Ice, Heat  Result of Injury: No  Work-Related Injury: No  Are there other pain locations you wish to document?: No  ROS: Review of systems reviewed from Patient History Form completed today and available in the patient's chart under the Media tab.        Past Medical History:   Diagnosis Date    Anxiety     Asthma     Chronic back pain         Past Surgical History:   Procedure Laterality Date    MEDICATION INJECTION      Epidural        Family History   Problem Relation Age of Onset    Diabetes Mother     Coronary Art Dis Mother     Diabetes Father        Social History     Socioeconomic History    Marital status:      Spouse name: None    Number of children: None    Years of education: None    Highest education level: None   Occupational History    None   Tobacco Use    Smoking status: Never Smoker    Smokeless tobacco: Never Used   Substance and Sexual Activity    Alcohol use: Not Currently    Drug use: Not Currently    Sexual activity: None   Other Topics Concern    None   Social History Narrative    None     Social Determinants of Health     Financial Resource Strain:     Difficulty of Paying Living Expenses:    Food Insecurity:     Worried About Running Out of Food in the Last Year:     Ran Out of Food in the Last Year:    Transportation Needs:     Lack of Transportation (Medical):      Lack of Transportation (Non-Medical):    Physical Activity:     Days of Exercise per Week:     Minutes of Exercise per Session:    Stress:     Feeling of Stress :    Social Connections:     Frequency of Communication with Friends and Family:     Frequency of Social Gatherings with Friends and Family:     Attends Scientologist Services:     Active Member of Clubs or Organizations:     Attends Club or Organization Meetings:     Marital Status:    Intimate Partner Violence:     Fear of Current or Ex-Partner:     Emotionally Abused:     Physically Abused:     Sexually Abused:        Current Outpatient Medications   Medication Sig Dispense Refill    aspirin EC 81 MG EC tablet Take 1 tablet by mouth 2 times daily for 14 days 28 tablet 0    naproxen (NAPROSYN) 500 MG tablet Take 1 tablet by mouth 2 times daily (with meals) for 14 days 28 tablet 0    oxyCODONE-acetaminophen (PERCOCET) 5-325 MG per tablet Take 1 tablet by mouth every 6 hours as needed for Pain for up to 5 days. 20 tablet 0    senna (SENOKOT) 8.6 MG TABS tablet Take 1 tablet by mouth 2 times daily for 7 days 14 tablet 0    traMADol (ULTRAM) 50 MG tablet Take 1 tablet by mouth every 6 hours as needed for Pain for up to 5 days. 20 tablet 0    gabapentin (NEURONTIN) 100 MG capsule TAKE 1 CAPSULE BY MOUTH TWICE DAILY FOR 30 DAYS 60 capsule 2    diclofenac (VOLTAREN) 50 MG EC tablet Take 1 tablet by mouth 2 times daily 60 tablet 1    naproxen (NAPROSYN) 250 MG tablet Take 1 tablet by mouth 2 times daily (with meals) 60 tablet 0    budesonide-formoterol (SYMBICORT) 80-4.5 MCG/ACT AERO Inhale 2 puffs into the lungs 2 times daily 1 Inhaler 0    FLUoxetine (PROZAC) 20 MG capsule Take 20 mg by mouth daily      montelukast (SINGULAIR) 10 MG tablet TK 1 T PO QPM      VENTOLIN  (90 Base) MCG/ACT inhaler INHALE 2 PUFFS INTO LUNGS Q 4 TO 6 H PRF BRONCOSPASMS      cetirizine (ZYRTEC) 10 MG tablet TK 1 T PO QD      budesonide (RHINOCORT AQUA) 32 MCG/ACT nasal spray 2 sprays by Each Nostril route 2 times daily 1 Bottle 1     No current facility-administered medications for this visit. No Known Allergies    Vital signs:  Resp 12   Ht 5' 10\" (1.778 m)   Wt 169 lb (76.7 kg)   BMI 24.25 kg/m²        Constitutional: The physical examination finds the patient to be well-developed and well-nourished. The patient is alert and oriented x3 and was cooperative throughout the visit. Neuro: no focal deficits noted. Normal mood, judgement, decision making  Eyes: sclera clear, EOMI  Ears: Normal external ear  Mouth:  No perioral lesions  Pulm: Respirations unlabored and regular  Pulse: Extremities well perfused, warm, capillary refill < 2 seconds  Musculoskeletal:    Hip Examination: Left    Skin/Inspection: no skin lesions, cellulitis, or extreme edema in the lower extremities. Standing/Walking: MILDLY antalgic gait, no pelvic tilt, Trendelenburg sign.  No use of assistive devices    Sitting Exam: 5/5 Hip Flexor Strength, 5/5 Abductor Strength, 5/5 Adductor strength, Negative Straight Leg Raise    Supine Exam: Non tender around the ASIS, AIIS  Flexion arc 0 to 100deg, with no pain or difficulty. Special Tests: Positive deep Flexion Test, positive FADIR for anterior hip pain but also for buttock pain with clicking, less pain with VENKAT, NEG resisted sit-up (Athletic Pubalgia). Side Lying Exam: Non-tender at greater trochanter, abductor musculature, nor TFL origin. Abductor side leg raise 4/5 strength. Positive OberTest    Prone Exam: Positive hip flexion contracture, internal rotation to 25 deg, external rotation to 45 deg. Non-tender at the ischial tuberosity nor proximal hamstring. Weak deep rotators on resistive testing    Diagnostics:  Radiology:       Pertinent imaging reviewed, images only - no report available. Radiographs were obtained and reviewed in the office; 3 views: AP Pelvis and left hip 45-deg Nuñez View, and left False Profile View    Tonnis stGstrstastdstest:st st1st LCEA: Highly elevated with pincer with LCEA of 45deg  Alpha Angle: 58deg alpha angle on the AP, suggesting predominantly a lateral based CAM.   Cross over-sign is focal  Other: positive Coxa Profunda, Negative Protrusio    Impression: moderate lateral cam VERONICA with severe pincer VERONICA and subspine impingement. xrays today appeared more as an outlet view therefore hard to assess completely for coverage    MRI Left Hip 6/28/2021:   CONCLUSION:   Signal in the labral tip superiorly for which further investigation may be considered as the    degree of reactive change in the joint including the absence of an effusion or swelling might    warrant contrast-enhanced MRI to determine contribution to the patient's clinical syndrome. See   [facility] image for context. Assessment: Patient is a 27 y.o. male left hip pain and buttock pain related to femoral acetabular impingement and compensatory left sided SI joint dysfunction.   He has a labral tear on the MRI.    Impression:  Visit Diagnoses       Codes    Femoroacetabular impingement of left hip    -  Primary M25.852    Tear of left acetabular labrum, subsequent encounter     S73.192D          Office Procedures:  Orders Placed This Encounter   Medications    aspirin EC 81 MG EC tablet     Sig: Take 1 tablet by mouth 2 times daily for 14 days     Dispense:  28 tablet     Refill:  0    naproxen (NAPROSYN) 500 MG tablet     Sig: Take 1 tablet by mouth 2 times daily (with meals) for 14 days     Dispense:  28 tablet     Refill:  0    oxyCODONE-acetaminophen (PERCOCET) 5-325 MG per tablet     Sig: Take 1 tablet by mouth every 6 hours as needed for Pain for up to 5 days. Dispense:  20 tablet     Refill:  0     Reduce doses taken as pain becomes manageable    senna (SENOKOT) 8.6 MG TABS tablet     Sig: Take 1 tablet by mouth 2 times daily for 7 days     Dispense:  14 tablet     Refill:  0    traMADol (ULTRAM) 50 MG tablet     Sig: Take 1 tablet by mouth every 6 hours as needed for Pain for up to 5 days. Dispense:  20 tablet     Refill:  0     Reduce doses taken as pain becomes manageable     Orders Placed This Encounter   Procedures    Cold Therapy Unit and Pad $150     Scheduling Instructions:      Patient was supplied a Cold Therapy Unit and Pad. This retail item was supplied to provide functional support and assist in protecting the affected area. Verbal and written instructions for the use of and application of this item were provided. The patient was educated and fit by a healthcare professional with expert knowledge and specialization in brace application. They were instructed to contact the office immediately should the equipment result       in increased pain, decreased sensation, increased swelling or worsening of the condition.     Ambulatory referral to Physical Therapy     Referral Priority:   Routine     Referral Type:   Eval and Treat     Referral Reason:   Specialty Services Required     Referred to Provider:   Tarah Anton PT     Requested Specialty:   Physical Therapy     Number of Visits Requested:   1    Ossur Hip Rebound     Patient was prescribed a Ossur Hip Rebound. The left hip will require stabilization / immobilization from this semi-rigid / rigid orthosis to improve their function. The orthosis will assist in protecting the affected area, provide functional support and facilitate healing. The prefabricated orthosis was modified in the following manner to provide a customizable fit for the patient at the time of delivery. 1.  Identification of appropriate positioning and alignment of anatomical landmarks. 2.  Trimming of straps and adjustment of frame around the waste and thigh to fit patient. 3.  Polycentric hinge adjustment to control flexion, extension, and adduction. The patient was educated and fit by a healthcare professional with expert knowledge and specialization in brace application while under the direct supervision of the treating physician. Verbal and written instructions for the use of and application of this item were provided. They were instructed to contact the office immediately should the brace result in increased pain, decreased sensation, increased swelling or worsening of the condition. Plan:    Lisa Cabral still a candidate for left hip surgery. No concerns on preoperative testing. No latex allergy  No adhesive allergy  No medication allergy  No OCP or hormonal treatment  No personal history of diabetes, or blood pressure issues  No personal or family history of bleeding  No personal or family history of DVT/PE  No personal or family history of intolerance no NSAIDS or history of GI ulcers  No personal or family history of problems with Anaesthesia    We recommended a LEFT hip arthroscopy labral repair , acetabuloplasty and osteochondroplasty.     Risks, benefits, advantages, disadvantages and potential complications as well as the anticipated postoperative course were discussed. We outlined the 80 - 90% success rate of the operation. The risks of infection, bleeding, nerve injury, perineal numbness, sexual dysfunction, hip stiffness, and the possibility of persistent pain and prolonged recovery. We also discussed the involved rehabilitation timelines, no driving for 2 weeks, a hip brace, the general trajectory of improvement after hip surgery (pain relief, activities of daily living, return to sport), full hip loading and strengthening commencing at 3 months, and up to 6 - 7 months before full return to sport and unrestricted activities. The patient agreed to pursue this treatment option. All questions were addressed to their satisfaction. We discussed the surgical process, postoperative recovery, the medications to be taken, and wound care instructions, hip brace fitting, and crutch instructions and fitting. Prescriptions were printed and given to the patient today. I will see him on Friday 8/20/2021 for his surgery. All the patient's questions were answered while in the clinic. The patient is understanding of all instructions and agrees with the plan. Approximately 45 minutes was spent on patient education and coordinating care. Follow up in: No follow-ups on file. Hip Self assessment forms      Sincerely,    Amanda Adames MD [address] [facility]   [address] [place], [address]  Email: [email or web address]  Office: [phone]    08/16/21  5:17 PM    This dictation was performed with a verbal recognition program (DRAGON) and it was checked for errors. It is possible that there are still dictated errors within this office note. If so, please bring any errors to my attention for an addendum. All efforts were made to ensure that this office note is accurate.

## 2021-08-17 NOTE — PROGRESS NOTES
set forth in Paragraph 1. I (we) authorize and request that the above-named physician, his/her assistants or his/her designees, perform procedures as necessary and desirable if deemed to be in my (our) best interest.     3. I acknowledge that health care personnel may be observing this procedure for the purpose of medical education or other specified purposes as may be necessary as requested and/or approved by my (our) physician. 4. I (we) consent to the disposal by the hospital Pathologist of the removed tissue, parts or organs in accordance with hospital policy. 5. I do ____ do not ____ consent to the use of a local infiltration pain blocking agent that will be used by my provider/surgical provider to help alleviate pain during my procedure. 6. I do ____ do not ____ consent to an emergent blood transfusion in the case of a life-threatening situation that requires blood components to be administered. This consent is valid for 24 hours from the beginning of the procedure. 7. This patient does ____ or does not ____ currently have a DNR status/order. If DNR order is in place, obtain Addendum to the Surgical Consent for ALL Patients with a DNR Order to address fabi-operative status for limited intervention or DNR suspension.      8. I have read and fully understand the above Consent for Operation/Procedure and that all blanks were completed before I signed the consent.     ____________________        ______________________           ______/_____am/pm  Signature of Patient or legal representative               Printed Name / Relationship                      Date / Time      ____________________          _____________________          ______/______am/pm  Witness to Signature                             Printed Name                      Date / Time     (If patient is unable to sign or is a minor, complete the following)  Patient is a minor, ____ years of age, or unable to sign because: _______________________________________________________________    [facility] If a phone consent is obtained, consent will be documented by using two health care professionals, each affirming that the consenting party has no questions and gives consent for the procedure discussed with the physician/provider.   _____________________          ____________________       ______/______am/pm   2nd witness to phone consent           Printed name                                Date / Time    Informed Consent:  I have provided the explanation described above in section 1 to the patient and/or legal representative. I have provided the patient and/or legal representative with an opportunity to ask any questions about the proposed operation/procedure.     ____________________          ____________________          ______/______am/pm  Provider / Proceduralist                            Printed name             Date / Time    Revised 8. 2.2021          page 2 of 2

## 2021-08-19 ENCOUNTER — ANESTHESIA EVENT (OUTPATIENT)
Dept: OPERATING ROOM | Age: 31
End: 2021-08-19
Payer: COMMERCIAL

## 2021-08-20 ENCOUNTER — ANESTHESIA (OUTPATIENT)
Dept: OPERATING ROOM | Age: 31
End: 2021-08-20
Payer: COMMERCIAL

## 2021-08-20 ENCOUNTER — APPOINTMENT (OUTPATIENT)
Dept: GENERAL RADIOLOGY | Age: 31
End: 2021-08-20
Attending: ORTHOPAEDIC SURGERY
Payer: COMMERCIAL

## 2021-08-20 ENCOUNTER — HOSPITAL ENCOUNTER (OUTPATIENT)
Age: 31
Setting detail: OUTPATIENT SURGERY
Discharge: HOME | End: 2021-08-20
Attending: ORTHOPAEDIC SURGERY | Admitting: ORTHOPAEDIC SURGERY
Payer: COMMERCIAL

## 2021-08-20 VITALS
WEIGHT: 170 LBS | TEMPERATURE: 97.8 F | RESPIRATION RATE: 16 BRPM | HEART RATE: 89 BPM | OXYGEN SATURATION: 95 % | SYSTOLIC BLOOD PRESSURE: 118 MMHG | BODY MASS INDEX: 24.34 KG/M2 | DIASTOLIC BLOOD PRESSURE: 76 MMHG | HEIGHT: 70 IN

## 2021-08-20 VITALS — TEMPERATURE: 98.2 F | SYSTOLIC BLOOD PRESSURE: 116 MMHG | OXYGEN SATURATION: 98 % | DIASTOLIC BLOOD PRESSURE: 56 MMHG

## 2021-08-20 PROBLEM — S73.192A TEAR OF LEFT ACETABULAR LABRUM: Status: ACTIVE | Noted: 2021-08-20

## 2021-08-20 LAB — SARS-COV-2, NAAT: NOT DETECTED

## 2021-08-20 PROCEDURE — 2500000003 HC RX 250 WO HCPCS: Performed by: NURSE ANESTHETIST, CERTIFIED REGISTERED

## 2021-08-20 PROCEDURE — 2580000003 HC RX 258: Performed by: ORTHOPAEDIC SURGERY

## 2021-08-20 PROCEDURE — 2580000003 HC RX 258: Performed by: ANESTHESIOLOGY

## 2021-08-20 PROCEDURE — 7100000010 HC PHASE II RECOVERY - FIRST 15 MIN: Performed by: ORTHOPAEDIC SURGERY

## 2021-08-20 PROCEDURE — 2720000010 HC SURG SUPPLY STERILE: Performed by: ORTHOPAEDIC SURGERY

## 2021-08-20 PROCEDURE — 2700000000 HC OXYGEN THERAPY PER DAY

## 2021-08-20 PROCEDURE — 87635 SARS-COV-2 COVID-19 AMP PRB: CPT

## 2021-08-20 PROCEDURE — 6360000002 HC RX W HCPCS: Performed by: NURSE ANESTHETIST, CERTIFIED REGISTERED

## 2021-08-20 PROCEDURE — 3700000001 HC ADD 15 MINUTES (ANESTHESIA): Performed by: ORTHOPAEDIC SURGERY

## 2021-08-20 PROCEDURE — 76942 ECHO GUIDE FOR BIOPSY: CPT | Performed by: ANESTHESIOLOGY

## 2021-08-20 PROCEDURE — 3600000014 HC SURGERY LEVEL 4 ADDTL 15MIN: Performed by: ORTHOPAEDIC SURGERY

## 2021-08-20 PROCEDURE — 6360000002 HC RX W HCPCS: Performed by: ANESTHESIOLOGY

## 2021-08-20 PROCEDURE — 3700000000 HC ANESTHESIA ATTENDED CARE: Performed by: ORTHOPAEDIC SURGERY

## 2021-08-20 PROCEDURE — 7100000001 HC PACU RECOVERY - ADDTL 15 MIN: Performed by: ORTHOPAEDIC SURGERY

## 2021-08-20 PROCEDURE — C1713 ANCHOR/SCREW BN/BN,TIS/BN: HCPCS | Performed by: ORTHOPAEDIC SURGERY

## 2021-08-20 PROCEDURE — 94761 N-INVAS EAR/PLS OXIMETRY MLT: CPT

## 2021-08-20 PROCEDURE — 2500000003 HC RX 250 WO HCPCS: Performed by: ORTHOPAEDIC SURGERY

## 2021-08-20 PROCEDURE — 3600000004 HC SURGERY LEVEL 4 BASE: Performed by: ORTHOPAEDIC SURGERY

## 2021-08-20 PROCEDURE — 6360000002 HC RX W HCPCS: Performed by: ORTHOPAEDIC SURGERY

## 2021-08-20 PROCEDURE — 7100000011 HC PHASE II RECOVERY - ADDTL 15 MIN: Performed by: ORTHOPAEDIC SURGERY

## 2021-08-20 PROCEDURE — 94640 AIRWAY INHALATION TREATMENT: CPT

## 2021-08-20 PROCEDURE — 7100000000 HC PACU RECOVERY - FIRST 15 MIN: Performed by: ORTHOPAEDIC SURGERY

## 2021-08-20 PROCEDURE — C1788 PORT, INDWELLING, IMP: HCPCS | Performed by: ORTHOPAEDIC SURGERY

## 2021-08-20 PROCEDURE — 73502 X-RAY EXAM HIP UNI 2-3 VIEWS: CPT

## 2021-08-20 PROCEDURE — 3209999900 FLUORO FOR SURGICAL PROCEDURES

## 2021-08-20 PROCEDURE — 2709999900 HC NON-CHARGEABLE SUPPLY: Performed by: ORTHOPAEDIC SURGERY

## 2021-08-20 DEVICE — NANOTACK TT SUTURE ANCHOR, 1.4MM WITH 1.2MM XBRAID TT
Type: IMPLANTABLE DEVICE | Site: HIP | Status: FUNCTIONAL
Brand: NANOTACK

## 2021-08-20 RX ORDER — SODIUM CHLORIDE 9 MG/ML
25 INJECTION, SOLUTION INTRAVENOUS PRN
Status: DISCONTINUED | OUTPATIENT
Start: 2021-08-20 | End: 2021-08-20 | Stop reason: HOSPADM

## 2021-08-20 RX ORDER — ROCURONIUM BROMIDE 10 MG/ML
INJECTION, SOLUTION INTRAVENOUS PRN
Status: DISCONTINUED | OUTPATIENT
Start: 2021-08-20 | End: 2021-08-20 | Stop reason: SDUPTHER

## 2021-08-20 RX ORDER — PROMETHAZINE HYDROCHLORIDE 25 MG/ML
6.25 INJECTION, SOLUTION INTRAMUSCULAR; INTRAVENOUS
Status: DISCONTINUED | OUTPATIENT
Start: 2021-08-20 | End: 2021-08-20 | Stop reason: HOSPADM

## 2021-08-20 RX ORDER — HYDROMORPHONE HCL 110MG/55ML
PATIENT CONTROLLED ANALGESIA SYRINGE INTRAVENOUS PRN
Status: DISCONTINUED | OUTPATIENT
Start: 2021-08-20 | End: 2021-08-20 | Stop reason: SDUPTHER

## 2021-08-20 RX ORDER — ROPIVACAINE HYDROCHLORIDE 5 MG/ML
INJECTION, SOLUTION EPIDURAL; INFILTRATION; PERINEURAL PRN
Status: DISCONTINUED | OUTPATIENT
Start: 2021-08-20 | End: 2021-08-20 | Stop reason: SDUPTHER

## 2021-08-20 RX ORDER — OXYCODONE HYDROCHLORIDE 5 MG/1
5 TABLET ORAL PRN
Status: DISCONTINUED | OUTPATIENT
Start: 2021-08-20 | End: 2021-08-20 | Stop reason: HOSPADM

## 2021-08-20 RX ORDER — MIDAZOLAM HYDROCHLORIDE 1 MG/ML
INJECTION INTRAMUSCULAR; INTRAVENOUS PRN
Status: DISCONTINUED | OUTPATIENT
Start: 2021-08-20 | End: 2021-08-20 | Stop reason: SDUPTHER

## 2021-08-20 RX ORDER — HYDRALAZINE HYDROCHLORIDE 20 MG/ML
5 INJECTION INTRAMUSCULAR; INTRAVENOUS EVERY 10 MIN PRN
Status: DISCONTINUED | OUTPATIENT
Start: 2021-08-20 | End: 2021-08-20 | Stop reason: HOSPADM

## 2021-08-20 RX ORDER — ROPIVACAINE HYDROCHLORIDE 5 MG/ML
INJECTION, SOLUTION EPIDURAL; INFILTRATION; PERINEURAL
Status: COMPLETED
Start: 2021-08-20 | End: 2021-08-20

## 2021-08-20 RX ORDER — ONDANSETRON 2 MG/ML
INJECTION INTRAMUSCULAR; INTRAVENOUS PRN
Status: DISCONTINUED | OUTPATIENT
Start: 2021-08-20 | End: 2021-08-20 | Stop reason: SDUPTHER

## 2021-08-20 RX ORDER — DIPHENHYDRAMINE HYDROCHLORIDE 50 MG/ML
12.5 INJECTION INTRAMUSCULAR; INTRAVENOUS
Status: DISCONTINUED | OUTPATIENT
Start: 2021-08-20 | End: 2021-08-20 | Stop reason: HOSPADM

## 2021-08-20 RX ORDER — MIDAZOLAM HYDROCHLORIDE 1 MG/ML
INJECTION INTRAMUSCULAR; INTRAVENOUS
Status: COMPLETED
Start: 2021-08-20 | End: 2021-08-20

## 2021-08-20 RX ORDER — SODIUM CHLORIDE, SODIUM LACTATE, POTASSIUM CHLORIDE, CALCIUM CHLORIDE 600; 310; 30; 20 MG/100ML; MG/100ML; MG/100ML; MG/100ML
INJECTION, SOLUTION INTRAVENOUS CONTINUOUS
Status: DISCONTINUED | OUTPATIENT
Start: 2021-08-20 | End: 2021-08-20 | Stop reason: HOSPADM

## 2021-08-20 RX ORDER — SODIUM CHLORIDE 0.9 % (FLUSH) 0.9 %
10 SYRINGE (ML) INJECTION PRN
Status: DISCONTINUED | OUTPATIENT
Start: 2021-08-20 | End: 2021-08-20 | Stop reason: HOSPADM

## 2021-08-20 RX ORDER — ALBUTEROL SULFATE 2.5 MG/3ML
2.5 SOLUTION RESPIRATORY (INHALATION) EVERY 6 HOURS PRN
Status: DISCONTINUED | OUTPATIENT
Start: 2021-08-20 | End: 2021-08-20 | Stop reason: HOSPADM

## 2021-08-20 RX ORDER — MORPHINE SULFATE 4 MG/ML
1 INJECTION, SOLUTION INTRAMUSCULAR; INTRAVENOUS EVERY 5 MIN PRN
Status: DISCONTINUED | OUTPATIENT
Start: 2021-08-20 | End: 2021-08-20 | Stop reason: HOSPADM

## 2021-08-20 RX ORDER — SODIUM CHLORIDE 0.9 % (FLUSH) 0.9 %
10 SYRINGE (ML) INJECTION EVERY 12 HOURS SCHEDULED
Status: DISCONTINUED | OUTPATIENT
Start: 2021-08-20 | End: 2021-08-20 | Stop reason: HOSPADM

## 2021-08-20 RX ORDER — PROPOFOL 10 MG/ML
INJECTION, EMULSION INTRAVENOUS PRN
Status: DISCONTINUED | OUTPATIENT
Start: 2021-08-20 | End: 2021-08-20 | Stop reason: SDUPTHER

## 2021-08-20 RX ORDER — METOCLOPRAMIDE HYDROCHLORIDE 5 MG/ML
10 INJECTION INTRAMUSCULAR; INTRAVENOUS
Status: COMPLETED | OUTPATIENT
Start: 2021-08-20 | End: 2021-08-20

## 2021-08-20 RX ORDER — LABETALOL HYDROCHLORIDE 5 MG/ML
5 INJECTION, SOLUTION INTRAVENOUS EVERY 10 MIN PRN
Status: DISCONTINUED | OUTPATIENT
Start: 2021-08-20 | End: 2021-08-20 | Stop reason: HOSPADM

## 2021-08-20 RX ORDER — DEXAMETHASONE SODIUM PHOSPHATE 4 MG/ML
INJECTION, SOLUTION INTRA-ARTICULAR; INTRALESIONAL; INTRAMUSCULAR; INTRAVENOUS; SOFT TISSUE PRN
Status: DISCONTINUED | OUTPATIENT
Start: 2021-08-20 | End: 2021-08-20 | Stop reason: SDUPTHER

## 2021-08-20 RX ORDER — MEPERIDINE HYDROCHLORIDE 25 MG/ML
12.5 INJECTION INTRAMUSCULAR; INTRAVENOUS; SUBCUTANEOUS EVERY 5 MIN PRN
Status: DISCONTINUED | OUTPATIENT
Start: 2021-08-20 | End: 2021-08-20 | Stop reason: HOSPADM

## 2021-08-20 RX ORDER — SODIUM CHLORIDE 9 MG/ML
INJECTION, SOLUTION INTRAVENOUS CONTINUOUS
Status: DISCONTINUED | OUTPATIENT
Start: 2021-08-20 | End: 2021-08-20 | Stop reason: HOSPADM

## 2021-08-20 RX ORDER — FENTANYL CITRATE 50 UG/ML
INJECTION, SOLUTION INTRAMUSCULAR; INTRAVENOUS PRN
Status: DISCONTINUED | OUTPATIENT
Start: 2021-08-20 | End: 2021-08-20 | Stop reason: SDUPTHER

## 2021-08-20 RX ORDER — OXYCODONE HYDROCHLORIDE 5 MG/1
10 TABLET ORAL PRN
Status: DISCONTINUED | OUTPATIENT
Start: 2021-08-20 | End: 2021-08-20 | Stop reason: HOSPADM

## 2021-08-20 RX ORDER — FENTANYL CITRATE 50 UG/ML
INJECTION, SOLUTION INTRAMUSCULAR; INTRAVENOUS
Status: COMPLETED
Start: 2021-08-20 | End: 2021-08-20

## 2021-08-20 RX ADMIN — SODIUM CHLORIDE, POTASSIUM CHLORIDE, SODIUM LACTATE AND CALCIUM CHLORIDE: 600; 310; 30; 20 INJECTION, SOLUTION INTRAVENOUS at 13:56

## 2021-08-20 RX ADMIN — HYDROMORPHONE HYDROCHLORIDE 0.25 MG: 1 INJECTION, SOLUTION INTRAMUSCULAR; INTRAVENOUS; SUBCUTANEOUS at 16:27

## 2021-08-20 RX ADMIN — ROCURONIUM BROMIDE 10 MG: 10 INJECTION INTRAVENOUS at 14:49

## 2021-08-20 RX ADMIN — METOCLOPRAMIDE HYDROCHLORIDE 10 MG: 5 INJECTION INTRAMUSCULAR; INTRAVENOUS at 17:07

## 2021-08-20 RX ADMIN — HYDROMORPHONE HYDROCHLORIDE 0.25 MG: 1 INJECTION, SOLUTION INTRAMUSCULAR; INTRAVENOUS; SUBCUTANEOUS at 16:47

## 2021-08-20 RX ADMIN — PHENYLEPHRINE HYDROCHLORIDE 160 MCG: 10 INJECTION, SOLUTION INTRAMUSCULAR; INTRAVENOUS; SUBCUTANEOUS at 12:44

## 2021-08-20 RX ADMIN — MIDAZOLAM HYDROCHLORIDE 2 MG: 2 INJECTION, SOLUTION INTRAMUSCULAR; INTRAVENOUS at 11:45

## 2021-08-20 RX ADMIN — ROCURONIUM BROMIDE 50 MG: 10 INJECTION INTRAVENOUS at 13:00

## 2021-08-20 RX ADMIN — HYDROMORPHONE HYDROCHLORIDE 1 MG: 2 INJECTION, SOLUTION INTRAMUSCULAR; INTRAVENOUS; SUBCUTANEOUS at 13:28

## 2021-08-20 RX ADMIN — PHENYLEPHRINE HYDROCHLORIDE 160 MCG: 10 INJECTION, SOLUTION INTRAMUSCULAR; INTRAVENOUS; SUBCUTANEOUS at 12:41

## 2021-08-20 RX ADMIN — ALBUTEROL SULFATE 2.5 MG: 2.5 SOLUTION RESPIRATORY (INHALATION) at 15:43

## 2021-08-20 RX ADMIN — ONDANSETRON 4 MG: 2 INJECTION INTRAMUSCULAR; INTRAVENOUS at 12:41

## 2021-08-20 RX ADMIN — SODIUM CHLORIDE, POTASSIUM CHLORIDE, SODIUM LACTATE AND CALCIUM CHLORIDE: 600; 310; 30; 20 INJECTION, SOLUTION INTRAVENOUS at 15:09

## 2021-08-20 RX ADMIN — PHENYLEPHRINE HYDROCHLORIDE 80 MCG: 10 INJECTION, SOLUTION INTRAMUSCULAR; INTRAVENOUS; SUBCUTANEOUS at 14:00

## 2021-08-20 RX ADMIN — PROPOFOL 220 MG: 10 INJECTION, EMULSION INTRAVENOUS at 12:13

## 2021-08-20 RX ADMIN — ROPIVACAINE HYDROCHLORIDE 30 ML: 5 INJECTION, SOLUTION EPIDURAL; INFILTRATION; PERINEURAL at 11:45

## 2021-08-20 RX ADMIN — PHENYLEPHRINE HYDROCHLORIDE 80 MCG: 10 INJECTION, SOLUTION INTRAMUSCULAR; INTRAVENOUS; SUBCUTANEOUS at 13:54

## 2021-08-20 RX ADMIN — PHENYLEPHRINE HYDROCHLORIDE 80 MCG: 10 INJECTION, SOLUTION INTRAMUSCULAR; INTRAVENOUS; SUBCUTANEOUS at 14:14

## 2021-08-20 RX ADMIN — PHENYLEPHRINE HYDROCHLORIDE 80 MCG: 10 INJECTION, SOLUTION INTRAMUSCULAR; INTRAVENOUS; SUBCUTANEOUS at 14:07

## 2021-08-20 RX ADMIN — FAMOTIDINE 20 MG: 10 INJECTION, SOLUTION INTRAVENOUS at 12:41

## 2021-08-20 RX ADMIN — SUGAMMADEX 200 MG: 100 INJECTION, SOLUTION INTRAVENOUS at 15:05

## 2021-08-20 RX ADMIN — HYDROMORPHONE HYDROCHLORIDE 1 MG: 2 INJECTION, SOLUTION INTRAMUSCULAR; INTRAVENOUS; SUBCUTANEOUS at 14:56

## 2021-08-20 RX ADMIN — TRANEXAMIC ACID 1000 MG: 1 INJECTION, SOLUTION INTRAVENOUS at 12:22

## 2021-08-20 RX ADMIN — ROCURONIUM BROMIDE 50 MG: 10 INJECTION INTRAVENOUS at 12:13

## 2021-08-20 RX ADMIN — PHENYLEPHRINE HYDROCHLORIDE 80 MCG: 10 INJECTION, SOLUTION INTRAMUSCULAR; INTRAVENOUS; SUBCUTANEOUS at 12:38

## 2021-08-20 RX ADMIN — SODIUM CHLORIDE, POTASSIUM CHLORIDE, SODIUM LACTATE AND CALCIUM CHLORIDE: 600; 310; 30; 20 INJECTION, SOLUTION INTRAVENOUS at 10:16

## 2021-08-20 RX ADMIN — FENTANYL CITRATE 100 MCG: 50 INJECTION, SOLUTION INTRAMUSCULAR; INTRAVENOUS at 11:45

## 2021-08-20 RX ADMIN — DEXAMETHASONE SODIUM PHOSPHATE 4 MG: 4 INJECTION, SOLUTION INTRAMUSCULAR; INTRAVENOUS at 12:41

## 2021-08-20 RX ADMIN — FENTANYL CITRATE 100 MCG: 50 INJECTION, SOLUTION INTRAMUSCULAR; INTRAVENOUS at 12:10

## 2021-08-20 ASSESSMENT — PULMONARY FUNCTION TESTS
PIF_VALUE: 20
PIF_VALUE: 21
PIF_VALUE: 1
PIF_VALUE: 2
PIF_VALUE: 20
PIF_VALUE: 21
PIF_VALUE: 20
PIF_VALUE: 1
PIF_VALUE: 21
PIF_VALUE: 20
PIF_VALUE: 21
PIF_VALUE: 21
PIF_VALUE: 20
PIF_VALUE: 21
PIF_VALUE: 2
PIF_VALUE: 21
PIF_VALUE: 20
PIF_VALUE: 20
PIF_VALUE: 21
PIF_VALUE: 2
PIF_VALUE: 21
PIF_VALUE: 21
PIF_VALUE: 1
PIF_VALUE: 20
PIF_VALUE: 21
PIF_VALUE: 3
PIF_VALUE: 20
PIF_VALUE: 21
PIF_VALUE: 27
PIF_VALUE: 21
PIF_VALUE: 20
PIF_VALUE: 9
PIF_VALUE: 20
PIF_VALUE: 20
PIF_VALUE: 21
PIF_VALUE: 21
PIF_VALUE: 19
PIF_VALUE: 21
PIF_VALUE: 21
PIF_VALUE: 20
PIF_VALUE: 24
PIF_VALUE: 21
PIF_VALUE: 20
PIF_VALUE: 19
PIF_VALUE: 20
PIF_VALUE: 21
PIF_VALUE: 22
PIF_VALUE: 21
PIF_VALUE: 22
PIF_VALUE: 31
PIF_VALUE: 21
PIF_VALUE: 25
PIF_VALUE: 21
PIF_VALUE: 20
PIF_VALUE: 22
PIF_VALUE: 22
PIF_VALUE: 23
PIF_VALUE: 21
PIF_VALUE: 22
PIF_VALUE: 20
PIF_VALUE: 21
PIF_VALUE: 21
PIF_VALUE: 20
PIF_VALUE: 21
PIF_VALUE: 21
PIF_VALUE: 19
PIF_VALUE: 21
PIF_VALUE: 20
PIF_VALUE: 11
PIF_VALUE: 21
PIF_VALUE: 22
PIF_VALUE: 1
PIF_VALUE: 21
PIF_VALUE: 21
PIF_VALUE: 1
PIF_VALUE: 21
PIF_VALUE: 20
PIF_VALUE: 21
PIF_VALUE: 11
PIF_VALUE: 21
PIF_VALUE: 2
PIF_VALUE: 22
PIF_VALUE: 21
PIF_VALUE: 8
PIF_VALUE: 20
PIF_VALUE: 21
PIF_VALUE: 21
PIF_VALUE: 20
PIF_VALUE: 9
PIF_VALUE: 21
PIF_VALUE: 20
PIF_VALUE: 34
PIF_VALUE: 20
PIF_VALUE: 21
PIF_VALUE: 21
PIF_VALUE: 0
PIF_VALUE: 28
PIF_VALUE: 22
PIF_VALUE: 21
PIF_VALUE: 21
PIF_VALUE: 19
PIF_VALUE: 11
PIF_VALUE: 47
PIF_VALUE: 20
PIF_VALUE: 21
PIF_VALUE: 31
PIF_VALUE: 22
PIF_VALUE: 21
PIF_VALUE: 23
PIF_VALUE: 21
PIF_VALUE: 20
PIF_VALUE: 21
PIF_VALUE: 20
PIF_VALUE: 21
PIF_VALUE: 20
PIF_VALUE: 20
PIF_VALUE: 21
PIF_VALUE: 20
PIF_VALUE: 8
PIF_VALUE: 19
PIF_VALUE: 20
PIF_VALUE: 21
PIF_VALUE: 9
PIF_VALUE: 21
PIF_VALUE: 1
PIF_VALUE: 20
PIF_VALUE: 21
PIF_VALUE: 20
PIF_VALUE: 21
PIF_VALUE: 20
PIF_VALUE: 21
PIF_VALUE: 20
PIF_VALUE: 19
PIF_VALUE: 23
PIF_VALUE: 21
PIF_VALUE: 28
PIF_VALUE: 9
PIF_VALUE: 26
PIF_VALUE: 20
PIF_VALUE: 21
PIF_VALUE: 21
PIF_VALUE: 20
PIF_VALUE: 22
PIF_VALUE: 20
PIF_VALUE: 21
PIF_VALUE: 20
PIF_VALUE: 20
PIF_VALUE: 21
PIF_VALUE: 64
PIF_VALUE: 21
PIF_VALUE: 3
PIF_VALUE: 20
PIF_VALUE: 20

## 2021-08-20 ASSESSMENT — PAIN SCALES - GENERAL
PAINLEVEL_OUTOF10: 1
PAINLEVEL_OUTOF10: 0
PAINLEVEL_OUTOF10: 1
PAINLEVEL_OUTOF10: 5
PAINLEVEL_OUTOF10: 0
PAINLEVEL_OUTOF10: 3
PAINLEVEL_OUTOF10: 3
PAINLEVEL_OUTOF10: 4
PAINLEVEL_OUTOF10: 5

## 2021-08-20 ASSESSMENT — PAIN DESCRIPTION - LOCATION
LOCATION: HIP

## 2021-08-20 ASSESSMENT — PAIN DESCRIPTION - DESCRIPTORS
DESCRIPTORS: ACHING
DESCRIPTORS: ACHING;SHARP
DESCRIPTORS: ACHING;SHARP

## 2021-08-20 ASSESSMENT — PAIN DESCRIPTION - FREQUENCY
FREQUENCY: CONTINUOUS

## 2021-08-20 ASSESSMENT — PAIN - FUNCTIONAL ASSESSMENT: PAIN_FUNCTIONAL_ASSESSMENT: 0-10

## 2021-08-20 ASSESSMENT — PAIN DESCRIPTION - ORIENTATION
ORIENTATION: LEFT

## 2021-08-20 ASSESSMENT — PAIN DESCRIPTION - ONSET
ONSET: ON-GOING

## 2021-08-20 NOTE — PROGRESS NOTES
Started with video interpretor until [employer] arrived at 0985. Pt. Also speaks fairly good English, but the interpretor will stay.

## 2021-08-20 NOTE — PROGRESS NOTES
Dr Katarina Brown in to perform left PENG block. Timeout performed. Pt administered oxygen via nasal cannula at 2 LPM. Anesthesia per Dr. Katarina Brown. VS stable. Pt tolerated procedure well. Bed in lowest position and side rails up, call light within reach.  present.

## 2021-08-20 NOTE — ANESTHESIA PROCEDURE NOTES
Peripheral Block    Patient location during procedure: pre-op  Staffing  Performed: anesthesiologist   Anesthesiologist: Thomas Singletary DO  Preanesthetic Checklist  Completed: patient identified, IV checked, site marked, risks and benefits discussed, surgical consent, monitors and equipment checked, pre-op evaluation, timeout performed, anesthesia consent given, oxygen available and patient being monitored  Peripheral Block  Patient position: supine  Prep: ChloraPrep  Patient monitoring: cardiac monitor, continuous pulse ox, continuous capnometry, frequent blood pressure checks and IV access  Block type: PENG  Laterality: left  Injection technique: single-shot  Guidance: ultrasound guided  Needle  Needle gauge: 22 G  Needle localization: anatomical landmarks and ultrasound guidance  Assessment  Injection assessment: negative aspiration for heme, no paresthesia on injection and local visualized surrounding nerve on ultrasound  Paresthesia pain: none  Slow fractionated injection: yes  Hemodynamics: stable  Additional Notes  Sterile prep. 2 mg versed + 100 micrograms fentanyl administered. 30 mL 0.5% Ropivacaine injected in 5 mL increments around the psoas tenson following negative aspiration. Tip of needle in view at all times. No pain or paresthesias on injection. Pt tolerated the procedure well.    Reason for block: procedure for pain, post-op pain management and at surgeon's request

## 2021-08-20 NOTE — PROGRESS NOTES
Patients wife, Rosie Gutiérrez, called with patient update. She is waiting in her car in the parking lot at this time.

## 2021-08-20 NOTE — OP NOTE
Operative Note      Patient: Itzel Pete  YOB: 1990  MRN: [id number]    Date of Procedure: 8/20/2021    Pre-Op Diagnosis: Tear of left acetabular labrum, subsequent encounter [S73.192D] Femoroacetabular impingement with osteophyte of left hip [M25.852, M25.752]    Post-Op Diagnosis: Same       Procedure(s):  LEFT HIP ARTHROSCOPY LABRAL REPAIR, ACETABULOPLASTY AND OSTEOCHONDROPLASTY, FRACTIONAL PSOAS LENGTHENING    Surgeon(s):  MD Lance Jorge MD    Assistant:   Surgical Assistant:   Dorothey Ganser; Yuridia Castorena; Anna Morgan    Anesthesia: General    Estimated Blood Loss (mL): Minimal    Complications: None    Specimens:   * No specimens in log *    Implants:  * No implants in log *      Drains: * No LDAs found *    Findings:Detailed Description of Procedure:     Findings:  Extensive Anterior Superior to Posterior to Posterior Inferior Labral tear from 3 O'Clock , 1 O'Clock, 11 O'clock and 9 O'Clock. Extensive Synovitis. Severe PINCER IMPINGEMENT. Moderate CAM IMPINGEMENT    Injection of 3'Oclock labrum with adjacent capsulitis. Prominent posterior pincer at 12 to 6' O'clock. Head-Neck junction prominence consistent with CAM VERONICA. Clinical Note:  Shena Duncan is a 80-year-old who has been having anterior AND posterior hip pain on the left side for the past 1 year. The patient was ultimately diagnosed with femoral acetabular impingement and had an MRI proven labral tear of their left hip hip. The patient had failed extensive nonoperative treatment including physical therapy, activity modification and, anti-inflammatory medicine. Despite that symptoms have persisted. The patient had reproducible pain in the hip on physical exam with deep flexion and flexion internal rotation, and also with posterior impingement testing. Imaging that confirmed a labral tear due to VERONICA, and relief with an intra-articular injection.   This allowed Shena Duncan to be a candidate for surgery in the form of LEFT hip arthroscopy labral repair, acetabuloplasty and osteochondralplasty. Risks, benefits, advantages, disadvantages and potential complications as well as the anticipated postoperative course were discussed. The patient agreed to pursue this treatment option. All questions were addressed to their satisfaction. Detailed Description of Procedure:   Lisa Cabral was met outside the operative theater. Consent was signed and questions were answered and the LEFT side was confirmed as the correct operative side. Anaesthesia administered a PENG block (peripheral nerve group) block that has no femoral nerve motor blockade. The patient was then brought back to the operative room in stable condition. The patient had a general anesthetic. Following this, the patient received intravenous antibiotics and was then transferred to the operative table. Weight-based IV TXA was also given pre-incision for intra-operative hemostasis. Traction was then applied to the operative hip and this was deemed to be excellent for hip arthroscopy. The left lower extremity was then prepped and draped in the usual sterile fashion. Access & Diagnostic Scope: An anterolateral portal was then established using Seldinger technique under x-ray guidance. Under direct visualization a mid anterior portal was then established anteriorly. Periportal capsulotomies were completed. A diagnostic arthroscopy revealed: Ful thickness labral  tear between 3 O'Clock to 9 O'Clock with gross instability and capsular sided capsulitis/extensive synovitis. The chondrolabral junction was intact was minimally softened, the femoral head was intact. The ligamentum teres was normal. Labrum size measured ro approximately 8mm, larger than average. No loose bodies. Central Compartment Work:  We decided to proceed with an extensive acetabuloplasty of the pincer lesion, and as well we planned for labral stabilization surgery at those involved segments. Using ablator soft tissue was then elevated off the anterior superior and posterior superior acetabular rim at the segments of the tear being careful not to truncate any of the labrum. Under x-ray guidance, a 5.5 mm motorized lorena edla was used to perform a pincer decompression, by approximately 3-4 mm, equating to a 3-4 degree LCEA radiographic correction edu fluoroscopy. Suture anchors were then inserted first at the 1 o'clock position and then at the 11 o'clock position, and then 9 o'clock. All three anchors were 1.4 mm [employer] ([employer]) single loaded anchors. Using a combination of Bird-Beak Kaylen pass, a looped technique around the  labrum was used to refixate it back down to the area on the acetabular rim. A revo- sliding knot with 3 alternating half hitches was used to complete the repair. This exact same repair was then repeated at 3 O'clock given the extension of the tear to that territory with an injected labrum. Each anchor had good purchase. Once labral repair was completed, cautery was used in a careful and limited manner to ablate the injected parts of the labrum and capsule. Stability was then confirmed using hook instrument at the site of the repair. This was satisfactory. Given the injected labrum at 3'oclock consistent with a tight psoas tendon, a fractional lengthening was performed in a diagonal checkered pattern , giving adequate lengthening while avoiding a complete release. Peripheral compartment work: At this point a distal anterolateral portal was created and a T capsulotomy showing the head neck junction almost 1.5  cm in length was created. A moderate size impingement lesion was found anteriorly and nikolas-laterally. An osteochondroplasty was completed using a elda at the head neck junction. On the AP plane correction was obtained.  Once AP correction was obtained the hip was released from traction at approximately 1 hour : 39 minutes and flexed and externally rotated to get

## 2021-08-20 NOTE — ANESTHESIA PRE PROCEDURE
Department of Anesthesiology  Preprocedure Note       Name:  Carrington Grimes   Age:  27 y.o.  :  1990                                          MRN:  [id number]         Date:  2021      Surgeon: Rachel Spring):  Yesica Rivas MD    Procedure: Procedure(s):  LEFT HIP ARTHROSCOPY LABRAL REPAIR, ACETABULOPLASTY AND OSTEOCHONDROPLASTY    Medications prior to admission:   Prior to Admission medications    Medication Sig Start Date End Date Taking? Authorizing Provider   traMADol (ULTRAM) 50 MG tablet Take 1 tablet by mouth every 6 hours as needed for Pain for up to 5 days. 21 Yes Yesica Rivas MD   gabapentin (NEURONTIN) 100 MG capsule TAKE 1 CAPSULE BY MOUTH TWICE DAILY FOR 30 DAYS 21 Yes Yesica Rivas MD   diclofenac (VOLTAREN) 50 MG EC tablet Take 1 tablet by mouth 2 times daily 21  Yes Yesica Rivas MD   budesonide-formoterol (SYMBICORT) 80-4.5 MCG/ACT AERO Inhale 2 puffs into the lungs 2 times daily 20  Yes Janice Rod MD   FLUoxetine (PROZAC) 20 MG capsule Take 20 mg by mouth daily   Yes Historical Provider, MD   montelukast (SINGULAIR) 10 MG tablet TK 1 T PO QPM 3/2/20  Yes Historical Provider, MD   VENTOLIN  (90 Base) MCG/ACT inhaler INHALE 2 PUFFS INTO LUNGS Q 4 TO 6 H PRF BRONCOSPASMS 20  Yes Historical Provider, MD   cetirizine (ZYRTEC) 10 MG tablet TK 1 T PO QD 20  Yes Historical Provider, MD   budesonide (RHINOCORT AQUA) 32 MCG/ACT nasal spray 2 sprays by Each Nostril route 2 times daily 3/12/20  Yes Jonas Owusu MD   aspirin EC 81 MG EC tablet Take 1 tablet by mouth 2 times daily for 14 days 21  Yesica Rivas MD   naproxen (NAPROSYN) 500 MG tablet Take 1 tablet by mouth 2 times daily (with meals) for 14 days 21  Yesica Rivas MD   oxyCODONE-acetaminophen (PERCOCET) 5-325 MG per tablet Take 1 tablet by mouth every 6 hours as needed for Pain for up to 5 days.  21  Yesica Rivas MD senna (SENOKOT) 8.6 MG TABS tablet Take 1 tablet by mouth 2 times daily for 7 days 8/16/21 8/23/21  Paul Guo MD   naproxen (NAPROSYN) 250 MG tablet Take 1 tablet by mouth 2 times daily (with meals) 11/23/20   Janice Borja MD       Current medications:    Current Facility-Administered Medications   Medication Dose Route Frequency Provider Last Rate Last Admin    ceFAZolin (ANCEF) 2000 mg in dextrose 5 % 50 mL IVPB  2,000 mg Intravenous Once Paul Guo MD        tranexamic acid (CYKLOKAPRON) 1,000 mg in sodium chloride 0.9 % 50 mL IVPB  1,000 mg Intravenous Once Paul Guo MD        sodium chloride flush 0.9 % injection 10 mL  10 mL Intravenous 2 times per day Magdalene Einstein, DO        sodium chloride flush 0.9 % injection 10 mL  10 mL Intravenous PRN Magdalene Einstein, DO        0.9 % sodium chloride infusion  25 mL Intravenous PRN Magdalene Einstein, DO        0.9 % sodium chloride infusion   Intravenous Continuous Magdalene Einstein, DO        lactated ringers infusion   Intravenous Continuous Magdalene Einstein,  mL/hr at 08/20/21 1016 New Bag at 08/20/21 1016    midazolam (VERSED) 2 MG/2ML injection             fentaNYL (SUBLIMAZE) 100 MCG/2ML injection             ropivacaine (NAROPIN) 0.5% injection                Allergies:  No Known Allergies    Problem List:    Patient Active Problem List   Diagnosis Code    Asthma J45.909    Anxiety F41.9    Nausea & vomiting R11.2    Headache R51.9    Dizziness R42    Acute metabolic encephalopathy C46.52    Leukocytosis D72.829    Diarrhea R19.7    Abnormal CT of the head R93.0    Septicemia (HCC) A41.9    Subarachnoid cyst G93.0    Lactic acid acidosis E87.2    Arachnoid cyst G93.0    Overweight (BMI 25.0-29. 9) E66.3    Cannabis abuse F12.10    Abnormal MRI, cervical spine R93.7       Past Medical History:        Diagnosis Date    Anxiety     Asthma     Chronic back pain        Past Surgical History:        Procedure Laterality Date    MEDICATION INJECTION      Epidural        Social History:    Social History     Tobacco Use    Smoking status: Never Smoker    Smokeless tobacco: Never Used   Substance Use Topics    Alcohol use: Not Currently                                Counseling given: Not Answered      Vital Signs (Current):   Vitals:    08/20/21 0930 08/20/21 1100 08/20/21 1141   BP: 128/74 125/66 123/72   Pulse: 74 67 76   Resp: 16 16 20   Temp: 97.9 °F (36.6 °C)     TempSrc: Oral     SpO2: 99% 100% 100%   Weight: 170 lb (77.1 kg)     Height: 5' 10\" (1.778 m)                                                BP Readings from Last 3 Encounters:   08/20/21 123/72   11/23/20 117/75   03/12/20 110/76       NPO Status: Time of last liquid consumption: 2030                        Time of last solid consumption: 2030                        Date of last liquid consumption: 08/19/21                        Date of last solid food consumption: 08/19/21    BMI:   Wt Readings from Last 3 Encounters:   08/20/21 170 lb (77.1 kg)   08/16/21 169 lb (76.7 kg)   07/20/21 169 lb 1.5 oz (76.7 kg)     Body mass index is 24.39 kg/m². CBC:   Lab Results   Component Value Date    WBC 8.0 11/23/2020    RBC 5.12 11/23/2020    HGB 15.4 11/23/2020    HCT 45.9 11/23/2020    MCV 89.7 11/23/2020    RDW 13.3 11/23/2020     11/23/2020       CMP:   Lab Results   Component Value Date     11/23/2020    K 4.1 11/23/2020     11/23/2020    CO2 32 11/23/2020    BUN 8 11/23/2020    CREATININE 0.8 11/23/2020    GFRAA >60 11/23/2020    LABGLOM >60 11/23/2020    GLUCOSE 108 11/23/2020    PROT 8.8 11/18/2020    CALCIUM 9.8 11/23/2020    BILITOT 0.4 11/18/2020    ALKPHOS 109 11/18/2020    AST 27 11/18/2020    ALT 25 11/18/2020       POC Tests: No results for input(s): POCGLU, POCNA, POCK, POCCL, POCBUN, POCHEMO, POCHCT in the last 72 hours.     Coags: No results found for: PROTIME, INR, APTT    HCG (If Applicable): No results found for: PREGTESTUR, PREGSERUM, HCG, HCGQUANT ABGs: No results found for: PHART, PO2ART, ZQT1PMN, XKF9USV, BEART, M3MCRKJY     Type & Screen (If Applicable):  No results found for: LABABO, LABRH    Drug/Infectious Status (If Applicable):  No results found for: HIV, HEPCAB    COVID-19 Screening (If Applicable):   Lab Results   Component Value Date    COVID19 Not Detected 08/20/2021    COVID19 Not Detected 11/18/2020           Anesthesia Evaluation  Patient summary reviewed and Nursing notes reviewed no history of anesthetic complications:   Airway: Mallampati: I  TM distance: >3 FB   Neck ROM: full  Mouth opening: > = 3 FB Dental: normal exam         Pulmonary: breath sounds clear to auscultation  (+) asthma:                            Cardiovascular:Negative CV ROS            Rhythm: regular  Rate: normal                    Neuro/Psych:   (+) headaches:,             GI/Hepatic/Renal: Neg GI/Hepatic/Renal ROS            Endo/Other: Negative Endo/Other ROS                    Abdominal:             Vascular: Other Findings:             Anesthesia Plan      general and regional     ASA 2     (PENG block PSR)  Induction: intravenous. MIPS: Prophylactic antiemetics administered. Anesthetic plan and risks discussed with patient. Plan discussed with CRNA.                   Rosalio Tilley DO   8/20/2021

## 2021-08-20 NOTE — ANESTHESIA POSTPROCEDURE EVALUATION
Department of Anesthesiology  Postprocedure Note    Patient: Oliva Felton  MRN: [id number]  YOB: 1990  Date of evaluation: 8/20/2021  Time:  5:27 PM     Procedure Summary     Date: 08/20/21 Room / Location: [address]    Anesthesia Start: 1210 Anesthesia Stop: 7478    Procedure: LEFT HIP ARTHROSCOPY LABRAL REPAIR, ACETABULOPLASTY AND OSTEOCHONDROPLASTY, FRACTIONAL PSOAS LENGTHENING (Left ) Diagnosis:       Tear of left acetabular labrum, subsequent encounter      Femoroacetabular impingement with osteophyte of left hip      (Tear of left acetabular labrum, subsequent encounter [S73.192D] Femoroacetabular impingement with osteophyte of left hip [S56.311, M25.752])    Surgeons: Shital Silverio MD Responsible Provider: Angela Huerta MD    Anesthesia Type: general, regional ASA Status: 2          Anesthesia Type: general, regional    Jamie Phase I: Jamie Score: 10    Jamie Phase II:      Last vitals: Reviewed and per EMR flowsheets.        Anesthesia Post Evaluation    Patient location during evaluation: PACU  Patient participation: complete - patient participated  Level of consciousness: awake and alert  Pain score: 4  Airway patency: patent  Nausea & Vomiting: no nausea and no vomiting  Complications: no  Cardiovascular status: hemodynamically stable  Respiratory status: acceptable  Hydration status: euvolemic

## 2021-08-20 NOTE — PROGRESS NOTES
PACU Transfer to [facility]    Vitals:    08/20/21 1700   BP: 120/74   Pulse: 97   Resp: 10   Temp: 98.5 °F (36.9 °C)   SpO2: 95%         Intake/Output Summary (Last 24 hours) at 8/20/2021 1701  Last data filed at 8/20/2021 1657  Gross per 24 hour   Intake 2220 ml   Output 50 ml   Net 2170 ml       Pain assessment:  present - adequately treated  Pain Level: 4    Patient became nauseated prior to tx to [facility] bed 6, Reglan 10mg IV given per orders. Patient transferred to care of [facility] RN with chart, discharge instructions and ice machine. Patients wife, Souleymane Dumont, called with patient transfer.      8/20/2021 5:01 PM

## 2021-08-20 NOTE — H&P
Neil Basil    [id number]    [facility] Same Day Surgery Update H & P  Department of General Surgery   Surgical Service   Pre-operative History and Physical  Last H & P within the last 30 days. DIAGNOSIS:   Tear of left acetabular labrum, subsequent encounter [S73.172D]  Femoroacetabular impingement with osteophyte of left hip [M25.852, M25.752]    Procedure(s):  LEFT HIP ARTHROSCOPY LABRAL REPAIR, ACETABULOPLASTY AND OSTEOCHONDROPLASTY     History obtained from: Patient interview and EHR. Update was completed with the aid of interpretor     HISTORY OF PRESENT ILLNESS:   Patient with left hip pain and limited ROM. The symptoms have been recalcitrant to conservative treatment and the patient presents today for the above procedure. Covid 19:  Patient denies fever, chills, worsening cough, or known exposure to Covid-19. Past Medical History:        Diagnosis Date    Anxiety     Asthma     Chronic back pain      Past Surgical History:        Procedure Laterality Date    MEDICATION INJECTION      Epidural      Past Social History:  Social History     Socioeconomic History    Marital status:      Spouse name: None    Number of children: None    Years of education: None    Highest education level: None   Occupational History    None   Tobacco Use    Smoking status: Never Smoker    Smokeless tobacco: Never Used   Substance and Sexual Activity    Alcohol use: Not Currently    Drug use: Not Currently    Sexual activity: None   Other Topics Concern    None   Social History Narrative    None     Social Determinants of Health     Financial Resource Strain:     Difficulty of Paying Living Expenses:    Food Insecurity:     Worried About Running Out of Food in the Last Year:     Ran Out of Food in the Last Year:    Transportation Needs:     Lack of Transportation (Medical):      Lack of Transportation (Non-Medical):    Physical Activity:     Days of Exercise per Week:     Minutes of Exercise per Session:    Stress:     Feeling of Stress :    Social Connections:     Frequency of Communication with Friends and Family:     Frequency of Social Gatherings with Friends and Family:     Attends Alevism Services:     Active Member of Clubs or Organizations:     Attends Club or Organization Meetings:     Marital Status:    Intimate Partner Violence:     Fear of Current or Ex-Partner:     Emotionally Abused:     Physically Abused:     Sexually Abused:          Medications Prior to Admission:      Prior to Admission medications    Medication Sig Start Date End Date Taking? Authorizing Provider   traMADol (ULTRAM) 50 MG tablet Take 1 tablet by mouth every 6 hours as needed for Pain for up to 5 days.  8/16/21 8/21/21 Yes Ulises Iraheta MD   gabapentin (NEURONTIN) 100 MG capsule TAKE 1 CAPSULE BY MOUTH TWICE DAILY FOR 30 DAYS 7/22/21 8/21/21 Yes Ulises Iraheta MD   diclofenac (VOLTAREN) 50 MG EC tablet Take 1 tablet by mouth 2 times daily 6/21/21  Yes Ulises Iraheta MD   budesonide-formoterol (SYMBICORT) 80-4.5 MCG/ACT AERO Inhale 2 puffs into the lungs 2 times daily 11/23/20  Yes Essie German MD   FLUoxetine (PROZAC) 20 MG capsule Take 20 mg by mouth daily   Yes Historical Provider, MD   montelukast (SINGULAIR) 10 MG tablet TK 1 T PO QPM 3/2/20  Yes Historical Provider, MD   VENTOLIN  (90 Base) MCG/ACT inhaler INHALE 2 PUFFS INTO LUNGS Q 4 TO 6 H PRF BRONCOSPASMS 2/21/20  Yes Historical Provider, MD   cetirizine (ZYRTEC) 10 MG tablet TK 1 T PO QD 1/28/20  Yes Historical Provider, MD   budesonide (RHINOCORT AQUA) 32 MCG/ACT nasal spray 2 sprays by Each Nostril route 2 times daily 3/12/20  Yes Kaity Meyers MD   aspirin EC 81 MG EC tablet Take 1 tablet by mouth 2 times daily for 14 days 8/16/21 8/30/21  Ulises Iraheta MD   naproxen (NAPROSYN) 500 MG tablet Take 1 tablet by mouth 2 times daily (with meals) for 14 days 8/16/21 8/30/21  Ulises Iraheta MD   oxyCODONE-acetaminophen (PERCOCET) 5-325 MG per tablet Take 1 tablet by mouth every 6 hours as needed for Pain for up to 5 days. 8/16/21 8/21/21  Brian Liu MD   senna (SENOKOT) 8.6 MG TABS tablet Take 1 tablet by mouth 2 times daily for 7 days 8/16/21 8/23/21  Brian Liu MD   naproxen (NAPROSYN) 250 MG tablet Take 1 tablet by mouth 2 times daily (with meals) 11/23/20   Stew Muñoz MD         Allergies:  Patient has no known allergies. PHYSICAL EXAM:      /74   Pulse 74   Temp 97.9 °F (36.6 °C) (Oral)   Resp 16   Ht 5' 10\" (1.778 m)   Wt 170 lb (77.1 kg)   SpO2 99%   BMI 24.39 kg/m²      Airway:  Airway patent with no audible stridor    Heart:  Regular rate and rhythm, No murmur noted    Lungs:  No increased work of breathing, good air exchange, clear to auscultation bilaterally, no crackles or wheezing    Abdomen:  Soft, non-distended, non-tender, no masses palpated    ASSESSMENT AND PLAN    Patient is a 27 y.o. male with above specified procedure planned. 1.  The patients history and physical was obtained and signed off by the pre-admission testing department. Patient seen and focused exam done today- no new changes since last physical exam on 8/16/21    2. Access to ancillary services are available per request of the provider.     Haritha Cannon, GALEN - CNP     8/20/2021

## 2021-08-20 NOTE — PROGRESS NOTES
Admitted to pacu at this time. Sedated with OA airway in place. Breath sounds are wheezy and tight. HOB elevated at 20 degrees. NRB mask on and resp tx called for. Dr. Yris Hooper at bedside. Report given by CRNA. LT hip brace on and ice machine connected.

## 2021-08-20 NOTE — PROGRESS NOTES
Ambulatory Surgery/Procedure Discharge Note    Vitals:    08/20/21 1720   BP: 118/76   Pulse: 89   Resp: 16   Temp: 97.8 °F (36.6 °C)   SpO2: 95%       In: 1220 [P.O.:50; I.V.:1170]  Out: 50     Restroom use offered before discharge. Yes    Pain assessment:  level of pain (1-10, 10 severe)  Pain Level: 3, pt states pain tolerable for discharge  Pt to [facility] post left hip arthroscopy labral repair, acetabuloplasty and osteochondroplasty. Brace in place, ice man machine pad to site. Pt c/o surgical pain 3/10, pt states pain tolerable for discharge. PO fluids refused per pt request. Pt denies nausea at this time. Discharge instructions given to pt's wife and she states understanding of these instructions. Pt states that he is \" ready to go. \"         Patient discharged to home/self care.  Patient discharged via wheel chair by transporter to waiting family/S.O.       8/20/2021 6:02 PM

## 2021-08-20 NOTE — PROGRESS NOTES
PATIENT HAD ANKLE BRACELETS ON OPERATIVE LEG. SURGEON DID HAVE TO CUT BRACELETS OFF. THEY WERE WALKED OUT TO WIFE IN WAITING AREA.  WIFE WAS ALSO UPDATED ON HOW PROCEDURE IS GOING

## 2021-08-23 ENCOUNTER — HOSPITAL ENCOUNTER (OUTPATIENT)
Dept: PHYSICAL THERAPY | Age: 31
Setting detail: THERAPIES SERIES
Discharge: HOME | End: 2021-08-23
Payer: COMMERCIAL

## 2021-08-23 ENCOUNTER — OFFICE VISIT (OUTPATIENT)
Dept: ORTHOPEDIC SURGERY | Age: 31
End: 2021-08-23

## 2021-08-23 VITALS — WEIGHT: 170 LBS | BODY MASS INDEX: 25.18 KG/M2 | HEIGHT: 69 IN | RESPIRATION RATE: 12 BRPM

## 2021-08-23 DIAGNOSIS — Z98.890 STATUS POST ARTHROSCOPY OF HIP: Primary | ICD-10-CM

## 2021-08-23 PROCEDURE — 97161 PT EVAL LOW COMPLEX 20 MIN: CPT

## 2021-08-23 PROCEDURE — 97140 MANUAL THERAPY 1/> REGIONS: CPT

## 2021-08-23 PROCEDURE — 97110 THERAPEUTIC EXERCISES: CPT

## 2021-08-23 PROCEDURE — 99024 POSTOP FOLLOW-UP VISIT: CPT | Performed by: ORTHOPAEDIC SURGERY

## 2021-08-23 NOTE — PLAN OF CARE
[facility]  [address] [place] [address]  Phone [phone]   Fax [phone]                                                       Physical Therapy Certification    Dear Uma Teresa MD  ,    We had the pleasure of evaluating the following patient for physical therapy services at [address] [place] [address]. A summary of our findings can be found in the initial assessment below. This includes our plan of care. If you have any questions or concerns regarding these findings, please do not hesitate to contact me at the office phone number checked above.   Thank you for the referral.       Physician Signature:_______________________________Date:__________________  By signing above (or electronic signature), therapists plan is approved by physician      Patient: Khushbu Giron   : 1990   MRN: [id number]  Referring Physician: Referring Practitioner: Uma Teresa MD                                                Evaluation Date: 2021                                         Medical Diagnosis Information:  T74.135 (ICD-10-CM) - Other specified joint disorders, left hip   S73.192D (ICD-10-CM) - Other sprain of left hip, subsequent encounter                                   Insurance information:  Insurance Information: [facility]       Precautions/ Contra-indications: DOS 21 LEFT HIP ARTHROSCOPY LABRAL REPAIR, ACETABULOPLASTY AND OSTEOCHONDROPLASTY    C-SSRS Triggered by Intake questionnaire (Past 2 wk assessment):   [x] No, Questionnaire did not trigger screening.   [] Yes, Patient intake triggered further evaluation      [] C-SSRS Screening completed  [] PCP notified via Plan of Care  [] Emergency services notified     Latex Allergy:  [x]NO      []YES  Preferred Language for Healthcare:   [x]English       []other:    SUBJECTIVE: Patient stated complaint: Patient underwent surgical intervention on 8/20/21. Patient reports that since, pain has been severe and he has been laying in bed. Patient reports he has not taken brace off since surgery. Patient reports ice and medication use for pain. Patient reports incision is healing well without discharge or fever. Patient reports he is ambulating with crutches well and maintaining precautions. Relevant Medical History:anxiety, depression   Functional Disability Index: LEFS 14/80    Pain Scale: 7-9/10  Easing factors: rest and ice  Provocative factors: ambulating, standing, squatting, all movements of L LE,  and prolonged sitting     Type: [x]Constant   []Intermittent  []Radiating [x]Localized []other:     Numbness/Tingling: Patient reports numbness into anterior groin. Occupation/School:       Living Status/Prior Level of Function: Independent with ADLs and IADLs, walking multiple miles per day at work. OBJECTIVE:     ROM (also see below) LEFT RIGHT   HIP Flex 43 120   HIP Abd     HIP Ext     HIP IR WNL WNL   HIP ER 5 WNL            Functional Testing  Sx Date 8/16/21 Prehab Date 8/16/21 Post-op Re-Eval Date  4 week F/U date  8 week F/U date  D/C Date       TUG (sec) 12.12  NT due to pain      30 second sit to stand (reps) 6 NT due to pain      6 minute walk (m) 195.35 NT due to pain         Balance:    Narrowed NEELA (sec) 30 NT due to pain      Semi Tandem (sec) 30 NT due to pain      Tandem (sec) 16.60 NT due to pain      SLS (sec) 5.0 NT due to pain         Knee AROM L R L R L R L R L R   Flexion 115 132 76          Extension 0 0 0             Knee Ext: L R L R L R L R L R   MMT (of 5)             Knee Ext (#) 15.8 45.5 NT due to pain             Hip Abd:  L R L R L R L R L R   MMT (of 5)             Hip Abd (#) 9.5 24.4 NT due to pain/post -op NT due to pain            LEFS (raw) 14/80 0/80      OV entered              Balance: See above.        Reflexes/Sensation: [x]Dermatomes/Myotomes intact    [x]Reflexes equal and normal bilaterally   []Other:    Joint mobility:  Joint mobility of the L hip reduced due to pain . []Normal    [x]Hypo   []Hyper    Palpation:     Functional Mobility/Transfers: Patient reports pain with ambulating, standing, squatting, and prolonged sitting. Posture: Unremarkable     Bandages/Dressings/Incisions:     Gait: (include devices/WB status) Patient currently using bilateral axillary crutches with supportive hip brace. Patient is doing well maintain 20 lb weight restriction. Orthopedic Special Tests:                        [x] Patient history, allergies, meds reviewed. Medical chart reviewed. See intake form. Review Of Systems (ROS):  [x]Performed Review of systems (Integumentary, CardioPulmonary, Neurological) by intake and observation. Intake form has been scanned into medical record. Patient has been instructed to contact their primary care physician regarding ROS issues if not already being addressed at this time.       Co-morbidities/Complexities (which will affect course of rehabilitation):   []None           Arthritic conditions   []Rheumatoid arthritis (M05.9)  []Osteoarthritis (M19.91)   Cardiovascular conditions   []Hypertension (I10)  []Hyperlipidemia (E78.5)  []Angina pectoris (I20)  []Atherosclerosis (I70)   Musculoskeletal conditions   []Disc pathology   []Congenital spine pathologies   []Prior surgical intervention  []Osteoporosis (M81.8)  []Osteopenia (M85.8)   Endocrine conditions   []Hypothyroid (E03.9)  []Hyperthyroid Gastrointestinal conditions   []Constipation (F71.15)   Metabolic conditions   []Morbid obesity (E66.01)  []Diabetes type 1(E10.65) or 2 (E11.65)   []Neuropathy (G60.9)     Pulmonary conditions   []Asthma (J45)  []Coughing   []COPD (J44.9)   Psychological Disorders  [x]Anxiety (F41.9)  [x]Depression (F32.9)   []Other:   []Other:          Barriers to/and or personal factors that will affect rehab potential:              []Age  []Sex              []Motivation/Lack of Motivation                        []Co-Morbidities              []Cognitive Function, education/learning barriers              []Environmental, home barriers              []profession/work barriers  []past PT/medical experience  []other:  Justification:     Falls Risk Assessment (30 days):   [x] Falls Risk assessed and no intervention required. [] Falls Risk assessed and Patient requires intervention due to being higher risk   TUG score (>12s at risk):     [] Falls education provided, including         ASSESSMENT: Patient is a 27year old male who is being seen post-operatively following LEFT HIP ARTHROSCOPY LABRAL REPAIR, ACETABULOPLASTY AND OSTEOCHONDROPLASTY on 8/20/21. Patient was significantly limited by pain during session . Patient presents with post-operative reduced strength and ROM. Patient encourage to begin gentle ROM exercises at home and to focus on pain management until next visit.      Functional Impairments:     [x]Noted lumbar/proximal hip/LE joint hypomobility   [x]Decreased LE functional ROM   [x]Decreased core/proximal hip strength and neuromuscular control   [x]Decreased LE functional strength   [x]Reduced balance/proprioceptive control   []other:      Functional Activity Limitations (from functional questionnaire and intake)   [x]Reduced ability to tolerate prolonged functional positions   [x]Reduced ability or difficulty with changes of positions or transfers between positions   []Reduced ability to maintain good posture and demonstrate good body mechanics with sitting, bending, and lifting   [x]Reduced ability to sleep   [x] Reduced ability or tolerance with driving and/or computer work   [x]Reduced ability to perform lifting, carrying tasks   [x]Reduced ability to squat   [x]Reduced ability to forward bend   [x]Reduced ability to ambulate prolonged functional periods/distances/surfaces   [x]Reduced ability to ascend/descend stairs   [x]Reduced ability to run, hop, cut or jump   []other:    Participation Restrictions   [x]Reduced participation in self care activities   [x]Reduced participation in home management activities   [x]Reduced participation in work activities   [x]Reduced participation in social activities. [x]Reduced participation in sport/recreation activities. Classification :    [x]Signs/symptoms consistent with post-surgical status including decreased ROM, strength and function.    []Signs/symptoms consistent with joint sprain/strain   []Signs/symptoms consistent with patella-femoral syndrome   []Signs/symptoms consistent with knee OA/hip OA   []Signs/symptoms consistent with internal derangement of knee/Hip   []Signs/symptoms consistent with functional hip weakness/NMR control      []Signs/symptoms consistent with tendinitis/tendinosis    []signs/symptoms consistent with pathology which may benefit from Dry needling      []other:      Prognosis/Rehab Potential:      []Excellent   [x]Good    []Fair   []Poor    Tolerance of evaluation/treatment:    []Excellent   [x]Good    []Fair   []Poor    Physical Therapy Evaluation Complexity Justification  [x] A history of present problem with:  [] no personal factors and/or comorbidities that impact the plan of care;  [x]1-2 personal factors and/or comorbidities that impact the plan of care  []3 personal factors and/or comorbidities that impact the plan of care  [x] An examination of body systems using standardized tests and measures addressing any of the following: body structures and functions (impairments), activity limitations, and/or participation restrictions;:  [x] a total of 1-2 or more elements   [] a total of 3 or more elements   [] a total of 4 or more elements   [x] A clinical presentation with:  [x] stable and/or uncomplicated characteristics   [] evolving clinical presentation with changing characteristics  [] unstable and unpredictable characteristics;   [x] Clinical decision making of [x] low, [] moderate, [] high complexity using standardized patient assessment instrument and/or measurable assessment of functional outcome. [x] EVAL (LOW) 90738 (typically 30 minutes face-to-face)  [] EVAL (MOD) 57638 (typically 30 minutes face-to-face)  [] EVAL (HIGH) 56724 (typically 45 minutes face-to-face)  [] RE-EVAL     PLAN:   Frequency/Duration:  1-2 days per week for 10-12 Weeks:  Interventions:  [x]  Therapeutic exercise including: strength training, ROM, for Lower extremity and core   [x]  NMR activation and proprioception for LE, Glutes and Core   [x]  Manual therapy as indicated for LE, Hip and spine to include: Dry Needling/IASTM, STM, PROM, Gr I-IV mobilizations, manipulation. [x] Modalities as needed that may include: thermal agents, E-stim, Biofeedback, US, iontophoresis as indicated  [x] Patient education on joint protection, postural re-education, activity modification, progression of HEP. HEP instruction: See HEP form. GOALS:  Patient stated goal: To experience less pain during walking. [] Progressing: [] Met: [] Not Met: [] Adjusted    Therapist goals for Patient:   Short Term Goals: To be achieved in: 2 weeks  1. Independent in HEP and progression per patient tolerance, in order to prevent re-injury. [] Progressing: [] Met: [] Not Met: [] Adjusted  2. Patient will have a decrease in pain to facilitate improvement in movement, function, and ADLs as indicated by Functional Deficits. [] Progressing: [] Met: [] Not Met: [] Adjusted    Long Term Goals: To be achieved in: 10-12 weeks  1. Disability index score of 50% or less for the LEFS to assist with reaching prior level of function. [] Progressing: [] Met: [] Not Met: [] Adjusted  2. Patient will demonstrate increased AROM to WNL to allow for proper joint functioning as indicated by patients Functional Deficits. [] Progressing: [] Met: [] Not Met: [] Adjusted  3.  Patient will demonstrate an increase in Strength to good proximal hip strength and control, within 5lb HHD in LE to allow for proper functional mobility as indicated by patients Functional Deficits. [] Progressing: [] Met: [] Not Met: [] Adjusted  4. Patient will return to ambulating, standing, squatting, and prolonged sitting, and functional activities without increased symptoms or restriction. [] Progressing: [] Met: [] Not Met: [] Adjusted  5.  To return to walking without pain. (patient specific functional goal)    [] Progressing: [] Met: [] Not Met: [] Adjusted     Electronically signed by:  Clinton Benítez, PT

## 2021-08-23 NOTE — PROGRESS NOTES
History of Present Illness:  Matteo Hawkins is a pleasant 27 y.o. male who presents for a post operative visit. He is 3 days  out following a left hip arthroscopy, labral repair, acetabuloplasty, osteochondroplasty, fractional psoas lengthening, and capsular closure. Overall He is doing okay and feels that their pain is well controlled with current pain medications. He has been compliant with the 20lb flat foot weight bearing instructions and the hip abductor brace. He plans to do physical [facility] at [facility]. He denies fevers, chills, numbness, tingling, and shortness of breath. Medical History:  Patient's medications, allergies, past medical, surgical, social and family histories were reviewed and updated as appropriate. No notes on file    Review of Systems  A 14 point review of systems was completed by the patient and is available in the media section of the scanned medical record and was reviewed on 8/23/2021. Vital Signs:  Vitals:    08/23/21 1136   Resp: 12       General/Appearance: Alert and oriented and in no apparent distress. Skin:  There are no skin lesions, cellulitis, or extreme edema. The patient has warm and well-perfused Bilateral lower  extremities with brisk capillary refill. Hip  Exam:    Inspection: Hip incision(s)  Dressing is clean/dry/intact. Mild ecchymosis and swelling are present as can be expected. There is no erythema, drainage or other signs of infection    Palpation:  No crepitus to gentle motion / some moderate pain with circumduction of the hip    Active Range of Motion: Deferred    Passive Range of Motion: 0 to 70deg flexion with some pain    Strength:  Deferred    Special Tests:  Deferred.     Neurovascular: Sensation to light touch is intact, no motor deficits, palpable radial pulses 2+    Radiology:     Plain radiographs of the LEFT hip comprising 2 views were obtained and reviewed in the office: Shows postsurgical changes from the hip arthroscopy and osteochondroplasty. No evidence of acute fracture or complications. Impression: Stable postop x-ray. Assessment :  Mr. Jaxon Duque is a pleasant 27 y.o. patient who is 3 days post eft hip arthroscopy, labral repair, acetabuloplasty, osteochondroplasty, fractional psoas lengthening, and capsular closure. No acute surgical concerns. Impression:  Encounter Diagnosis   Name Primary?  Status post arthroscopy of hip Yes       Office Procedures:  Orders Placed This Encounter   Procedures    XR HIP LEFT (2-3 VIEWS)     Standing Status:   Future     Number of Occurrences:   1     Standing Expiration Date:   8/23/2022       Treatment Plan:    Overall Jaxon Duque is doing well. The pain is well-controlled. We recommend that He commence with physical therapy for timeline based progression of motion, weight bearing, and and strengthening. The patient was told that he is restricted from driving for at least 2weeks postop. They were advised to continue their ASA, NSAIDs, and Toney-Hose compression stockings for 14 days post surgery. Patient/PT can keep on waterproof dressing on until 1 week post op, then can be opened to air. Can commence showering today. All of his questions were fully answered today. We would like to see Jaxon Duque back in 2 weeks for follow-up visit and suture removal.   Sincerely,    Elzbieta Lo MD [facility] and [address] [place] [address]   [address] [place], [address]  Email: [email or web address]  Office: [phone]    08/23/21  12:11 PM

## 2021-08-23 NOTE — FLOWSHEET NOTE
[employer]    Physical Therapy Treatment Note/ Progress Report:     Date:  2021    Patient Name:  Kalie Sims    :  1990  MRN: [id number]  Medical/Treatment Diagnosis Information:  X97.451 (ICD-10-CM) - Other specified joint disorders, left hip   S73.192D (ICD-10-CM) - Other sprain of left hip, subsequent encounter     Insurance/Certification information:  PT Insurance Information: [facility]  Physician Information:  Referring Practitioner: Amanda Adames MD  Plan of care signed (Y/N):     Date of Patient follow up with Physician:      Progress Report: []  Yes  []  No     Functional Scale:  LEFS:  0/80  Date: 21    Date Range for reporting period:  Beginnin21  Ending:      Progress report due (10 Rx/or 30 days whichever is less): 0/51/10     Recertification due (POC duration/ or 90 days whichever is less):      Visit # Insurance Allowable Auth Needed   2 (1 post op) 60 []Yes    []No     Pain level:  5-9/10     SUBJECTIVE:  See eval    OBJECTIVE: See eval   Observation:    Test measurements:      RESTRICTIONS/PRECAUTIONS:  DOS 21 LEFT HIP ARTHROSCOPY LABRAL REPAIR, ACETABULOPLASTY AND OSTEOCHONDROPLASTY    Exercises/Interventions:     Therapeutic Ex 10' Resistance Sets/sec Reps Notes          Quad set   1 10 6'   Heel slide  1 8 5' hold                                                                    Therapeutic Activities 5'             Education on Gait with crutches and WB precautions, surgical precautions                                                 Manual Intervention 10'       Knee mobs/PROM       Tib/Fem Mobs       Patella Mobs       Ankle mobs       PROM    Gentle motion within precautions.            NMR re-education                                                                          Therapeutic Exercise and NMR EXR  [x] (85406) Provided verbal/tactile cueing for activities related to strengthening, Charges:  Timed Code Treatment Minutes: 25   Total Treatment Minutes: 40       [x] EVAL (LOW) 91084 (typically 20 minutes face-to-face)  [] EVAL (MOD) 82300 (typically 30 minutes face-to-face)  [] EVAL (HIGH) 25511 (typically 45 minutes face-to-face)  [] RE-EVAL     [x] RG(44438) x   1  [] IONTO (72190)  [] NMR (61537) x     [] VASO (34926)  [x] Manual (63389) x 1    [] Other:  [] TA (71653)x     [] Mech Traction (15973)  [] ES(attended) (41050)     [] ES (un) (96141): If BWC Please Indicate Time In/Out and Total Minutes  CPT Code Time in Time out Total Min                                           GOALS:  Patient stated goal: To experience less pain during walking. [] Progressing: [] Met: [] Not Met: [] Adjusted    Therapist goals for Patient:   Short Term Goals: To be achieved in: 2 weeks  1. Independent in HEP and progression per patient tolerance, in order to prevent re-injury. [] Progressing: [] Met: [] Not Met: [] Adjusted  2. Patient will have a decrease in pain to facilitate improvement in movement, function, and ADLs as indicated by Functional Deficits. [] Progressing: [] Met: [] Not Met: [] Adjusted    Long Term Goals: To be achieved in: 10-12 weeks  1. Disability index score of 50% or less for the LEFS to assist with reaching prior level of function. [] Progressing: [] Met: [] Not Met: [] Adjusted  2. Patient will demonstrate increased AROM to WNL to allow for proper joint functioning as indicated by patients Functional Deficits. [] Progressing: [] Met: [] Not Met: [] Adjusted  3. Patient will demonstrate an increase in Strength to good proximal hip strength and control, within 5lb HHD in LE to allow for proper functional mobility as indicated by patients Functional Deficits. [] Progressing: [] Met: [] Not Met: [] Adjusted  4. Patient will return to ambulating, standing, squatting, and prolonged sitting, and functional activities without increased symptoms or restriction.    [] Progressing: [] Met: [] Not Met: [] Adjusted  5. To return to walking without pain. (patient specific functional goal)    [] Progressing: [] Met: [] Not Met: [] Adjusted     Progression Towards Functional goals:  [] Patient is progressing as expected towards functional goals listed. [] Progression is slowed due to complexities listed. [] Progression has been slowed due to co-morbidities. [x] Plan just implemented, too soon to assess goals progression  [] Other:     ASSESSMENT:  See eval    Return to Play: (if applicable)   []  Stage 1: Intro to Strength   []  Stage 2: Return to Run and Strength   []  Stage 3: Return to Jump and Strength   []  Stage 4: Dynamic Strength and Agility   []  Stage 5: Sport Specific Training     []  Ready to Return to Play, Meets All Above Stages   []  Not Ready for Return to Sports   Comments:            Treatment/Activity Tolerance:  [x] Patient tolerated treatment well [] Patient limited by fatique  [] Patient limited by pain  [] Patient limited by other medical complications  [] Other:     Overall Progression Towards Functional goals/ Treatment Progress Update:  [] Patient is progressing as expected towards functional goals listed. [] Progression is slowed due to complexities/Impairments listed. [] Progression has been slowed due to co-morbidities.   [x] Plan just implemented, too soon to assess goals progression <30days   [] Goals require adjustment due to lack of progress  [] Patient is not progressing as expected and requires additional follow up with physician  [] Other    Prognosis for POC: [x] Good [] Fair  [] Poor    Patient requires continued skilled intervention: [x] Yes  [] No        PLAN: See eval  [] Continue per plan of care [] Alter current plan (see comments)  [x] Plan of care initiated [] Hold pending MD visit [] Discharge    Electronically signed by: Madison Aguirre PT    Note: If patient does not return for scheduled/recommended follow up visits, this note

## 2021-08-27 ENCOUNTER — HOSPITAL ENCOUNTER (OUTPATIENT)
Dept: PHYSICAL THERAPY | Age: 31
Setting detail: THERAPIES SERIES
Discharge: HOME | End: 2021-08-27
Payer: COMMERCIAL

## 2021-08-27 PROCEDURE — 97140 MANUAL THERAPY 1/> REGIONS: CPT

## 2021-08-27 PROCEDURE — 97110 THERAPEUTIC EXERCISES: CPT

## 2021-08-27 NOTE — FLOWSHEET NOTE
[facility]    Physical Therapy Treatment Note/ Progress Report:     Date:  2021    Patient Name:  Braden Delacruz    :  1990  MRN: [id number]  Medical/Treatment Diagnosis Information:  V29.466 (ICD-10-CM) - Other specified joint disorders, left hip   S73.192D (ICD-10-CM) - Other sprain of left hip, subsequent encounter     Insurance/Certification information:  PT Insurance Information: [facility]  Physician Information:  Referring Practitioner: Rosa Candelaria MD  Plan of care signed (Y/N):     Date of Patient follow up with Physician:      Progress Report: []  Yes  []  No     Functional Scale:  LEFS:  0/80  Date: 21    Date Range for reporting period:  Beginnin21  Ending:      Progress report due (10 Rx/or 30 days whichever is less):      Recertification due (POC duration/ or 90 days whichever is less):      Visit # Insurance Allowable Auth Needed   3 (2post op) 60 []Yes    []No     Pain level:  5-9/10     SUBJECTIVE:  Patient reports currently 7/10 pain. Patient reports pain is controlled with medicine and ice. Patient numbness remains. Patient reports he did not perform HEP due to pain. OBJECTIVE: See eval   Observation:    Test measurements:      RESTRICTIONS/PRECAUTIONS:  DOS 21 LEFT HIP ARTHROSCOPY LABRAL REPAIR, ACETABULOPLASTY AND OSTEOCHONDROPLASTY    Exercises/Interventions:     Therapeutic Ex 15' Resistance Sets/sec Reps Notes          Quad set   2 10 6'   Heel slide  2 8 5' hold   TA draw in   2 8 5' hold                                                             Therapeutic Activities 5'             Education on Gait with crutches and WB precautions, surgical precautions                                                 Manual Intervention 25'       Knee mobs/PROM       Tib/Fem Mobs       Patella Mobs       Ankle mobs       PROM    Gentle motion within precautions.            NMR re-education Therapeutic Exercise and NMR EXR  [x] (75474) Provided verbal/tactile cueing for activities related to strengthening, flexibility, endurance, ROM for improvements in LE, proximal hip, and core control with self care, mobility, lifting, ambulation.  [] (50319) Provided verbal/tactile cueing for activities related to improving balance, coordination, kinesthetic sense, posture, motor skill, proprioception  to assist with LE, proximal hip, and core control in self care, mobility, lifting, ambulation and eccentric single leg control.      NMR and Therapeutic Activities:    [x] (12560 or 29022) Provided verbal/tactile cueing for activities related to improving balance, coordination, kinesthetic sense, posture, motor skill, proprioception and motor activation to allow for proper function of core, proximal hip and LE with self care and ADLs  [] (50689) Gait Re-education- Provided training and instruction to the patient for proper LE, core and proximal hip recruitment and positioning and eccentric body weight control with ambulation re-education including up and down stairs     Home Exercise Program:    [x] (09653) Reviewed/Progressed HEP activities related to strengthening, flexibility, endurance, ROM of core, proximal hip and LE for functional self-care, mobility, lifting and ambulation/stair navigation   [] (35655)Reviewed/Progressed HEP activities related to improving balance, coordination, kinesthetic sense, posture, motor skill, proprioception of core, proximal hip and LE for self care, mobility, lifting, and ambulation/stair navigation      Manual Treatments:  PROM / STM / Oscillations-Mobs:  G-I, II, III, IV (PA's, Inf., Post.)  [x] (43808) Provided manual therapy to mobilize LE, proximal hip and/or LS spine soft tissue/joints for the purpose of modulating pain, promoting relaxation,  increasing ROM, reducing/eliminating soft tissue swelling/inflammation/restriction, improving soft tissue extensibility and allowing for proper ROM for normal function with self care, mobility, lifting and ambulation. Modalities:      Charges:  Timed Code Treatment Minutes: 40   Total Treatment Minutes: 40       [] EVAL (LOW) 69574 (typically 20 minutes face-to-face)  [] EVAL (MOD) 68594 (typically 30 minutes face-to-face)  [] EVAL (HIGH) 94684 (typically 45 minutes face-to-face)  [] RE-EVAL     [x] MM(72309) x   1  [] IONTO (01224)  [] NMR (47850) x     [] VASO (91679)  [x] Manual (12051) x 2    [] Other:  [] TA (88343)x     [] Mech Traction (09767)  [] ES(attended) (96816)     [] ES (un) (33676): If BWC Please Indicate Time In/Out and Total Minutes  CPT Code Time in Time out Total Min                                           GOALS:  Patient stated goal: To experience less pain during walking. [] Progressing: [] Met: [] Not Met: [] Adjusted    Therapist goals for Patient:   Short Term Goals: To be achieved in: 2 weeks  1. Independent in HEP and progression per patient tolerance, in order to prevent re-injury. [] Progressing: [] Met: [] Not Met: [] Adjusted  2. Patient will have a decrease in pain to facilitate improvement in movement, function, and ADLs as indicated by Functional Deficits. [] Progressing: [] Met: [] Not Met: [] Adjusted    Long Term Goals: To be achieved in: 10-12 weeks  1. Disability index score of 50% or less for the LEFS to assist with reaching prior level of function. [] Progressing: [] Met: [] Not Met: [] Adjusted  2. Patient will demonstrate increased AROM to WNL to allow for proper joint functioning as indicated by patients Functional Deficits. [] Progressing: [] Met: [] Not Met: [] Adjusted  3. Patient will demonstrate an increase in Strength to good proximal hip strength and control, within 5lb HHD in LE to allow for proper functional mobility as indicated by patients Functional Deficits.    [] Progressing: [] Met: [] Not Met: [] Adjusted  4. Patient will return to ambulating, standing, squatting, and prolonged sitting, and functional activities without increased symptoms or restriction. [] Progressing: [] Met: [] Not Met: [] Adjusted  5. To return to walking without pain. (patient specific functional goal)    [] Progressing: [] Met: [] Not Met: [] Adjusted     Progression Towards Functional goals:  [] Patient is progressing as expected towards functional goals listed. [] Progression is slowed due to complexities listed. [] Progression has been slowed due to co-morbidities. [x] Plan just implemented, too soon to assess goals progression  [] Other:     ASSESSMENT:  Patient tolerated session well. PROM improved during session. Significant muscular guarding still occurring. Continue to progress per protocol. Return to Play: (if applicable)   []  Stage 1: Intro to Strength   []  Stage 2: Return to Run and Strength   []  Stage 3: Return to Jump and Strength   []  Stage 4: Dynamic Strength and Agility   []  Stage 5: Sport Specific Training     []  Ready to Return to Play, Meets All Above Stages   []  Not Ready for Return to Sports   Comments:            Treatment/Activity Tolerance:  [x] Patient tolerated treatment well [] Patient limited by fatique  [] Patient limited by pain  [] Patient limited by other medical complications  [] Other:     Overall Progression Towards Functional goals/ Treatment Progress Update:  [] Patient is progressing as expected towards functional goals listed. [] Progression is slowed due to complexities/Impairments listed. [] Progression has been slowed due to co-morbidities.   [x] Plan just implemented, too soon to assess goals progression <30days   [] Goals require adjustment due to lack of progress  [] Patient is not progressing as expected and requires additional follow up with physician  [] Other    Prognosis for POC: [x] Good [] Fair  [] Poor    Patient requires continued skilled intervention: [x] Yes  [] No        PLAN: See eval  [x] Continue per plan of care [] Alter current plan (see comments)  [] Plan of care initiated [] Hold pending MD visit [] Discharge    Electronically signed by: João Gandhi PT    Note: If patient does not return for scheduled/recommended follow up visits, this note will serve as a discharge from care along with the most recent update on progress.

## 2021-08-30 ENCOUNTER — HOSPITAL ENCOUNTER (OUTPATIENT)
Dept: PHYSICAL THERAPY | Age: 31
Setting detail: THERAPIES SERIES
Discharge: HOME | End: 2021-08-30
Payer: COMMERCIAL

## 2021-08-30 PROCEDURE — 97140 MANUAL THERAPY 1/> REGIONS: CPT

## 2021-08-30 PROCEDURE — 97110 THERAPEUTIC EXERCISES: CPT

## 2021-08-30 NOTE — FLOWSHEET NOTE
[facility]    Physical Therapy Treatment Note/ Progress Report:     Date:  2021    Patient Name:  Yesi Kaur    :  1990  MRN: [id number]  Medical/Treatment Diagnosis Information:  V29.221 (ICD-10-CM) - Other specified joint disorders, left hip   S73.192D (ICD-10-CM) - Other sprain of left hip, subsequent encounter     Insurance/Certification information:  PT Insurance Information: [facility]  Physician Information:  Referring Practitioner: Sheila Hendricks MD  Plan of care signed (Y/N):     Date of Patient follow up with Physician:      Progress Report: []  Yes  []  No     Functional Scale:  LEFS:  0/80  Date: 21    Date Range for reporting period:  Beginnin21  Ending:      Progress report due (10 Rx/or 30 days whichever is less):      Recertification due (POC duration/ or 90 days whichever is less):      Visit # Insurance Allowable Auth Needed   4 (4post op) 60 []Yes    []No     Pain level:  5-9/10     SUBJECTIVE:  Patient reports currently 4/10 pain. Patient reports HEP is going well. Patient reports ascending stairs continues to improve. Pain is gradually improving overall. OBJECTIVE: See eval   Observation:    Test measurements:  L knee AAROM: 0-114 deg. L hip flexion to 90 deg, adb 25 deg.  ER 10 deg    RESTRICTIONS/PRECAUTIONS:  DOS 21 LEFT HIP ARTHROSCOPY LABRAL REPAIR, ACETABULOPLASTY AND OSTEOCHONDROPLASTY    Exercises/Interventions:     Therapeutic Ex 25' Resistance Sets/sec Reps Notes          Quad set   1 10 6'   Heel slide  2 8 5' hold   TA draw in   2 8 6' hold   SAQ  2 10    bike  5 min     Prone lying  3 min     Prone quad stretch    NV                                 Therapeutic Activities 5'             Education on Gait with crutches and WB precautions, surgical precautions                                                 Manual Intervention 20'       Knee mobs/PROM       Tib/Fem Mobs Patella Mobs       Ankle mobs       PROM    Gentle motion within precautions. NMR re-education                                                                          Therapeutic Exercise and NMR EXR  [x] (98881) Provided verbal/tactile cueing for activities related to strengthening, flexibility, endurance, ROM for improvements in LE, proximal hip, and core control with self care, mobility, lifting, ambulation.  [] (53493) Provided verbal/tactile cueing for activities related to improving balance, coordination, kinesthetic sense, posture, motor skill, proprioception  to assist with LE, proximal hip, and core control in self care, mobility, lifting, ambulation and eccentric single leg control.      NMR and Therapeutic Activities:    [x] (74335 or 05475) Provided verbal/tactile cueing for activities related to improving balance, coordination, kinesthetic sense, posture, motor skill, proprioception and motor activation to allow for proper function of core, proximal hip and LE with self care and ADLs  [] (48331) Gait Re-education- Provided training and instruction to the patient for proper LE, core and proximal hip recruitment and positioning and eccentric body weight control with ambulation re-education including up and down stairs     Home Exercise Program:    [x] (01797) Reviewed/Progressed HEP activities related to strengthening, flexibility, endurance, ROM of core, proximal hip and LE for functional self-care, mobility, lifting and ambulation/stair navigation   [] (14974)Reviewed/Progressed HEP activities related to improving balance, coordination, kinesthetic sense, posture, motor skill, proprioception of core, proximal hip and LE for self care, mobility, lifting, and ambulation/stair navigation      Manual Treatments:  PROM / STM / Oscillations-Mobs:  G-I, II, III, IV (PA's, Inf., Post.)  [x] (69472) Provided manual therapy to mobilize LE, proximal hip and/or LS spine soft tissue/joints for the purpose of modulating pain, promoting relaxation,  increasing ROM, reducing/eliminating soft tissue swelling/inflammation/restriction, improving soft tissue extensibility and allowing for proper ROM for normal function with self care, mobility, lifting and ambulation. Modalities:      Charges:  Timed Code Treatment Minutes: 45   Total Treatment Minutes: 45       [] EVAL (LOW) 73584 (typically 20 minutes face-to-face)  [] EVAL (MOD) 93990 (typically 30 minutes face-to-face)  [] EVAL (HIGH) 37643 (typically 45 minutes face-to-face)  [] RE-EVAL     [x] OD(77300) x   2  [] IONTO (38619)  [] NMR (18487) x     [] VASO (38843)  [x] Manual (09723) x 1    [] Other:  [] TA (15601)x     [] Mech Traction (63747)  [] ES(attended) (46103)     [] ES (un) (64164): If BWC Please Indicate Time In/Out and Total Minutes  CPT Code Time in Time out Total Min                                           GOALS:  Patient stated goal: To experience less pain during walking. [] Progressing: [] Met: [] Not Met: [] Adjusted    Therapist goals for Patient:   Short Term Goals: To be achieved in: 2 weeks  1. Independent in HEP and progression per patient tolerance, in order to prevent re-injury. [] Progressing: [] Met: [] Not Met: [] Adjusted  2. Patient will have a decrease in pain to facilitate improvement in movement, function, and ADLs as indicated by Functional Deficits. [] Progressing: [] Met: [] Not Met: [] Adjusted    Long Term Goals: To be achieved in: 10-12 weeks  1. Disability index score of 50% or less for the LEFS to assist with reaching prior level of function. [] Progressing: [] Met: [] Not Met: [] Adjusted  2. Patient will demonstrate increased AROM to WNL to allow for proper joint functioning as indicated by patients Functional Deficits. [] Progressing: [] Met: [] Not Met: [] Adjusted  3.  Patient will demonstrate an increase in Strength to good proximal hip strength and control, within 5lb HHD in LE to allow for proper functional mobility as indicated by patients Functional Deficits. [] Progressing: [] Met: [] Not Met: [] Adjusted  4. Patient will return to ambulating, standing, squatting, and prolonged sitting, and functional activities without increased symptoms or restriction. [] Progressing: [] Met: [] Not Met: [] Adjusted  5. To return to walking without pain. (patient specific functional goal)    [] Progressing: [] Met: [] Not Met: [] Adjusted     Progression Towards Functional goals:  [] Patient is progressing as expected towards functional goals listed. [] Progression is slowed due to complexities listed. [] Progression has been slowed due to co-morbidities. [x] Plan just implemented, too soon to assess goals progression  [] Other:     ASSESSMENT:  Patient tolerated session well with improved PROM. Patient able to tolerate upright bike during session. Continue to progress per protocol. Return to Play: (if applicable)   []  Stage 1: Intro to Strength   []  Stage 2: Return to Run and Strength   []  Stage 3: Return to Jump and Strength   []  Stage 4: Dynamic Strength and Agility   []  Stage 5: Sport Specific Training     []  Ready to Return to Play, Meets All Above Stages   []  Not Ready for Return to Sports   Comments:            Treatment/Activity Tolerance:  [x] Patient tolerated treatment well [] Patient limited by fatique  [] Patient limited by pain  [] Patient limited by other medical complications  [] Other:     Overall Progression Towards Functional goals/ Treatment Progress Update:  [] Patient is progressing as expected towards functional goals listed. [] Progression is slowed due to complexities/Impairments listed. [] Progression has been slowed due to co-morbidities.   [x] Plan just implemented, too soon to assess goals progression <30days   [] Goals require adjustment due to lack of progress  [] Patient is not progressing as expected and requires additional follow up with physician  [] Other    Prognosis for POC: [x] Good [] Fair  [] Poor    Patient requires continued skilled intervention: [x] Yes  [] No        PLAN: See eval  [x] Continue per plan of care [] Alter current plan (see comments)  [] Plan of care initiated [] Hold pending MD visit [] Discharge    Electronically signed by: Tenzin Fermin PT    Note: If patient does not return for scheduled/recommended follow up visits, this note will serve as a discharge from care along with the most recent update on progress.

## 2021-09-01 ENCOUNTER — APPOINTMENT (OUTPATIENT)
Dept: PHYSICAL THERAPY | Age: 31
End: 2021-09-01
Payer: COMMERCIAL

## 2021-09-03 ENCOUNTER — HOSPITAL ENCOUNTER (OUTPATIENT)
Dept: PHYSICAL THERAPY | Age: 31
Setting detail: THERAPIES SERIES
Discharge: HOME | End: 2021-09-03
Payer: COMMERCIAL

## 2021-09-03 PROCEDURE — 97140 MANUAL THERAPY 1/> REGIONS: CPT

## 2021-09-03 PROCEDURE — 97110 THERAPEUTIC EXERCISES: CPT

## 2021-09-03 NOTE — FLOWSHEET NOTE
[employer]    Physical Therapy Treatment Note/ Progress Report:     Date:  9/3/2021    Patient Name:  Idalia King    :  1990  MRN: [id number]  Medical/Treatment Diagnosis Information:  W28.441 (ICD-10-CM) - Other specified joint disorders, left hip   S73.192D (ICD-10-CM) - Other sprain of left hip, subsequent encounter     Insurance/Certification information:  PT Insurance Information: [facility]  Physician Information:  Referring Practitioner: Emmett Santo MD  Plan of care signed (Y/N):     Date of Patient follow up with Physician:      Progress Report: []  Yes  []  No     Functional Scale:  LEFS:  0/80  Date: 21    Date Range for reporting period:  Beginnin21  Ending:      Progress report due (10 Rx/or 30 days whichever is less):      Recertification due (POC duration/ or 90 days whichever is less):      Visit # Insurance Allowable Auth Needed   4 (4post op) 60 []Yes    []No     Pain level:  5-9/10     SUBJECTIVE:  Patient reports currently 3/10 pain. Patient reports HEP is going well. Patient reports stairs continue to be difficult, especially descending. Overall patient is doing well. OBJECTIVE: See eval   Observation:    Test measurements:  L knee AAROM: 0-125 deg. L hip flexion to 90 deg, adb 25 deg.  ER 10 deg    RESTRICTIONS/PRECAUTIONS:  DOS 21 LEFT HIP ARTHROSCOPY LABRAL REPAIR, ACETABULOPLASTY AND OSTEOCHONDROPLASTY    Exercises/Interventions:     Therapeutic Ex 25' Resistance Sets/sec Reps Notes          Quad set   1 10 6'   Heel slide  1 8 5' hold   TA draw in   2 8 6' hold   SAQ  2 15    bike  5 min     Prone lying  3 min     Prone quad stretch  3 30'                                  Therapeutic Activities 5'             Education on Gait with crutches and WB precautions, surgical precautions                                                 Manual Intervention 20'       Knee mobs/PROM       Tib/Fem Mobs       Patella Mobs       Ankle mobs       PROM    Gentle motion within precautions. NMR re-education                                                                          Therapeutic Exercise and NMR EXR  [x] (34129) Provided verbal/tactile cueing for activities related to strengthening, flexibility, endurance, ROM for improvements in LE, proximal hip, and core control with self care, mobility, lifting, ambulation.  [] (62858) Provided verbal/tactile cueing for activities related to improving balance, coordination, kinesthetic sense, posture, motor skill, proprioception  to assist with LE, proximal hip, and core control in self care, mobility, lifting, ambulation and eccentric single leg control.      NMR and Therapeutic Activities:    [x] (94668 or 24495) Provided verbal/tactile cueing for activities related to improving balance, coordination, kinesthetic sense, posture, motor skill, proprioception and motor activation to allow for proper function of core, proximal hip and LE with self care and ADLs  [] (22422) Gait Re-education- Provided training and instruction to the patient for proper LE, core and proximal hip recruitment and positioning and eccentric body weight control with ambulation re-education including up and down stairs     Home Exercise Program:    [x] (39230) Reviewed/Progressed HEP activities related to strengthening, flexibility, endurance, ROM of core, proximal hip and LE for functional self-care, mobility, lifting and ambulation/stair navigation   [] (70466)Reviewed/Progressed HEP activities related to improving balance, coordination, kinesthetic sense, posture, motor skill, proprioception of core, proximal hip and LE for self care, mobility, lifting, and ambulation/stair navigation      Manual Treatments:  PROM / STM / Oscillations-Mobs:  G-I, II, III, IV (PA's, Inf., Post.)  [x] (72400) Provided manual therapy to mobilize LE, proximal hip and/or LS spine soft tissue/joints for the purpose of modulating pain, promoting relaxation,  increasing ROM, reducing/eliminating soft tissue swelling/inflammation/restriction, improving soft tissue extensibility and allowing for proper ROM for normal function with self care, mobility, lifting and ambulation. Modalities:      Charges:  Timed Code Treatment Minutes: 45   Total Treatment Minutes: 45       [] EVAL (LOW) 86253 (typically 20 minutes face-to-face)  [] EVAL (MOD) 52011 (typically 30 minutes face-to-face)  [] EVAL (HIGH) 50176 (typically 45 minutes face-to-face)  [] RE-EVAL     [x] FS(03226) x   2  [] IONTO (80701)  [] NMR (77354) x     [] VASO (95011)  [x] Manual (87217) x 1    [] Other:  [] TA (97454)x     [] Mech Traction (33379)  [] ES(attended) (96428)     [] ES (un) (76807): If BWC Please Indicate Time In/Out and Total Minutes  CPT Code Time in Time out Total Min                                           GOALS:  Patient stated goal: To experience less pain during walking. [] Progressing: [] Met: [] Not Met: [] Adjusted    Therapist goals for Patient:   Short Term Goals: To be achieved in: 2 weeks  1. Independent in HEP and progression per patient tolerance, in order to prevent re-injury. [x] Progressing: [] Met: [] Not Met: [] Adjusted  2. Patient will have a decrease in pain to facilitate improvement in movement, function, and ADLs as indicated by Functional Deficits. [x] Progressing: [] Met: [] Not Met: [] Adjusted    Long Term Goals: To be achieved in: 10-12 weeks  1. Disability index score of 50% or less for the LEFS to assist with reaching prior level of function. [x] Progressing: [] Met: [] Not Met: [] Adjusted  2. Patient will demonstrate increased AROM to WNL to allow for proper joint functioning as indicated by patients Functional Deficits. [x] Progressing: [] Met: [] Not Met: [] Adjusted  3.  Patient will demonstrate an increase in Strength to good proximal hip strength and control, within 5lb HHD in LE to allow for proper functional mobility as indicated by patients Functional Deficits. [x] Progressing: [] Met: [] Not Met: [] Adjusted  4. Patient will return to ambulating, standing, squatting, and prolonged sitting, and functional activities without increased symptoms or restriction. [x] Progressing: [] Met: [] Not Met: [] Adjusted  5. To return to walking without pain. (patient specific functional goal)    [x] Progressing: [] Met: [] Not Met: [] Adjusted     Progression Towards Functional goals:  [x] Patient is progressing as expected towards functional goals listed. [] Progression is slowed due to complexities listed. [] Progression has been slowed due to co-morbidities. [] Plan just implemented, too soon to assess goals progression  [] Other:     ASSESSMENT:  Patient tolerated session well with improved PROM at the limit of surgical precaution. . Patient educated on importance of rick and speed during gait and while descending/ascending stairs. Pain is well controlled at this time. Patient reports slight numbness in L heel. Continue to progress per protocol. Return to Play: (if applicable)   []  Stage 1: Intro to Strength   []  Stage 2: Return to Run and Strength   []  Stage 3: Return to Jump and Strength   []  Stage 4: Dynamic Strength and Agility   []  Stage 5: Sport Specific Training     []  Ready to Return to Play, Meets All Above Stages   []  Not Ready for Return to Sports   Comments:            Treatment/Activity Tolerance:  [x] Patient tolerated treatment well [] Patient limited by fatique  [] Patient limited by pain  [] Patient limited by other medical complications  [] Other:     Overall Progression Towards Functional goals/ Treatment Progress Update:  [] Patient is progressing as expected towards functional goals listed. [] Progression is slowed due to complexities/Impairments listed. [] Progression has been slowed due to co-morbidities.   [x] Plan just implemented, too soon to assess goals progression <30days   [] Goals require adjustment due to lack of progress  [] Patient is not progressing as expected and requires additional follow up with physician  [] Other    Prognosis for POC: [x] Good [] Fair  [] Poor    Patient requires continued skilled intervention: [x] Yes  [] No        PLAN: See eval  [x] Continue per plan of care [] Alter current plan (see comments)  [] Plan of care initiated [] Hold pending MD visit [] Discharge    Electronically signed by: Santiago Dwyer PT    Note: If patient does not return for scheduled/recommended follow up visits, this note will serve as a discharge from care along with the most recent update on progress.

## 2021-09-08 ENCOUNTER — OFFICE VISIT (OUTPATIENT)
Dept: ORTHOPEDIC SURGERY | Age: 31
End: 2021-09-08

## 2021-09-08 ENCOUNTER — HOSPITAL ENCOUNTER (OUTPATIENT)
Dept: PHYSICAL THERAPY | Age: 31
Setting detail: THERAPIES SERIES
Discharge: HOME | End: 2021-09-08
Payer: COMMERCIAL

## 2021-09-08 VITALS — HEIGHT: 70 IN | BODY MASS INDEX: 24.62 KG/M2 | WEIGHT: 172 LBS

## 2021-09-08 DIAGNOSIS — Z98.890 STATUS POST ARTHROSCOPY OF HIP: Primary | ICD-10-CM

## 2021-09-08 PROCEDURE — 99024 POSTOP FOLLOW-UP VISIT: CPT | Performed by: ORTHOPAEDIC SURGERY

## 2021-09-08 PROCEDURE — 97110 THERAPEUTIC EXERCISES: CPT

## 2021-09-08 PROCEDURE — 97140 MANUAL THERAPY 1/> REGIONS: CPT

## 2021-09-08 NOTE — PROGRESS NOTES
arthroscopy and osteochondroplasty. No evidence of acute fracture or complications. Impression: Stable postop x-ray. Assessment :  Mr. Max Sim is a pleasant 27 y.o. patient who is 14 post eft hip arthroscopy, labral repair, acetabuloplasty, osteochondroplasty, fractional psoas lengthening, and capsular closure. No acute surgical concerns, and is improving as expected. Impression:  Encounter Diagnosis   Name Primary?  Status post arthroscopy of hip Yes       Office Procedures:  No orders of the defined types were placed in this encounter. Treatment Plan:    Overall Max Sim is doing well    We recommend that He continue with physical therapy for timeline based progression of motion, weight bearing, and and strengthening. He can start driving. Brace and WB restrictions for 2 more weeks, then to wean off. Can start Phase 2 in 1 week. All of his questions were fully answered today. We would like to see Max Sim back in 4 weeks for follow-up visit. Sincerely,    Manuel Haddad MD [facility] and [address] [place] [address]   [address] [place], [address]  Email: [email or web address]  Office: [phone]    09/08/21  1:28 PM    She can start our

## 2021-09-08 NOTE — FLOWSHEET NOTE
[employer]    Physical Therapy Treatment Note/ Progress Report:     Date:  2021    Patient Name:  Russ Sender    :  1990  MRN: [id number]  Medical/Treatment Diagnosis Information:  T05.204 (ICD-10-CM) - Other specified joint disorders, left hip   S73.192D (ICD-10-CM) - Other sprain of left hip, subsequent encounter     Insurance/Certification information:  PT Insurance Information: [facility]  Physician Information:  Referring Practitioner: Grey Renner MD  Plan of care signed (Y/N):     Date of Patient follow up with Physician:      Progress Report: []  Yes  []  No     Functional Scale:  LEFS:  0/80  Date: 21    Date Range for reporting period:  Beginnin21  Ending:      Progress report due (10 Rx/or 30 days whichever is less): 65     Recertification due (POC duration/ or 90 days whichever is less):      Visit # Insurance Allowable Auth Needed   6 (5 post op) 60 []Yes    []No     Pain level:  5-9/10     SUBJECTIVE:  Patient reports he has an intermittent pain into the L low back. Patient states this pain occurs when he has been in one position for too long. Patient reports use of crutches is becoming easier with movement. OBJECTIVE: See eval   Observation:    Test measurements:  21    L knee AAROM: 0-130 deg. L hip flexion to 90 deg, adb 25 deg. ER 10 deg.     RESTRICTIONS/PRECAUTIONS:  DOS 21 LEFT HIP ARTHROSCOPY LABRAL REPAIR, ACETABULOPLASTY AND OSTEOCHONDROPLASTY    Exercises/Interventions:     Therapeutic Ex 30' Resistance Sets/sec Reps Notes          Quad set   2 15    Heel slide  1 8 5' hold   TA draw in   2 8 6' hold   SAQ  2 15 5'   bike  5 min     Prone lying stretch  3 min     Prone quad stretch  3 30'    Pelvic clocks  6-12 3-9 2 10                           Therapeutic Activities 5'             Education on Gait with crutches and WB precautions, surgical precautions Manual Intervention 15'       Knee mobs/PROM       Tib/Fem Mobs       Patella Mobs       Ankle mobs       PROM    Gentle motion within precautions. NMR re-education                                                                          Therapeutic Exercise and NMR EXR  [x] (82628) Provided verbal/tactile cueing for activities related to strengthening, flexibility, endurance, ROM for improvements in LE, proximal hip, and core control with self care, mobility, lifting, ambulation.  [] (19758) Provided verbal/tactile cueing for activities related to improving balance, coordination, kinesthetic sense, posture, motor skill, proprioception  to assist with LE, proximal hip, and core control in self care, mobility, lifting, ambulation and eccentric single leg control.      NMR and Therapeutic Activities:    [x] (82689 or 96184) Provided verbal/tactile cueing for activities related to improving balance, coordination, kinesthetic sense, posture, motor skill, proprioception and motor activation to allow for proper function of core, proximal hip and LE with self care and ADLs  [] (56358) Gait Re-education- Provided training and instruction to the patient for proper LE, core and proximal hip recruitment and positioning and eccentric body weight control with ambulation re-education including up and down stairs     Home Exercise Program:    [x] (86269) Reviewed/Progressed HEP activities related to strengthening, flexibility, endurance, ROM of core, proximal hip and LE for functional self-care, mobility, lifting and ambulation/stair navigation   [] (29486)Reviewed/Progressed HEP activities related to improving balance, coordination, kinesthetic sense, posture, motor skill, proprioception of core, proximal hip and LE for self care, mobility, lifting, and ambulation/stair navigation      Manual Treatments:  PROM / STM / Oscillations-Mobs:  G-I, II, III, IV (PA's, Inf., Post.)  [x] (67393) Provided manual therapy to mobilize LE, proximal hip and/or LS spine soft tissue/joints for the purpose of modulating pain, promoting relaxation,  increasing ROM, reducing/eliminating soft tissue swelling/inflammation/restriction, improving soft tissue extensibility and allowing for proper ROM for normal function with self care, mobility, lifting and ambulation. Modalities:      Charges:  Timed Code Treatment Minutes: 45   Total Treatment Minutes: 45       [] EVAL (LOW) 94312 (typically 20 minutes face-to-face)  [] EVAL (MOD) 42708 (typically 30 minutes face-to-face)  [] EVAL (HIGH) 52357 (typically 45 minutes face-to-face)  [] RE-EVAL     [x] FZ(08846) x   2  [] IONTO (78131)  [] NMR (42178) x     [] VASO (73325)  [x] Manual (62214) x 1    [] Other:  [] TA (60233)x     [] Mech Traction (00844)  [] ES(attended) (60535)     [] ES (un) (61181): If BWC Please Indicate Time In/Out and Total Minutes  CPT Code Time in Time out Total Min                                           GOALS:  Patient stated goal: To experience less pain during walking. [] Progressing: [] Met: [] Not Met: [] Adjusted    Therapist goals for Patient:   Short Term Goals: To be achieved in: 2 weeks  1. Independent in HEP and progression per patient tolerance, in order to prevent re-injury. [x] Progressing: [] Met: [] Not Met: [] Adjusted  2. Patient will have a decrease in pain to facilitate improvement in movement, function, and ADLs as indicated by Functional Deficits. [x] Progressing: [] Met: [] Not Met: [] Adjusted    Long Term Goals: To be achieved in: 10-12 weeks  1. Disability index score of 50% or less for the LEFS to assist with reaching prior level of function. [x] Progressing: [] Met: [] Not Met: [] Adjusted  2. Patient will demonstrate increased AROM to WNL to allow for proper joint functioning as indicated by patients Functional Deficits. [x] Progressing: [] Met: [] Not Met: [] Adjusted  3.  Patient will demonstrate an increase in Strength to good proximal hip strength and control, within 5lb HHD in LE to allow for proper functional mobility as indicated by patients Functional Deficits. [x] Progressing: [] Met: [] Not Met: [] Adjusted  4. Patient will return to ambulating, standing, squatting, and prolonged sitting, and functional activities without increased symptoms or restriction. [x] Progressing: [] Met: [] Not Met: [] Adjusted  5. To return to walking without pain. (patient specific functional goal)    [x] Progressing: [] Met: [] Not Met: [] Adjusted     Progression Towards Functional goals:  [x] Patient is progressing as expected towards functional goals listed. [] Progression is slowed due to complexities listed. [] Progression has been slowed due to co-morbidities. [] Plan just implemented, too soon to assess goals progression  [] Other:     ASSESSMENT:  Patient tolerated session well with maintained PROM during session. Patient is doing very well overall. Patient reports numbness in heel is improving. Continue to progress per protocol. Return to Play: (if applicable)   []  Stage 1: Intro to Strength   []  Stage 2: Return to Run and Strength   []  Stage 3: Return to Jump and Strength   []  Stage 4: Dynamic Strength and Agility   []  Stage 5: Sport Specific Training     []  Ready to Return to Play, Meets All Above Stages   []  Not Ready for Return to Sports   Comments:            Treatment/Activity Tolerance:  [x] Patient tolerated treatment well [] Patient limited by fatique  [] Patient limited by pain  [] Patient limited by other medical complications  [] Other:     Overall Progression Towards Functional goals/ Treatment Progress Update:  [] Patient is progressing as expected towards functional goals listed. [] Progression is slowed due to complexities/Impairments listed. [] Progression has been slowed due to co-morbidities.   [x] Plan just implemented, too soon to assess goals progression <30days   [] Goals require adjustment due to lack of progress  [] Patient is not progressing as expected and requires additional follow up with physician  [] Other    Prognosis for POC: [x] Good [] Fair  [] Poor    Patient requires continued skilled intervention: [x] Yes  [] No        PLAN: See eval  [x] Continue per plan of care [] Alter current plan (see comments)  [] Plan of care initiated [] Hold pending MD visit [] Discharge    Electronically signed by: Breonna Mckeon PT    Note: If patient does not return for scheduled/recommended follow up visits, this note will serve as a discharge from care along with the most recent update on progress.

## 2021-09-08 NOTE — LETTER
Physical Therapy Rehabilitation Referral    Patient Name: Analia Tee MRN: [id number]  DOS: 9/8/2021     Diagnosis:   1.  Status post arthroscopy of hip        Precautions:     [x] Evaluate and Treat    Post Op Instructions:  [] Continuous passive motion (CPM)   [] AAROM: Flexion to 90   [] Uni-planar passive range of motion   [] AAROM: External rotation to 10   [] Hip brace on at all times    [] AAROM: Extension to 10   [] Hip brace when hip at risk     [] AAROM:  Neutral IR   [] Home exercise program (copy to patient)   [] AAROM: Abduction to 25    [] Isometric external rotator strengthening    [] Isometric glute max strengthening     [] Isometric abductor strengthening              Post-op Phases:  []Phase I: Maximum Protection (Day 1-3 Weeks)  [x]Phase II: Mobility & Neuromuscular Retraining (3-6 Weeks)  []Phase III: Phase III: Muscle Balance and Strengthening (6-12 Weeks)  []Phase IV: Functional Training of the Hip & Lower Extremity (12-18 Weeks)  []Phase V: Advanced Training  Specificity for Return to Sport and/or Work (18-24 [employer])    Non-Operative & Pre-Operative Rehabilitation:    Cardiovascular:            Deep Hip Rotators  [] Upright Bike (Baseline)           [] External Rotators AROM in 4PK (Baseline)  [] Walking (Progression 1)           [] Internal Rotators AROM in 4PK (Baseline)  [] Running (Progression 2)           [] ER in side lying (Progression 1)  [] Dynamic Loading (Progression 3)          [] IR in side lying (Progression 1)                [] ER with resistance in 4PK (Progression 2)                [] IR with resistance in 4PK (Progression 2)                 [] Lateral Step Up (Progression 3)  Positioning:  [] 4PK with pelvic tilts (Baseline)  [] Pelvic tilts in sitting (Progression 1)        Core:   [] Relaxation Breathing (Baseline)         [] [place] lying elbow presses (Progression 1)  [] Side Planks (Baseline)         [] Side planks + hip abduction (Progression 1)  [] Bird-Dog (Baseline) [] Bird-Dog with resistance (Progression 1)    Glute Complex:            Load Transfer:  [] Bridging (Baseline)             [] Weight Shifting (Baseline)     [] Single Leg Bridge (Progression 1)         [] Single Leg Stance to SEBT(Progression 1)     [] Single Leg Lower (Progression 2)          [] Hip hinge + monster walks (Progression 2)       Mobility:  [] Kanu position with breathing (baseline)  [] Hip Hinge with stick & neutral spine (baseline)  [] Sit to stand (baseline)  [] Hip Hinge without guidance (Progression 1)  [] Hip Hinge with resisted punch out guidance (Progression 2)      Pelvic Floor:  [] Relaxation breathing         Activities:       [] Rowing       [] Stepper/Exercise bike     [] Swimming  [] Water exercises    Modalities:     Return to Sport:  [x] Of Choice      [] Plyometrics  [] Ultrasound     [] Rhythmic stabilization  [] Iontophoresis    [] Core strengthening   [] Moist heat     [] Sports specific program:   [] Massage         [x] Cryotherapy      [] Electrical stimulation     [] Paraffin  [] Whirlpool  [] TENS    [x] Home exercise program (copy to patient). Perform exercises for:   15     minutes    3      times/day  [x] Supervised physical therapy  Frequency: []  1x week  [x] 2x week  [] 3x week  [] Other:   Duration: [] 2 weeks   [] 4 weeks  [x] 6 weeks  [] Other:     Additional Instructions:       Sincerely,    Elzbieta Lo MD [facility]  Hip Preservation & Sports Medicine Surgeon   [address] [facility]   [address] [place], [address]  Email: [email or web address]  Office: [phone]

## 2021-09-10 ENCOUNTER — HOSPITAL ENCOUNTER (OUTPATIENT)
Dept: PHYSICAL THERAPY | Age: 31
Setting detail: THERAPIES SERIES
Discharge: HOME | End: 2021-09-10
Payer: COMMERCIAL

## 2021-09-10 PROCEDURE — 97110 THERAPEUTIC EXERCISES: CPT

## 2021-09-10 PROCEDURE — 97140 MANUAL THERAPY 1/> REGIONS: CPT

## 2021-09-10 NOTE — FLOWSHEET NOTE
[employer]    Physical Therapy Treatment Note/ Progress Report:     Date:  9/10/2021    Patient Name:  Cindy Lafleur    :  1990  MRN: [id number]  Medical/Treatment Diagnosis Information:  J74.249 (ICD-10-CM) - Other specified joint disorders, left hip   S73.192D (ICD-10-CM) - Other sprain of left hip, subsequent encounter     Insurance/Certification information:  PT Insurance Information: [facility]  Physician Information:  Referring Practitioner: Ag Abdullahi MD  Plan of care signed (Y/N):     Date of Patient follow up with Physician:      Progress Report: []  Yes  []  No     Functional Scale:  LEFS:  0/80  Date: 21    Date Range for reporting period:  Beginnin21  Ending:      Progress report due (10 Rx/or 30 days whichever is less):      Recertification due (POC duration/ or 90 days whichever is less):      Visit # Insurance Allowable Auth Needed   7 (6 post op) 60 []Yes    []No     Pain level:  5-9/10     SUBJECTIVE:  Patient reports he continues to feel slight soreness in the anterior hip that is gradually reducing. Low back pain has improved. Patient is doing well with maintaining surgical precautions and performing HEP. OBJECTIVE: See eval   Observation:    Test measurements:  21    L knee AAROM: 0-130 deg. L hip flexion to 90 deg, adb 25 deg. ER 10 deg.     RESTRICTIONS/PRECAUTIONS:  DOS 21 LEFT HIP ARTHROSCOPY LABRAL REPAIR, ACETABULOPLASTY AND OSTEOCHONDROPLASTY    Exercises/Interventions:     Therapeutic Ex 30' Resistance Sets/sec Reps Notes          Quad set   2 15    Heel slide  1 8 5' hold   TA draw in   2 8 6' hold   SAQ  2 15 5'   bike  5 min     Prone lying stretch  5 min     Prone quad stretch  4 30'    Pelvic clocks  6-12 3-9 2 10                           Therapeutic Activities 5'             Education on Gait with crutches and WB precautions, surgical precautions Manual Intervention 15'       Knee mobs/PROM       Tib/Fem Mobs       Patella Mobs       Ankle mobs       PROM    Gentle motion within precautions. NMR re-education                                                                          Therapeutic Exercise and NMR EXR  [x] (37082) Provided verbal/tactile cueing for activities related to strengthening, flexibility, endurance, ROM for improvements in LE, proximal hip, and core control with self care, mobility, lifting, ambulation.  [] (30044) Provided verbal/tactile cueing for activities related to improving balance, coordination, kinesthetic sense, posture, motor skill, proprioception  to assist with LE, proximal hip, and core control in self care, mobility, lifting, ambulation and eccentric single leg control.      NMR and Therapeutic Activities:    [x] (76601 or 31998) Provided verbal/tactile cueing for activities related to improving balance, coordination, kinesthetic sense, posture, motor skill, proprioception and motor activation to allow for proper function of core, proximal hip and LE with self care and ADLs  [] (63844) Gait Re-education- Provided training and instruction to the patient for proper LE, core and proximal hip recruitment and positioning and eccentric body weight control with ambulation re-education including up and down stairs     Home Exercise Program:    [x] (75413) Reviewed/Progressed HEP activities related to strengthening, flexibility, endurance, ROM of core, proximal hip and LE for functional self-care, mobility, lifting and ambulation/stair navigation   [] (13711)Reviewed/Progressed HEP activities related to improving balance, coordination, kinesthetic sense, posture, motor skill, proprioception of core, proximal hip and LE for self care, mobility, lifting, and ambulation/stair navigation      Manual Treatments:  PROM / STM / Oscillations-Mobs:  G-I, II, III, IV (PA's, Inf., Post.)  [x] (10635) Provided manual therapy to mobilize LE, proximal hip and/or LS spine soft tissue/joints for the purpose of modulating pain, promoting relaxation,  increasing ROM, reducing/eliminating soft tissue swelling/inflammation/restriction, improving soft tissue extensibility and allowing for proper ROM for normal function with self care, mobility, lifting and ambulation. Modalities:      Charges:  Timed Code Treatment Minutes: 45   Total Treatment Minutes: 45       [] EVAL (LOW) 56013 (typically 20 minutes face-to-face)  [] EVAL (MOD) 02281 (typically 30 minutes face-to-face)  [] EVAL (HIGH) 49535 (typically 45 minutes face-to-face)  [] RE-EVAL     [x] TI(17273) x   2  [] IONTO (42938)  [] NMR (69356) x     [] VASO (82599)  [x] Manual (24450) x 1    [] Other:  [] TA (70157)x     [] Mech Traction (12409)  [] ES(attended) (02049)     [] ES (un) (42195): If BWC Please Indicate Time In/Out and Total Minutes  CPT Code Time in Time out Total Min                                           GOALS:  Patient stated goal: To experience less pain during walking. [] Progressing: [] Met: [] Not Met: [] Adjusted    Therapist goals for Patient:   Short Term Goals: To be achieved in: 2 weeks  1. Independent in HEP and progression per patient tolerance, in order to prevent re-injury. [x] Progressing: [] Met: [] Not Met: [] Adjusted  2. Patient will have a decrease in pain to facilitate improvement in movement, function, and ADLs as indicated by Functional Deficits. [x] Progressing: [] Met: [] Not Met: [] Adjusted    Long Term Goals: To be achieved in: 10-12 weeks  1. Disability index score of 50% or less for the LEFS to assist with reaching prior level of function. [x] Progressing: [] Met: [] Not Met: [] Adjusted  2. Patient will demonstrate increased AROM to WNL to allow for proper joint functioning as indicated by patients Functional Deficits. [x] Progressing: [] Met: [] Not Met: [] Adjusted  3.  Patient will demonstrate an increase in Strength to good proximal hip strength and control, within 5lb HHD in LE to allow for proper functional mobility as indicated by patients Functional Deficits. [x] Progressing: [] Met: [] Not Met: [] Adjusted  4. Patient will return to ambulating, standing, squatting, and prolonged sitting, and functional activities without increased symptoms or restriction. [x] Progressing: [] Met: [] Not Met: [] Adjusted  5. To return to walking without pain. (patient specific functional goal)    [x] Progressing: [] Met: [] Not Met: [] Adjusted     Progression Towards Functional goals:  [x] Patient is progressing as expected towards functional goals listed. [] Progression is slowed due to complexities listed. [] Progression has been slowed due to co-morbidities. [] Plan just implemented, too soon to assess goals progression  [] Other:     ASSESSMENT:  Patient tolerated session well with maintained PROM during session. Patient educated on progression of protocol in the upcoming weeks. Patient is going on vacation and safety education was performed with emphasis on maintained surgical precautions. Patient voiced understanding. Patient is doing well overall and is progressing as expected. Return to Play: (if applicable)   []  Stage 1: Intro to Strength   []  Stage 2: Return to Run and Strength   []  Stage 3: Return to Jump and Strength   []  Stage 4: Dynamic Strength and Agility   []  Stage 5: Sport Specific Training     []  Ready to Return to Play, Meets All Above Stages   []  Not Ready for Return to Sports   Comments:            Treatment/Activity Tolerance:  [x] Patient tolerated treatment well [] Patient limited by fatique  [] Patient limited by pain  [] Patient limited by other medical complications  [] Other:     Overall Progression Towards Functional goals/ Treatment Progress Update:  [x] Patient is progressing as expected towards functional goals listed.     [] Progression is slowed due to complexities/Impairments listed. [] Progression has been slowed due to co-morbidities. [] Plan just implemented, too soon to assess goals progression <30days   [] Goals require adjustment due to lack of progress  [] Patient is not progressing as expected and requires additional follow up with physician  [] Other    Prognosis for POC: [x] Good [] Fair  [] Poor    Patient requires continued skilled intervention: [x] Yes  [] No        PLAN: Continue to progress per protocol. [x] Continue per plan of care [] Alter current plan (see comments)  [] Plan of care initiated [] Hold pending MD visit [] Discharge    Electronically signed by: Santiago Dwyer PT    Note: If patient does not return for scheduled/recommended follow up visits, this note will serve as a discharge from care along with the most recent update on progress.

## 2021-09-13 ENCOUNTER — HOSPITAL ENCOUNTER (OUTPATIENT)
Dept: PHYSICAL THERAPY | Age: 31
Setting detail: THERAPIES SERIES
Discharge: HOME | End: 2021-09-13
Payer: COMMERCIAL

## 2021-09-13 ENCOUNTER — PATIENT MESSAGE (OUTPATIENT)
Dept: ORTHOPEDIC SURGERY | Age: 31
End: 2021-09-13

## 2021-09-13 ENCOUNTER — OFFICE VISIT (OUTPATIENT)
Dept: ORTHOPEDIC SURGERY | Age: 31
End: 2021-09-13

## 2021-09-13 ENCOUNTER — TELEPHONE (OUTPATIENT)
Dept: ORTHOPEDIC SURGERY | Age: 31
End: 2021-09-13

## 2021-09-13 VITALS — HEIGHT: 70 IN | WEIGHT: 170 LBS | BODY MASS INDEX: 24.34 KG/M2

## 2021-09-13 DIAGNOSIS — Z98.890 STATUS POST ARTHROSCOPY OF HIP: Primary | ICD-10-CM

## 2021-09-13 DIAGNOSIS — L30.8 OTHER ECZEMA: ICD-10-CM

## 2021-09-13 PROCEDURE — 97110 THERAPEUTIC EXERCISES: CPT

## 2021-09-13 PROCEDURE — 99024 POSTOP FOLLOW-UP VISIT: CPT | Performed by: ORTHOPAEDIC SURGERY

## 2021-09-13 PROCEDURE — 97140 MANUAL THERAPY 1/> REGIONS: CPT

## 2021-09-13 NOTE — PROGRESS NOTES
History of Present Illness:  Km Calero is a pleasant 27 y.o. male who presents for a post operative visit. He is 24 days  out following a left hip arthroscopy, labral repair, acetabuloplasty, osteochondroplasty, fractional psoas lengthening, and capsular closure. He came in today due to a subjective rash that has started distal to his arthroscopy portals about 4 -5 days ago. This is itchy but not painful. He denies fevers, chills, numbness, tingling, and shortness of breath. He has been applying topical fucidin. Medical History:  Patient's medications, allergies, past medical, surgical, social and family histories were reviewed and updated as appropriate. Review of Systems  A 14 point review of systems was completed by the patient and is available in the media section of the scanned medical record and was reviewed on 9/13/2021. Vital Signs: There were no vitals filed for this visit. General/Appearance: Alert and oriented and in no apparent distress. Skin:  There are no skin lesions, cellulitis, or extreme edema. The patient has warm and well-perfused Bilateral lower  extremities with brisk capillary refill. Hip  Exam:LEFT    Inspection: Hip incision(s)  Dressing is clean/dry/intact. There is no erythema, drainage or other signs of infection. However, there are     Palpation:  No crepitus to gentle motion / much les  pain with circumduction of the hip    Active Range of Motion: Deferred    Passive Range of Motion: 0 to 90 deg flexion with no major pain today    Strength:  Deferred    Special Tests:  Deferred. Neurovascular: Sensation to light touch is intact, no motor deficits, palpable radial pulses 2+    Radiology:     Plain radiographs of the LEFT hip comprising 2 views were obtained and reviewed in the office: Shows postsurgical changes from the hip arthroscopy and osteochondroplasty. No evidence of acute fracture or complications. Impression: Stable postop x-ray. Assessment :  Mr. Itzel Pete is a pleasant 27 y.o. patient who is 14 post eft hip arthroscopy, labral repair, acetabuloplasty, osteochondroplasty, fractional psoas lengthening, and capsular closure. No acute surgical concerns, and is improving as expected. Impression:  Encounter Diagnosis   Name Primary?  Status post arthroscopy of hip Yes       Office Procedures:  No orders of the defined types were placed in this encounter. Treatment Plan:    Overall Itzel Pete is doing well but has a rash distal and around his arthroscopy portals, that is either reactive or eczema. I do not suspect a superficial skin infection. However, I woul recommend is that  He is evaluated by a dermatologist Dr Arvind Smith for complete diagnosis and possible treatment. He will be seen within the next 24 hours. Until then, he can continue with his normal hip rehab protocol. All of his questions were fully answered today. We would like to see Itzel Pete back in 2 weeks for follow-up visit. Sincerely,    Brian Liu MD [facility] and [address] [place] [address]   [address] [place], [address]  Email: [email or web address]  Office: [phone]    09/13/21  5:46 PM    She can start our

## 2021-09-13 NOTE — FLOWSHEET NOTE
Education on Gait with crutches and WB precautions, surgical precautions                                                 Manual Intervention 15'       Knee mobs/PROM       Tib/Fem Mobs       Patella Mobs       Ankle mobs       Hamstring stretch  3 30'    PROM    Gentle motion within precautions. Slight hip flexor tightness noted that improved following stretching. NMR re-education                                                                          Therapeutic Exercise and NMR EXR  [x] (24790) Provided verbal/tactile cueing for activities related to strengthening, flexibility, endurance, ROM for improvements in LE, proximal hip, and core control with self care, mobility, lifting, ambulation.  [] (67699) Provided verbal/tactile cueing for activities related to improving balance, coordination, kinesthetic sense, posture, motor skill, proprioception  to assist with LE, proximal hip, and core control in self care, mobility, lifting, ambulation and eccentric single leg control.      NMR and Therapeutic Activities:    [x] (39660 or 14174) Provided verbal/tactile cueing for activities related to improving balance, coordination, kinesthetic sense, posture, motor skill, proprioception and motor activation to allow for proper function of core, proximal hip and LE with self care and ADLs  [] (32059) Gait Re-education- Provided training and instruction to the patient for proper LE, core and proximal hip recruitment and positioning and eccentric body weight control with ambulation re-education including up and down stairs     Home Exercise Program:    [x] (01432) Reviewed/Progressed HEP activities related to strengthening, flexibility, endurance, ROM of core, proximal hip and LE for functional self-care, mobility, lifting and ambulation/stair navigation   [] (03590)Reviewed/Progressed HEP activities related to improving balance, coordination, kinesthetic sense, posture, motor skill, proprioception of core, proximal hip and LE for self care, mobility, lifting, and ambulation/stair navigation      Manual Treatments:  PROM / STM / Oscillations-Mobs:  G-I, II, III, IV (PA's, Inf., Post.)  [x] (78886) Provided manual therapy to mobilize LE, proximal hip and/or LS spine soft tissue/joints for the purpose of modulating pain, promoting relaxation,  increasing ROM, reducing/eliminating soft tissue swelling/inflammation/restriction, improving soft tissue extensibility and allowing for proper ROM for normal function with self care, mobility, lifting and ambulation. Modalities:      Charges:  Timed Code Treatment Minutes: 45   Total Treatment Minutes: 45       [] EVAL (LOW) 71644 (typically 20 minutes face-to-face)  [] EVAL (MOD) 73654 (typically 30 minutes face-to-face)  [] EVAL (HIGH) 65658 (typically 45 minutes face-to-face)  [] RE-EVAL     [x] GQ(79444) x   2  [] IONTO (50786)  [] NMR (54278) x     [] VASO (57084)  [x] Manual (76112) x 1    [] Other:  [] TA (10454)x     [] Mech Traction (49393)  [] ES(attended) (32666)     [] ES (un) (52922): If BWC Please Indicate Time In/Out and Total Minutes  CPT Code Time in Time out Total Min                                           GOALS:  Patient stated goal: To experience less pain during walking. [] Progressing: [] Met: [] Not Met: [] Adjusted    Therapist goals for Patient:   Short Term Goals: To be achieved in: 2 weeks  1. Independent in HEP and progression per patient tolerance, in order to prevent re-injury. [x] Progressing: [] Met: [] Not Met: [] Adjusted  2. Patient will have a decrease in pain to facilitate improvement in movement, function, and ADLs as indicated by Functional Deficits. [x] Progressing: [] Met: [] Not Met: [] Adjusted    Long Term Goals: To be achieved in: 10-12 weeks  1. Disability index score of 50% or less for the LEFS to assist with reaching prior level of function. [x] Progressing: [] Met: [] Not Met: [] Adjusted  2.  Patient will demonstrate increased AROM to WNL to allow for proper joint functioning as indicated by patients Functional Deficits. [x] Progressing: [] Met: [] Not Met: [] Adjusted  3. Patient will demonstrate an increase in Strength to good proximal hip strength and control, within 5lb HHD in LE to allow for proper functional mobility as indicated by patients Functional Deficits. [x] Progressing: [] Met: [] Not Met: [] Adjusted  4. Patient will return to ambulating, standing, squatting, and prolonged sitting, and functional activities without increased symptoms or restriction. [x] Progressing: [] Met: [] Not Met: [] Adjusted  5. To return to walking without pain. (patient specific functional goal)    [x] Progressing: [] Met: [] Not Met: [] Adjusted     Progression Towards Functional goals:  [x] Patient is progressing as expected towards functional goals listed. [] Progression is slowed due to complexities listed. [] Progression has been slowed due to co-morbidities. [] Plan just implemented, too soon to assess goals progression  [] Other:     ASSESSMENT:  Patient tolerated session well with maintained PROM during session. Patient educated on progression of protocol. Patient does present with slightly increased hip flexor and hamstring tightness during session. Patient cleared to begin Phase 2 next visit. Patient is doing very well overall and is progressing as expected.    Return to Play: (if applicable)   []  Stage 1: Intro to Strength   []  Stage 2: Return to Run and Strength   []  Stage 3: Return to Jump and Strength   []  Stage 4: Dynamic Strength and Agility   []  Stage 5: Sport Specific Training     []  Ready to Return to Play, Meets All Above Stages   []  Not Ready for Return to Sports   Comments:            Treatment/Activity Tolerance:  [x] Patient tolerated treatment well [] Patient limited by fatique  [] Patient limited by pain  [] Patient limited by other medical complications  [] Other:     Overall Progression Towards Functional goals/ Treatment Progress Update:  [x] Patient is progressing as expected towards functional goals listed. [] Progression is slowed due to complexities/Impairments listed. [] Progression has been slowed due to co-morbidities. [] Plan just implemented, too soon to assess goals progression <30days   [] Goals require adjustment due to lack of progress  [] Patient is not progressing as expected and requires additional follow up with physician  [] Other    Prognosis for POC: [x] Good [] Fair  [] Poor    Patient requires continued skilled intervention: [x] Yes  [] No        PLAN: Continue to progress per protocol. [x] Continue per plan of care [] Alter current plan (see comments)  [] Plan of care initiated [] Hold pending MD visit [] Discharge    Electronically signed by: Elicia Washington PT    Note: If patient does not return for scheduled/recommended follow up visits, this note will serve as a discharge from care along with the most recent update on progress.

## 2021-09-15 ENCOUNTER — HOSPITAL ENCOUNTER (OUTPATIENT)
Dept: PHYSICAL THERAPY | Age: 31
Setting detail: THERAPIES SERIES
Discharge: HOME | End: 2021-09-15
Payer: COMMERCIAL

## 2021-09-15 PROCEDURE — 97110 THERAPEUTIC EXERCISES: CPT

## 2021-09-15 PROCEDURE — 97140 MANUAL THERAPY 1/> REGIONS: CPT

## 2021-09-15 NOTE — FLOWSHEET NOTE
[facility]    Physical Therapy Treatment Note/ Progress Report:     Date:  9/15/2021    Patient Name:  Ada Irizarry    :  1990  MRN: [id number]  Medical/Treatment Diagnosis Information:  B33.906 (ICD-10-CM) - Other specified joint disorders, left hip   S73.192D (ICD-10-CM) - Other sprain of left hip, subsequent encounter     Insurance/Certification information:  PT Insurance Information: [facility]  Physician Information:  Referring Practitioner: Tanner Gamez MD  Plan of care signed (Y/N):     Date of Patient follow up with Physician:      Progress Report: []  Yes  []  No     Functional Scale:  LEFS:  0/80  Date: 21    Date Range for reporting period:  Beginnin21  Ending:      Progress report due (10 Rx/or 30 days whichever is less): 3/97/01     Recertification due (POC duration/ or 90 days whichever is less):      Visit # Insurance Allowable Auth Needed   9 (7 post op) 60 []Yes    []No     Pain level:  0/10     SUBJECTIVE: Pt notes groin pain throughout the day. Notes improvement in incisional     OBJECTIVE: See eval   Observation:    Test measurements:  21    L knee AAROM: 0-130 deg. L hip flexion to 90 deg, adb 25 deg. ER 10 deg.     RESTRICTIONS/PRECAUTIONS:  DOS 21 LEFT HIP ARTHROSCOPY LABRAL REPAIR, ACETABULOPLASTY AND OSTEOCHONDROPLASTY    Exercises/Interventions:     Therapeutic Ex 30' Resistance Sets/sec Reps Notes          Quad set   2 15    Heel slide  1 8 5' hold   TA draw in w/ belt  2 8 6' hold   SAQ 1# 2 15 5'   bike  5 min     Prone lying stretch  5 min     Prone quad stretch  4 30'    Prone hamstring curl  1 15    HL hip add iso  10 5 sec submax   Pelvic clocks  6-12 3-9 2 10                           Therapeutic Activities 5'             Education on Gait with crutches and WB precautions, surgical precautions                                                 Manual Intervention 15'       Knee mobs/PROM Tib/Fem Mobs       Patella Mobs       Ankle mobs       Hamstring stretch  3 30'    PROM    Gentle motion within precautions. Slight hip flexor tightness noted that improved following stretching. NMR re-education                                                                          Therapeutic Exercise and NMR EXR  [x] (41773) Provided verbal/tactile cueing for activities related to strengthening, flexibility, endurance, ROM for improvements in LE, proximal hip, and core control with self care, mobility, lifting, ambulation.  [] (85718) Provided verbal/tactile cueing for activities related to improving balance, coordination, kinesthetic sense, posture, motor skill, proprioception  to assist with LE, proximal hip, and core control in self care, mobility, lifting, ambulation and eccentric single leg control.      NMR and Therapeutic Activities:    [x] (26658 or 45442) Provided verbal/tactile cueing for activities related to improving balance, coordination, kinesthetic sense, posture, motor skill, proprioception and motor activation to allow for proper function of core, proximal hip and LE with self care and ADLs  [] (48158) Gait Re-education- Provided training and instruction to the patient for proper LE, core and proximal hip recruitment and positioning and eccentric body weight control with ambulation re-education including up and down stairs     Home Exercise Program:    [x] (10570) Reviewed/Progressed HEP activities related to strengthening, flexibility, endurance, ROM of core, proximal hip and LE for functional self-care, mobility, lifting and ambulation/stair navigation   [] (31440)Reviewed/Progressed HEP activities related to improving balance, coordination, kinesthetic sense, posture, motor skill, proprioception of core, proximal hip and LE for self care, mobility, lifting, and ambulation/stair navigation      Manual Treatments:  PROM / STM / Oscillations-Mobs:  G-I, II, III, IV (PA's, Inf., will demonstrate an increase in Strength to good proximal hip strength and control, within 5lb HHD in LE to allow for proper functional mobility as indicated by patients Functional Deficits. [x] Progressing: [] Met: [] Not Met: [] Adjusted  4. Patient will return to ambulating, standing, squatting, and prolonged sitting, and functional activities without increased symptoms or restriction. [x] Progressing: [] Met: [] Not Met: [] Adjusted  5. To return to walking without pain. (patient specific functional goal)    [x] Progressing: [] Met: [] Not Met: [] Adjusted     Progression Towards Functional goals:  [x] Patient is progressing as expected towards functional goals listed. [] Progression is slowed due to complexities listed. [] Progression has been slowed due to co-morbidities. [] Plan just implemented, too soon to assess goals progression  [] Other:     ASSESSMENT:  Patient tolerated session well with maintained PROM during session. Patient educated on progression of protocol. Patient does present with slightly increased hip flexor and hamstring tightness during session. Patient cleared to begin Phase 2 next visit. Patient is doing very well overall and is progressing as expected. Return to Play: (if applicable)   []  Stage 1: Intro to Strength   []  Stage 2: Return to Run and Strength   []  Stage 3: Return to Jump and Strength   []  Stage 4: Dynamic Strength and Agility   []  Stage 5: Sport Specific Training     []  Ready to Return to Play, Meets All Above Stages   []  Not Ready for Return to Sports   Comments:            Treatment/Activity Tolerance:  [x] Patient tolerated treatment well [] Patient limited by fatique  [] Patient limited by pain  [] Patient limited by other medical complications  [] Other:     Overall Progression Towards Functional goals/ Treatment Progress Update:  [x] Patient is progressing as expected towards functional goals listed.     [] Progression is slowed due to complexities/Impairments listed. [] Progression has been slowed due to co-morbidities. [] Plan just implemented, too soon to assess goals progression <30days   [] Goals require adjustment due to lack of progress  [] Patient is not progressing as expected and requires additional follow up with physician  [] Other    Prognosis for POC: [x] Good [] Fair  [] Poor    Patient requires continued skilled intervention: [x] Yes  [] No        PLAN: Continue to progress per protocol. [x] Continue per plan of care [] Alter current plan (see comments)  [] Plan of care initiated [] Hold pending MD visit [] Discharge    Electronically signed by: Genoveva Loomis, PT    Note: If patient does not return for scheduled/recommended follow up visits, this note will serve as a discharge from care along with the most recent update on progress.

## 2021-09-20 ENCOUNTER — HOSPITAL ENCOUNTER (OUTPATIENT)
Dept: PHYSICAL THERAPY | Age: 31
Setting detail: THERAPIES SERIES
Discharge: HOME | End: 2021-09-20
Payer: COMMERCIAL

## 2021-09-20 PROCEDURE — 97110 THERAPEUTIC EXERCISES: CPT

## 2021-09-20 PROCEDURE — 97140 MANUAL THERAPY 1/> REGIONS: CPT

## 2021-09-20 NOTE — FLOWSHEET NOTE
[facility]    Physical Therapy Treatment Note/ Progress Report:     Date:  2021    Patient Name:  Eliecer Borrego    :  1990  MRN: [id number]  Medical/Treatment Diagnosis Information:  B17.692 (ICD-10-CM) - Other specified joint disorders, left hip   S73.192D (ICD-10-CM) - Other sprain of left hip, subsequent encounter     Insurance/Certification information:  PT Insurance Information: [facility]  Physician Information:  Referring Practitioner: Onesimo oPole MD  Plan of care signed (Y/N):     Date of Patient follow up with Physician:      Progress Report: []  Yes  []  No     Functional Scale:  LEFS:  0/80  Date: 21    Date Range for reporting period:  Beginnin21  Ending:      Progress report due (10 Rx/or 30 days whichever is less):      Recertification due (POC duration/ or 90 days whichever is less):      Visit # Insurance Allowable Auth Needed   9 (8 post op) 60 []Yes    []No     Pain level:  3/10     SUBJECTIVE:  Patient reports he is experience sharp pain inside the joint with hip flexion, patient also reports a dull ache at rest. Patient reports 2-3/10 pain currently. Patient reports stretching improves symptoms. Patient reports symptoms like a muscle going over hip. OBJECTIVE: See eval   Observation:    Test measurements:  21    L knee AAROM: 0-130 deg. L hip flexion to 90 deg, adb 25 deg. ER 10 deg.     RESTRICTIONS/PRECAUTIONS:  DOS 21 LEFT HIP ARTHROSCOPY LABRAL REPAIR, ACETABULOPLASTY AND OSTEOCHONDROPLASTY    Exercises/Interventions:     Therapeutic Ex 25' Resistance Sets/sec Reps Notes          Quad set   2 15    Heel slide  1 8 5' hold   TA draw in w/ belt  2 8 6' hold   SAQ  2 15 5'   bike  6 min     Prone lying stretch  5 min     Prone quad stretch  5 30'    Pelvic clocks  6-12 3-9 2 10                           Therapeutic Activities 5'             Education on Gait with crutches and WB precautions, surgical precautions                                                 Manual Intervention 20'       Knee mobs/PROM       Tib/Fem Mobs       Patella Mobs       Ankle mobs       Hamstring stretch  3 30'    PROM    Gentle motion within precautions. Slight hip flexor tightness noted that improved following stretching. STM    To hip flexor, improved symptoms with less reported pain during hip flexion. NMR re-education                                                                          Therapeutic Exercise and NMR EXR  [x] (41158) Provided verbal/tactile cueing for activities related to strengthening, flexibility, endurance, ROM for improvements in LE, proximal hip, and core control with self care, mobility, lifting, ambulation.  [] (22568) Provided verbal/tactile cueing for activities related to improving balance, coordination, kinesthetic sense, posture, motor skill, proprioception  to assist with LE, proximal hip, and core control in self care, mobility, lifting, ambulation and eccentric single leg control.      NMR and Therapeutic Activities:    [x] (35466 or 19510) Provided verbal/tactile cueing for activities related to improving balance, coordination, kinesthetic sense, posture, motor skill, proprioception and motor activation to allow for proper function of core, proximal hip and LE with self care and ADLs  [] (04497) Gait Re-education- Provided training and instruction to the patient for proper LE, core and proximal hip recruitment and positioning and eccentric body weight control with ambulation re-education including up and down stairs     Home Exercise Program:    [x] (71965) Reviewed/Progressed HEP activities related to strengthening, flexibility, endurance, ROM of core, proximal hip and LE for functional self-care, mobility, lifting and ambulation/stair navigation   [] (22161)Reviewed/Progressed HEP activities related to improving balance, coordination, kinesthetic sense, posture, [] Adjusted  2. Patient will demonstrate increased AROM to WNL to allow for proper joint functioning as indicated by patients Functional Deficits. [x] Progressing: [] Met: [] Not Met: [] Adjusted  3. Patient will demonstrate an increase in Strength to good proximal hip strength and control, within 5lb HHD in LE to allow for proper functional mobility as indicated by patients Functional Deficits. [x] Progressing: [] Met: [] Not Met: [] Adjusted  4. Patient will return to ambulating, standing, squatting, and prolonged sitting, and functional activities without increased symptoms or restriction. [x] Progressing: [] Met: [] Not Met: [] Adjusted  5. To return to walking without pain. (patient specific functional goal)    [x] Progressing: [] Met: [] Not Met: [] Adjusted     Progression Towards Functional goals:  [x] Patient is progressing as expected towards functional goals listed. [] Progression is slowed due to complexities listed. [] Progression has been slowed due to co-morbidities. [] Plan just implemented, too soon to assess goals progression  [] Other:     ASSESSMENT:  Patient tolerated session well with improved PROM. Patient continues to report symptoms related to hip flexor tightness. Patient reeducated on importance of stretching at home to reduce symptoms. Patient reports well to MT with reduced pain with hip flexion. Patient is doing very well overall and is progressing as expected.       Return to Play: (if applicable)   []  Stage 1: Intro to Strength   []  Stage 2: Return to Run and Strength   []  Stage 3: Return to Jump and Strength   []  Stage 4: Dynamic Strength and Agility   []  Stage 5: Sport Specific Training     []  Ready to Return to Play, Meets All Above Stages   []  Not Ready for Return to Sports   Comments:            Treatment/Activity Tolerance:  [x] Patient tolerated treatment well [] Patient limited by fatique  [] Patient limited by pain  [] Patient limited by other medical complications  [] Other:     Overall Progression Towards Functional goals/ Treatment Progress Update:  [x] Patient is progressing as expected towards functional goals listed. [] Progression is slowed due to complexities/Impairments listed. [] Progression has been slowed due to co-morbidities. [] Plan just implemented, too soon to assess goals progression <30days   [] Goals require adjustment due to lack of progress  [] Patient is not progressing as expected and requires additional follow up with physician  [] Other    Prognosis for POC: [x] Good [] Fair  [] Poor    Patient requires continued skilled intervention: [x] Yes  [] No        PLAN: Continue to progress per protocol. [x] Continue per plan of care [] Alter current plan (see comments)  [] Plan of care initiated [] Hold pending MD visit [] Discharge    Electronically signed by: Elinor Hilton PT    Note: If patient does not return for scheduled/recommended follow up visits, this note will serve as a discharge from care along with the most recent update on progress.

## 2021-09-27 ENCOUNTER — OFFICE VISIT (OUTPATIENT)
Dept: ORTHOPEDIC SURGERY | Age: 31
End: 2021-09-27

## 2021-09-27 ENCOUNTER — HOSPITAL ENCOUNTER (OUTPATIENT)
Dept: PHYSICAL THERAPY | Age: 31
Setting detail: THERAPIES SERIES
Discharge: HOME | End: 2021-09-27
Payer: COMMERCIAL

## 2021-09-27 VITALS — BODY MASS INDEX: 24.34 KG/M2 | HEIGHT: 70 IN | WEIGHT: 170 LBS

## 2021-09-27 DIAGNOSIS — Z98.890 STATUS POST ARTHROSCOPY OF HIP: Primary | ICD-10-CM

## 2021-09-27 PROCEDURE — 99024 POSTOP FOLLOW-UP VISIT: CPT | Performed by: ORTHOPAEDIC SURGERY

## 2021-09-27 NOTE — PROGRESS NOTES
[facility]    Physical Therapy  Cancellation/No-show Note  Patient Name:  Eliecer Borrego  :  1990   Date:  2021  Cancelled visits to date: 1  No-shows to date: 0    For today's appointment patient:  [x]  Cancelled  []  Rescheduled appointment  []  No-show     Reason given by patient:  []  Patient ill  [x]  Conflicting appointment  []  No transportation    []  Conflict with work  []  No reason given  []  Other:     Comments: Patient arrived 20 minutes late to appointment. Patient also has an appointment with JAMES Sousa scheduled at 2:15 today in the [place] office. PT visit held at this time due to time constraints. Phone call information:   []  Phone call made today to patient at _ time at number provided:      []  Patient answered, conversation as follows:    []  Patient did not answer, message left as follows:  []  Phone call not made today  []  Phone call not needed - pt contacted us to cancel and provided reason for cancellation.      Electronically signed by:  Dl So, [facility], [id number]

## 2021-09-27 NOTE — LETTER
Physical Therapy Rehabilitation Referral    Patient Name: Idalia King MRN: [id number]  DOS: 9/27/2021     Diagnosis:   1.  Status post arthroscopy of hip        Precautions:     [x] Evaluate and Treat    Post Op Instructions:  [] Continuous passive motion (CPM)   [] AAROM: Flexion to 90   [] Uni-planar passive range of motion   [] AAROM: External rotation to 10   [] Hip brace on at all times    [] AAROM: Extension to 10   [] Hip brace when hip at risk     [] AAROM:  Neutral IR   [] Home exercise program (copy to patient)   [] AAROM: Abduction to 25    [] Isometric external rotator strengthening    [] Isometric glute max strengthening     [] Isometric abductor strengthening              Post-op Phases:  []Phase I: Maximum Protection (Day 1-3 Weeks)  []Phase II: Mobility & Neuromuscular Retraining (3-6 Weeks)  [x]Phase III: Phase III: Muscle Balance and Strengthening (6-12 Weeks)  []Phase IV: Functional Training of the Hip & Lower Extremity (12-18 Weeks)  []Phase V: Advanced Training  Specificity for Return to Sport and/or Work (18-24 [email or web address])    Non-Operative & Pre-Operative Rehabilitation:    Cardiovascular:            Deep Hip Rotators  [] Upright Bike (Baseline)           [] External Rotators AROM in 4PK (Baseline)  [] Walking (Progression 1)           [] Internal Rotators AROM in 4PK (Baseline)  [] Running (Progression 2)           [] ER in side lying (Progression 1)  [] Dynamic Loading (Progression 3)          [] IR in side lying (Progression 1)                [] ER with resistance in 4PK (Progression 2)                [] IR with resistance in 4PK (Progression 2)                 [] Lateral Step Up (Progression 3)  Positioning:  [] 4PK with pelvic tilts (Baseline)  [] Pelvic tilts in sitting (Progression 1)      Core:   [] Relaxation Breathing (Baseline)         [] Nesmith lying elbow presses (Progression 1)  [] Side Planks (Baseline)         [] Side planks + hip abduction (Progression 1)  [] Bird-Dog (Baseline) [] Bird-Dog with resistance (Progression 1)    Glute Complex:            Load Transfer:  [] Bridging (Baseline)             [] Weight Shifting (Baseline)     [] Single Leg Bridge (Progression 1)         [] Single Leg Stance to SEBT(Progression 1)     [] Single Leg Lower (Progression 2)          [] Hip hinge + monster walks (Progression 2)       Mobility:  [] Kanu position with breathing (baseline)  [] Hip Hinge with stick & neutral spine (baseline)  [] Sit to stand (baseline)  [] Hip Hinge without guidance (Progression 1)  [] Hip Hinge with resisted punch out guidance (Progression 2)      Pelvic Floor:  [] Relaxation breathing         Activities:       [] Rowing       [] Stepper/Exercise bike     [] Swimming  [] Water exercises    Modalities:     Return to Sport:  [x] Of Choice      [] Plyometrics  [] Ultrasound     [] Rhythmic stabilization  [] Iontophoresis    [] Core strengthening   [] Moist heat     [] Sports specific program:   [] Massage         [x] Cryotherapy      [] Electrical stimulation     [] Paraffin  [] Whirlpool  [] TENS    [x] Home exercise program (copy to patient). Perform exercises for:   15     minutes    3      times/day  [x] Supervised physical therapy  Frequency: []  1x week  [x] 2x week  [] 3x week  [] Other:   Duration: [] 2 weeks   [] 4 weeks  [x] 6 weeks  [] Other:     Additional Instructions:     OKAY TO ADD DRY NEEDLING FOR LEFT SIDED PARASPINAL MUSCLES     Sincerely,    Manuel Haddad MD [facility]  Hip Preservation & Sports Medicine Surgeon   [address] [facility]   [address] [facility]  Email: [email or web address]  Office: [phone]

## 2021-09-27 NOTE — PROGRESS NOTES
History of Present Illness:  Russ Mitchell is a pleasant 27 y.o. male who presents for a post operative visit. He is 5 weeks  out following a left hip arthroscopy, labral repair, acetabuloplasty, osteochondroplasty, fractional psoas lengthening, and capsular closure. He is doing a lot better today and is ambulating with full weightbearing and crutches. He saw Dr. Valentin Arevalo for the rash on his skin and has been treated successfully with a cortisone cream that help resolve the rash that he had in a matter 48 hours, a couple weeks ago. He denies any fevers chills or night sweats. Still has occasional 1 out of 10 groin pain. He still has some lateral sided buttock pain in the paraspinal, dorsolumbar fascia insertion to the posterior iliac crest territory. Medical History:  Patient's medications, allergies, past medical, surgical, social and family histories were reviewed and updated as appropriate. Review of Systems  A 14 point review of systems was completed by the patient and is available in the media section of the scanned medical record and was reviewed on 9/27/2021. Vital Signs: There were no vitals filed for this visit. General/Appearance: Alert and oriented and in no apparent distress. Skin:  There are no skin lesions, cellulitis, or extreme edema. The patient has warm and well-perfused Bilateral lower  extremities with brisk capillary refill. Hip  Exam:LEFT    Inspection: Hip incision(s)  Are scabbed and intact. There is no erythema, drainage or other signs of infection. However, there are     Palpation:  No crepitus to gentle motion / much les  pain with circumduction of the hip    Active Range of Motion: normal active SLR and circumduction    Passive Range of Motion: 0 to 90 deg flexion with no pain     Strength:  Deferred    Special Tests:  Deferred.     Neurovascular: Sensation to light touch is intact, no motor deficits, palpable radial pulses 2+    Radiology:     No new imaging today         Assessment :  Mr. Neil Gracia is a pleasant 27 y.o. patient who is  5 weeks post left hip arthroscopy, labral repair, acetabuloplasty, osteochondroplasty, fractional psoas lengthening, and capsular closure. He is progressing quite appropriately and has healed his skin rash. Impression:  Encounter Diagnosis   Name Primary?  Status post arthroscopy of hip Yes       Office Procedures:  No orders of the defined types were placed in this encounter. Treatment Plan:    Overall Neil Gracia is doing great. He can start phase 3 of his physical therapy and wean off the crutches in the next week. I am okay with paraspinal lumbodorsal fascia dry needling. Return in 6 to 7 weeks for repeat assessment    All of his questions were fully answered today. We would like to see Neil Gracia back in 6-7  weeks for follow-up visit. Sincerely,    Kiki Burnett MD [facility] and [address] [place] [address]   [address] [facility], [address]  Email: [email or web address]  Office: [phone]    09/27/21  4:21 PM    She can start our

## 2021-10-04 NOTE — TELEPHONE ENCOUNTER
From: MICHEAL NORIEGA MA  To: Veronique   Sent: 9/13/2021 12:45 PM EDT  Subject: dermotology referral    Charla Rajan will be referring you do dermatologist Dr. Jennifer Ovalle. He would like for you to go ahead and call their office to schedule an appointment. The number is [phone]. We will see you at your 2:30 appointment for all other questions.    Thank you

## 2021-10-06 ENCOUNTER — HOSPITAL ENCOUNTER (OUTPATIENT)
Dept: PHYSICAL THERAPY | Age: 31
Setting detail: THERAPIES SERIES
Discharge: HOME | End: 2021-10-06
Payer: COMMERCIAL

## 2021-10-06 PROCEDURE — 97140 MANUAL THERAPY 1/> REGIONS: CPT

## 2021-10-06 PROCEDURE — 97110 THERAPEUTIC EXERCISES: CPT

## 2021-10-06 NOTE — FLOWSHEET NOTE
[facility]    Physical Therapy Treatment Note/ Progress Report:     Date:  10/6/2021    Patient Name:  Nelson Padilla    :  1990  MRN: [id number]  Medical/Treatment Diagnosis Information:  W39.139 (ICD-10-CM) - Other specified joint disorders, left hip   S73.192D (ICD-10-CM) - Other sprain of left hip, subsequent encounter     Insurance/Certification information:  PT Insurance Information: [facility]  Physician Information:  Referring Practitioner: Hattie Montalvo MD  Plan of care signed (Y/N):     Date of Patient follow up with Physician:      Progress Report: []  Yes  []  No     Functional Scale:  LEFS:  0/80  Date: 21    Date Range for reporting period:  Beginnin21  Ending:      Progress report due (10 Rx/or 30 days whichever is less):      Recertification due (POC duration/ or 90 days whichever is less):      Visit # Insurance Allowable Auth Needed   9 (8 post op) 60 []Yes    []No     Pain level:  3/10     SUBJECTIVE:  Patient reports  He is doing well with only slight continued dull ache into the anterior hip. Patient is very eager to return to work. Patient reports he continues to experience low back pain on L.     OBJECTIVE: See eval   Observation:    Test measurements:  21    L knee AAROM: 0-130 deg. L hip flexion to 90 deg, adb 25 deg. ER 10 deg.     RESTRICTIONS/PRECAUTIONS:  DOS 21 LEFT HIP ARTHROSCOPY LABRAL REPAIR, ACETABULOPLASTY AND OSTEOCHONDROPLASTY    Exercises/Interventions:     Therapeutic Ex 30' Resistance Sets/sec Reps Notes          Quad set   2 15    Heel slide  1 8 5' hold   TA draw in w/ belt  2 8 6' hold   SAQ  2 15 5'   bike  6 min     Prone lying stretch  5 min     Prone quad stretch  5 30'    Pelvic clocks  6-12 3-9 2 10    Supine hip flexor hang off table   2 1 min    Bridges   2 8-10 5' hold    Clam shell  3 8    Standing hip extension/abd  2 10    Squat tap to table   2 15    Wall  sit 4 to fatigue                      Therapeutic Activities 5'             Education on Gait with crutches and WB precautions, surgical precautions                                                 Manual Intervention 15'       Knee mobs/PROM       Tib/Fem Mobs       Patella Mobs       Ankle mobs       Hamstring stretch  3 30'    PROM    Gentle motion within precautions. Slight hip flexor tightness noted that improved following stretching. SL hip flexor stretch  3 30'    STM    To hip flexor, improved symptoms with less reported pain during hip flexion. NMR re-education                                                                          Therapeutic Exercise and NMR EXR  [x] (08592) Provided verbal/tactile cueing for activities related to strengthening, flexibility, endurance, ROM for improvements in LE, proximal hip, and core control with self care, mobility, lifting, ambulation.  [] (10468) Provided verbal/tactile cueing for activities related to improving balance, coordination, kinesthetic sense, posture, motor skill, proprioception  to assist with LE, proximal hip, and core control in self care, mobility, lifting, ambulation and eccentric single leg control.      NMR and Therapeutic Activities:    [x] (27922 or 69889) Provided verbal/tactile cueing for activities related to improving balance, coordination, kinesthetic sense, posture, motor skill, proprioception and motor activation to allow for proper function of core, proximal hip and LE with self care and ADLs  [] (51452) Gait Re-education- Provided training and instruction to the patient for proper LE, core and proximal hip recruitment and positioning and eccentric body weight control with ambulation re-education including up and down stairs     Home Exercise Program:    [x] (76617) Reviewed/Progressed HEP activities related to strengthening, flexibility, endurance, ROM of core, proximal hip and LE for functional self-care, mobility, lifting and ambulation/stair navigation   [] (84779)Reviewed/Progressed HEP activities related to improving balance, coordination, kinesthetic sense, posture, motor skill, proprioception of core, proximal hip and LE for self care, mobility, lifting, and ambulation/stair navigation      Manual Treatments:  PROM / STM / Oscillations-Mobs:  G-I, II, III, IV (PA's, Inf., Post.)  [x] (85129) Provided manual therapy to mobilize LE, proximal hip and/or LS spine soft tissue/joints for the purpose of modulating pain, promoting relaxation,  increasing ROM, reducing/eliminating soft tissue swelling/inflammation/restriction, improving soft tissue extensibility and allowing for proper ROM for normal function with self care, mobility, lifting and ambulation. Modalities:      Charges:  Timed Code Treatment Minutes: 45   Total Treatment Minutes: 45       [] EVAL (LOW) 16812 (typically 20 minutes face-to-face)  [] EVAL (MOD) 75700 (typically 30 minutes face-to-face)  [] EVAL (HIGH) 04173 (typically 45 minutes face-to-face)  [] RE-EVAL     [x] BI(52737) x   2  [] IONTO (03500)  [] NMR (97244) x     [] VASO (79221)  [x] Manual (21417) x 1    [] Other:  [] TA (53060)x     [] Mech Traction (07335)  [] ES(attended) (31992)     [] ES (un) (18428): If BWC Please Indicate Time In/Out and Total Minutes  CPT Code Time in Time out Total Min                                           GOALS:  Patient stated goal: To experience less pain during walking. [x] Progressing: [] Met: [] Not Met: [] Adjusted    Therapist goals for Patient:   Short Term Goals: To be achieved in: 2 weeks  1. Independent in HEP and progression per patient tolerance, in order to prevent re-injury. [x] Progressing: [] Met: [] Not Met: [] Adjusted  2. Patient will have a decrease in pain to facilitate improvement in movement, function, and ADLs as indicated by Functional Deficits. [x] Progressing: [] Met: [] Not Met: [] Adjusted    Long Term Goals:  To be achieved in: 10-12 weeks  1. Disability index score of 50% or less for the LEFS to assist with reaching prior level of function. [x] Progressing: [] Met: [] Not Met: [] Adjusted  2. Patient will demonstrate increased AROM to WNL to allow for proper joint functioning as indicated by patients Functional Deficits. [x] Progressing: [] Met: [] Not Met: [] Adjusted  3. Patient will demonstrate an increase in Strength to good proximal hip strength and control, within 5lb HHD in LE to allow for proper functional mobility as indicated by patients Functional Deficits. [x] Progressing: [] Met: [] Not Met: [] Adjusted  4. Patient will return to ambulating, standing, squatting, and prolonged sitting, and functional activities without increased symptoms or restriction. [x] Progressing: [] Met: [] Not Met: [] Adjusted  5. To return to walking without pain. (patient specific functional goal)    [x] Progressing: [] Met: [] Not Met: [] Adjusted     Progression Towards Functional goals:  [x] Patient is progressing as expected towards functional goals listed. [] Progression is slowed due to complexities listed. [] Progression has been slowed due to co-morbidities. [] Plan just implemented, too soon to assess goals progression  [] Other:     ASSESSMENT:  Patient tolerated session well with improved PROM. Patient tolerated strengthening well without increase in pain. Patient able to tolerate ambulation with single axillary crutch well with cues for improved heel strike. HEP updated due to progress.      Return to Play: (if applicable)   []  Stage 1: Intro to Strength   []  Stage 2: Return to Run and Strength   []  Stage 3: Return to Jump and Strength   []  Stage 4: Dynamic Strength and Agility   []  Stage 5: Sport Specific Training     []  Ready to Return to Play, Meets All Above Stages   []  Not Ready for Return to Sports   Comments:            Treatment/Activity Tolerance:  [x] Patient tolerated treatment well [] Patient limited by nishant  [] Patient limited by pain  [] Patient limited by other medical complications  [] Other:     Overall Progression Towards Functional goals/ Treatment Progress Update:  [x] Patient is progressing as expected towards functional goals listed. [] Progression is slowed due to complexities/Impairments listed. [] Progression has been slowed due to co-morbidities. [] Plan just implemented, too soon to assess goals progression <30days   [] Goals require adjustment due to lack of progress  [] Patient is not progressing as expected and requires additional follow up with physician  [] Other    Prognosis for POC: [x] Good [] Fair  [] Poor    Patient requires continued skilled intervention: [x] Yes  [] No        PLAN: Continue to progress per protocol with emphasis on strengthening. [x] Continue per plan of care [] Alter current plan (see comments)  [] Plan of care initiated [] Hold pending MD visit [] Discharge    Electronically signed by: Michaelle Martinez PT    Note: If patient does not return for scheduled/recommended follow up visits, this note will serve as a discharge from care along with the most recent update on progress.

## 2021-10-08 ENCOUNTER — HOSPITAL ENCOUNTER (OUTPATIENT)
Dept: PHYSICAL THERAPY | Age: 31
Setting detail: THERAPIES SERIES
Discharge: HOME | End: 2021-10-08
Payer: COMMERCIAL

## 2021-10-08 PROCEDURE — 97110 THERAPEUTIC EXERCISES: CPT

## 2021-10-08 NOTE — FLOWSHEET NOTE
Wall  sit  4 to fatigue      Leg press 80 2 8-10             Therapeutic Activities 5'             Education on Gait with crutches and WB precautions, surgical precautions                                                 Manual Intervention 15'       Knee mobs/PROM       Tib/Fem Mobs       Patella Mobs       Ankle mobs       Hamstring stretch  3 30'    PROM    Gentle motion within precautions. Slight hip flexor tightness noted that improved following stretching. SL hip flexor stretch  3 30'    STM    To hip flexor, improved symptoms with less reported pain during hip flexion. NMR re-education                                                                          Therapeutic Exercise and NMR EXR  [x] (81203) Provided verbal/tactile cueing for activities related to strengthening, flexibility, endurance, ROM for improvements in LE, proximal hip, and core control with self care, mobility, lifting, ambulation.  [] (97176) Provided verbal/tactile cueing for activities related to improving balance, coordination, kinesthetic sense, posture, motor skill, proprioception  to assist with LE, proximal hip, and core control in self care, mobility, lifting, ambulation and eccentric single leg control.      NMR and Therapeutic Activities:    [x] (34787 or 59022) Provided verbal/tactile cueing for activities related to improving balance, coordination, kinesthetic sense, posture, motor skill, proprioception and motor activation to allow for proper function of core, proximal hip and LE with self care and ADLs  [] (96976) Gait Re-education- Provided training and instruction to the patient for proper LE, core and proximal hip recruitment and positioning and eccentric body weight control with ambulation re-education including up and down stairs     Home Exercise Program:    [x] (68897) Reviewed/Progressed HEP activities related to strengthening, flexibility, endurance, ROM of core, proximal hip and LE for functional self-care, mobility, lifting and ambulation/stair navigation   [] (25046)Reviewed/Progressed HEP activities related to improving balance, coordination, kinesthetic sense, posture, motor skill, proprioception of core, proximal hip and LE for self care, mobility, lifting, and ambulation/stair navigation      Manual Treatments:  PROM / STM / Oscillations-Mobs:  G-I, II, III, IV (PA's, Inf., Post.)  [x] (12823) Provided manual therapy to mobilize LE, proximal hip and/or LS spine soft tissue/joints for the purpose of modulating pain, promoting relaxation,  increasing ROM, reducing/eliminating soft tissue swelling/inflammation/restriction, improving soft tissue extensibility and allowing for proper ROM for normal function with self care, mobility, lifting and ambulation. Modalities:      Charges:  Timed Code Treatment Minutes: 45   Total Treatment Minutes: 45       [] EVAL (LOW) 30180 (typically 20 minutes face-to-face)  [] EVAL (MOD) 78525 (typically 30 minutes face-to-face)  [] EVAL (HIGH) 18086 (typically 45 minutes face-to-face)  [] RE-EVAL     [x] TG(78731) x   3  [] IONTO (78668)  [] NMR (19796) x     [] VASO (50058)  [] Manual (25515) x     [] Other:  [] TA (84082)x     [] Mech Traction (28195)  [] ES(attended) (68881)     [] ES (un) (96058): If BWC Please Indicate Time In/Out and Total Minutes  CPT Code Time in Time out Total Min                                           GOALS:  Patient stated goal: To experience less pain during walking. [x] Progressing: [] Met: [] Not Met: [] Adjusted    Therapist goals for Patient:   Short Term Goals: To be achieved in: 2 weeks  1. Independent in HEP and progression per patient tolerance, in order to prevent re-injury. [x] Progressing: [] Met: [] Not Met: [] Adjusted  2. Patient will have a decrease in pain to facilitate improvement in movement, function, and ADLs as indicated by Functional Deficits. [x] Progressing: [] Met: [] Not Met: [] Adjusted    Long Term Goals:  To be achieved in: 10-12 weeks  1. Disability index score of 50% or less for the LEFS to assist with reaching prior level of function. [x] Progressing: [] Met: [] Not Met: [] Adjusted  2. Patient will demonstrate increased AROM to WNL to allow for proper joint functioning as indicated by patients Functional Deficits. [x] Progressing: [] Met: [] Not Met: [] Adjusted  3. Patient will demonstrate an increase in Strength to good proximal hip strength and control, within 5lb HHD in LE to allow for proper functional mobility as indicated by patients Functional Deficits. [x] Progressing: [] Met: [] Not Met: [] Adjusted  4. Patient will return to ambulating, standing, squatting, and prolonged sitting, and functional activities without increased symptoms or restriction. [x] Progressing: [] Met: [] Not Met: [] Adjusted  5. To return to walking without pain. (patient specific functional goal)    [x] Progressing: [] Met: [] Not Met: [] Adjusted     Progression Towards Functional goals:  [x] Patient is progressing as expected towards functional goals listed. [] Progression is slowed due to complexities listed. [] Progression has been slowed due to co-morbidities. [] Plan just implemented, too soon to assess goals progression  [] Other:     ASSESSMENT:  Patient tolerated session well with new exercises to focus on strengthening. Patient encouraged to continue strengthening at home with HEP. Patient encouraged to begin ambulating without crutches for household distance.      Return to Play: (if applicable)   []  Stage 1: Intro to Strength   []  Stage 2: Return to Run and Strength   []  Stage 3: Return to Jump and Strength   []  Stage 4: Dynamic Strength and Agility   []  Stage 5: Sport Specific Training     []  Ready to Return to Play, Meets All Above Stages   []  Not Ready for Return to Sports   Comments:            Treatment/Activity Tolerance:  [x] Patient tolerated treatment well [] Patient limited by nishant  [] Patient limited by pain  [] Patient limited by other medical complications  [] Other:     Overall Progression Towards Functional goals/ Treatment Progress Update:  [x] Patient is progressing as expected towards functional goals listed. [] Progression is slowed due to complexities/Impairments listed. [] Progression has been slowed due to co-morbidities. [] Plan just implemented, too soon to assess goals progression <30days   [] Goals require adjustment due to lack of progress  [] Patient is not progressing as expected and requires additional follow up with physician  [] Other    Prognosis for POC: [x] Good [] Fair  [] Poor    Patient requires continued skilled intervention: [x] Yes  [] No        PLAN: Continue to progress per protocol with emphasis on strengthening. [x] Continue per plan of care [] Alter current plan (see comments)  [] Plan of care initiated [] Hold pending MD visit [] Discharge    Electronically signed by: Yasmany Stephenson, PT    Note: If patient does not return for scheduled/recommended follow up visits, this note will serve as a discharge from care along with the most recent update on progress.

## 2021-10-11 ENCOUNTER — HOSPITAL ENCOUNTER (OUTPATIENT)
Dept: PHYSICAL THERAPY | Age: 31
Setting detail: THERAPIES SERIES
Discharge: HOME | End: 2021-10-11
Payer: COMMERCIAL

## 2021-10-11 NOTE — PROGRESS NOTES
[facility]    Physical Therapy  Cancellation/No-show Note  Patient Name:  Melissa Browning  :  1990   Date:  10/11/2021  Cancelled visits to date: 2  No-shows to date: 0    For today's appointment patient:  [x]  Cancelled  []  Rescheduled appointment  []  No-show     Reason given by patient:  []  Patient ill  []  Conflicting appointment  [x]  No transportation    []  Conflict with work  []  No reason given  []  Other:     Comments: Patient reports he locked his keys in his car and will not be able to make his appointment in time. Phone call information:   []  Phone call made today to patient at _ time at number provided:      []  Patient answered, conversation as follows:    []  Patient did not answer, message left as follows:  []  Phone call not made today  [x]  Phone call not needed - pt contacted us to cancel and provided reason for cancellation.      Electronically signed by:  Sherryn Dakins, [place], [id number]

## 2021-10-13 ENCOUNTER — APPOINTMENT (OUTPATIENT)
Dept: PHYSICAL THERAPY | Age: 31
End: 2021-10-13
Payer: COMMERCIAL

## 2021-10-13 ENCOUNTER — TELEPHONE (OUTPATIENT)
Dept: ORTHOPEDIC SURGERY | Age: 31
End: 2021-10-13

## 2021-10-13 NOTE — TELEPHONE ENCOUNTER
Patient calls to cancel 11/8/21 f/u appt. Family emergency in [place]. S/w spouse. Will call to reschedule in next few weeks.

## 2021-10-15 ENCOUNTER — CLINICAL DOCUMENTATION (OUTPATIENT)
Dept: OTHER | Age: 31
End: 2021-10-15

## 2021-10-20 ENCOUNTER — APPOINTMENT (OUTPATIENT)
Dept: PHYSICAL THERAPY | Age: 31
End: 2021-10-20
Payer: COMMERCIAL

## 2021-10-22 ENCOUNTER — APPOINTMENT (OUTPATIENT)
Dept: PHYSICAL THERAPY | Age: 31
End: 2021-10-22
Payer: COMMERCIAL

## 2021-10-25 ENCOUNTER — APPOINTMENT (OUTPATIENT)
Dept: PHYSICAL THERAPY | Age: 31
End: 2021-10-25
Payer: COMMERCIAL

## 2021-10-27 ENCOUNTER — APPOINTMENT (OUTPATIENT)
Dept: PHYSICAL THERAPY | Age: 31
End: 2021-10-27
Payer: COMMERCIAL

## 2021-11-02 ENCOUNTER — APPOINTMENT (OUTPATIENT)
Dept: PHYSICAL THERAPY | Age: 31
End: 2021-11-02
Payer: COMMERCIAL

## 2021-11-04 ENCOUNTER — APPOINTMENT (OUTPATIENT)
Dept: PHYSICAL THERAPY | Age: 31
End: 2021-11-04
Payer: COMMERCIAL

## 2021-11-17 ENCOUNTER — OFFICE VISIT (OUTPATIENT)
Dept: ORTHOPEDIC SURGERY | Age: 31
End: 2021-11-17

## 2021-11-17 VITALS — HEIGHT: 70 IN | WEIGHT: 170 LBS | BODY MASS INDEX: 24.34 KG/M2

## 2021-11-17 DIAGNOSIS — Z98.890 STATUS POST ARTHROSCOPY OF HIP: Primary | ICD-10-CM

## 2021-11-17 PROCEDURE — 99024 POSTOP FOLLOW-UP VISIT: CPT | Performed by: ORTHOPAEDIC SURGERY

## 2021-11-17 NOTE — LETTER
Physical Therapy Rehabilitation Referral    Patient Name: Carrington Grimes MRN: [id number]  DOS: 11/17/2021     Diagnosis:   1.  Status post arthroscopy of hip          Precautions:     [x] Evaluate and Treat    Post Op Instructions:  [] Continuous passive motion (CPM)   [] AAROM: Flexion to 90   [] Uni-planar passive range of motion   [] AAROM: External rotation to 10   [] Hip brace on at all times    [] AAROM: Extension to 10   [] Hip brace when hip at risk     [] AAROM:  Neutral IR   [] Home exercise program (copy to patient)   [] AAROM: Abduction to 25    [] Isometric external rotator strengthening    [] Isometric glute max strengthening     [] Isometric abductor strengthening              Post-op Phases:   []Phase I: Maximum Protection (Day 1-3 Weeks)  []Phase II: Mobility & Neuromuscular Retraining (3-6 Weeks)  [x]Phase III: Phase III: Muscle Balance and Strengthening (6-12 Weeks)  [x]Phase IV: Functional Training of the Hip & Lower Extremity (12-18 Weeks)  []Phase V: Advanced Training  Specificity for Return to Sport and/or Work (18-24 [employer])    Non-Operative & Pre-Operative Rehabilitation:    Cardiovascular:            Deep Hip Rotators  [] Upright Bike (Baseline)           [] External Rotators AROM in 4PK (Baseline)  [] Walking (Progression 1)           [] Internal Rotators AROM in 4PK (Baseline)  [] Running (Progression 2)           [] ER in side lying (Progression 1)  [] Dynamic Loading (Progression 3)          [] IR in side lying (Progression 1)                [] ER with resistance in 4PK (Progression 2)                [] IR with resistance in 4PK (Progression 2)                 [] Lateral Step Up (Progression 3)  Positioning:  [] 4PK with pelvic tilts (Baseline)  [] Pelvic tilts in sitting (Progression 1)      Core:   [] Relaxation Breathing (Baseline)         [] [place] lying elbow presses (Progression 1)  [] Side Planks (Baseline)         [] Side planks + hip abduction (Progression 1)  [] Bird-Dog (Baseline)            [] Bird-Dog with resistance (Progression 1)    Glute Complex:            Load Transfer:  [] Bridging (Baseline)             [] Weight Shifting (Baseline)     [] Single Leg Bridge (Progression 1)         [] Single Leg Stance to SEBT(Progression 1)     [] Single Leg Lower (Progression 2)          [] Hip hinge + monster walks (Progression 2)       Mobility:  [] Kanu position with breathing (baseline)  [] Hip Hinge with stick & neutral spine (baseline)  [] Sit to stand (baseline)  [] Hip Hinge without guidance (Progression 1)  [] Hip Hinge with resisted punch out guidance (Progression 2)      Pelvic Floor:  [] Relaxation breathing         Activities:       [] Rowing       [] Stepper/Exercise bike     [] Swimming  [] Water exercises    Modalities:     Return to Sport:  [x] Of Choice      [] Plyometrics  [] Ultrasound     [] Rhythmic stabilization  [] Iontophoresis    [] Core strengthening   [] Moist heat     [] Sports specific program:   [] Massage         [x] Cryotherapy      [] Electrical stimulation     [] Paraffin  [] Whirlpool  [] TENS    [x] Home exercise program (copy to patient). Perform exercises for:   15     minutes    3      times/day  [x] Supervised physical therapy  Frequency: []  1x week  [x] 2x week  [] 3x week  [] Other:   Duration: [] 2 weeks   [] 4 weeks  [x] 6 weeks  [] Other:     Additional Instructions:     OKAY TO ADD DRY NEEDLING     Sincerely,    Abdirizak Patterson MD [facility]  Hip Preservation & Sports Medicine Surgeon   [phone] [facility]   [address], [facility] [address]  Email: [email or web address]  Office: [phone]

## 2021-11-17 NOTE — PROGRESS NOTES
Chief Complaint  Post-Op Check (12.5 WEEK POST OP LEFT HIP SCOPE 08/20/21)      History of Present Illness:  Danyell Muir is a pleasant 27 y.o. male who is 15 and half weeks post left hip arthroscopy labral repair, acetabuloplasty, osteochondral plasty and capsular closure. He recently returned from [place] as his dad fell ill. 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 He is having some recurrent buttock pain and occasional anterior hip pain worse with repetitive lifting twisting and turning. No other setbacks. Due to the recent travel he has not been back in therapy. He was doing at our [facility] office. Medical History:  Patient's medications, allergies, past medical, surgical, social and family histories were reviewed and updated as appropriate. Pain Assessment  Location of Pain:  (HIP)  Location Modifiers: Left  Severity of Pain: 5  Quality of Pain:  (STABBING)  Duration of Pain: Persistent  Frequency of Pain: Intermittent  Aggravating Factors: Walking (LIFTING AND TURNING)  Limiting Behavior: Yes  Relieving Factors: Rest  Result of Injury: No  Work-Related Injury: No  ROS: Review of systems reviewed from Patient History Form completed today and available in the patient's chart under the Media tab. Pertinent items are noted in HPI  Review of systems reviewed from Patient History Form completed today and available in the patient's chart under the Media tab. Vital Signs:  Ht 5' 10\" (1.778 m)   Wt 170 lb (77.1 kg)   BMI 24.39 kg/m²         Neuro: Alert & oriented x 3,  normal,  no focal deficits noted. Normal affect. Eyes: sclera clear  Ears: Normal external ear  Mouth:  No perioral lesions  Pulm: Respirations unlabored and regular  Pulse: Extremities well perfused. 2+ peripheral pulses. Skin: Warm. No ulcerations.       Constitutional: The physical examination finds the patient to be well-developed and well-nourished. The patient is alert and oriented x3 and was cooperative throughout the visit. Hip exam    Hip Examination: LEFT    Skin/Inspection: no skin lesions, cellulitis, or extreme edema in the lower extremities. Standing/Walking: mildly antalgic gait, no pelvic tilt, -ve Trendelenburg sign. No use of assistive devices    Sitting Exam: 5/5 Hip Flexor Strength, 5/5 Abductor Strength, 5/5 Adductor strength, Negative Straight Leg Raise    Supine Exam: Non tender around the ASIS, AIIS  Flexion arc 0 to 100deg, with no pain  pain  Internal Rotation 15deg   External Rotation 30deg  Special Tests: -ve Deep Flexion Test, +FADIR , mild VENKAT    Side Lying Exam: non tender at greater trochanter, nontender at abductor musculature, + tender along the TFL, + tender along short external rotators/piriformis. Abductor side leg raise 5/5 strength. -veOberTest    Prone Exam: -ve  hip flexion contracture, internal rotation to 25 deg, external rotation to 45 deg. tender at the ischial tuberosity/proximal hamstring    Special tests: some pain with squats/lunges. Weak single leg bridges. Weak resisted deep rotators in ER/IR on the operative LEFT hip. Distal Neurovascular exam is intact (foot sensation, pulses, and motor exam)    Diagnostics:  Radiology:     No new imaging. Assessment: Patient is a 27 y.o. male who is 12 and half weeks post left hip arthroscopy, labral repair, osteochondral plasty, and acetabuloplasty. He is having some residual pain due to glute dysfunction and deconditioning due to the chronicity of his condition and some under rehabilitation at home given his recent travel and early return to work.      Impression:  Visit Diagnoses       Codes    Status post arthroscopy of hip    -  Primary Z98.890          Office Procedures:  Orders Placed This Encounter   Medications    Diclofenac Sodium POWD     Sig: Apply 1-2 g topically 3 times daily as needed (As needed for pain) Ketamine 5% Gabapentin 5% Cyclobenzaprine 2% Diclofenac 3% Lidocaine 3%     Dispense:  100 g     Refill:  0     No orders of the defined types were placed in this encounter. Plan:  Otherwise I think Efraín's repair remains to be intact. I think he should progress to finishing his phase 3 and slowly transition to phase 4 of his rehab protocol and focusing on posterior chain strengthening and anterior chain flexibility. He is a candidate for a topical wellness cream.  He is also a candidate for dry needling by physical therapy office. I like see him back in 8 weeks for reassessment    We have given him a note for work restrictions for his [address] [employer] job. 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 All the patient's questions were answered while in the clinic. The patient is understanding of all instructions and agrees with the plan. Approximately 30 minutes was spent on patient education and coordinating care. Follow up in: No follow-ups on file. Sincerely,    Zeny Neal MD [address] [facility]   [address] [place], [address]  Email: [email or web address]  Office: [phone]    11/17/21  4:15 PM      The encounter with Herberth Cordero was carried out by myself, Dr Sainz, who personally examined the patient and reviewed the plan. This dictation was performed with a verbal recognition program (DRAGON) and it was checked for errors. It is possible that there are still dictated errors within this office note. If so, please bring any errors to my attention for an addendum. All efforts were made to ensure that this office note is accurate.

## 2021-11-17 NOTE — LETTER
[address]  [address] [facility] [address]  Phone: [phone]  Fax: [phone]    Abdirizak Patterson MD        November 17, 2021     Patient: Christie Dudley   YOB: 1990   Date of Visit: 11/17/2021       To Whom It May Concern: It is my medical opinion that Christie Dudley may return to work on 11/18/21 with the following restrictions: no twisting/turning/lifting exceeding 15lbs. Recommend patient work indoor for the next 8 weeks . If you have any questions or concerns, please don't hesitate to call.     Sincerely,          Abdirizak Patterson MD

## 2021-12-01 ENCOUNTER — HOSPITAL ENCOUNTER (OUTPATIENT)
Dept: PHYSICAL THERAPY | Age: 31
Setting detail: THERAPIES SERIES
Discharge: HOME | End: 2021-12-01
Payer: COMMERCIAL

## 2021-12-01 NOTE — PROGRESS NOTES
[facility]    Physical Therapy  Cancellation/No-show Note  Patient Name:  Nicolás Lennon  :  1990   Date:  2021  Cancelled visits to date: 3  No-shows to date: 0    For today's appointment patient:  [x]  Cancelled  []  Rescheduled appointment  []  No-show     Reason given by patient:  [x]  Patient ill  []  Conflicting appointment  []  No transportation    []  Conflict with work  []  No reason given  []  Other:     Comments:     Phone call information:   []  Phone call made today to patient at _ time at number provided:      []  Patient answered, conversation as follows:    []  Patient did not answer, message left as follows:  []  Phone call not made today  [x]  Phone call not needed - pt contacted us to cancel and provided reason for cancellation.      Electronically signed by:  Rudie Hodgkin, [address] [place] [address], [id number]

## 2021-12-06 ENCOUNTER — HOSPITAL ENCOUNTER (OUTPATIENT)
Dept: PHYSICAL THERAPY | Age: 31
Setting detail: THERAPIES SERIES
Discharge: HOME | End: 2021-12-06
Payer: COMMERCIAL

## 2021-12-06 NOTE — PROGRESS NOTES
[facility]    Physical Therapy  Cancellation/No-show Note  Patient Name:  Carolina Patel  :  1990   Date:  2021  Cancelled visits to date: 4  No-shows to date: 0    For today's appointment patient:  [x]  Cancelled  []  Rescheduled appointment  []  No-show     Reason given by patient:  []  Patient ill  []  Conflicting appointment  [x]  No transportation    []  Conflict with work  []  No reason given  []  Other:     Comments:  Patient reports car broke down and they need a tow truck. Phone call information:   []  Phone call made today to patient at _ time at number provided:      []  Patient answered, conversation as follows:    []  Patient did not answer, message left as follows:  []  Phone call not made today  [x]  Phone call not needed - pt contacted us to cancel and provided reason for cancellation.      Electronically signed by:  Hortencia Mota, [facility], [id number]

## 2021-12-09 ENCOUNTER — HOSPITAL ENCOUNTER (OUTPATIENT)
Dept: PHYSICAL THERAPY | Age: 31
Setting detail: THERAPIES SERIES
Discharge: HOME | End: 2021-12-09
Payer: COMMERCIAL

## 2021-12-09 PROCEDURE — 97110 THERAPEUTIC EXERCISES: CPT

## 2021-12-09 PROCEDURE — 97530 THERAPEUTIC ACTIVITIES: CPT

## 2021-12-09 NOTE — FLOWSHEET NOTE
[facility]    Physical Therapy Treatment Note/ Progress Report:     Date:  2021    Patient Name:  Stephani Pinedo    :  1990  MRN: [id number]  Medical/Treatment Diagnosis Information:  V41.820 (ICD-10-CM) - Other specified joint disorders, left hip   S73.192D (ICD-10-CM) - Other sprain of left hip, subsequent encounter     Insurance/Certification information:  PT Insurance Information: [facility]  Physician Information:  Referring Practitioner: Jessie Lyons MD  Plan of care signed (Y/N):     Date of Patient follow up with Physician:      Progress Report: []  Yes  []  No     Functional Scale:  LEFS:     Date: 10/8/21    Date Range for reporting period:  Beginnin21  Ending:  10/8/21    Progress report due (10 Rx/or 30 days whichever is less):      Recertification due (POC duration/ or 90 days whichever is less):      Visit # Insurance Allowable Auth Needed   10 (9 post op) 60 []Yes    []No     Pain level:  3/10     SUBJECTIVE:  Patient reports he has started working at [employer] as a  that increases soreness in the glut area. Patient sitting for a maximum of 4 hours. Patient reports he feels movement is good. Patient reports slight pain intermittently in the anterior groin area. Patient reports increased low back pain that began prior to surgical intervention on the hip. Back pain comes on after 2 1/2 hours. OBJECTIVE: See eval   Observation:    Test measurements:  21    L knee AAROM: 0-130 deg. L hip flexion to 120 deg significant tightness to begin movement past 90, adb 25 deg. ER 10 deg. 21:  L hip flexion to 120 deg significant tightness, adb 25 deg. ER 25 deg.   Strength:   Hip abduction L: 16.7 lb  R: 30.6   Hip ext: L 24 lb R 23.2 lb   Hip flexion: L: 38.3lb slight painR: 58.6lb    RESTRICTIONS/PRECAUTIONS:  DOS 21 LEFT HIP ARTHROSCOPY LABRAL REPAIR, ACETABULOPLASTY AND OSTEOCHONDROPLASTY    Exercises/Interventions:     Therapeutic Ex 25' Resistance Sets/sec Reps Notes          Quad set   2 15    Heel slide  1 8 5' hold   TA draw in w/ belt  2 8 6' hold   SAQ  2 15 5'   bike  5 min     Prone lying stretch  5 min     Prone quad stretch  2 30'    Pelvic clocks  6-12 3-9 2 10    Kanu stretch  3 30'           Kneeling hip flexor stretch with band   3 30'    SL hip abduction  1 10    Supine hip flexor hang off table   3 30 sec    Bridges   1 8-10 10' hold    Clam shell legs extensed  3 8    Standing hip extension/abd 3lb  2 10    Squat tap to table   2 15    Wall sit  4 To fatigue     Leg ext  20 2 8-10 ecc L   Wall  sit  4 to fatigue      Leg press 80 2 8-10             Therapeutic Activities 20'             Education on protocol, activity modification, POC, updated HEP, DN, and progressions                                                 Manual Intervention 15'       Knee mobs/PROM       Tib/Fem Mobs       Patella Mobs       Ankle mobs       Hamstring stretch  3 30'    PROM    Gentle motion within precautions. Slight hip flexor tightness noted that improved following stretching. SL hip flexor stretch  3 30'    STM    To hip flexor, improved symptoms with less reported pain during hip flexion. NMR re-education                                                                          Therapeutic Exercise and NMR EXR  [x] (56994) Provided verbal/tactile cueing for activities related to strengthening, flexibility, endurance, ROM for improvements in LE, proximal hip, and core control with self care, mobility, lifting, ambulation.  [] (79391) Provided verbal/tactile cueing for activities related to improving balance, coordination, kinesthetic sense, posture, motor skill, proprioception  to assist with LE, proximal hip, and core control in self care, mobility, lifting, ambulation and eccentric single leg control.      NMR and Therapeutic Activities:    [x] (68668 or 96943) Provided verbal/tactile cueing for activities related to improving balance, coordination, kinesthetic sense, posture, motor skill, proprioception and motor activation to allow for proper function of core, proximal hip and LE with self care and ADLs  [] (14751) Gait Re-education- Provided training and instruction to the patient for proper LE, core and proximal hip recruitment and positioning and eccentric body weight control with ambulation re-education including up and down stairs     Home Exercise Program:    [x] (60932) Reviewed/Progressed HEP activities related to strengthening, flexibility, endurance, ROM of core, proximal hip and LE for functional self-care, mobility, lifting and ambulation/stair navigation   [] (53928)Reviewed/Progressed HEP activities related to improving balance, coordination, kinesthetic sense, posture, motor skill, proprioception of core, proximal hip and LE for self care, mobility, lifting, and ambulation/stair navigation      Manual Treatments:  PROM / STM / Oscillations-Mobs:  G-I, II, III, IV (PA's, Inf., Post.)  [x] (15675) Provided manual therapy to mobilize LE, proximal hip and/or LS spine soft tissue/joints for the purpose of modulating pain, promoting relaxation,  increasing ROM, reducing/eliminating soft tissue swelling/inflammation/restriction, improving soft tissue extensibility and allowing for proper ROM for normal function with self care, mobility, lifting and ambulation. Modalities:      Charges:  Timed Code Treatment Minutes: 45   Total Treatment Minutes: 45       [] EVAL (LOW) 33538 (typically 20 minutes face-to-face)  [] EVAL (MOD) 90971 (typically 30 minutes face-to-face)  [] EVAL (HIGH) 19881 (typically 45 minutes face-to-face)  [] RE-EVAL     [x] ZO(16643) x   3  [] IONTO (08131)  [] NMR (45582) x     [] VASO (72715)  [] Manual (44335) x     [] Other:  [] TA (27119)x     [] Mech Traction (73451)  [] ES(attended) (11284)     [] ES (un) (56509):      If [facility] Please Indicate Time In/Out and Total Minutes  CPT Code Time in Time out Total Min                                           GOALS:  Patient stated goal: To experience less pain during walking. [x] Progressing: [] Met: [] Not Met: [] Adjusted    Therapist goals for Patient:   Short Term Goals: To be achieved in: 2 weeks  1. Independent in HEP and progression per patient tolerance, in order to prevent re-injury. [x] Progressing: [] Met: [] Not Met: [] Adjusted  2. Patient will have a decrease in pain to facilitate improvement in movement, function, and ADLs as indicated by Functional Deficits. [x] Progressing: [] Met: [] Not Met: [] Adjusted    Long Term Goals: To be achieved in: 10-12 weeks  1. Disability index score of 50% or less for the LEFS to assist with reaching prior level of function. [x] Progressing: [] Met: [] Not Met: [] Adjusted  2. Patient will demonstrate increased AROM to WNL to allow for proper joint functioning as indicated by patients Functional Deficits. [x] Progressing: [] Met: [] Not Met: [] Adjusted  3. Patient will demonstrate an increase in Strength to good proximal hip strength and control, within 5lb HHD in LE to allow for proper functional mobility as indicated by patients Functional Deficits. [x] Progressing: [] Met: [] Not Met: [] Adjusted  4. Patient will return to ambulating, standing, squatting, and prolonged sitting, and functional activities without increased symptoms or restriction. [x] Progressing: [] Met: [] Not Met: [] Adjusted  5. To return to walking without pain. (patient specific functional goal)    [x] Progressing: [] Met: [] Not Met: [] Adjusted     Progression Towards Functional goals:  [x] Patient is progressing as expected towards functional goals listed. [] Progression is slowed due to complexities listed. [] Progression has been slowed due to co-morbidities.   [] Plan just implemented, too soon to assess goals progression  [] Other:     ASSESSMENT:  Patient continues to present with anterior hip and low back pain. Patient provided with forms during session and educated on dry needling to be performed next visit. Patient represents with significnat strength deficits as well. Patient provided with updated HEP during session focusing on gaining proximal hip strength and increasing flexibility. Return to Play: (if applicable)   []  Stage 1: Intro to Strength   []  Stage 2: Return to Run and Strength   []  Stage 3: Return to Jump and Strength   []  Stage 4: Dynamic Strength and Agility   []  Stage 5: Sport Specific Training     []  Ready to Return to Play, Meets All Above Stages   []  Not Ready for Return to Sports   Comments:            Treatment/Activity Tolerance:  [x] Patient tolerated treatment well [] Patient limited by fatique  [] Patient limited by pain  [] Patient limited by other medical complications  [] Other:     Overall Progression Towards Functional goals/ Treatment Progress Update:  [x] Patient is progressing as expected towards functional goals listed. [] Progression is slowed due to complexities/Impairments listed. [] Progression has been slowed due to co-morbidities. [] Plan just implemented, too soon to assess goals progression <30days   [] Goals require adjustment due to lack of progress  [] Patient is not progressing as expected and requires additional follow up with physician  [] Other    Prognosis for POC: [x] Good [] Fair  [] Poor    Patient requires continued skilled intervention: [x] Yes  [] No        PLAN: Continue to progress per protocol with emphasis on strengthening and pain reduction. [x] Continue per plan of care [] Alter current plan (see comments)  [] Plan of care initiated [] Hold pending MD visit [] Discharge    Electronically signed by: Ji Anthony PT    Note: If patient does not return for scheduled/recommended follow up visits, this note will serve as a discharge from care along with the most recent update on progress.

## 2021-12-13 ENCOUNTER — HOSPITAL ENCOUNTER (OUTPATIENT)
Dept: PHYSICAL THERAPY | Age: 31
Setting detail: THERAPIES SERIES
Discharge: HOME | End: 2021-12-13
Payer: COMMERCIAL

## 2021-12-13 PROCEDURE — 97110 THERAPEUTIC EXERCISES: CPT

## 2021-12-13 PROCEDURE — 20560 NDL INSJ W/O NJX 1 OR 2 MUSC: CPT

## 2021-12-13 PROCEDURE — 97140 MANUAL THERAPY 1/> REGIONS: CPT

## 2021-12-13 NOTE — FLOWSHEET NOTE
[facility]    Physical Therapy Treatment Note/ Progress Report:     Date:  2021    Patient Name:  Tia Root    :  1990  MRN: [id number]  Medical/Treatment Diagnosis Information:  U01.605 (ICD-10-CM) - Other specified joint disorders, left hip   S73.192D (ICD-10-CM) - Other sprain of left hip, subsequent encounter     Insurance/Certification information:  PT Insurance Information: [facility]  Physician Information:  Referring Practitioner: Arabella Barnett MD  Plan of care signed (Y/N):     Date of Patient follow up with Physician:      Progress Report: []  Yes  []  No     Functional Scale:  LEFS:     Date: 21    Date Range for reporting period:   Beginnin21  Ending:       Progress report due (10 Rx/or 30 days whichever is less):      Recertification due (POC duration/ or 90 days whichever is less):      Visit # Insurance Allowable Auth Needed   14 (9 post op) 60 []Yes    []No     Pain level:  3/10     SUBJECTIVE:  Patient reports his L hip is feeling heavy with some pain. Patient reports pain is currently a 4/10 following work. Patient reports stretches were helpful. Patient reports he continues to have low back pain after sitting. OBJECTIVE: See eval   Observation:    Test measurements:  21    L knee AAROM: 0-130 deg. L hip flexion to 120 deg significant tightness to begin movement past 90, adb 25 deg. ER 10 deg. 21:  L hip flexion to 120 deg significant tightness, adb 25 deg. ER 25 deg.   Strength:   Hip abduction L: 16.7 lb  R: 30.6   Hip ext: L 24 lb R 23.2 lb   Hip flexion: L: 38.3lb slight painR: 58.6lb    RESTRICTIONS/PRECAUTIONS:  DOS 21 LEFT HIP ARTHROSCOPY LABRAL REPAIR, ACETABULOPLASTY AND OSTEOCHONDROPLASTY    Exercises/Interventions:     Therapeutic Ex 20' Resistance Sets/sec Reps Notes          Quad set   2 15    Heel slide  1 8 5' hold   TA draw in w/ belt  2 8 6' hold   SAQ 2 15 5'   bike  5 min     Prone lying stretch  5 min     Prone quad stretch  2 30'    Pelvic clocks  6-12 3-9 2 10    Kanu stretch  2 30'    6in step up  1 10    Kneeling hip flexor stretch with band   2 30'    Prone hip extension  2 10    SL hip abduction  2 10    Supine hip flexor hang off table   3 30 sec    Bridges   1 8-10 10' hold    Clam shell legs extensed  3 8    Standing hip extension/abd 3lb  2 10    Squat tap to table   2 15    Wall sit  4 To fatigue     Leg ext  20 2 8-10 ecc L   Wall  sit  4 to fatigue      Leg press DL/SL ecc 100/60 2 8-10             Therapeutic Activities 20'             Education on protocol, activity modification, POC, updated HEP, DN, and progressions                                                 Manual Intervention 10'       Knee mobs/PROM       Tib/Fem Mobs       Patella Mobs       Ankle mobs       Hamstring stretch  3 30'    PROM    Gentle motion within precautions. Slight hip flexor tightness noted that improved following stretching. SL hip flexor stretch  3 30'    STM    To hip flexor, improved symptoms with less reported pain during hip flexion. NMR re-education                                                                          Therapeutic Exercise and NMR EXR  [x] (55229) Provided verbal/tactile cueing for activities related to strengthening, flexibility, endurance, ROM for improvements in LE, proximal hip, and core control with self care, mobility, lifting, ambulation.  [] (09620) Provided verbal/tactile cueing for activities related to improving balance, coordination, kinesthetic sense, posture, motor skill, proprioception  to assist with LE, proximal hip, and core control in self care, mobility, lifting, ambulation and eccentric single leg control.      NMR and Therapeutic Activities:    [x] (26297 or 67097) Provided verbal/tactile cueing for activities related to improving balance, coordination, kinesthetic sense, posture, motor skill, proprioception and motor activation to allow for proper function of core, proximal hip and LE with self care and ADLs  [] (04138) Gait Re-education- Provided training and instruction to the patient for proper LE, core and proximal hip recruitment and positioning and eccentric body weight control with ambulation re-education including up and down stairs     Home Exercise Program:    [x] (45634) Reviewed/Progressed HEP activities related to strengthening, flexibility, endurance, ROM of core, proximal hip and LE for functional self-care, mobility, lifting and ambulation/stair navigation   [] (96956)Reviewed/Progressed HEP activities related to improving balance, coordination, kinesthetic sense, posture, motor skill, proprioception of core, proximal hip and LE for self care, mobility, lifting, and ambulation/stair navigation      Manual Treatments:  PROM / STM / Oscillations-Mobs:  G-I, II, III, IV (PA's, Inf., Post.)  [x] (01888) Provided manual therapy to mobilize LE, proximal hip and/or LS spine soft tissue/joints for the purpose of modulating pain, promoting relaxation,  increasing ROM, reducing/eliminating soft tissue swelling/inflammation/restriction, improving soft tissue extensibility and allowing for proper ROM for normal function with self care, mobility, lifting and ambulation. Modalities:      Dry needling manual therapy: consisted on the placement of 3 needles in the following muscles: rectus femoris. A 60 mm needle was inserted, piston, rotated, and coned to produce intramuscular mobilization. These techniques were used to restore functional range of motion, reduce muscle spasm and induce healing in the corresponding musculature. (88802)  Clean Technique was utilized today while applying Dry needling treatment. The treatment sites where cleaned with 70% solution of  isopropyl alcohol . The PT washed their hands and utilized treatment gloves along with hand  prior to inserting the needles.   All needles where removed and discarded in the appropriate sharps container. Charges:  Timed Code Treatment Minutes: 45   Total Treatment Minutes: 45       [] EVAL (LOW) 99433 (typically 20 minutes face-to-face)  [] EVAL (MOD) 49171 (typically 30 minutes face-to-face)  [] EVAL (HIGH) 16235 (typically 45 minutes face-to-face)  [] RE-EVAL     [x] BL(41850) x   1  [] IONTO (60348)  [] NMR (34132) x     [] VASO (84612)  [x] Manual (92816) x   1  [x] Other: DN  [] TA (33363)x     [] Mech Traction (51298)  [] ES(attended) (95617)     [] ES (un) (09374): If BWC Please Indicate Time In/Out and Total Minutes  CPT Code Time in Time out Total Min                                           GOALS:  Patient stated goal: To experience less pain during walking. [x] Progressing: [] Met: [] Not Met: [] Adjusted    Therapist goals for Patient:   Short Term Goals: To be achieved in: 2 weeks  1. Independent in HEP and progression per patient tolerance, in order to prevent re-injury. [x] Progressing: [] Met: [] Not Met: [] Adjusted  2. Patient will have a decrease in pain to facilitate improvement in movement, function, and ADLs as indicated by Functional Deficits. [x] Progressing: [] Met: [] Not Met: [] Adjusted    Long Term Goals: To be achieved in: 10-12 weeks  1. Disability index score of 50% or less for the LEFS to assist with reaching prior level of function. [x] Progressing: [] Met: [] Not Met: [] Adjusted  2. Patient will demonstrate increased AROM to WNL to allow for proper joint functioning as indicated by patients Functional Deficits. [x] Progressing: [] Met: [] Not Met: [] Adjusted  3. Patient will demonstrate an increase in Strength to good proximal hip strength and control, within 5lb HHD in LE to allow for proper functional mobility as indicated by patients Functional Deficits. [x] Progressing: [] Met: [] Not Met: [] Adjusted  4.  Patient will return to ambulating, standing, squatting, and prolonged sitting, and functional activities without increased symptoms or restriction. [x] Progressing: [] Met: [] Not Met: [] Adjusted  5. To return to walking without pain. (patient specific functional goal)    [x] Progressing: [] Met: [] Not Met: [] Adjusted     Progression Towards Functional goals:  [x] Patient is progressing as expected towards functional goals listed. [] Progression is slowed due to complexities listed. [] Progression has been slowed due to co-morbidities. [] Plan just implemented, too soon to assess goals progression  [] Other:     ASSESSMENT:  Patient continues to present with anterior hip and low back pain. Patient responded well to DN with improved ROM and reduced pain (2/10). Patient encouraged to continue with ADLs and to continue stretching multiple times per day. Patient tolerated strengthening exercises well but was limited by fatigue due to new job. Return to Play: (if applicable)   []  Stage 1: Intro to Strength   []  Stage 2: Return to Run and Strength   []  Stage 3: Return to Jump and Strength   []  Stage 4: Dynamic Strength and Agility   []  Stage 5: Sport Specific Training     []  Ready to Return to Play, Meets All Above Stages   []  Not Ready for Return to Sports   Comments:            Treatment/Activity Tolerance:  [] Patient tolerated treatment well [x] Patient limited by fatique  [] Patient limited by pain  [] Patient limited by other medical complications  [] Other:     Overall Progression Towards Functional goals/ Treatment Progress Update:  [x] Patient is progressing as expected towards functional goals listed. [] Progression is slowed due to complexities/Impairments listed. [] Progression has been slowed due to co-morbidities.   [] Plan just implemented, too soon to assess goals progression <30days   [] Goals require adjustment due to lack of progress  [] Patient is not progressing as expected and requires additional follow up with physician  [] Other    Prognosis for POC: [x] Good [] Fair  [] Poor    Patient requires continued skilled intervention: [x] Yes  [] No        PLAN: Continue to progress per protocol with emphasis on strengthening and pain reduction. [x] Continue per plan of care [] Alter current plan (see comments)  [] Plan of care initiated [] Hold pending MD visit [] Discharge    Electronically signed by: Philly Domingo PT    Note: If patient does not return for scheduled/recommended follow up visits, this note will serve as a discharge from care along with the most recent update on progress.

## 2021-12-16 ENCOUNTER — HOSPITAL ENCOUNTER (OUTPATIENT)
Dept: PHYSICAL THERAPY | Age: 31
Setting detail: THERAPIES SERIES
Discharge: HOME | End: 2021-12-16
Payer: COMMERCIAL

## 2021-12-16 NOTE — PROGRESS NOTES
[facility]    Physical Therapy  Cancellation/No-show Note  Patient Name:  Stephani Pinedo  :  1990   Date:  2021  Cancelled visits to date: 5  No-shows to date: 0    For today's appointment patient:  [x]  Cancelled  []  Rescheduled appointment  []  No-show     Reason given by patient:  [x]  Patient ill  []  Conflicting appointment  []  No transportation    []  Conflict with work  []  No reason given  []  Other:     Comments:       Phone call information:   []  Phone call made today to patient at _ time at number provided:      []  Patient answered, conversation as follows:    []  Patient did not answer, message left as follows:  []  Phone call not made today  [x]  Phone call not needed - pt contacted us to cancel and provided reason for cancellation.      Electronically signed by:  Cintia Ellington, [id number]

## 2022-01-12 ENCOUNTER — TELEPHONE (OUTPATIENT)
Dept: ORTHOPEDIC SURGERY | Age: 32
End: 2022-01-12

## 2022-01-12 NOTE — TELEPHONE ENCOUNTER
Attempted to contact patient to inform them about the [address]  knee joint pain program. I was unable to LVM.  Patient can call [phone] to find out more information or to schedule an appointment with a joint pain orthopedic specialist.

## 2022-01-24 ENCOUNTER — OFFICE VISIT (OUTPATIENT)
Dept: ORTHOPEDIC SURGERY | Age: 32
End: 2022-01-24
Payer: COMMERCIAL

## 2022-01-24 VITALS — HEIGHT: 70 IN | RESPIRATION RATE: 12 BRPM | BODY MASS INDEX: 24.34 KG/M2 | WEIGHT: 170 LBS

## 2022-01-24 DIAGNOSIS — Z98.890 STATUS POST ARTHROSCOPY OF HIP: Primary | ICD-10-CM

## 2022-01-24 PROCEDURE — 99214 OFFICE O/P EST MOD 30 MIN: CPT | Performed by: ORTHOPAEDIC SURGERY

## 2022-01-24 NOTE — PROGRESS NOTES
Chief Complaint  Hip Pain (F/u left hip scope 8/20/21)      History of Present Illness:  Lavinia Rivera is a pleasant 32 y.o. male is 5 months post left hip arthroscopy, VERONICA decompression, labral repair and capsular closure. He is doing much better. He still is not back to work at [employer]. He has been working the odd hours at [employer]. However he still has some fatigue and discomfort which he describes comes from his left paraspinals thoracic spine rating down to the buttock. However his SI joint joint pain is much less minimal.  He has no hip pain. His biggest complaint remains to be fatigue with prolonged activity. Unfortunately had to fly back to his home country as his father fell ill. His father is now doing much better. However Kristi Leon has not been able to do some physical therapy for the past 2 months. He has previously worked with [place] at [employer]. Medical History:  Patient's medications, allergies, past medical, surgical, social and family histories were reviewed and updated as appropriate. Pain Assessment  Location of Pain: Other (Comment) (Hip)  Location Modifiers: Left  Severity of Pain: 2  Quality of Pain: Sharp,Dull  Duration of Pain: Persistent  Frequency of Pain: Intermittent  Aggravating Factors: Bending,Other (Comment) (Prolonged sitting)  Limiting Behavior: Yes  Relieving Factors: Rest,Ice  Result of Injury: No  Work-Related Injury: No  Are there other pain locations you wish to document?: No  ROS: Review of systems reviewed from Patient History Form completed today and available in the patient's chart under the Media tab. Pertinent items are noted in HPI  Review of systems reviewed from Patient History Form completed today and available in the patient's chart under the Media tab. Vital Signs:  Resp 12   Ht 5' 10\" (1.778 m)   Wt 170 lb (77.1 kg)   BMI 24.39 kg/m²         Neuro: Alert & oriented x 3,  normal,  no focal deficits noted.  Normal affect. Eyes: sclera clear  Ears: Normal external ear  Mouth:  No perioral lesions  Pulm: Respirations unlabored and regular  Pulse: Extremities well perfused. 2+ peripheral pulses. Skin: Warm. No ulcerations. Constitutional: The physical examination finds the patient to be well-developed and well-nourished. The patient is alert and oriented x3 and was cooperative throughout the visit. Hip exam    Hip Examination: LEFT    Skin/Inspection: no skin lesions, cellulitis, or extreme edema in the lower extremities. Standing/Walking: Normal non-antalgic gait, no pelvic tilt, -ve Trendelenburg sign. No use of assistive devices    Sitting Exam: 5/5 Hip Flexor Strength, 5/5 Abductor Strength, 5/5 Adductor strength, Negative Straight Leg Raise    Supine Exam: Non tender around the ASIS, AIIS  Flexion arc 0 to 100deg, with no  pain  Internal Rotation 25deg   External Rotation 45deg  Special Tests: -ve Deep Flexion Test, -ve FADIR , -ve VENKAT,     Side Lying Exam: non tender at greater trochanter, non tender at abductor musculature, non tender along the TFL, non tender along short external rotators/piriformis. Abductor side leg raise 4/5 strength. ++OberTest    Prone Exam: -ve  hip flexion contracture, internal rotation to 25 deg, external rotation to 45 deg. Non tender at the ischial tuberos     Special tests: severely deconditioned double leg and single leg bridges ity/proximal hamstring. Very weak and deconditioned single-leg step downs, single-leg squats. Ok double legged squats. Distal Neurovascular exam is intact (foot sensation, pulses, and motor exam)    Diagnostics:  Radiology:     No new xrays today. Assessment: Patient is a 32 y.o. male is 5 months post left hip arthroscopy, VERONICA decompression, labral repair and capsular closure.     His impingement symptoms are mostly diminished however he is quite deconditioned with respect to his posterior chain endurance/strength and he has some stiffness in his thoracic spine positive paraspinal discomfort. Impression:  Visit Diagnoses       Codes    Status post arthroscopy of hip    -  Primary E88.414          Office Procedures:  No orders of the defined types were placed in this encounter. No orders of the defined types were placed in this encounter. Plan:  My recommendation is for Emily Bo to revisit formal supervised physical therapy for another 6+ sessions. He should resume phase 4 and then transition to phase 5. Emphasis on single-leg conditioning with single-leg bridging, single-leg step downs, thoracic mobility. Dry needling and other hands-on modalities as needed. I would like to see Emily Bo back in 8 weeks for reassessment. I expect him to make further strength improvements and hopefully get him back to work in the next 2 to 3 months. All the patient's questions were answered while in the clinic. The patient is understanding of all instructions and agrees with the plan. Approximately 30 minutes was spent on patient education and coordinating care. Follow up in: No follow-ups on file. Sincerely,    Elva Mendiola MD [address] [place] [id number]  Email: [email or web address]  Office: [phone]    01/24/22  4:51 PM        The encounter with Fredo Yousif was carried out by myself, Dr Emmanuel Christianson, who personally examined the patient and reviewed the plan. This dictation was performed with a verbal recognition program (DRAGON) and it was checked for errors. It is possible that there are still dictated errors within this office note. If so, please bring any errors to my attention for an addendum. All efforts were made to ensure that this office note is accurate.

## 2022-01-24 NOTE — LETTER
Physical Therapy Rehabilitation Referral    Patient Name: Liliana Dias MRN: [id number]  DOS: 1/24/2022     Diagnosis: No diagnosis found.         Precautions:     [x] Evaluate and Treat    Post Op Instructions:  [] Continuous passive motion (CPM)   [] AAROM: Flexion to 90   [] Uni-planar passive range of motion   [] AAROM: External rotation to 10   [] Hip brace on at all times    [] AAROM: Extension to 10   [] Hip brace when hip at risk     [] AAROM:  Neutral IR   [] Home exercise program (copy to patient)   [] AAROM: Abduction to 25    [] Isometric external rotator strengthening    [] Isometric glute max strengthening     [] Isometric abductor strengthening     [] Leg Circumduction            Post-op Phases:  []Phase I: Maximum Protection (Day 1-3 Weeks)  []Phase II: Mobility & Neuromuscular Retraining (3-6 Weeks)  []Phase III: Phase III: Muscle Balance and Strengthening (6-12 Weeks)  [x]Phase IV: Functional Training of the Hip & Lower Extremity (12-18 Weeks)  []Phase V: Advanced Training  Specificity for Return to Sport and/or Work (18-24 Zura!)    Non-Operative & Pre-Operative Rehabilitation:    Cardiovascular:            Deep Hip Rotators  [] Upright Bike (Baseline)           [] External Rotators AROM in 4PK (Baseline)  [] Walking (Progression 1)           [] Internal Rotators AROM in 4PK (Baseline)  [] Running (Progression 2)           [] ER in side lying (Progression 1)  [] Dynamic Loading (Progression 3)          [] IR in side lying (Progression 1)                [] ER with resistance in 4PK (Progression 2)                [] IR with resistance in 4PK (Progression 2)                 [] Lateral Step Up (Progression 3)      Positioning:  [] 4PK with pelvic tilts (Baseline)  [] Pelvic tilts in sitting (Progression 1)      Core:   [] Relaxation Breathing (Baseline)         [] Fenton lying elbow presses (Progression 1)  [] Side Planks (Baseline)         [] Side planks + hip abduction (Progression 1)  [] Bird-Dog (Baseline)            [] Bird-Dog with resistance (Progression 1)    Glute Complex:            Load Transfer:  [] Bridging (Baseline)            [] Weight Shifting (Baseline)     [] Single Leg Bridge (Progression 1)        [] Single Leg Stance to SEBT(Progression 1)     [] Single Leg Lower (Progression 2)         [] Hip hinge + monster walks (Progression 2)       Mobility:  [] Kanu position with breathing (baseline)  [] Hip Hinge with stick & neutral spine (baseline)  [] Sit to stand (baseline)  [] Hip Hinge without guidance (Progression 1)  [] Hip Hinge with resisted punch out guidance (Progression 2)      Pelvic Floor:  [] Relaxation breathing         Activities:       [] Rowing       [] Stepper/Exercise bike     [] Swimming  [] Water exercises    Modalities:     Return to Sport:  [x] Of Choice      [] Plyometrics  [] Ultrasound     [] Rhythmic stabilization  [] Iontophoresis    [] Core strengthening   [] Moist heat     [] Sports specific program:   [] Massage         [x] Cryotherapy      [] Electrical stimulation     [] Paraffin  [] Whirlpool  [] TENS    [x] Home exercise program (copy to patient). Perform exercises for:   15     minutes    3      times/day  [x] Supervised physical therapy  Frequency: []  1x week  [x] 2x week  [] 3x week  [] Other:   Duration: [] 2 weeks   [] 4 weeks  [x] 6 weeks  [] Other:     Additional Instructions:     PER MD, CONTINUE WITH PT FOR ANOTHER 4-6 SESSIONS  FOCUS ON SL BRIDGING, SL SQUATTING AND SL STEP DOWNS      Sincerely,    Alonzo Johnson MD [facility]  Hip Preservation & Sports Medicine Surgeon   [address] [facility] [address], [address]  Email: [email or web address]  Office: [phone]

## 2022-02-15 ENCOUNTER — OFFICE VISIT (OUTPATIENT)
Dept: ENT CLINIC | Age: 32
End: 2022-02-15
Payer: COMMERCIAL

## 2022-02-15 VITALS — DIASTOLIC BLOOD PRESSURE: 77 MMHG | TEMPERATURE: 97.1 F | SYSTOLIC BLOOD PRESSURE: 119 MMHG | HEART RATE: 87 BPM

## 2022-02-15 DIAGNOSIS — J34.2 DNS (DEVIATED NASAL SEPTUM): ICD-10-CM

## 2022-02-15 DIAGNOSIS — R04.0 EPISTAXIS: ICD-10-CM

## 2022-02-15 DIAGNOSIS — J34.3 HYPERTROPHY OF BOTH INFERIOR NASAL TURBINATES: ICD-10-CM

## 2022-02-15 DIAGNOSIS — R09.81 NASAL CONGESTION: Primary | ICD-10-CM

## 2022-02-15 PROCEDURE — 99214 OFFICE O/P EST MOD 30 MIN: CPT | Performed by: STUDENT IN AN ORGANIZED HEALTH CARE EDUCATION/TRAINING PROGRAM

## 2022-02-15 NOTE — PROGRESS NOTES
[address] VISIT      Patient Name: Liliana Dias  Medical Record Number:  [id number]  Primary Care Physician:  GALEN Almanza CNP    ChiefComplaint     Chief Complaint   Patient presents with    Nasal Congestion       History of Present Illness     Liliana Dias is an 32 y.o. male previously seen for nasal congestion, last seen by Dr. Argenis Kwon on 3/12/2020. Given Depo-Medrol injection and started on budesonide at that time. Interval History:   Nasal congestion has been worse over the last year. Using flonase daily. Occasional loratadine use. On singulair, hx of asthma with use of rescue inhaler. No recurrent sinus infections. No mucopurulent nasal drainage. No hx of sinonasal surgery. Snores nightly. Recently has been waking up with nosebleeds. Works as mailman.        Past Medical History     Past Medical History:   Diagnosis Date    Anxiety     Asthma     Chronic back pain        Past Surgical History     Past Surgical History:   Procedure Laterality Date    HIP SURGERY Left 8/20/2021    LEFT HIP ARTHROSCOPY LABRAL REPAIR, ACETABULOPLASTY AND OSTEOCHONDROPLASTY, FRACTIONAL PSOAS LENGTHENING performed by Zulema Taveras MD at [address] [place] [address]        Family History     Family History   Problem Relation Age of Onset    Diabetes Mother     Coronary Art Dis Mother     Diabetes Father        Social History     Social History     Tobacco Use    Smoking status: Never Smoker    Smokeless tobacco: Never Used   Substance Use Topics    Alcohol use: Not Currently    Drug use: Not Currently        Allergies     No Known Allergies    Medications     Current Outpatient Medications   Medication Sig Dispense Refill    budesonide-formoterol (SYMBICORT) 80-4.5 MCG/ACT AERO Inhale 2 puffs into the lungs 2 times daily 1 Inhaler 0    FLUoxetine (PROZAC) 20 MG capsule Take 20 mg by mouth daily      montelukast (SINGULAIR) 10 MG tablet TK 1 T PO QPM      VENTOLIN  (90 Base) MCG/ACT inhaler INHALE 2 PUFFS INTO LUNGS Q 4 TO 6 H PRF BRONCOSPASMS      cetirizine (ZYRTEC) 10 MG tablet TK 1 T PO QD      budesonide (RHINOCORT AQUA) 32 MCG/ACT nasal spray 2 sprays by Each Nostril route 2 times daily 1 Bottle 1     No current facility-administered medications for this visit. Review of Systems     REVIEW OF SYSTEMS  The following systems were reviewed and revealed the following in addition to any already discussed in the HPI:    CONSTITUTIONAL: no weight loss, no fever, no night sweats, no chills  EYES: no vision changes, no blurry vision  EARS: no hearing loss, no otalgia  NOSE: no epistaxis, no rhinorrhea  THROAT: No voice changes, no sore throat, no dysphagia    PhysicalExam     Vitals:    02/15/22 0906   BP: 119/77   Position: Sitting   Cuff Size: Large Adult   Pulse: 87   Temp: 97.1 °F (36.2 °C)   TempSrc: Temporal       PHYSICAL EXAM  /77 (Position: Sitting, Cuff Size: Large Adult)   Pulse 87   Temp 97.1 °F (36.2 °C) (Temporal)     GENERAL: No acute distress, alert and oriented, no hoarseness  EYES: EOMI, Anti-icteric  NOSE: Sternal nasal dorsum slightly deviated to the left. On anterior rhinoscopy there is no epistaxis, nasal mucosa moist and normal appearing, no purulent drainage. Nasal septum devaited to the right. Inferior turbinates hypertrophied bilaterally.    EARS: Normal external appearance; on portable otomicroscopy:     -Ad: External auditory canal without stenosis, tympanic membrane clear, no middle ear effusions or retractions     -As: External auditory canal without stenosis, tympanic membrane clear, no middle ear effusions or retractions  FACE: HB 1/6 bilaterally, symmetric appearing, sensation equal bilaterally  ORAL CAVITY: No masses or lesions visualized or palpated, uvula is midline, moist mucous membranes, symmetric 2+ tonsils, dentition without evidence of major decay  NECK: Normal range of motion, no thyromegaly, trachea is midline, no palpable lymphadenopathy or neck masses, no crepitus  CHEST: Normal respiratory effort, breathing comfortably, no retractions  SKIN: No rashes, normal appearing skin, no evidence of skin lesions/tumors  NEURO: Cranial Nerves 2, 3, 4, 5, 6, 7, 11, 12 intact bilaterally     I have performed a head and neck physical exam personally or was physically present during the key or critical portions of the service. Assessment and Plan     1. Nasal congestion  2. DNS (deviated nasal septum)  3. Hypertrophy of both inferior nasal turbinates  -Failed medical management with intranasal steroids, antihistamines, Singulair. Plan for septoplasty and inferior turbinate reduction. Description of the procedure as well as the associated risk, benefits, alternatives discussed in detail with the patient.  used during discussion of procedure and risk. Discussed risk included but were not limited to anosmia, postoperative bleeding, infection, nerve damage, septal perforation, palatal numbness, anesthesia risk. I did explicitly tell the patient this procedure is meant to improve his nasal airway and decrease his nasal congestion and will not address any cosmetic issues and likely will not address this snoring. Consent was signed in the office with the use of . -PCP for preoperative clearance    4.  Epistaxis  -Likely precipitated by Flonase use and deviation of nasal septum with altered nasal airflow. Discontinue Flonase.  -Start nasal saline sprays daily      Follow-Up     Return for post operative visit. [place] [id number]  Department of Otolaryngology/Head and Neck Surgery  2/15/22    Medical Decision Making:   The following items were considered in medical decision making:  Independent review of images  Review / order clinical lab tests  Review / order radiology tests  Decision to obtain old records      This note was generated completely or in part utilizing Dragon dictation speech recognition software. Occasionally, words are mistranscribed and despite editing, the text may contain inaccuracies due to incorrect word recognition. If further clarification is needed please contact the office at [phone].

## 2022-02-24 RX ORDER — VILAZODONE HYDROCHLORIDE 40 MG/1
20 TABLET ORAL DAILY
COMMUNITY
End: 2022-08-31

## 2022-02-24 NOTE — PROGRESS NOTES
Name_______________________________________Printed:____________________  Date and time of surgery__3/2/2022_____1230_________________Arrival Time:_1100  masc_______________   1. The instructions given regarding when and if a patient needs to stop oral intake prior to surgery varies. Follow the specific instructions you were given                  _x__Nothing to eat or to drink after Midnight the night before.                   ____Carbo loading or ERAS instructions will be given to select patients-if you have been given those instructions -please do the following                           The evening before your surgery after dinner before midnight drink 40 ounces of gatorade. If you are diabetic use sugar free. The morning of surgery drink 40 ounces of water. This needs to be finished 3 hours prior to your surgery start time. 2. Take the following pills with a small sip of water on the morning of surgery_none__________________________________________________                  Do not take blood pressure medications ending in pril or sartan the florian prior to surgery or the morning of surgery_   3. Aspirin, Ibuprofen, Advil, Naproxen, Vitamin E and other Anti-inflammatory products and supplements should be stopped for 5 -7days before surgery or as directed by your physician. 4. Check with your Doctor regarding stopping Plavix, Coumadin,Eliquis, Lovenox,Effient,Pradaxa,Xarelto, Fragmin or other blood thinners and follow their instructions. 5. Do not smoke, and do not drink any alcoholic beverages 24 hours prior to surgery. This includes NA Beer. Refrain from the usage of any recreational drugs. 6. You may brush your teeth and gargle the morning of surgery. DO NOT SWALLOW WATER   7. You MUST make arrangements for a responsible adult to stay on site while you are here and take you home after your surgery. You will not be allowed to leave alone or drive yourself home.   It is strongly suggested someone stay with you the first 24 hrs. Your surgery will be cancelled if you do not have a ride home. 8. A parent/legal guardian must accompany a child scheduled for surgery and plan to stay at the hospital until the child is discharged. Please do not bring other children with you. 9. Please wear simple, loose fitting clothing to the hospital.  Denice Villavicencio not bring valuables (money, credit cards, checkbooks, etc.) Do not wear any makeup (including no eye makeup) or nail polish on your fingers or toes. 10. DO NOT wear any jewelry or piercings on day of surgery. All body piercing jewelry must be removed. 11. If you have ___dentures, they will be removed before going to the OR; we will provide you a container. If you wear ___contact lenses or ___glasses, they will be removed; please bring a case for them. 12. Please see your family doctor/pediatrician for a history & physical and/or concerning medications. Bring any test results/reports from your physician's office. PCP__________________Phone___________H&P Appt. Date________             13 If you  have a Living Will and Durable Power of  for Healthcare, please bring in a copy. 15. Notify your Surgeon if you develop any illness between now and surgery  time, cough, cold, fever, sore throat, nausea, vomiting, etc.  Please notify your surgeon if you experience dizziness, shortness of breath or blurred vision between now & the time of your surgery             15. DO NOT shave your operative site 96 hours prior to surgery. For face & neck surgery, men may use an electric razor 48 hours prior to surgery. 16. Shower the night before or morning of surgery using an antibacterial soap or as you have been instructed. 17. To provide excellent care visitors will be limited to one in the room at any given time. 18.  Please bring picture ID and insurance card.              19.  Visit our web site for additional information:  [email or web address]              20.During flu season no children under the age of 15 are permitted in the hospital for the safety of all patients. 21. If you take a long acting insulin in the evening only  take half of your usual  dose the night  before your procedure              22. If you use a c-pap please bring DOS if staying overnight,             23.For your convenience [address] [place] [address] has a pharmacy on site to fill your prescriptions. 24. If you use oxygen and have a portable tank please bring it  with you the DOS             25. Bring a complete list of all your medications with name and dose include any supplements. 26. Other__________________________________________   *Please call pre admission testing if you any further questions   [place] [id number]    [phone]  [phone]   [facility] [address] BLAKE BYERS   [phone]           COVID TESTING    _x__ Covid test to be done 3-5 days prior to scheduled surgery -patient aware they are REQUIRED to bring a copy of the negative result DOS-if they receive a positive result to notify their surgeon         If known - indicate where patient is getting covid test done _2/25/2022__will bring________________________________________________________    ___ Rapid - DOS    ___ Other___________________________________      [facility] [place] POLICY(subject to change)    There is a one visitor policy at [facility] for all surgeries and endoscopies. Whether the visitor can stay or will be asked to wait in the car will depend on the current policy and if social distancing can be maintained. The policy is subject to change at any time. Please make sure the visitor has a cell phone that is on,charged and able to accept calls, as this may be the way that the staff communicates with them. Pain management is NO VISITOR policyThe patients ride is expected to remain in the car with a cell phone for communication. If the ride is leaving the hospital grounds please make sure they are back in time for pickup. Have the patient inform the staff on arrival what their rides plans are while the patient is in the facility. At the MAIN there is one visitor allowed. Please note that the visitor policy is subject to change. All above information reviewed with patient in person or by phone. Patient verbalizes understanding. All questions and concerns addressed.                                                                                                  Patient/Rep_spoke to Key spouse___________________                                                                                                                                    PRE OP INSTRUCTIONS

## 2022-02-24 NOTE — PROGRESS NOTES
Patient not reached. Preop instructions left on voice mail. Ezmpnm_[phone]______________  Via #[id number]  Also instructions needs pre op H&P    -Date_3/2/2022______time_1230______arrival__1100  masc__________  -Nothing to eat or drink after midnight  -Responsible adult 25 or older to stay on site while you are here and drive you home and stay with you after  -Follow any instructions your doctors office has given you  -Bring a complete list of all your medications and supplements  -If you normally take the following medications in the morning please do so with a small    sip of water-heart,blood pressure,seizure,breathing or thyroid-avoid water pilll Do not take blood pressure medications ending in \"senait\" or \"pril\" the AM of surgery or the florian prior  -You may use your inhalers  -Take half of your normal dose of any long acting insulins the night before-do not take    any diabetic medications in the morning  -Follow your doctors instructions regarding blood thinners  -Any questions call your surgeons office      COVID TEST        _x__ Covid test to be done 3-5 days prior to scheduled surgery -patient aware they are REQUIRED to bring a copy of the negative test result DOS-if they receive a positive result to notify their surgeon          If known-indicate where patient is getting test done          _Instructions to have done and bring results_______________________________________    ___ Rapid - DOS    ___ Other _____________________________      Azeb Rosas POLICY(subject to change)    There is a one visitor policy at [facility] for all surgeries and endoscopies. Whether the visitor can stay or will be asked to wait in the car will depend on the current policy and if social distancing can be maintained. The policy is subject to change at any time. Please make sure the visitor has a cell phone that is on,charged and able to accept calls, as this may be the way that the staff communicates with them. Pain management is NO VISITOR policyThe patients ride is expected to remain in the car with a cell phone for communication. If the ride is leaving the hospital grounds please make sure they are back in time for pickup. Have the patient inform the staff on arrival what their rides plans are while the patient is in the facility. At the MAIN there is one visitor allowed. Please note that the visitor policy is subject to change.

## 2022-02-24 NOTE — PROGRESS NOTES
scheduled via [employer] ([facility]#[id number]) for 3/2/2022 arrive @ 1100 for 4 hours @ [facility].

## 2022-03-02 ENCOUNTER — ANESTHESIA (OUTPATIENT)
Dept: OPERATING ROOM | Age: 32
End: 2022-03-02
Payer: COMMERCIAL

## 2022-03-02 ENCOUNTER — HOSPITAL ENCOUNTER (OUTPATIENT)
Age: 32
Setting detail: OUTPATIENT SURGERY
Discharge: HOME | End: 2022-03-02
Attending: STUDENT IN AN ORGANIZED HEALTH CARE EDUCATION/TRAINING PROGRAM | Admitting: STUDENT IN AN ORGANIZED HEALTH CARE EDUCATION/TRAINING PROGRAM
Payer: COMMERCIAL

## 2022-03-02 ENCOUNTER — ANESTHESIA EVENT (OUTPATIENT)
Dept: OPERATING ROOM | Age: 32
End: 2022-03-02
Payer: COMMERCIAL

## 2022-03-02 VITALS
SYSTOLIC BLOOD PRESSURE: 130 MMHG | WEIGHT: 162.56 LBS | HEART RATE: 92 BPM | OXYGEN SATURATION: 93 % | TEMPERATURE: 97.7 F | HEIGHT: 69 IN | BODY MASS INDEX: 24.08 KG/M2 | DIASTOLIC BLOOD PRESSURE: 86 MMHG | RESPIRATION RATE: 16 BRPM

## 2022-03-02 VITALS
DIASTOLIC BLOOD PRESSURE: 93 MMHG | OXYGEN SATURATION: 100 % | RESPIRATION RATE: 9 BRPM | SYSTOLIC BLOOD PRESSURE: 147 MMHG | TEMPERATURE: 99.3 F

## 2022-03-02 DIAGNOSIS — G89.18 ACUTE POST-OPERATIVE PAIN: Primary | ICD-10-CM

## 2022-03-02 PROCEDURE — 7100000001 HC PACU RECOVERY - ADDTL 15 MIN: Performed by: STUDENT IN AN ORGANIZED HEALTH CARE EDUCATION/TRAINING PROGRAM

## 2022-03-02 PROCEDURE — 3700000000 HC ANESTHESIA ATTENDED CARE: Performed by: STUDENT IN AN ORGANIZED HEALTH CARE EDUCATION/TRAINING PROGRAM

## 2022-03-02 PROCEDURE — 6360000002 HC RX W HCPCS: Performed by: NURSE ANESTHETIST, CERTIFIED REGISTERED

## 2022-03-02 PROCEDURE — 7100000011 HC PHASE II RECOVERY - ADDTL 15 MIN: Performed by: STUDENT IN AN ORGANIZED HEALTH CARE EDUCATION/TRAINING PROGRAM

## 2022-03-02 PROCEDURE — 3700000001 HC ADD 15 MINUTES (ANESTHESIA): Performed by: STUDENT IN AN ORGANIZED HEALTH CARE EDUCATION/TRAINING PROGRAM

## 2022-03-02 PROCEDURE — 3600000014 HC SURGERY LEVEL 4 ADDTL 15MIN: Performed by: STUDENT IN AN ORGANIZED HEALTH CARE EDUCATION/TRAINING PROGRAM

## 2022-03-02 PROCEDURE — 2500000003 HC RX 250 WO HCPCS: Performed by: NURSE ANESTHETIST, CERTIFIED REGISTERED

## 2022-03-02 PROCEDURE — 7100000000 HC PACU RECOVERY - FIRST 15 MIN: Performed by: STUDENT IN AN ORGANIZED HEALTH CARE EDUCATION/TRAINING PROGRAM

## 2022-03-02 PROCEDURE — 6370000000 HC RX 637 (ALT 250 FOR IP): Performed by: STUDENT IN AN ORGANIZED HEALTH CARE EDUCATION/TRAINING PROGRAM

## 2022-03-02 PROCEDURE — 2580000003 HC RX 258: Performed by: STUDENT IN AN ORGANIZED HEALTH CARE EDUCATION/TRAINING PROGRAM

## 2022-03-02 PROCEDURE — A4217 STERILE WATER/SALINE, 500 ML: HCPCS | Performed by: STUDENT IN AN ORGANIZED HEALTH CARE EDUCATION/TRAINING PROGRAM

## 2022-03-02 PROCEDURE — 2500000003 HC RX 250 WO HCPCS: Performed by: STUDENT IN AN ORGANIZED HEALTH CARE EDUCATION/TRAINING PROGRAM

## 2022-03-02 PROCEDURE — 30520 REPAIR OF NASAL SEPTUM: CPT | Performed by: STUDENT IN AN ORGANIZED HEALTH CARE EDUCATION/TRAINING PROGRAM

## 2022-03-02 PROCEDURE — 2709999900 HC NON-CHARGEABLE SUPPLY: Performed by: STUDENT IN AN ORGANIZED HEALTH CARE EDUCATION/TRAINING PROGRAM

## 2022-03-02 PROCEDURE — 30140 RESECT INFERIOR TURBINATE: CPT | Performed by: STUDENT IN AN ORGANIZED HEALTH CARE EDUCATION/TRAINING PROGRAM

## 2022-03-02 PROCEDURE — 2580000003 HC RX 258: Performed by: NURSE ANESTHETIST, CERTIFIED REGISTERED

## 2022-03-02 PROCEDURE — 7100000010 HC PHASE II RECOVERY - FIRST 15 MIN: Performed by: STUDENT IN AN ORGANIZED HEALTH CARE EDUCATION/TRAINING PROGRAM

## 2022-03-02 PROCEDURE — 3600000004 HC SURGERY LEVEL 4 BASE: Performed by: STUDENT IN AN ORGANIZED HEALTH CARE EDUCATION/TRAINING PROGRAM

## 2022-03-02 PROCEDURE — 2720000010 HC SURG SUPPLY STERILE: Performed by: STUDENT IN AN ORGANIZED HEALTH CARE EDUCATION/TRAINING PROGRAM

## 2022-03-02 RX ORDER — OXYMETAZOLINE HYDROCHLORIDE 0.05 G/100ML
SPRAY NASAL
Status: COMPLETED | OUTPATIENT
Start: 2022-03-02 | End: 2022-03-02

## 2022-03-02 RX ORDER — HYDROMORPHONE HCL 110MG/55ML
PATIENT CONTROLLED ANALGESIA SYRINGE INTRAVENOUS PRN
Status: DISCONTINUED | OUTPATIENT
Start: 2022-03-02 | End: 2022-03-02 | Stop reason: SDUPTHER

## 2022-03-02 RX ORDER — PROPOFOL 10 MG/ML
INJECTION, EMULSION INTRAVENOUS PRN
Status: DISCONTINUED | OUTPATIENT
Start: 2022-03-02 | End: 2022-03-02 | Stop reason: SDUPTHER

## 2022-03-02 RX ORDER — LIDOCAINE HYDROCHLORIDE 10 MG/ML
1 INJECTION, SOLUTION EPIDURAL; INFILTRATION; INTRACAUDAL; PERINEURAL
Status: CANCELLED | OUTPATIENT
Start: 2022-03-02 | End: 2022-03-02

## 2022-03-02 RX ORDER — HYDRALAZINE HYDROCHLORIDE 20 MG/ML
10 INJECTION INTRAMUSCULAR; INTRAVENOUS
Status: DISCONTINUED | OUTPATIENT
Start: 2022-03-02 | End: 2022-03-02 | Stop reason: HOSPADM

## 2022-03-02 RX ORDER — LIDOCAINE HYDROCHLORIDE 20 MG/ML
INJECTION, SOLUTION EPIDURAL; INFILTRATION; INTRACAUDAL; PERINEURAL PRN
Status: DISCONTINUED | OUTPATIENT
Start: 2022-03-02 | End: 2022-03-02 | Stop reason: SDUPTHER

## 2022-03-02 RX ORDER — CEPHALEXIN 500 MG/1
500 CAPSULE ORAL 2 TIMES DAILY
Qty: 10 CAPSULE | Refills: 0 | Status: SHIPPED | OUTPATIENT
Start: 2022-03-02 | End: 2022-03-07

## 2022-03-02 RX ORDER — KETAMINE HCL IN NACL, ISO-OSM 100MG/10ML
SYRINGE (ML) INJECTION PRN
Status: DISCONTINUED | OUTPATIENT
Start: 2022-03-02 | End: 2022-03-02 | Stop reason: SDUPTHER

## 2022-03-02 RX ORDER — HYDROMORPHONE HCL 110MG/55ML
0.5 PATIENT CONTROLLED ANALGESIA SYRINGE INTRAVENOUS EVERY 5 MIN PRN
Status: DISCONTINUED | OUTPATIENT
Start: 2022-03-02 | End: 2022-03-02 | Stop reason: HOSPADM

## 2022-03-02 RX ORDER — SODIUM CHLORIDE, SODIUM LACTATE, POTASSIUM CHLORIDE, CALCIUM CHLORIDE 600; 310; 30; 20 MG/100ML; MG/100ML; MG/100ML; MG/100ML
INJECTION, SOLUTION INTRAVENOUS CONTINUOUS PRN
Status: DISCONTINUED | OUTPATIENT
Start: 2022-03-02 | End: 2022-03-02 | Stop reason: SDUPTHER

## 2022-03-02 RX ORDER — OXYCODONE HYDROCHLORIDE 5 MG/1
5 TABLET ORAL
Status: DISCONTINUED | OUTPATIENT
Start: 2022-03-02 | End: 2022-03-02 | Stop reason: HOSPADM

## 2022-03-02 RX ORDER — HYDROCODONE BITARTRATE AND ACETAMINOPHEN 5; 325 MG/1; MG/1
1 TABLET ORAL EVERY 4 HOURS PRN
Qty: 18 TABLET | Refills: 0 | Status: SHIPPED | OUTPATIENT
Start: 2022-03-02 | End: 2022-03-05

## 2022-03-02 RX ORDER — ONDANSETRON 2 MG/ML
INJECTION INTRAMUSCULAR; INTRAVENOUS PRN
Status: DISCONTINUED | OUTPATIENT
Start: 2022-03-02 | End: 2022-03-02 | Stop reason: SDUPTHER

## 2022-03-02 RX ORDER — ROCURONIUM BROMIDE 10 MG/ML
INJECTION, SOLUTION INTRAVENOUS PRN
Status: DISCONTINUED | OUTPATIENT
Start: 2022-03-02 | End: 2022-03-02 | Stop reason: SDUPTHER

## 2022-03-02 RX ORDER — ONDANSETRON HYDROCHLORIDE 8 MG/1
8 TABLET, FILM COATED ORAL EVERY 8 HOURS PRN
Qty: 10 TABLET | Refills: 0 | Status: SHIPPED | OUTPATIENT
Start: 2022-03-02

## 2022-03-02 RX ORDER — FENTANYL CITRATE 50 UG/ML
25 INJECTION, SOLUTION INTRAMUSCULAR; INTRAVENOUS EVERY 5 MIN PRN
Status: DISCONTINUED | OUTPATIENT
Start: 2022-03-02 | End: 2022-03-02 | Stop reason: HOSPADM

## 2022-03-02 RX ORDER — ACETAMINOPHEN 325 MG/1
650 TABLET ORAL
Status: DISCONTINUED | OUTPATIENT
Start: 2022-03-02 | End: 2022-03-02 | Stop reason: HOSPADM

## 2022-03-02 RX ORDER — LABETALOL HYDROCHLORIDE 5 MG/ML
10 INJECTION, SOLUTION INTRAVENOUS
Status: DISCONTINUED | OUTPATIENT
Start: 2022-03-02 | End: 2022-03-02 | Stop reason: HOSPADM

## 2022-03-02 RX ORDER — SODIUM CHLORIDE, SODIUM LACTATE, POTASSIUM CHLORIDE, CALCIUM CHLORIDE 600; 310; 30; 20 MG/100ML; MG/100ML; MG/100ML; MG/100ML
INJECTION, SOLUTION INTRAVENOUS CONTINUOUS
Status: CANCELLED | OUTPATIENT
Start: 2022-03-02

## 2022-03-02 RX ORDER — MAGNESIUM HYDROXIDE 1200 MG/15ML
LIQUID ORAL CONTINUOUS PRN
Status: COMPLETED | OUTPATIENT
Start: 2022-03-02 | End: 2022-03-02

## 2022-03-02 RX ORDER — ONDANSETRON 2 MG/ML
4 INJECTION INTRAMUSCULAR; INTRAVENOUS
Status: DISCONTINUED | OUTPATIENT
Start: 2022-03-02 | End: 2022-03-02 | Stop reason: HOSPADM

## 2022-03-02 RX ORDER — DEXAMETHASONE SODIUM PHOSPHATE 4 MG/ML
INJECTION, SOLUTION INTRA-ARTICULAR; INTRALESIONAL; INTRAMUSCULAR; INTRAVENOUS; SOFT TISSUE PRN
Status: DISCONTINUED | OUTPATIENT
Start: 2022-03-02 | End: 2022-03-02 | Stop reason: SDUPTHER

## 2022-03-02 RX ORDER — FENTANYL CITRATE 50 UG/ML
INJECTION, SOLUTION INTRAMUSCULAR; INTRAVENOUS PRN
Status: DISCONTINUED | OUTPATIENT
Start: 2022-03-02 | End: 2022-03-02 | Stop reason: SDUPTHER

## 2022-03-02 RX ORDER — PROCHLORPERAZINE EDISYLATE 5 MG/ML
5 INJECTION INTRAMUSCULAR; INTRAVENOUS
Status: DISCONTINUED | OUTPATIENT
Start: 2022-03-02 | End: 2022-03-02 | Stop reason: HOSPADM

## 2022-03-02 RX ORDER — SUCCINYLCHOLINE/SOD CL,ISO/PF 200MG/10ML
SYRINGE (ML) INTRAVENOUS PRN
Status: DISCONTINUED | OUTPATIENT
Start: 2022-03-02 | End: 2022-03-02 | Stop reason: SDUPTHER

## 2022-03-02 RX ORDER — OXYMETAZOLINE HYDROCHLORIDE 0.05 G/100ML
2 SPRAY NASAL
Qty: 1 EACH | Refills: 5 | Status: SHIPPED | OUTPATIENT
Start: 2022-03-02 | End: 2022-03-05

## 2022-03-02 RX ORDER — MIDAZOLAM HYDROCHLORIDE 1 MG/ML
INJECTION INTRAMUSCULAR; INTRAVENOUS PRN
Status: DISCONTINUED | OUTPATIENT
Start: 2022-03-02 | End: 2022-03-02 | Stop reason: SDUPTHER

## 2022-03-02 RX ADMIN — HYDROMORPHONE HYDROCHLORIDE 0.4 MG: 2 INJECTION, SOLUTION INTRAMUSCULAR; INTRAVENOUS; SUBCUTANEOUS at 14:45

## 2022-03-02 RX ADMIN — ROCURONIUM BROMIDE 10 MG: 10 INJECTION, SOLUTION INTRAVENOUS at 13:00

## 2022-03-02 RX ADMIN — HYDROMORPHONE HYDROCHLORIDE 0.4 MG: 2 INJECTION, SOLUTION INTRAMUSCULAR; INTRAVENOUS; SUBCUTANEOUS at 14:30

## 2022-03-02 RX ADMIN — SODIUM CHLORIDE, POTASSIUM CHLORIDE, SODIUM LACTATE AND CALCIUM CHLORIDE: 600; 310; 30; 20 INJECTION, SOLUTION INTRAVENOUS at 12:45

## 2022-03-02 RX ADMIN — ROCURONIUM BROMIDE 10 MG: 10 INJECTION, SOLUTION INTRAVENOUS at 14:40

## 2022-03-02 RX ADMIN — FENTANYL CITRATE 50 MCG: 50 INJECTION, SOLUTION INTRAMUSCULAR; INTRAVENOUS at 12:56

## 2022-03-02 RX ADMIN — HYDROMORPHONE HYDROCHLORIDE 0.4 MG: 2 INJECTION, SOLUTION INTRAMUSCULAR; INTRAVENOUS; SUBCUTANEOUS at 14:09

## 2022-03-02 RX ADMIN — Medication 140 MG: at 13:00

## 2022-03-02 RX ADMIN — ROCURONIUM BROMIDE 10 MG: 10 INJECTION, SOLUTION INTRAVENOUS at 14:10

## 2022-03-02 RX ADMIN — SUGAMMADEX 200 MG: 100 INJECTION, SOLUTION INTRAVENOUS at 15:26

## 2022-03-02 RX ADMIN — FENTANYL CITRATE 50 MCG: 50 INJECTION, SOLUTION INTRAMUSCULAR; INTRAVENOUS at 12:58

## 2022-03-02 RX ADMIN — DEXAMETHASONE SODIUM PHOSPHATE 12 MG: 4 INJECTION, SOLUTION INTRAMUSCULAR; INTRAVENOUS at 13:00

## 2022-03-02 RX ADMIN — PROPOFOL 50 MG: 10 INJECTION, EMULSION INTRAVENOUS at 14:54

## 2022-03-02 RX ADMIN — MIDAZOLAM 2 MG: 1 INJECTION INTRAMUSCULAR; INTRAVENOUS at 12:54

## 2022-03-02 RX ADMIN — HYDROMORPHONE HYDROCHLORIDE 0.4 MG: 2 INJECTION, SOLUTION INTRAMUSCULAR; INTRAVENOUS; SUBCUTANEOUS at 15:45

## 2022-03-02 RX ADMIN — LIDOCAINE HYDROCHLORIDE 100 MG: 20 INJECTION, SOLUTION EPIDURAL; INFILTRATION; INTRACAUDAL; PERINEURAL at 12:58

## 2022-03-02 RX ADMIN — PROPOFOL 200 MG: 10 INJECTION, EMULSION INTRAVENOUS at 13:00

## 2022-03-02 RX ADMIN — Medication 30 MG: at 13:00

## 2022-03-02 RX ADMIN — HYDROMORPHONE HYDROCHLORIDE 0.4 MG: 2 INJECTION, SOLUTION INTRAMUSCULAR; INTRAVENOUS; SUBCUTANEOUS at 15:58

## 2022-03-02 RX ADMIN — ROCURONIUM BROMIDE 30 MG: 10 INJECTION, SOLUTION INTRAVENOUS at 13:15

## 2022-03-02 RX ADMIN — ONDANSETRON 4 MG: 2 INJECTION INTRAMUSCULAR; INTRAVENOUS at 15:12

## 2022-03-02 ASSESSMENT — PULMONARY FUNCTION TESTS
PIF_VALUE: 22
PIF_VALUE: 21
PIF_VALUE: 0
PIF_VALUE: 22
PIF_VALUE: 19
PIF_VALUE: 23
PIF_VALUE: 21
PIF_VALUE: 17
PIF_VALUE: 21
PIF_VALUE: 20
PIF_VALUE: 20
PIF_VALUE: 22
PIF_VALUE: 22
PIF_VALUE: 21
PIF_VALUE: 22
PIF_VALUE: 20
PIF_VALUE: 21
PIF_VALUE: 23
PIF_VALUE: 21
PIF_VALUE: 22
PIF_VALUE: 21
PIF_VALUE: 22
PIF_VALUE: 21
PIF_VALUE: 21
PIF_VALUE: 13
PIF_VALUE: 20
PIF_VALUE: 22
PIF_VALUE: 21
PIF_VALUE: 21
PIF_VALUE: 22
PIF_VALUE: 22
PIF_VALUE: 20
PIF_VALUE: 22
PIF_VALUE: 20
PIF_VALUE: 22
PIF_VALUE: 22
PIF_VALUE: 18
PIF_VALUE: 21
PIF_VALUE: 30
PIF_VALUE: 21
PIF_VALUE: 22
PIF_VALUE: 21
PIF_VALUE: 22
PIF_VALUE: 21
PIF_VALUE: 17
PIF_VALUE: 22
PIF_VALUE: 22
PIF_VALUE: 21
PIF_VALUE: 17
PIF_VALUE: 21
PIF_VALUE: 1
PIF_VALUE: 11
PIF_VALUE: 22
PIF_VALUE: 22
PIF_VALUE: 21
PIF_VALUE: 22
PIF_VALUE: 21
PIF_VALUE: 17
PIF_VALUE: 22
PIF_VALUE: 20
PIF_VALUE: 21
PIF_VALUE: 21
PIF_VALUE: 17
PIF_VALUE: 21
PIF_VALUE: 18
PIF_VALUE: 21
PIF_VALUE: 21
PIF_VALUE: 22
PIF_VALUE: 21
PIF_VALUE: 22
PIF_VALUE: 1
PIF_VALUE: 1
PIF_VALUE: 22
PIF_VALUE: 21
PIF_VALUE: 21
PIF_VALUE: 22
PIF_VALUE: 1
PIF_VALUE: 22
PIF_VALUE: 22
PIF_VALUE: 21
PIF_VALUE: 22
PIF_VALUE: 22
PIF_VALUE: 0
PIF_VALUE: 22
PIF_VALUE: 22
PIF_VALUE: 20
PIF_VALUE: 22
PIF_VALUE: 22
PIF_VALUE: 20
PIF_VALUE: 21
PIF_VALUE: 22
PIF_VALUE: 21
PIF_VALUE: 20
PIF_VALUE: 21
PIF_VALUE: 22
PIF_VALUE: 22
PIF_VALUE: 21
PIF_VALUE: 21
PIF_VALUE: 22
PIF_VALUE: 21
PIF_VALUE: 21
PIF_VALUE: 22
PIF_VALUE: 21
PIF_VALUE: 26
PIF_VALUE: 21
PIF_VALUE: 22
PIF_VALUE: 21
PIF_VALUE: 22
PIF_VALUE: 17
PIF_VALUE: 22
PIF_VALUE: 21
PIF_VALUE: 20
PIF_VALUE: 21
PIF_VALUE: 2
PIF_VALUE: 21
PIF_VALUE: 21
PIF_VALUE: 22
PIF_VALUE: 17
PIF_VALUE: 22
PIF_VALUE: 21
PIF_VALUE: 22
PIF_VALUE: 21
PIF_VALUE: 19
PIF_VALUE: 22
PIF_VALUE: 22
PIF_VALUE: 18
PIF_VALUE: 22
PIF_VALUE: 22
PIF_VALUE: 21
PIF_VALUE: 1
PIF_VALUE: 19
PIF_VALUE: 5
PIF_VALUE: 21
PIF_VALUE: 22
PIF_VALUE: 21
PIF_VALUE: 22
PIF_VALUE: 13
PIF_VALUE: 21
PIF_VALUE: 32
PIF_VALUE: 19
PIF_VALUE: 21
PIF_VALUE: 21

## 2022-03-02 ASSESSMENT — PAIN - FUNCTIONAL ASSESSMENT
PAIN_FUNCTIONAL_ASSESSMENT: 0-10
PAIN_FUNCTIONAL_ASSESSMENT: ACTIVITIES ARE NOT PREVENTED

## 2022-03-02 ASSESSMENT — ENCOUNTER SYMPTOMS: SHORTNESS OF BREATH: 0

## 2022-03-02 NOTE — PROGRESS NOTES
Pt awake and alert, pain and nausea controlled at this time. Pt maintaining oxygen 91-92% on room air.

## 2022-03-02 NOTE — OP NOTE
Patient Name: Fifi Fontana  YOB: 1990  Medical Record Number:  [id number]  Billing Number:  [id number]  Date of Procedure: 3/2/2022  Time: 0188    Pre Operative Diagnoses:    1. Deviated Nasal Septum  2. Inferior turbinate hypertrophy  3. Nasal congestion    Post Operative Diagnoses:  Same           Procedure:    1. Endoscopic nasal septoplasty (87985)  2. Submucosal resection of bilateral inferior turbinates (67085)           Surgeon: Dr. Pura England DO    Assistant: Circulator: Shamar Yoo RN; Mihai Lopez RN  Relief Circulator: Ruma Hilton RN  Scrub Person First: Xena Gustafson  Circulator Assist: Enoc Smith; Naresh Howell     Anesthesia:  General anesthesia     Findings:  Nasal septum deviated to the right caudally and with a prominent posterior leftward facing bony spur touching left inferior turbinate. Prominent maxillary crest. Bilateral inferior turbinates hypertrophied. Description: After verification of informed consent, the patient was brought to the operating room and placed in the supine position. General anesthesia was induced. Time out was performed and agreed upon by the entirety of the operating room staff confirming the patient's identity, procedure, laterality, drug allergies, beta-blocker status, pertinent medications, airway risk, fire risk. Approximately 10ml of 1% lidocaine with 1:100,000 epinephrine was submucosally injected into both sides of the nasal septum using a 25 gauge needle. Afrin soaked cotton pledgets were then placed in both sides of the nasal cavity and allowed to sit for approximately 5 minutes. These pledgets were then removed. A modified Mohini Kenning incision was made on the patient's left side and a freer elevator and #8 Swiss suction were used to elevate a mucoperichondrial flap under endoscopic guideance.  Incision was made into the quadrangular cartilage with the freer elevator and a mucoperichondrial flap was similiarly raised on the right side with the freer elevator and #8 Malawian suction. Deviated portions of quadrangular cartilage and septal bone were removed with the use of Metzenbaum scissors, Germain forceps, Ohlman elevator, and Lyon elevator, making sure to leave at least a 1 cm caudal and 1 cm dorsal cartilaginous strut to stabilization purposes. Visualization of the bilateral nasal cavities was performed throughout the removal of septal contents to ensure adequate removal of deviated septal cartilage and bone. Upon taking down a portion of the deviated maxillary crest very brisk arterial bleeding was encountered, exact location was difficult to pinpoint initially. More maxillary crest was carefully taken down with a chisel and hammer to further expose area of bleeding. After removal of more maxillary crest a large bleeding artery was encountered eminating from the maxillary crest. Hemostasis was achieved with bipolar cautery and placement of bone wax in the exposed maxillary crest bone. Care was taken not to cauterize the left mucoperichondrial flap as it was not violated through the case. The left inferior turbinate was then submucosally resected using the 2.9 mm turbinate microdebrider blade through an anterior stab incision at the head of the turbinate. After successful debridement the inferior turbinate was then outfractured using a [place] elevator. The site of the anterior stab incision was then cauterized with bipolar cautery on 20 W. Gentle bipolar cauterization on 20 W was used to cauterize the posterior third of the inferior turbinate as well. In a similar fashion the right inferior turbinate was then submucosally resected using the 2.9 mm turbinate microdebrider blade through an anterior stab incision at the head of the turbinate. After successful debridement the inferior turbinate was then outfractured using a [place] elevator.   The site of the anterior stab incision was then cauterized with bipolar cautery on 20 W. Gentle bipolar cauterization on 20 W was used to cauterize the posterior third of the inferior turbinate as well. Valsalva was performed by anesthesia under direct visualization to ensure hemostasis. After this the incision was reapproximated with 4-0 chronic sutures in a simple interrupted fashion. A 4-0 plain gut absorbable suture on a straight dai needle was used to approximate the septal mucoperichondrial flaps in a quilting fashion. Wynn splints covered in Bactroban nasal ointment were then placed in the bilateral nasal cavity and secured in place with 3-0 Ethilon suture anteriorly. This included the operative portion of the case. The patient was turned over to the care of the anesthesia team for awakening and extubation. The patient was eventually transferred to the recovery room in stable condition. Specimens: none    Estimated Blood Loss: 076 ml    Complications:  None.

## 2022-03-02 NOTE — PROGRESS NOTES
Teaching / education initiated regarding perioperative experience, expectations, and pain management during stay. Patient verbalized understanding.   Used  Elier Chavira

## 2022-03-02 NOTE — ANESTHESIA PRE PROCEDURE
Department of Anesthesiology  Preprocedure Note       Name:  Daksha Reynolds   Age:  32 y.o.  :  1990                                          MRN:  [id number]         Date:  3/2/2022      Surgeon: Marcell Barros):  Elona Angelucci, DO    Procedure: Procedure(s):  SEPTOPLASTY; BILATERAL INFERIOR TURBINATE REDUCTION ([phone], [phone], [id number])    Medications prior to admission:   Prior to Admission medications    Medication Sig Start Date End Date Taking? Authorizing Provider   vilazodone HCl (VILAZODONE HCL) 40 MG TABS Take 20 mg by mouth daily   Yes Historical Provider, MD   montelukast (SINGULAIR) 10 MG tablet TK 1 T PO QPM 3/2/20  Yes Historical Provider, MD   VENTOLIN  (90 Base) MCG/ACT inhaler INHALE 2 PUFFS INTO LUNGS Q 4 TO 6 H PRF BRONCOSPASMS 20  Yes Historical Provider, MD   cetirizine (ZYRTEC) 10 MG tablet TK 1 T PO QD 20   Historical Provider, MD       Current medications:    No current facility-administered medications for this encounter. Allergies:  No Known Allergies    Problem List:    Patient Active Problem List   Diagnosis Code    Asthma J45.909    Anxiety F41.9    Nausea & vomiting R11.2    Headache R51.9    Dizziness R42    Acute metabolic encephalopathy Z66.19    Leukocytosis D72.829    Diarrhea R19.7    Abnormal CT of the head R93.0    Septicemia (HCC) A41.9    Subarachnoid cyst G93.0    Lactic acid acidosis E87.2    Arachnoid cyst G93.0    Overweight (BMI 25.0-29. 9) E66.3    Cannabis abuse F12.10    Abnormal MRI, cervical spine R93.7    Tear of left acetabular labrum I27.902V       Past Medical History:        Diagnosis Date    Anxiety     Asthma     Chronic back pain        Past Surgical History:        Procedure Laterality Date    HIP SURGERY Left 2021    LEFT HIP ARTHROSCOPY LABRAL REPAIR, ACETABULOPLASTY AND OSTEOCHONDROPLASTY, FRACTIONAL PSOAS LENGTHENING performed by Natalia Hidalgo MD at [address] [place] [address] Social History:    Social History     Tobacco Use    Smoking status: Never Smoker    Smokeless tobacco: Never Used   Substance Use Topics    Alcohol use: Not Currently     Alcohol/week: 1.0 standard drink     Types: 1 Glasses of wine per week     Comment: 1 montly                                Counseling given: Not Answered      Vital Signs (Current):   Vitals:    02/24/22 1616 03/02/22 1208   BP:  124/75   Pulse:  71   Resp:  16   Temp:  97.7 °F (36.5 °C)   TempSrc:  Temporal   SpO2:  98%   Weight: 165 lb (74.8 kg) 162 lb 9 oz (73.7 kg)   Height: 5' 9\" (1.753 m) 5' 9\" (1.753 m)                                              BP Readings from Last 3 Encounters:   03/02/22 124/75   02/15/22 119/77   08/20/21 (!) 116/56       NPO Status: Time of last liquid consumption: 2330                        Time of last solid consumption: 2330                        Date of last liquid consumption: 03/01/22                        Date of last solid food consumption: 03/01/22    BMI:   Wt Readings from Last 3 Encounters:   03/02/22 162 lb 9 oz (73.7 kg)   01/24/22 170 lb (77.1 kg)   11/17/21 170 lb (77.1 kg)     Body mass index is 24.01 kg/m². CBC:   Lab Results   Component Value Date    WBC 8.0 11/23/2020    RBC 5.12 11/23/2020    HGB 15.4 11/23/2020    HCT 45.9 11/23/2020    MCV 89.7 11/23/2020    RDW 13.3 11/23/2020     11/23/2020       CMP:   Lab Results   Component Value Date     11/23/2020    K 4.1 11/23/2020     11/23/2020    CO2 32 11/23/2020    BUN 8 11/23/2020    CREATININE 0.8 11/23/2020    GFRAA >60 11/23/2020    LABGLOM >60 11/23/2020    GLUCOSE 108 11/23/2020    PROT 8.8 11/18/2020    CALCIUM 9.8 11/23/2020    BILITOT 0.4 11/18/2020    ALKPHOS 109 11/18/2020    AST 27 11/18/2020    ALT 25 11/18/2020       POC Tests: No results for input(s): POCGLU, POCNA, POCK, POCCL, POCBUN, POCHEMO, POCHCT in the last 72 hours.     Coags: No results found for: PROTIME, INR, APTT    HCG (If Applicable): No results found for: PREGTESTUR, PREGSERUM, HCG, HCGQUANT     ABGs: No results found for: PHART, PO2ART, UHH8TMG, WWO3ETP, BEART, R7YQMNRE     Type & Screen (If Applicable):  No results found for: LABABO, LABRH    Drug/Infectious Status (If Applicable):  No results found for: HIV, HEPCAB    COVID-19 Screening (If Applicable):   Lab Results   Component Value Date    COVID19 Not Detected 08/20/2021    COVID19 Not Detected 11/18/2020           Anesthesia Evaluation  Patient summary reviewed and Nursing notes reviewed no history of anesthetic complications:   Airway: Mallampati: I  TM distance: >3 FB   Neck ROM: full  Mouth opening: > = 3 FB Dental: normal exam         Pulmonary:   (+) asthma (daily inhaler use):     (-) shortness of breath                           Cardiovascular:        (-) hypertension and  angina                Neuro/Psych:   (+) headaches:,    (-) CVA           GI/Hepatic/Renal:        (-) GERD and liver disease       Endo/Other:        (-) diabetes mellitus, hypothyroidism               Abdominal:             Vascular:     - PVD. Other Findings:             Anesthesia Plan      general     ASA 2       Induction: intravenous. MIPS: Postoperative opioids intended and Prophylactic antiemetics administered. Anesthetic plan and risks discussed with patient. Use of blood products discussed with patient whom. Plan discussed with CRNA.                   Kari Dotson MD   3/2/2022

## 2022-03-02 NOTE — PROGRESS NOTES
Discharge instructions reviewed with pt wife face to face at bedside with  at bedside to translate. All questions answered and verbalized understanding of instructions. Scripts placed in pt discharge folder to fill at preferred pharmacy.

## 2022-03-02 NOTE — PROGRESS NOTES
Pt assisted with dressing per wife. Pt able to ambulate independently from stretcher to wheel chair. Pt having a small amount of bloody drainage from bilateral nares, mustache dressing placed and pt sent home with dressing change materials. Pt discharged via wheel chair in stable condition to wife to be transported home.

## 2022-03-02 NOTE — ANESTHESIA POSTPROCEDURE EVALUATION
Department of Anesthesiology  Postprocedure Note    Patient: Brian Wang  MRN: [id number]  YOB: 1990  Date of evaluation: 3/2/2022  Time:  4:01 PM     Procedure Summary     Date: 03/02/22 Room / Location: [address]    Anesthesia Start: 5545 Anesthesia Stop: 7991    Procedure: SEPTOPLASTY; BILATERAL INFERIOR TURBINATE REDUCTION (12602, 64094, [id number]) (Bilateral Nose) Diagnosis:       (R09.81  NASAL CONGESTION)      (J34.2  DEVIATED NASAL SEPTUM)      (J34.3  HYPERTROPHY OF BOTH INFERIOR TURBINATES)    Surgeons: Daniel Ferraro DO Responsible Provider: Roque Esqueda MD    Anesthesia Type: general ASA Status: 2          Anesthesia Type: general    Jamie Phase I: Jamie Score: 5    Jamie Phase II:      Last vitals: Reviewed and per EMR flowsheets.        Anesthesia Post Evaluation    Patient location during evaluation: PACU  Patient participation: complete - patient participated  Level of consciousness: awake and alert  Pain score: 2  Airway patency: patent  Nausea & Vomiting: no vomiting  Complications: no  Cardiovascular status: blood pressure returned to baseline  Respiratory status: acceptable  Hydration status: euvolemic  Multimodal analgesia pain management approach

## 2022-03-02 NOTE — H&P
Date of Surgery Update:  Liliana Dias was seen, history and physical examination reviewed, and patient examined by me today. There have been no significant clinical changes since the completion of the previous history and physical performed on 3-1-2022. The risk, benefits, and alternatives of the proposed procedure have been explained to the patient (or appropriate guardian) and understanding verbalized. All questions answered. Patient wishes to proceed.     Electronically signed by: Kit Felipe DO,3/2/2022,12:35 PM

## 2022-03-07 ENCOUNTER — OFFICE VISIT (OUTPATIENT)
Dept: ENT CLINIC | Age: 32
End: 2022-03-07

## 2022-03-07 DIAGNOSIS — R09.81 NASAL CONGESTION: Primary | ICD-10-CM

## 2022-03-07 DIAGNOSIS — J34.3 HYPERTROPHY OF BOTH INFERIOR NASAL TURBINATES: ICD-10-CM

## 2022-03-07 DIAGNOSIS — J34.2 DNS (DEVIATED NASAL SEPTUM): ICD-10-CM

## 2022-03-07 PROCEDURE — 99024 POSTOP FOLLOW-UP VISIT: CPT | Performed by: STUDENT IN AN ORGANIZED HEALTH CARE EDUCATION/TRAINING PROGRAM

## 2022-03-07 NOTE — PROGRESS NOTES
[facility] POST-OPERATIVE VISIT      Patient Name: Jean Carlos Huynh  Medical Record Number:  [id number]  Primary Care Physician:  GALEN Quinteros - CNP    ChiefComplaint     Post-op visit    History of Present Illness     Jean Carlos Huynh is an 32 y.o. male s/p endoscopic nasal septoplasty and bilateral inferior turbinate reduction on 3/2/2022. Doing well, no surgical issues. Pain controlled. Significant nosebleeds since surgery.     Past Medical History     Past Medical History:   Diagnosis Date    Anxiety     Asthma     Chronic back pain        Past Surgical History     Past Surgical History:   Procedure Laterality Date    HIP SURGERY Left 8/20/2021    LEFT HIP ARTHROSCOPY LABRAL REPAIR, ACETABULOPLASTY AND OSTEOCHONDROPLASTY, FRACTIONAL PSOAS LENGTHENING performed by Reji Stanley MD at [facility]      Epidural     SINUS ENDOSCOPY Bilateral 3/2/2022    SEPTOPLASTY; BILATERAL INFERIOR TURBINATE REDUCTION (07305, 03269, 88717) performed by Kate Marcial DO at [facility]       Family History     Family History   Problem Relation Age of Onset    Diabetes Mother     Coronary Art Dis Mother     Diabetes Father        Social History     Social History     Tobacco Use    Smoking status: Never Smoker    Smokeless tobacco: Never Used   Vaping Use    Vaping Use: Former   Substance Use Topics    Alcohol use: Not Currently     Alcohol/week: 1.0 standard drink     Types: 1 Glasses of wine per week     Comment: 1 montly    Drug use: Not Currently        Allergies     No Known Allergies    Medications     Current Outpatient Medications   Medication Sig Dispense Refill    cephALEXin (KEFLEX) 500 MG capsule Take 1 capsule by mouth 2 times daily for 5 days 10 capsule 0    ondansetron (ZOFRAN) 8 MG tablet Take 1 tablet by mouth every 8 hours as needed for Nausea or Vomiting 10 tablet 0    vilazodone HCl (VILAZODONE HCL) 40 MG TABS Take 20 mg by mouth daily      montelukast (SINGULAIR) 10 MG tablet TK 1 T PO QPM      VENTOLIN  (90 Base) MCG/ACT inhaler INHALE 2 PUFFS INTO LUNGS Q 4 TO 6 H PRF BRONCOSPASMS      cetirizine (ZYRTEC) 10 MG tablet TK 1 T PO QD       No current facility-administered medications for this visit. Review of Systems     REVIEW OF SYSTEMS  The following systems were reviewed and revealed the following in addition to any already discussed in the HPI:    CONSTITUTIONAL: no weight loss, no fever, no night sweats, no chills    PhysicalExam     There were no vitals filed for this visit. PHYSICAL EXAM  There were no vitals taken for this visit. GENERAL: No acute distress, alert and oriented  ENT: Bilateral Wynn splints in place. Anchoring nylon suture was cut and removed. Bilateral Wynn splints were removed uneventfully. Bilateral nasal passages suctioned. Nasal passages appear patent bilaterally. No evidence of septal hematoma or perforation. I have performed a head and neck physical exam personally or was physically present during the key or critical portions of the service. Assessment and Plan     1. Nasal congestion  2. DNS (deviated nasal septum)  3. Hypertrophy of both inferior nasal turbinates    Plan:  -Doing well postoperatively. Wynn splints removed in the office today  -Continue nasal saline sprays multiple times throughout the day. Continue placement of mupirocin ointment in bilateral nasal passages twice daily until follow-up. Follow-Up     Return in about 1 month (around 4/7/2022), or if symptoms worsen or fail to improve. Dr. Pura [place] [address]  Department of Otolaryngology/Head and Neck Surgery  3/7/22    Medical Decision Making:   The following items were considered in medical decision making:  Independent review of images  Review / order clinical lab tests  Review / order radiology tests  Decision to obtain old

## 2022-04-11 ENCOUNTER — OFFICE VISIT (OUTPATIENT)
Dept: ENT CLINIC | Age: 32
End: 2022-04-11

## 2022-04-11 VITALS
HEIGHT: 69 IN | DIASTOLIC BLOOD PRESSURE: 77 MMHG | SYSTOLIC BLOOD PRESSURE: 130 MMHG | BODY MASS INDEX: 24.88 KG/M2 | HEART RATE: 77 BPM | WEIGHT: 168 LBS

## 2022-04-11 DIAGNOSIS — J34.3 HYPERTROPHY OF BOTH INFERIOR NASAL TURBINATES: ICD-10-CM

## 2022-04-11 DIAGNOSIS — Z91.89 AT RISK FOR OBSTRUCTIVE SLEEP APNEA: Primary | ICD-10-CM

## 2022-04-11 DIAGNOSIS — J34.2 DNS (DEVIATED NASAL SEPTUM): ICD-10-CM

## 2022-04-11 DIAGNOSIS — R09.81 NASAL CONGESTION: ICD-10-CM

## 2022-04-11 PROCEDURE — 99024 POSTOP FOLLOW-UP VISIT: CPT | Performed by: STUDENT IN AN ORGANIZED HEALTH CARE EDUCATION/TRAINING PROGRAM

## 2022-04-11 NOTE — PROGRESS NOTES
[facility] POST-OPERATIVE VISIT      Patient Name: Analia Tee  Medical Record Number:  [id number]  Primary Care Physician:  GALEN Jackson - CLARISA    ChiefComplaint     Status post nasal surgery, snoring    History of Present Illness     Analia Tee is an 32 y.o. male s/p endoscopic nasal septoplasty and bilateral inferior turbinate reduction on 3/2/2022. Doing well, no surgical issues. Pain controlled. No epistaxis. He is now able to breathe easier through the bilateral nasal passages. He does endorse snoring nightly. His wife who accompanied him today states that she has seen him stop breathing at times while sleeping. He is often tired throughout the day. He is interested in obtaining a sleep study.     Past Medical History     Past Medical History:   Diagnosis Date    Anxiety     Asthma     Chronic back pain        Past Surgical History     Past Surgical History:   Procedure Laterality Date    HIP SURGERY Left 8/20/2021    LEFT HIP ARTHROSCOPY LABRAL REPAIR, ACETABULOPLASTY AND OSTEOCHONDROPLASTY, FRACTIONAL PSOAS LENGTHENING performed by Yessica Chapin MD at [facility]      Epidural     SINUS ENDOSCOPY Bilateral 3/2/2022    SEPTOPLASTY; BILATERAL INFERIOR TURBINATE REDUCTION ([phone], 89218, 00381) performed by Toni Gamble DO at [facility]       Family History     Family History   Problem Relation Age of Onset    Diabetes Mother     Coronary Art Dis Mother     Diabetes Father        Social History     Social History     Tobacco Use    Smoking status: Never Smoker    Smokeless tobacco: Never Used   Vaping Use    Vaping Use: Former   Substance Use Topics    Alcohol use: Not Currently     Alcohol/week: 1.0 standard drink     Types: 1 Glasses of wine per week     Comment: 1 montly    Drug use: Not Currently        Allergies     No Known Allergies    Medications     Current Outpatient Medications Medication Sig Dispense Refill    ondansetron (ZOFRAN) 8 MG tablet Take 1 tablet by mouth every 8 hours as needed for Nausea or Vomiting 10 tablet 0    vilazodone HCl (VILAZODONE HCL) 40 MG TABS Take 20 mg by mouth daily      montelukast (SINGULAIR) 10 MG tablet TK 1 T PO QPM      VENTOLIN  (90 Base) MCG/ACT inhaler INHALE 2 PUFFS INTO LUNGS Q 4 TO 6 H PRF BRONCOSPASMS      cetirizine (ZYRTEC) 10 MG tablet TK 1 T PO QD       No current facility-administered medications for this visit. Review of Systems     REVIEW OF SYSTEMS  The following systems were reviewed and revealed the following in addition to any already discussed in the HPI:    CONSTITUTIONAL: no weight loss, no fever, no night sweats, no chills    PhysicalExam     Vitals:    04/11/22 1139   BP: 130/77   Site: Left Upper Arm   Position: Sitting   Cuff Size: Medium Adult   Pulse: 77   Weight: 168 lb (76.2 kg)   Height: 5' 9\" (1.753 m)       PHYSICAL EXAM  /77 (Site: Left Upper Arm, Position: Sitting, Cuff Size: Medium Adult)   Pulse 77   Ht 5' 9\" (1.753 m)   Wt 168 lb (76.2 kg)   BMI 24.81 kg/m²     GENERAL: No acute distress, alert and oriented  ENT: On anterior anoscopy with nasal speculum the bilateral nasal passages appear patent and healed appropriately. No evidence of septal perforation or hematoma. Tonsils 1+ bilaterally. I have performed a head and neck physical exam personally or was physically present during the key or critical portions of the service. Assessment and Plan     1. At risk for obstructive sleep apnea  -Referral placed to sleep medicine for PSG    2. Nasal congestion  3. DNS (deviated nasal septum)  4. Hypertrophy of both inferior nasal turbinates  -Healing appropriately. Continue saline nasal sprays twice daily. Follow-Up     Return if symptoms worsen or fail to improve.       Clarence Solorio   Department of Otolaryngology/Head and Neck Surgery  4/11/22    Medical Decision Making: The following items were considered in medical decision making:  Independent review of images  Review / order clinical lab tests  Review / order radiology tests  Decision to obtain old records    Portions of this note were dictated using Dragon.  There may be linguistic errors secondary to the use of this program.

## 2022-05-06 ENCOUNTER — TELEPHONE (OUTPATIENT)
Dept: PULMONOLOGY | Age: 32
End: 2022-05-06

## 2022-05-06 NOTE — TELEPHONE ENCOUNTER
Woman called and left a message wanting to get sooner appointment. Tried calling both numbers listed and could not use either because both are [place] area codes and [employer] are blocked for those.

## 2022-05-17 ENCOUNTER — TELEPHONE (OUTPATIENT)
Dept: ORTHOPEDIC SURGERY | Age: 32
End: 2022-05-17

## 2022-05-17 NOTE — TELEPHONE ENCOUNTER
Received narrative letter request from attorneys at the [employer]. Scanned to [employer] fee letter correspondence by fax and mail. Patient aware. Waiting for response.

## 2022-06-06 ENCOUNTER — OFFICE VISIT (OUTPATIENT)
Dept: ORTHOPEDIC SURGERY | Age: 32
End: 2022-06-06
Payer: COMMERCIAL

## 2022-06-06 VITALS — WEIGHT: 168 LBS | HEIGHT: 69 IN | BODY MASS INDEX: 24.88 KG/M2

## 2022-06-06 DIAGNOSIS — M51.27 HERNIATION OF INTERVERTEBRAL DISC BETWEEN L5 AND S1: ICD-10-CM

## 2022-06-06 DIAGNOSIS — Z98.890 STATUS POST ARTHROSCOPY OF HIP: Primary | ICD-10-CM

## 2022-06-06 DIAGNOSIS — M53.3 SACRAL BACK PAIN: ICD-10-CM

## 2022-06-06 PROCEDURE — 99214 OFFICE O/P EST MOD 30 MIN: CPT | Performed by: ORTHOPAEDIC SURGERY

## 2022-06-06 NOTE — PROGRESS NOTES
Chief Complaint  Post-Op Check (9.5 MONTH POST OP LEFT HIP SCOPE 8/20/2021)      History of Present Illness:  Bryan Stern is a pleasant 32 y.o. male is 9.5 months post left hip arthroscopy, VERONICA decompression, labral repair and capsular closure. Today he notes much improvement in his hip pain and symptoms. Pain assessment as described below. He continues to work as tolerable, noting he has been working with [employer] some lately. Mumtaz Smyth has not been to formal physical therapy in a few months but notes he continues with home exercises and reports he has been going to the gym more frequently. The patient notes when he began going to the gym he did not lift anything greater than 7lbs. He does have some continued left sided low back discomfort with certain exercises along the SI joint. He is also having some left sided lateral/posterior thigh numbness. Nothing he does at home or at the gym provokes his hip directly. Separately, he is working on anxiety concerns with a psychologist.     He is not back to working with [employer] as this remains under possible worker's claim status. Medical History:  Patient's medications, allergies, past medical, surgical, social and family histories were reviewed and updated as appropriate. Pain Assessment  Location of Pain:  (HIP)  Location Modifiers: Left  Severity of Pain: 0  Quality of Pain:  (NUMB)  Aggravating Factors:  (EXTENDED SITTING)  Limiting Behavior: Some  Result of Injury: No  Work-Related Injury: No  Are there other pain locations you wish to document?: No  ROS: Review of systems reviewed from Patient History Form completed today and available in the patient's chart under the Media tab. Pertinent items are noted in HPI  Review of systems reviewed from Patient History Form completed today and available in the patient's chart under the Media tab.        Vital Signs:  Ht 5' 9\" (1.753 m)   Wt 168 lb (76.2 kg)   BMI 24.81 kg/m²         Neuro: Alert & oriented x 3,  normal,  no focal deficits noted. Normal affect. Eyes: sclera clear  Ears: Normal external ear  Mouth:  No perioral lesions  Pulm: Respirations unlabored and regular  Pulse: Extremities well perfused. 2+ peripheral pulses. Skin: Warm. No ulcerations. Constitutional: The physical examination finds the patient to be well-developed and well-nourished. The patient is alert and oriented x3 and was cooperative throughout the visit. Hip Examination: left    Skin/Inspection: Incisions fully healed. No skin lesions, cellulitis, or extreme edema in the lower extremities. Standing/Walking: normal Antalgic gait, negative Trendelenburg sign. Supine Exam: Non tender around the ASIS, AIIS  Flexion arc 0 to 100deg, IR 15 deg, ER 45 deg full range of motion  Special Tests:  negative Deep Flexion Test, negative  FADIR ,  Negative  VENKAT, negative  Resisted Abduction 5/5   Resisted Adduction 5/5   Resisted Hip Flexion 5/5    Side Lying Exam: not tender at greater trochanter, not tender abductor musculature,   Abductor side leg raise 5/5, normal. OberTest     Special Tests:  Double Leg Squats (Standing): good  Lunges: good  Single Leg Squats: fair  Hip Hinge (Sit to Stand):  good  Double Leg Bridges: excellent  Single Leg Bridges: good  Single Leg Step-Downs: excellent  Single Leg RDL: fair    Distal Neurovascular exam is intact (foot sensation, pulses, and motor exam)      Diagnostics:  Radiology:     No new x-rays obtained today. MRI Lumbar Spine reviewed today in the office 11/18/2020: Impression   1. No acute abnormality of the lumbar spine. 2. Mild left L5 neural foraminal narrowing secondary to a left foraminal   L5-S1 disc protrusion. Assessment: Patient is a 32 y.o. male is 9.5 months post left hip arthroscopy, VERONICA decompression, labral repair and capsular closure. In regards to his hip surgery, he is fully healed and is at a patient acceptable symptomatic state. In regards to his ongoing back and leg pain, he does have a history of an L5-S1 disc protrusion from 2 years ago confirmed on MRI. I feel his symptoms are most likely related to this issue at this time. 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 Orders Placed This Encounter   Procedures    MRI LUMBAR SPINE WO CONTRAST     Standing Status:   Future     Standing Expiration Date:   6/6/2023     Scheduling Instructions:      [address]            PHONE: [phone]      FAX: [phone]            MRI LUMBAR SPINE W/O CONTRAST       EVALUATE L5-S1 DISC PROTRUSION             MRI SCHEDULED FOR: NEEDS SCHEDULED            PLEASE NOTE: IMAGING RESULTS ARE NOT GIVEN OVER THE PHONE. AN IN-OFFICE APPOINTMENT IS REQUIRED UNLESS OTHER ARRANGEMENTS ARE PREVIOUSLY MADE. PLEASE SCHEDULE THIS PRIOR TO LEAVING THE OFFICE TODAY. Order Specific Question:   What is the sedation requirement? Answer:   None    MRI SACRUM COCCYX WO CONTRAST     Standing Status:   Future     Standing Expiration Date:   6/6/2023     Scheduling Instructions:      [address]            PHONE: [phone]      FAX: [phone]            MRI SACRUM COCCYX W/O CONTRAST             MRI SCHEDULED FOR: NEEDS SCHEDULED            PLEASE NOTE: IMAGING RESULTS ARE NOT GIVEN OVER THE PHONE. AN IN-OFFICE APPOINTMENT IS REQUIRED UNLESS OTHER ARRANGEMENTS ARE PREVIOUSLY MADE. PLEASE SCHEDULE THIS PRIOR TO LEAVING THE OFFICE TODAY. Plan:  My recommendation is for Astrid Rice to continue with home exercises and advancde hip strengthening.   He has not been attending formal physical therapy due to insurance changes. They note it is too expensive currently to undergo formal physical therapy. We recommend the patient undergo a new MRI of the lumbar spine to re-evaluate previous L5-S1 disc protrusion. We will also include the sacrum to further evaluate the sacro-iliac joints. I would like to see Michel segal for follow up after completion of this MRI. Depending on MRI findings, he will refer the patient to either a PM&R specialist or spine specialist.      All the patient's questions were answered while in the clinic. The patient is understanding of all instructions and agrees with the plan. Approximately 30 minutes was spent on patient education and coordinating care. Follow up in: Return for TR MRI LSP/SACRUM RESULTS. IGladys, am scribing for Dr. Grey Renner MD.  06/06/22 2:26 PM Gladys Hercules. The physical examination was performed between the patient and Dr. Grey Renner MD.  All counseling during the appointment was performed between the patient and the provider. Sincerely,    Grey Renner MD [address] [place] [address]  Email: [email or web address]  Office: [phone]    06/06/22  2:26 PM        The encounter with Russ Mitchell was carried out by myself, Dr Mike Ramirez, who personally examined the patient and reviewed the plan. This dictation was performed with a verbal recognition program (DRAGON) and it was checked for errors. It is possible that there are still dictated errors within this office note. If so, please bring any errors to my attention for an addendum. All efforts were made to ensure that this office note is accurate.

## 2022-06-06 NOTE — LETTER
[address] [place] [address]  Phone: [phone]  Fax: [phone]    Uma Teresa MD    June 6, 2022     GALEN Martin CNP  [address]  [place] [address]    Patient: Khushbu Giron   MR Number: [id number]   YOB: 1990   Date of Visit: 6/6/2022       Dear Jimbo Babcock:    Thank you for referring Khushbu Giron to me for evaluation/treatment. Below are the relevant portions of my assessment and plan of care. If you have questions, please do not hesitate to call me. I look forward to following Ann Marie Franklin along with you.     Sincerely,      Uma Teresa MD

## 2022-06-06 NOTE — PROGRESS NOTES
Hip Examination: {Right/left:46648}    Skin/Inspection: ***Incisions fully healed. No skin lesions, cellulitis, or extreme edema in the lower extremities. Standing/Walking: {Desc; normal/:66668} Antalgic gait, {NEGATIVE/POSITIVE:985705750} Trendelenburg sign. Supine Exam: Non tender around the ASIS, AIIS  Flexion arc 0 to ***100deg, IR *** deg, ER *** deg {Range of motion:86416}  Special Tests:  {NEGATIVE/POSITIVE:634539182} Deep Flexion Test, {NEGATIVE/POSITIVE:577158716}  FADIR ,  {NEGATIVE/POSITIVE:875635183}  pain with VENKAT ***that is {ANTERIOR:42196}   Resisted Abduction ***5/5   Resisted Adduction ***5/5   Resisted Hip Flexion ***5/5    Side Lying Exam: {TENDER NOT TNEDER:04032} at greater trochanter, {TENDER NOT TNEDER:34158} abductor musculature,   Abductor side leg raise {strength scale:67618}, {DESC; TIGHT/LOOSE/NORML:55295}.  OberTest    Special Tests:  Double Leg Squats (Standing): {RATIN}  Lunges: {RATIN}  Single Leg Squats: {RATIN}  Hip Hinge (Sit to Stand):  {IJYDDD:116901311}  Double Leg Bridges: {AYBFZD:528109292}  Single Leg Bridges: {DPGLDH:616031571}  Single Leg Step-Downs: {RATIN}  Single Leg RDL: {RATIN}  Triple Medial Hops: {RATIN}  Triple Lateral Hops: {RATIN}  Triple Forward Hops: {RATIN}    Distal Neurovascular exam is intact (foot sensation, pulses, and motor exam)

## 2022-06-15 ENCOUNTER — TELEPHONE (OUTPATIENT)
Dept: ORTHOPEDIC SURGERY | Age: 32
End: 2022-06-15

## 2022-06-15 NOTE — TELEPHONE ENCOUNTER
Sent patient [employer] message due to difficulty getting phone number in chart to work regarding MRI Lumbar Spine and MRI Sacrum approval and authorization being valid until 06/06/2023. Patient has been instructed to call to schedule MRI Lumbar Spine and MRI Sacrum at [facility]. MRI Lumbar Spine and MRI Sacrum results will not be given over the phone or via [employer]. Patient was also asked to allow 24-48 hours for follow up appointment from MRI scan in order for staff to obtain MRI report. Patient currently has a follow up appointment schedule for NEEDS FOLLOW UP. The patient was advised to contact the office to schedule this appointment to accommodate MRI scan.

## 2022-07-22 ENCOUNTER — TELEPHONE (OUTPATIENT)
Dept: ORTHOPEDIC SURGERY | Age: 32
End: 2022-07-22

## 2022-07-22 NOTE — TELEPHONE ENCOUNTER
Sent my chart message to patient:  We have received a message from [employer] stating your [employer] insurance and therefore approval for MRI has elapsed. Please let me know the name of your new insurance company, member ID number and group number. I will re submitt the MRI's through your new insurance for approval      *will resubmit once new insurance has been obtained.

## 2022-07-22 NOTE — TELEPHONE ENCOUNTER
LYDIA, PRO SCAN [phone], NEEDS UPDATED PRIOR AUTHORIZATION [employer].  DX#[phone], OR CLINICAL NOTES

## 2022-07-22 NOTE — TELEPHONE ENCOUNTER
General Question     Subject: PRE CERTIFICATION FOR MRI  Patient and /or Facility Request: Rosita Wayne  Contact Number: [phone]    [employer] CALLED STATING THAT PRE CERTIFICATION IS REQUIRED FOR THE ACTIVE [employer] INSURANCE FOR PATIENT'S MRI. PATIENT'S ANTHEM INSURANCE IS INACTIVE. PLEASE CALL THE PATIENT AT THE ABOVE NUMBER.

## 2022-07-25 DIAGNOSIS — M53.3 SACRAL BACK PAIN: Primary | ICD-10-CM

## 2022-08-31 ENCOUNTER — OFFICE VISIT (OUTPATIENT)
Dept: ORTHOPEDIC SURGERY | Age: 32
End: 2022-08-31
Payer: COMMERCIAL

## 2022-08-31 VITALS — BODY MASS INDEX: 23.25 KG/M2 | WEIGHT: 157 LBS | HEIGHT: 69 IN

## 2022-08-31 DIAGNOSIS — M51.27 HERNIATION OF INTERVERTEBRAL DISC BETWEEN L5 AND S1: ICD-10-CM

## 2022-08-31 DIAGNOSIS — Z98.890 STATUS POST ARTHROSCOPY OF HIP: Primary | ICD-10-CM

## 2022-08-31 DIAGNOSIS — M43.06 PARS DEFECT OF LUMBAR SPINE: ICD-10-CM

## 2022-08-31 PROCEDURE — 99213 OFFICE O/P EST LOW 20 MIN: CPT | Performed by: ORTHOPAEDIC SURGERY

## 2022-08-31 RX ORDER — VENLAFAXINE 75 MG/1
75 TABLET ORAL DAILY
COMMUNITY

## 2022-08-31 RX ORDER — FLUTICASONE PROPIONATE 44 UG/1
1 AEROSOL, METERED RESPIRATORY (INHALATION) 2 TIMES DAILY
COMMUNITY

## 2022-08-31 NOTE — PROGRESS NOTES
Chief Complaint  Follow-up (F/U SACRUM AND LUMBAR MRI RESULTS)      History of Present Illness:  Alicia Bourne is a pleasant 32 y.o. male who is 1 year post left hip arthroscopy for VERONICA surgery. He is also here to review the results of his lumbar spine MRI and Pelvis MRI. he is actually feeling a lot better. He has had fevers lumbar strong and mobile. However he still endorsing some paraspinal back pain with some radiculopathy down the left buttock. He denies any setbacks. Medical History:  Patient's medications, allergies, past medical, surgical, social and family histories were reviewed and updated as appropriate. Pain Assessment  Location of Pain: Hip (BACK)  Location Modifiers: Left  Severity of Pain: 1  Quality of Pain:  (PRESSURE)  Frequency of Pain: Intermittent  Aggravating Factors: Bending (SITTING)  Limiting Behavior: Some  Relieving Factors: Rest, Nsaids  Result of Injury: Yes  Work-Related Injury: Yes  Are there other pain locations you wish to document?: No  ROS: Review of systems reviewed from Patient History Form completed today and available in the patient's chart under the Media tab. Pertinent items are noted in HPI  Review of systems reviewed from Patient History Form completed today and available in the patient's chart under the Media tab. Vital Signs:  Ht 5' 9\" (1.753 m)   Wt 157 lb (71.2 kg)   BMI 23.18 kg/m²         Neuro: Alert & oriented x 3,  normal,  no focal deficits noted. Normal affect. Eyes: sclera clear  Ears: Normal external ear  Mouth:  No perioral lesions  Pulm: Respirations unlabored and regular  Pulse: Extremities well perfused. 2+ peripheral pulses. Skin: Warm. No ulcerations. Constitutional: The physical examination finds the patient to be well-developed and well-nourished. The patient is alert and oriented x3 and was cooperative throughout the visit.     Hip Examination: left    Skin/Inspection: No skin lesions, cellulitis, or extreme edema in the lower extremities. Standing/Walking: normal gait, negative Trendelenburg sign. Supine/Side Lying Exam: Non tender around the major bony prominences  full range of motion  FADIR Negative  VENKAT Negative  Resisted Abduction  5/5   Resisted Adduction  5/5   Resisted  Flexion  5/5    not tender at greater trochanter    Distal Neurovascular exam is intact (foot sensation, pulses, and motor exam)       THROACIC/LUMBAR/SACRAL EXAMINATION:    Gait & station: normal, patient ambulates without assistance    Inspection: Local inspection shows no step-off or bruising. Lumbar alignment is normal.    Skin: There are no rashes, ulcerations or lesions. Palpation: No paraspinous tenderness present bilaterally in lower lumbar region. There is no paraspinal spasm. Range of Motion: There is limitation to thoracic rotation towards the right more so than the left. Strength: Strength testing is 5/5 in all muscle groups tested. Reflexes: Reflexes are symmetrically 2+ at the patellar and ankle tendons. Clonus absent bilaterally at the feet. Special Tests: Straight leg raise and crossed SLR negative. Leg length and pelvis level. Additional Examinations: Negative Trendelenburg test.      Diagnostics:   MRI pelvis nil acute for si joint pathology  MRI lumbar spine shows pars defect L5/S1 with moderate disc bulg causing moderate left foraminal narrowing at L5-s1. Assessment: Patient is a 32 y.o. male who is 1 year post left hip arthroscopy for VERONICA surgery. He has gained substantial clinical benefit and his hip is at a acceptable symptomatic state and he has made terrific functional gains. He has some residual left-sided paraspinal discomfort which I think is related to compensatory thoracic limitation of motion from a congenital L5-S1 pars defect causing less than grade 1 spondylolisthesis.     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 Orders Placed This Encounter   Procedures    Ambulatory referral to Physical Therapy     Referral Priority:   Routine     Referral Type:   Eval and Treat     Referral Reason:   Specialty Services Required     Requested Specialty:   Physical Therapist     Number of Visits Requested:   1       Plan:  Conrad Cagle is doing well    We recommend that He recommence with physical therapy and home exercise program now specifically for thoracic mobility. I would  like to see him back in 1 year. All the patient's questions were answered while in the clinic. The patient is understanding of all instructions and agrees with the plan. Approximately 30 minutes was spent on patient education and coordinating care. Follow up in: No follow-ups on file. Sincerely,    Anand Hogan MD [address] [address]  Email: [email or web address]  Office: [phone]    08/31/22  3:00 PM        The encounter with Ben Masters was carried out by myself, Dr Kailey Cortez, who personally examined the patient and reviewed the plan. This dictation was performed with a verbal recognition program (DRAGON) and it was checked for errors. It is possible that there are still dictated errors within this office note. If so, please bring any errors to my attention for an addendum. All efforts were made to ensure that this office note is accurate.

## 2022-08-31 NOTE — LETTER
PRESCRIPTION FOR PHYSICAL THERAPY      Patient Name: Dinorah Santiago MRN: [id number]  DOS: 8/31/2022     Diagnosis:   1. Status post arthroscopy of hip    2. Pars defect of lumbar spine    3. 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                                            Goal:  [x]Decrease Pain and/or Swelling [x]Increase ROM and/or Flexibility     [x]Increase Function   [x]Increase Strength and/or Endurance   []Other     Evaluation:  [x]Evaluation and Treatment []KT-1000 []Isokinetic Exam []Preoperative Eval      Recommended Modalities:  [x]Modalities of Choice      []HCVS            []Electrical Stimulation     [] Remove Dressing  []Ultrasound        []TENS/TNS    [] Lumbar Traction           [] Cervical Traction  []Phonophoresis         []Hot Pack/Cold Pack   []PT Treatment, Unlisted []Other:    Therapeutic Exercises:    []Isometrics    []Range of Motion []Progressive Exer. []Balance Coordination   []Flexibility  []ROM Limited  []Total Hip Replacement   []Passive  []ROM Full   []Total Knee Replacement   []Active Assisted    []Shoulder Impingement Prog  []Active   []Tennis Elbow Program   []Capsular Shift Regular        []Isokinetics                    [x]Spine Program   []Straight Leg Raises  [] Gait    []Fixation    [] Supine    [] Running    [] Extension    [] Prone   [] Throwing   [x] Stabilization   [] AB    [] [facility]    [] AD      [] Spine Eval   [] Cervical Eval  [x] Conditioning   [x] Lumbar    [] Stationary Bike   [] Lumbar Exer.   [] Stairmaster   [] Functional Cap   [] [place]   [] Return to work   [] Treadmill  [x]Other :     [] Aquatic Prog.   THORACIC AND LUMBAR SPINE PROGRAM   PATIENT NEWLY DIAGNOSED WITH L5/S1 PARS DEFECT   PER MD, TREAT WITH 1-2 SESSIONS AND TEACH HEP        Treatment Program:  Frequency: [] 1x  [x] 2x  [] 3x  [] 4x  [] 5x week  Duration: [x] 1  [] 2  [] 3  [] 4  [] 5 month   Weight Bearing: [] Non  [] 1/4  [] 1/2  [] 3/4  [] Full  ROM: [] Restricted  [] Full  [] Follow established:        [] Other:

## 2022-08-31 NOTE — LETTER
[address]  [address] [facility] [address]  Phone: [phone]  Fax: [phone]    Rao Snider MD    August 31, 2022     GALEN Solano - [facility]  [address] [facility] [address]    Patient: Eleni Mott   MR Number: [id number]   YOB: 1990   Date of Visit: 8/31/2022       Dear Nirali King:    Thank you for referring Eleni Mott to me for evaluation/treatment. Below are the relevant portions of my assessment and plan of care. If you have questions, please do not hesitate to call me. I look forward to following Belita Claude along with you.     Sincerely,      Rao Snider MD

## 2022-09-08 ENCOUNTER — HOSPITAL ENCOUNTER (OUTPATIENT)
Dept: PHYSICAL THERAPY | Age: 32
Setting detail: THERAPIES SERIES
Discharge: HOME | End: 2022-09-08
Payer: COMMERCIAL

## 2022-09-12 ENCOUNTER — HOSPITAL ENCOUNTER (OUTPATIENT)
Dept: PHYSICAL THERAPY | Age: 32
Setting detail: THERAPIES SERIES
Discharge: HOME | End: 2022-09-12
Payer: COMMERCIAL

## 2022-09-12 PROCEDURE — 97140 MANUAL THERAPY 1/> REGIONS: CPT

## 2022-09-12 PROCEDURE — 97161 PT EVAL LOW COMPLEX 20 MIN: CPT

## 2022-09-12 PROCEDURE — 97110 THERAPEUTIC EXERCISES: CPT

## 2022-09-12 NOTE — FLOWSHEET NOTE
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 [M51.27]  Treatment Diagnosis: Decreased core/proximal hip strength  Decreased Lumbar mobility     Insurance/Certification information:     Physician Information:  Angela Penaloza MD    Plan of care signed (Y/N): []  Yes [x]  No     Date of Patient follow up with Physician:      Progress Report: []  Yes  [x]  No     Date Range for reporting period:  Beginnin2022  Ending:     Progress report due (10 Rx/or 30 days whichever is less): visit #10 or  73/3/4972     Recertification due (POC duration/ or 90 days whichever is less): visit #4 or 10/3/2022 (date)     Visit # Insurance Allowable Auth required?  Date Range   1 61 PCY []  Yes  [x]  No NA         Latex Allergy:  [x]NO      []YES  Preferred Language for Healthcare:   [x]English       []other:    Functional Scale:           Date assessed:  FOTO physical FS primary measure score = 32; risk adjusted = 55  2022    Pain level:  310   currently  10/10      SUBJECTIVE:  See eval    OBJECTIVE: See eval  Observation:   Test measurements:      RESTRICTIONS/PRECAUTIONS: Hx of Vertigo    L5-S1 Pars Defect      Treatment based classification:    [] mobilization/manipulation   [x] stabilization   [] extension based   [] flexion based   [] lateral shift   [] traction   [] unspecified Components:   [x] thoracolumbar   [] pelvic   [] SIJ   [] sacral   [x] hip           Exercises/Interventions:     Therapeutic Exercise (21236) x 15' Resistance / level Sets / Seconds Reps Notes / Cues   Hooklying Bridge Controlled painfree movement 3\" 10 HEP   Quadruped Alt LE  neutral spine 5\" 10 ea side HEP   Gentle Hooklying LTR Pillow between knees 5\" 10 ea side HEP   Pt education on HEP including to avoid pushing into aggravating pain, use of a pillow for LTR to minimize stress on (L) hip, as well as proper muscle activation for stabilization activities                                          Therapeutic Activities (45952)                                          Neuromuscular Re-ed (57810)                                          Manual Intervention ([phone]) x 13'       Prone PA (L) UPA T12-L1 8'    GISTM/STM (L) paraspinals adjacent to lower T/S to mid L/S  5'    Lumbar Manip       SI Manip       Hip belt mobs       Hip LA distraction                              Modalities:     Pt. Education:  -pt educated on diagnosis, prognosis and expectations for rehab  -all pt questions were answered    Home Exercise Prorgam:  See above    Therapeutic Exercise and NMR EXR  [x] (49464) Provided verbal/tactile cueing for activities related to strengthening, flexibility, endurance, ROM  for improvements in proximal hip and core control with self care, mobility, lifting and ambulation.  [] (89089) Provided verbal/tactile cueing for activities related to improving balance, coordination, kinesthetic sense, posture, motor skill, proprioception  to assist with core control in self care, mobility, lifting, and ambulation.   [] (70994) Therapist is in constant attendance of 2 or more patients providing skilled therapy interventions, but not providing any significant amount of measurable one-on-one time to either patient, for improvements in LE, proximal hip, and core control in self care, mobility, lifting, ambulation and eccentric single leg control.      Therapeutic Activities:    [] (39993 or 45733) Provided verbal/tactile cueing for activities related to improving balance, coordination, kinesthetic sense, posture, motor skill, proprioception and motor activation to allow for proper function  with self care and ADLs  [] (93637) Provided training and instruction to the patient for proper core and proximal hip recruitment and positioning with ambulation re-education     Home Exercise Program:    [x] (65401) Reviewed/Progressed HEP activities related to strengthening, flexibility, endurance, ROM of core, proximal hip and LE for functional self-care, mobility, lifting and ambulation   [] (73011) Reviewed/Progressed HEP activities related to improving balance, coordination, kinesthetic sense, posture, motor skill, proprioception of core, proximal hip and LE for self care, mobility, lifting, and ambulation      Manual Treatments:  PROM / STM / Oscillations-Mobs:  G-I, II, III, IV (PA's, Inf., Post.)  [x] (87764) Provided manual therapy to mobilize proximal hip and LS spine soft tissue/joints for the purpose of modulating pain, promoting relaxation,  increasing ROM, reducing/eliminating soft tissue swelling/inflammation/restriction, improving soft tissue extensibility and allowing for proper ROM for normal function with self care, mobility, lifting and ambulation. Charges:  Timed Code Treatment Minutes: 28'   Total Treatment Minutes: 61'       [x] EVAL - LOW (745 1011)   [] EVAL - MOD (60681)  [] EVAL - HIGH (20544)  [] RE-EVAL (38310)  [x] IJ(19796) x 1      [] Ionto  [] NMR (74331) x       [] Vaso  [x] Manual (93476) x 1      [] Ultrasound  [] TA x        [] Mech Traction (02742)  [] Aquatic Therapy x     [] ES (un) (36494):   [] Home Management Training x  [] ES(attended) (39792)   [] Dry Needling 1-2 muscles (89662):  [] Dry Needling 3+ muscles (603976  [] Group:      [] Other:       GOALS:  Patient stated goal: Reduce pain. Play baseball  [] Progressing: [] Met: [] Not Met: [] Adjusted    Therapist goals for Patient:   Short Term Goals: To be achieved in: 2 weeks  1. Independent in HEP and progression per patient tolerance, in order to prevent re-injury. [] Progressing: [] Met: [] Not Met: [] Adjusted  2.  Patient will have a decrease in pain to facilitate improvement in movement, function, and ADLs as indicated by Functional Deficits. [] Progressing: [] Met: [] Not Met: [] Adjusted    Long Term Goals: To be achieved in: 3 weeks  1. FOTO score of at least 55 to assist with reaching prior level of function including being able to bend and lift for normal work activities. [] Progressing: [] Met: [] Not Met: [] Adjusted  2. Patient will demonstrate increased lumbar AROM to 25% loss or < in all planes without significant pain in order to help pt better perform normal daily activities. [] Progressing: [] Met: [] Not Met: [] Adjusted  3. Patient will demonstrate proper lifting mechanics with 10-15lbs in order to safely return to desired work volume. [] Progressing: [] Met: [] Not Met: [] Adjusted    Overall Progression Towards Functional goals/ Treatment Progress Update:  [] Patient is progressing as expected towards functional goals listed. [] Progression is slowed due to complexities/Impairments listed. [] Progression has been slowed due to co-morbidities. [x] Plan just implemented, too soon to assess goals progression <30days   [] Goals require adjustment due to lack of progress  [] Patient is not progressing as expected and requires additional follow up with physician  [] Other    Persisting Functional Limitations/Impairments:  []Sitting []Standing   []Walking [x]Squatting/bending    []Stairs [x]ADL's    []Transfers []Reaching  [x]Housework [x]Job related tasks  []Driving [x]Sports/Recreation   []Sleeping []Other:    ASSESSMENT:  See eval  Treatment/Activity Tolerance:  [x] Patient able to complete tx  [] Patient limited by fatique  [] Patient limited by pain  [] Patient limited by other medical complications  [] Other:     Prognosis: [] Good [x] Fair  [] Poor    Patient Requires Follow-up: [x] Yes  [] No    PLAN: See eval. PT 1x / week for 3 weeks.    [] Continue per plan of care [] Alter current plan (see comments)  [x] Plan of care initiated [] Hold pending MD visit [] Discharge    Electronically signed by: Shahzad Christine, PT, DPT      Note: If patient does not return for scheduled/ recommended follow up visits, this note will serve as a discharge from care along with most recent update on progress.

## 2022-09-12 NOTE — PLAN OF CARE
[address] [address] [place], [address]  Phone: [phone]   Fax: [phone]                                                       Physical Therapy Certification    Dear Blake Gillette MD  ,    We had the pleasure of evaluating the following patient for physical therapy services at [address] [place] [address]. A summary of our findings can be found in the initial assessment below. This includes our plan of care. If you have any questions or concerns regarding these findings, please do not hesitate to contact me at the office phone number checked above. Thank you for the referral.       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   [] Yes, Patient intake triggered C-SSRS Screening     [] Completed, no further action required. [] Completed, PCP notified via Epic    SUBJECTIVE: Patient stated complaint: mid/low back pain that has been present for more than one year. Pt feels this happened during work around that time.   Pt recently returned to his ortho surgeon Dr. Janey Villanueva for a one year follow up from a (L) Hip Scope for VERON[facility]. Pt notes his hip feels okay. Pt notes he can still get some hip pain and per pt's wife, they had to stop post operative therapy early due to financial constraints. Pt recently had an MRI of his lumbar spine which showed a pars defect at L5-S1 as well as \"moderate disc bulging. \"  Pt notes the pain is (L) sided and \"feels like someone is holding my back. \"    Pt notes the pain can radiate the (L) LE to his foot and feels like numbness at times  Currently, pain is only localized to the (L) side of his back. Pt denies any change in bowel or bladder function or progressive leg weakness. Pt has been referred here to therapy for 2-3 sessions to provide an HEP. Pt is currently working.        Relevant Medical History:Vertigo (started within the last 2 months), Anxiety, Depression  Functional Outcome: FOTO physical FS primary measure score = 32; Risk adjusted = 55    Pain Scale: 3/10 currently  10/10 at worst  Easing factors: stretching, resting  Provocative factors: Work     Type: []Constant   [x]Intermittent  [x]Radiating []Localized []other:     Numbness/Tingling: see subjective    Occupation/School: Works for [employer] to this injury / incident, pt was independent with ADLs and IADLs,       OBJECTIVE:   Palpation: Hypomobility T/L Junction with (L) UPA  increased tone (L) paraspinals lumbar spine    Functional Mobility/Transfers: Functional Squat    Posture: rounded shoulders    Bandages/Dressings/Incisions: N/A    Gait: (include devices/WB status) WNL    Dermatomes Normal Abnormal Comments   inguinal area (L1)  x     anterior mid-thigh (L2) x     distal ant thigh/med knee (L3) x     medial lower leg and foot (L4) x     lateral lower leg and foot (L5)  x Decreased (L)   posterior calf (S1) x     medial calcaneus (S2) x         Reflexes Normal Abnormal Comments   S1-2 Seated achilles x     S1-2 Prone knee bend      L3-4 Patellar tendon x     Clonus x     Babinski        Lumbar AROM screen: [] WFL  [x] abnormal: 50% loss and pain with flexion    25% loss no pain ext   (L) SB: 50% loss pain (R) SB: no pain WFL    Thoracic AROM: Pain with (L) rotation 25% loss with (B) rotation       PROM AROM    L R L R   Hip Flexion 110 deg pain in hip 120 deg 110 deg pain in hip 120 deg   Hip Abduction [facility] WFL   Hip ER 50 deg 50 deg 50 deg 50 deg   Hip IR 15 deg pain in hip 20 deg 15 deg pain in hip 20 deg   Knee Flexion WNL WNL WNL WNL   Knee Extension WNL WNL WNL WNL   Dorsiflexion        Plantarflexion        Inversion        Eversion            Strength (0-5) / Myotomes Left Right   Hip Flexion - supine     Hip Flexion - seated (L1-2) 4+ pain in hip 5   Hip Abduction 4- 4+   Hip Adduction     Hip ER 4+ 5   Hip IR     Quads (L2-4) 5 5   Hamstrings 5 5   Ankle Dorsiflexion (L4-5) 5 5   Ankle Plantarflexion (S1-2) 5 5   Ankle Inversion 5 5   Ankle Eversion (S1-2) 5 5   Great Toe Extension (L5) 5 5        Flexibility     Hamstrings (90/90) Mild WNL   ITB (Drew)     Quads (Ely's) WNL WNL   Hip Flexor (Kanu)          Girth (cm)     Mid patella     Suprapatellar     Figure 8     Transmalleolar     Metatarsal Heads         Joint mobility:    []Normal    [x]Hypo (L) UPA T11-L2   CPA T12-L1   []Hyper    Orthopaedic Special Tests  Positive  Negative  NT Comments    Hip       VENKAT / Raimundo's x   (L)   FADIR x   (L)   Scour       Trendelenburg              Lumbar       SLR x   (L)   Slump  X                                                      Ankle       Anterior Drawer       Talar Tilt       Watt       Mony's                   Balance: SLS WNL (B)                         [x] Patient history, allergies, meds reviewed. Medical chart reviewed. See intake form. Review Of Systems (ROS):  [x]Performed Review of systems (Integumentary, CardioPulmonary, Neurological) by intake and observation. Intake form has been scanned into medical record. Patient has been instructed to contact their primary care physician regarding ROS issues if not already being addressed at this time.       Co-morbidities/Complexities (which will affect course of rehabilitation):   []None        []Hx of COVID   Arthritic conditions   []Rheumatoid arthritis (M05.9)  []Osteoarthritis (M19.91)  []Gout   Cardiovascular conditions   []Hypertension (I10)  []Hyperlipidemia (E78.5)  []Angina pectoris (I20)  []Atherosclerosis (I70)  []Pacemaker  []Hx of CABG/stent/  cardiac surgeries   Musculoskeletal conditions   []Disc pathology   []Congenital spine pathologies   []Osteoporosis (M81.8)  []Osteopenia (M85.8)  []Scoliosis       Endocrine conditions   []Hypothyroid (E03.9)  []Hyperthyroid Gastrointestinal conditions   []Constipation (Z58.84)   Metabolic conditions   []Morbid obesity (E66.01)  []Diabetes type 1(E10.65) or 2 (E11.65)   []Neuropathy (G60.9)     Cardio/Pulmonary conditions   []Asthma (J45)  []Coughing   []COPD (J44.9)  []CHF  []A-fib   Psychological Disorders  [x]Anxiety (F41.9)  [x]Depression (F32.9)   []Other:   Developmental Disorders  []Autism (F84.0)  []CP (G80)  []Down Syndrome (Q90.9)  []Developmental delay     Neurological conditions  []Prior Stroke (I69.30)  []Parkinson's (G20)  []Encephalopathy (G93.40)  []MS (G35)  []Post-polio (G14)  []SCI  []TBI  []ALS Other conditions  []Fibromyalgia (M79.7)  [x]Vertigo  []Syncope  []Kidney Failure  []Cancer      []currently undergoing                treatment  []Pregnancy  []Incontinence   Prior surgeries  []involved limb  []previous spinal surgery  [] section birth  []hysterectomy  []bowel / bladder surgery  []other relevant surgeries   []Other:              Barriers to/and or personal factors that will affect rehab potential:              []Age  []Sex    []Smoker              []Motivation/Lack of Motivation                        []Co-Morbidities              []Cognitive sprain/strain   []Signs/symptoms consistent with patella-femoral syndrome   []Signs/symptoms consistent with knee OA/hip OA   []Signs/symptoms consistent with internal derangement of knee/Hip   []Signs/symptoms consistent with functional hip weakness/NMR control      []Signs/symptoms consistent with tendinitis/tendinosis    []signs/symptoms consistent with pathology which may benefit from Dry needling      [x]other: signs/symptoms consistent with continued post operative proximal hip weakness (L) as well as lumbar pain that indicate lumbar instability/trunk weakness. Prognosis/Rehab Potential:      []Excellent   []Good    [x]Fair   []Poor    Tolerance of evaluation/treatment:    []Excellent   []Good    [x]Fair   []Poor    Physical Therapy Evaluation Complexity Justification  [x] A history of present problem with:  [] no personal factors and/or comorbidities that impact the plan of care;  []1-2 personal factors and/or comorbidities that impact the plan of care  [x]3 personal factors and/or comorbidities that impact the plan of care  [x] An examination of body systems using standardized tests and measures addressing any of the following: body structures and functions (impairments), activity limitations, and/or participation restrictions;:  [] a total of 1-2 or more elements   [] a total of 3 or more elements   [x] a total of 4 or more elements   [x] A clinical presentation with:  [x] stable and/or uncomplicated characteristics   [] evolving clinical presentation with changing characteristics  [] unstable and unpredictable characteristics;   [x] Clinical decision making of [] Low, [] moderate, [] high complexity using standardized patient assessment instrument and/or measurable assessment of functional outcome.     [x] EVAL (LOW) 71928 (typically 15 minutes face-to-face)  [] EVAL (MOD) 23220 (typically 30 minutes face-to-face)  [] EVAL (HIGH) 61511 (typically 45 minutes face-to-face)  [] RE-EVAL     PLAN:

## 2022-09-19 ENCOUNTER — HOSPITAL ENCOUNTER (OUTPATIENT)
Dept: PHYSICAL THERAPY | Age: 32
Setting detail: THERAPIES SERIES
Discharge: HOME | End: 2022-09-19
Payer: COMMERCIAL

## 2022-09-19 PROCEDURE — 97110 THERAPEUTIC EXERCISES: CPT

## 2022-09-19 PROCEDURE — 97140 MANUAL THERAPY 1/> REGIONS: CPT

## 2022-09-19 NOTE — FLOWSHEET NOTE
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 Date Range   2 61 PCY []  Yes  [x]  No NA         Latex Allergy:  [x]NO      []YES  Preferred Language for Healthcare:   [x]English       []other:    Functional Scale:           Date assessed:  FOTO physical FS primary measure score = 32; risk adjusted = 55  2022    Pain level:  3/10   currently  10/10      SUBJECTIVE:  Pt states he has been continuing to work as a  and notes that after about 4 hours of work, he starts to get pain. Pt notes that he can feel pain down his (L) LE into the calf. PT notes it feels like \"shocks\" into his calf. Pt notes this is more meidally and not as much laterally  Pt feels that the bridging exercise seems to help his pain.       OBJECTIVE: 2022  Observation:   Test measurements:    AROM L/S:  Flex: painful but full ROM  Ext: full ROM no pain  Sidebending: (L) sidebending = WNL    (R) Sidebending =    (L) hip hypomobility                        RESTRICTIONS/PRECAUTIONS: Hx of Vertigo    L5-S1 Pars Defect      Treatment based classification:    [] mobilization/manipulation   [x] stabilization   [] extension based   [] flexion based   [] lateral shift   [] traction   [] unspecified Components:   [x] thoracolumbar   [] pelvic   [] SIJ   [] sacral   [x] hip           Exercises/Interventions:     Therapeutic Exercise (11648) x 20' Resistance / level Sets / Seconds Reps Notes / Cues   Hooklying Bridge Alt marches 3\" 10 HEP ^ 9/19   Quadruped Alt LE  neutral spine 5\" 10 ea side HEP   Gentle Hooklying LTR Pillow between knees 5\" 10 ea side HEP   Lateral stepping blue 3 laps 10 steps down/back 9/19                                      Therapeutic Activities (25257)                                          Neuromuscular Re-ed (55013)                                          Manual Intervention ([id number]) x 25'       Prone PA (L) UPA  CPA and (R) UPA to  T12-L1    L2-L4 10' Gr III   GISTM/STM (L) paraspinals adjacent to lower T/S to mid L/S  5'    Lumbar Manip       SI Manip       Hip belt mobs Lateral and inferior mobs Gr III 10' Improved hip mobility and decreased pain post mobs   Hip LA distraction                              Modalities:     Pt. Education:  -pt educated on diagnosis, prognosis and expectations for rehab  -all pt questions were answered    Home Exercise Prorgam:  See above    Therapeutic Exercise and NMR EXR  [x] (21974) Provided verbal/tactile cueing for activities related to strengthening, flexibility, endurance, ROM  for improvements in proximal hip and core control with self care, mobility, lifting and ambulation.  [] (66309) Provided verbal/tactile cueing for activities related to improving balance, coordination, kinesthetic sense, posture, motor skill, proprioception  to assist with core control in self care, mobility, lifting, and ambulation.   [] (58109) Therapist is in constant attendance of 2 or more patients providing skilled therapy interventions, but not providing any significant amount of measurable one-on-one time to either patient, for improvements in LE, proximal hip, and core control in self care, mobility, lifting, ambulation and eccentric single leg control. Therapeutic Activities:    [] (00718 or 53475) Provided verbal/tactile cueing for activities related to improving balance, coordination, kinesthetic sense, posture, motor skill, proprioception and motor activation to allow for proper function  with self care and ADLs  [] (66155) Provided training and instruction to the patient for proper core and proximal hip recruitment and positioning with ambulation re-education     Home Exercise Program:    [x] (39334) Reviewed/Progressed HEP activities related to strengthening, flexibility, endurance, ROM of core, proximal hip and LE for functional self-care, mobility, lifting and ambulation   [] (90351) Reviewed/Progressed HEP activities related to improving balance, coordination, kinesthetic sense, posture, motor skill, proprioception of core, proximal hip and LE for self care, mobility, lifting, and ambulation      Manual Treatments:  PROM / STM / Oscillations-Mobs:  G-I, II, III, IV (PA's, Inf., Post.)  [x] (77703) Provided manual therapy to mobilize proximal hip and LS spine soft tissue/joints for the purpose of modulating pain, promoting relaxation,  increasing ROM, reducing/eliminating soft tissue swelling/inflammation/restriction, improving soft tissue extensibility and allowing for proper ROM for normal function with self care, mobility, lifting and ambulation.        Charges:  Timed Code Treatment Minutes: 39'   Total Treatment Minutes: 48'       [] EVAL - LOW (77149)   [] EVAL - MOD (34339)  [] EVAL - HIGH (24412)  [] RE-EVAL (32339)  [x] TK(71033) x 1      [] Ionto  [] NMR (19518) x       [] Vaso  [x] Manual (79364) x 2    [] Ultrasound  [] TA x        [] Mech Traction (03960)  [] Aquatic Therapy x     [] ES (un) (30124):   [] Home Management Training x  [] ES(attended) (30186)   [] Dry Needling 1-2 muscles (71214):  [] Dry Needling 3+ muscles (629639  [] Group:      [] Other:       GOALS:  Patient stated goal: Reduce pain. Play baseball  [] Progressing: [] Met: [] Not Met: [] Adjusted    Therapist goals for Patient:   Short Term Goals: To be achieved in: 2 weeks  1. Independent in HEP and progression per patient tolerance, in order to prevent re-injury. [] Progressing: [] Met: [] Not Met: [] Adjusted  2. Patient will have a decrease in pain to facilitate improvement in movement, function, and ADLs as indicated by Functional Deficits. [] Progressing: [] Met: [] Not Met: [] Adjusted    Long Term Goals: To be achieved in: 3 weeks  1. FOTO score of at least 55 to assist with reaching prior level of function including being able to bend and lift for normal work activities. [] Progressing: [] Met: [] Not Met: [] Adjusted  2. Patient will demonstrate increased lumbar AROM to 25% loss or < in all planes without significant pain in order to help pt better perform normal daily activities. [] Progressing: [] Met: [] Not Met: [] Adjusted  3. Patient will demonstrate proper lifting mechanics with 10-15lbs in order to safely return to desired work volume. [] Progressing: [] Met: [] Not Met: [] Adjusted    Overall Progression Towards Functional goals/ Treatment Progress Update:  [] Patient is progressing as expected towards functional goals listed. [] Progression is slowed due to complexities/Impairments listed. [] Progression has been slowed due to co-morbidities.   [x] Plan just implemented, too soon to assess goals progression <30days   [] Goals require adjustment due to lack of progress  [] Patient is not progressing as expected and requires additional follow up with physician  [] Other    Persisting Functional Limitations/Impairments:  []Sitting []Standing   []Walking [x]Squatting/bending    []Stairs [x]ADL's    []Transfers []Reaching  [x]Housework [x]Job related

## 2022-09-28 ENCOUNTER — HOSPITAL ENCOUNTER (OUTPATIENT)
Dept: PHYSICAL THERAPY | Age: 32
Setting detail: THERAPIES SERIES
Discharge: HOME | End: 2022-09-28
Payer: COMMERCIAL

## 2022-09-28 NOTE — PROGRESS NOTES
Physical Therapy      [facility]     Physical Therapy  Cancellation/No-show Note  Patient Name:  Sid Moya  :  1990   Date:  2022  Cancelled visits to date: 1  No-shows to date: 0    Patient status for today's appointment patient:  [x]  Cancelled  []  Rescheduled appointment  []  No-show     Reason given by patient:  [x]  Patient ill  []  Conflicting appointment  []  No transportation    []  Conflict with work  []  No reason given  []  Other:     Comments:      Phone call information:   []  Phone call made today to patient at _ time at number provided:      []  Patient answered, conversation as follows:    []  Patient did not answer, message left as follows:  []  Phone call not made today  [x]  Phone call not needed - pt contacted us to cancel and provided reason for cancellation.      Electronically signed by:  Waqas Anguiano PT

## 2022-11-29 ENCOUNTER — OFFICE VISIT (OUTPATIENT)
Dept: PULMONOLOGY | Age: 32
End: 2022-11-29
Payer: COMMERCIAL

## 2022-11-29 VITALS
BODY MASS INDEX: 22.39 KG/M2 | DIASTOLIC BLOOD PRESSURE: 78 MMHG | WEIGHT: 156.4 LBS | OXYGEN SATURATION: 96 % | SYSTOLIC BLOOD PRESSURE: 112 MMHG | HEART RATE: 90 BPM | TEMPERATURE: 98.5 F | HEIGHT: 70 IN

## 2022-11-29 DIAGNOSIS — G47.10 HYPERSOMNIA: Primary | ICD-10-CM

## 2022-11-29 DIAGNOSIS — G47.26 SHIFT WORK SLEEP DISORDER: ICD-10-CM

## 2022-11-29 DIAGNOSIS — J45.40 MODERATE PERSISTENT ASTHMA WITHOUT COMPLICATION: Chronic | ICD-10-CM

## 2022-11-29 DIAGNOSIS — R06.83 SNORING: ICD-10-CM

## 2022-11-29 DIAGNOSIS — F41.9 ANXIETY: Chronic | ICD-10-CM

## 2022-11-29 PROBLEM — J45.909 ASTHMA: Chronic | Status: ACTIVE | Noted: 2020-11-18

## 2022-11-29 PROCEDURE — 99244 OFF/OP CNSLTJ NEW/EST MOD 40: CPT | Performed by: INTERNAL MEDICINE

## 2022-11-29 ASSESSMENT — ENCOUNTER SYMPTOMS
PHOTOPHOBIA: 0
ABDOMINAL PAIN: 0
EYE PAIN: 0
CHOKING: 0
ALLERGIC/IMMUNOLOGIC NEGATIVE: 1
NAUSEA: 0
ABDOMINAL DISTENTION: 0
RHINORRHEA: 0
APNEA: 1
SHORTNESS OF BREATH: 1
VOMITING: 0
CHEST TIGHTNESS: 0

## 2022-11-29 ASSESSMENT — SLEEP AND FATIGUE QUESTIONNAIRES
HOW LIKELY ARE YOU TO NOD OFF OR FALL ASLEEP WHILE SITTING AND READING: 0
ESS TOTAL SCORE: 12
HOW LIKELY ARE YOU TO NOD OFF OR FALL ASLEEP WHILE SITTING AND TALKING TO SOMEONE: 1
HOW LIKELY ARE YOU TO NOD OFF OR FALL ASLEEP IN A CAR, WHILE STOPPED FOR A FEW MINUTES IN TRAFFIC: 0
NECK CIRCUMFERENCE (INCHES): 14.5
HOW LIKELY ARE YOU TO NOD OFF OR FALL ASLEEP WHILE SITTING INACTIVE IN A PUBLIC PLACE: 1
HOW LIKELY ARE YOU TO NOD OFF OR FALL ASLEEP WHEN YOU ARE A PASSENGER IN A CAR FOR AN HOUR WITHOUT A BREAK: 2
HOW LIKELY ARE YOU TO NOD OFF OR FALL ASLEEP WHILE WATCHING TV: 2
HOW LIKELY ARE YOU TO NOD OFF OR FALL ASLEEP WHILE LYING DOWN TO REST IN THE AFTERNOON WHEN CIRCUMSTANCES PERMIT: 3
HOW LIKELY ARE YOU TO NOD OFF OR FALL ASLEEP WHILE SITTING QUIETLY AFTER LUNCH WITHOUT ALCOHOL: 3

## 2022-11-29 NOTE — PROGRESS NOTES
[facility] Naina Ghosh  [address] [facility]  Naina Ghosh, [address] [facility]- [phone]   [facility] [address] Claudio Palomino. [address] [facility]. Denys Ledesma 37 [phone] [address] [facility] SLEEP MEDICINE  [facility] MYRON Chou [address] [address]  Dept: [phone]  Loc: [phone]    Assessment:      Visit Diagnoses and Associated Orders       Hypersomnia   (New Problem)  -  Primary    needs work-up    Home Sleep Study (HST) [84946 Custom]   - Future Order         Snoring   (New Problem)      needs work-up    Home Sleep Study (HST) [42947 Custom]   - Future Order         Moderate persistent asthma without complication   (Stable)           Shift work sleep disorder   (New Problem)           Anxiety   (Stable)                    Plan:      One or more undiagnosed new problem with uncertain prognosis till final diagnosis is made. Differential diagnosis includes but not limited to: ROSALINDA, PLMD's, narcolepsy, parasomnias. Reviewed ROSALINDA (highest likelihood Dx): pathophysiology, diagnosis, complications and treatment. Instructed him not to drive if drowsy. Continue medications per her PCP and other physicians. Limit caffeine use after 3pm. Standard of care is to do in-lab PSG but insurance is mandating an inferior HST. 1 wk follow up after study to review his results. The chronic medical conditions listed are directly related to the primary diagnosis listed above. The management of the primary diagnosis affects the secondary diagnosis and vice versa. This information was analyzed to assess complexity and medical decision making in regards to further testing and management. Continue meds for: asthma and MAURILIO. Encouraged to quit tobacco in all forms, discussed different techniques to quit. Orders Placed This Encounter   Procedures    Home Sleep Study (HST)            Subjective:     Patient ID: Pina Martins is a 28 y.o. male.    Patient was offered a medical  but declined and wanted his wife to interpret. Waiver was signed. Chief Complaint   Patient presents with    Sleep Apnea    Daytime Sleepiness       HPI:      Candy Guillen is a 28 y.o. male referred by Irina Campbell DO for a sleep evaluation. He complains of: snoring, witnessed apneas, excessive daytime sleepiness , non-restorative sleep, napping, and tossing and turning at night. He denies: cataplexy and hypnagogic hallucinations. Symptoms began several years ago, gradually worsening since that time. Works 3A-8A, 3-4 days a week. On off days will sleep like he is working. Previous evaluation and treatment has included- none    Chronic stable medical conditions: MAURILIO and asthma    DOT/CDL - no  FAA/'s license -no    Previous Report(s) Reviewed: historical medical records, office notes, andreferral letter(s). Pertinent data has been documented. Widener - Widener Sleepiness Score: 12    Caffeine Intake - None.     Social History     Socioeconomic History    Marital status: Life Partner     Spouse name: Not on file    Number of children: Not on file    Years of education: Not on file    Highest education level: Not on file   Occupational History    Not on file   Tobacco Use    Smoking status: Never    Smokeless tobacco: Never   Vaping Use    Vaping Use: Every day    Substances: Nicotine   Substance and Sexual Activity    Alcohol use: Not Currently     Alcohol/week: 1.0 standard drink     Types: 1 Glasses of wine per week     Comment: 1 montly    Drug use: Not Currently    Sexual activity: Not on file   Other Topics Concern    Not on file   Social History Narrative    Not on file     Social Determinants of Health     Financial Resource Strain: Not on file   Food Insecurity: Not on file   Transportation Needs: Not on file   Physical Activity: Not on file   Stress: Not on file   Social Connections: Not on file   Intimate Partner Violence: Not on file   Housing Stability: Not on file        Current Outpatient Medications   Medication Instructions    cetirizine (ZYRTEC) 10 MG tablet TK 1 T PO QD    fluticasone (FLOVENT HFA) 44 MCG/ACT inhaler 1 puff, Inhalation, 2 TIMES DAILY    montelukast (SINGULAIR) 10 MG tablet TK 1 T PO QPM    ondansetron (ZOFRAN) 8 mg, Oral, EVERY 8 HOURS PRN    venlafaxine (EFFEXOR) 75 mg, Oral, DAILY    VENTOLIN  (90 Base) MCG/ACT inhaler        Allergies as of 11/29/2022    (No Known Allergies)       Patient Active Problem List   Diagnosis    Moderate persistent asthma without complication    Anxiety    Nausea & vomiting    Headache    Dizziness    Acute metabolic encephalopathy    Leukocytosis    Diarrhea    Abnormal CT of the head    Septicemia (HCC)    Subarachnoid cyst    Lactic acid acidosis    Arachnoid cyst    Overweight (BMI 25.0-29. 9)    Cannabis abuse    Abnormal MRI, cervical spine    Tear of left acetabular labrum    Shift work sleep disorder       Past Medical History:   Diagnosis Date    Anxiety     Asthma     Chronic back pain     Shift work sleep disorder 11/29/2022       Past Surgical History:   Procedure Laterality Date    HIP SURGERY Left 8/20/2021    LEFT HIP ARTHROSCOPY LABRAL REPAIR, ACETABULOPLASTY AND OSTEOCHONDROPLASTY, FRACTIONAL PSOAS LENGTHENING performed by Amauri Longo MD at [address]      Epidural     SINUS ENDOSCOPY Bilateral 3/2/2022    SEPTOPLASTY; BILATERAL INFERIOR TURBINATE REDUCTION ([phone], 69376, 56077) performed by Dylan Tavera DO at [address]       Family History   Problem Relation Age of Onset    Diabetes Mother     Coronary Art Dis Mother     Diabetes Father        Review of Systems   Constitutional:  Positive for fatigue. Negative for activity change and appetite change. HENT:  Positive for congestion. Negative for nosebleeds, postnasal drip, rhinorrhea and sneezing. Eyes:  Negative for photophobia, pain and visual disturbance. Respiratory:  Positive for apnea and shortness of breath. Negative for choking and chest tightness. Cardiovascular: Negative. Gastrointestinal:  Negative for abdominal distention, abdominal pain, nausea and vomiting. Endocrine: Negative for cold intolerance and heat intolerance. Genitourinary:  Negative for difficulty urinating, dysuria, frequency and urgency. Musculoskeletal: Negative. Negative for neck pain and neck stiffness. Skin: Negative. Allergic/Immunologic: Negative. Neurological:  Positive for headaches. Negative for tremors, seizures, syncope and weakness. Hematological:  Negative for adenopathy. Does not bruise/bleed easily. Psychiatric/Behavioral:  Positive for sleep disturbance. Negative for agitation, behavioral problems and confusion. Objective:     Vitals:  Weight BMI   Wt Readings from Last 3 Encounters:   11/29/22 156 lb 6.4 oz (70.9 kg)   08/31/22 157 lb (71.2 kg)   06/06/22 168 lb (76.2 kg)    Body mass index is 22.44 kg/m². BP HR SaO2   BP Readings from Last 3 Encounters:   11/29/22 112/78   04/11/22 130/77   03/02/22 130/86    Pulse Readings from Last 3 Encounters:   11/29/22 90   04/11/22 77   03/02/22 92    SpO2 Readings from Last 3 Encounters:   11/29/22 96%   03/02/22 93%   03/02/22 100%        Physical Exam  Vitals reviewed. Constitutional:       General: He is not in acute distress. Appearance: Normal appearance. He is well-developed. He is not toxic-appearing or diaphoretic. HENT:      Head: Normocephalic and atraumatic. Not macrocephalic and not microcephalic. Right Ear: External ear normal.      Left Ear: External ear normal.      Nose: Mucosal edema present. No nasal deformity or septal deviation. Mouth/Throat:      Lips: Pink. Mouth: Mucous membranes are moist.      Tongue: No lesions. Palate: No mass. Pharynx: Uvula midline. Uvula swelling present. No oropharyngeal exudate.       Tonsils: No tonsillar exudate or tonsillar abscesses. Comments: Tonsils: normal size  Eyes:      General: Lids are normal.      Extraocular Movements: Extraocular movements intact. Conjunctiva/sclera: Conjunctivae normal.      Pupils: Pupils are equal, round, and reactive to light. Neck:      Vascular: No JVD. Trachea: Trachea normal.      Comments: Neck Circ: 14.5 inches    Cardiovascular:      Rate and Rhythm: Normal rate and regular rhythm. Heart sounds: Normal heart sounds. Pulmonary:      Effort: Pulmonary effort is normal.      Breath sounds: Normal breath sounds. Abdominal:      General: Bowel sounds are normal.   Musculoskeletal:      Cervical back: Normal range of motion. Comments: No evidence of cyanosis or clubbing of nails   Skin:     General: Skin is warm. Nails: There is no clubbing. Neurological:      General: No focal deficit present. Mental Status: He is alert. Psychiatric:         Attention and Perception: Attention normal.         Mood and Affect: Mood and affect normal.         Speech: Speech normal.         Behavior: Behavior normal. Behavior is cooperative. Thought Content:  Thought content normal.       Electronically signed by Arthur Mackey MD on11/29/2022 at 1:49 PM

## 2023-01-13 ENCOUNTER — HOSPITAL ENCOUNTER (OUTPATIENT)
Dept: SLEEP CENTER | Age: 33
Discharge: HOME | End: 2023-01-13

## 2023-01-13 DIAGNOSIS — G47.10 HYPERSOMNIA: ICD-10-CM

## 2023-01-13 DIAGNOSIS — R06.83 SNORING: ICD-10-CM

## 2023-01-19 ENCOUNTER — TELEPHONE (OUTPATIENT)
Dept: PULMONOLOGY | Age: 33
End: 2023-01-19

## 2023-01-19 NOTE — PROGRESS NOTES
Dorian Starkey         : 1990  [address] [facility]    Diagnosis: [x] ROSALINAD (G47.33) [] CSA (G47.31) [] Apnea (G47.30)   Length of Need: [x] 18 Months [] 99 Months [] Other:    Machine (KORINA!): [] Respironics Dream Station   2   Auto [x] ResMed AirSense     Auto S11 [] Other:     [x]  CPAP () [] Bilevel ()   Mode: [x] Auto [] Spontaneous    Mode: [] Auto [] Spontaneous      P min 6 cmH2O  P max 16 cmH2O      Comfort Settings:   - Ramp Pressure: 4 cmH2O                                        - Ramp time: 15 min                                     -  Flex/EPR - 3 full time                                    - For ResMed Bilevel (TiMax-4 sec   TiMin- 0.2 sec)     Humidifier: [x] Heated ()        [x] Water chamber replacement ()/ 1 per 6 months        Mask:   [x] Nasal () /1 per 3 months [] Full Face () /1 per 3 months   [x] Patient choice -Size and fit mask [] Patient Choice - Size and fit mask   [] Dispense:  [] Dispense:    [x] Headgear () / 1 per 3 months [] Headgear () / 1 per 3 months   [x] Replacement Nasal Cushion ()/2 per month [] Interface Replacement ()/1 per month   [] Replacement Nasal Pillows ()/2 per month         Tubing: [x] Heated ()/1 per 3 months    [] Standard ()/1 per 3 months [] Other:           Filters: [x] Non-disposable ()/1 per 6 months     [x] Ultra-Fine, Disposable ()/2 per month        Miscellaneous: [] Chin Strap ()/ 1 per 6 months [] O2 bleed-in:       LPM   [] Oximetry on CPAP/Bilevel []  Other:    [x] Modem: ()         Start Order Date: 23    MEDICAL JUSTIFICATION:  I, the undersigned, certify that the above prescribed supplies are medically necessary for this patients wellbeing. In my opinion, the supplies are both reasonable and necessary in reference to accepted standards of medicalpractice in treatment of this patients condition.     Dayne Stuart RCP      NPI:        Order Signed Date: 23    Electronically signed by Dayne Stuart RCP on 2023 at 4:02 PM    [address] [place] [address]  1990  [address]  [phone] (home)   [phone] (mobile)      Insurance Info (confirm with patient if correct):  Payer/Plan Subscr  Sex Relation Sub.  Ins. ID Effective Group Num

## 2023-01-19 NOTE — TELEPHONE ENCOUNTER
Spoke with pt's spouse to review HST results. Order to be sent to [facility]. F/U to be scheduled.

## 2023-02-14 ENCOUNTER — PATIENT MESSAGE (OUTPATIENT)
Dept: PULMONOLOGY | Age: 33
End: 2023-02-14

## 2023-02-14 NOTE — TELEPHONE ENCOUNTER
From: Sakshi Ramirez  To: Dr. Alexis Xiong: 2/14/2023 12:57 PM EST  Subject: CPAP    Good Afternoon    We havent received a call from the medical equipment place, is possible to have the information from the place the order was sent?  Probably they are having issues due to our phones being from [place] sometimes call dont come through

## 2023-02-14 NOTE — TELEPHONE ENCOUNTER
[facility] Is your DME company there number is [phone], I would recommend you give them a call. I hope this helps, have a good evening!     Thanks  Unique Reinoso

## 2023-02-15 NOTE — TELEPHONE ENCOUNTER
Juan Kenny,  I will send it over to them again it was sent on 01/19/23, I am sorry about the confusion. I would reach out to them tomorrow to check the status of your order. I hope you have a good day!    Thanks  Abby

## 2023-03-15 ENCOUNTER — OFFICE VISIT (OUTPATIENT)
Dept: ORTHOPEDIC SURGERY | Age: 33
End: 2023-03-15
Payer: COMMERCIAL

## 2023-03-15 VITALS — HEIGHT: 70 IN | BODY MASS INDEX: 22.44 KG/M2

## 2023-03-15 DIAGNOSIS — M54.17 LUMBOSACRAL RADICULOPATHY: ICD-10-CM

## 2023-03-15 DIAGNOSIS — M51.37 DEGENERATION OF LUMBOSACRAL INTERVERTEBRAL DISC: Primary | ICD-10-CM

## 2023-03-15 DIAGNOSIS — M53.3 SACROILIAC JOINT DYSFUNCTION OF LEFT SIDE: ICD-10-CM

## 2023-03-15 PROCEDURE — 99204 OFFICE O/P NEW MOD 45 MIN: CPT | Performed by: INTERNAL MEDICINE

## 2023-03-15 NOTE — PROGRESS NOTES
Reason:   Specialty Services Required     Requested Specialty:   Physical Therapist     Number of Visits Requested:   1           Disclaimer: \"This note was dictated with voice recognition software. Though review and correction are routine, we apologize for any errors. \"

## 2023-03-15 NOTE — LETTER
Referral Type:   Eval and Treat     Referral Reason:   Specialty Services Required     Requested Specialty:   Physical Therapist     Number of Visits Requested:   1           Disclaimer: \"This note was dictated with voice recognition software. Though review and correction are routine, we apologize for any errors. \"             If you have questions, please do not hesitate to call me. I look forward to following Morse Hatchet along with you.     Sincerely,        Paul Umanzor MD

## 2023-03-21 ENCOUNTER — TELEPHONE (OUTPATIENT)
Dept: ORTHOPEDIC SURGERY | Age: 33
End: 2023-03-21

## 2023-03-24 ENCOUNTER — TELEPHONE (OUTPATIENT)
Dept: ORTHOPEDIC SURGERY | Age: 33
End: 2023-03-24

## 2023-03-24 NOTE — TELEPHONE ENCOUNTER
[id number] / WORKABILITY:    CallBola Ramirez     Phone#: [phone]    Fax#: [phone]    [employer]/WORK[employer]/CA-17 Date of Service:  6.21.21 to current    REASON FOR CALL:         [employer]14/WORKABILITY/CA-17 Form Missing    PLEASE FAX ONCE COMPLETED

## 2023-04-06 ENCOUNTER — TELEPHONE (OUTPATIENT)
Dept: ORTHOPEDIC SURGERY | Age: 33
End: 2023-04-06

## 2023-04-06 NOTE — TELEPHONE ENCOUNTER
Received a call from IW's wife    She wants to know if a CA-17 was received thru My chart? [facility] scheduling?     Ph: [phone]

## 2023-04-17 ENCOUNTER — OFFICE VISIT (OUTPATIENT)
Dept: ORTHOPEDIC SURGERY | Age: 33
End: 2023-04-17

## 2023-04-17 VITALS — BODY MASS INDEX: 22.19 KG/M2 | WEIGHT: 155 LBS | HEIGHT: 70 IN

## 2023-04-17 DIAGNOSIS — Z98.890 STATUS POST ARTHROSCOPY OF HIP: Primary | ICD-10-CM

## 2023-04-17 DIAGNOSIS — M53.3 SACROILIAC JOINT DYSFUNCTION OF LEFT SIDE: ICD-10-CM

## 2023-04-17 NOTE — PROGRESS NOTES
osteochondroplasty with a very distal bump that I do no suspect will cause recurrent impingement at this time. Impression:  Visit Diagnoses         Codes    Status post arthroscopy of hip    -  Primary Z98.890    Sacroiliac joint dysfunction of left side     M53.3            Office Procedures:  No orders of the defined types were placed in this encounter. Orders Placed This Encounter   Procedures    XR HIP 3-4 VW W PELVIS BILATERAL     ROOM 2    TRUE LEFT HIP     Standing Status:   Future     Number of Occurrences:   1     Standing Expiration Date:   4/17/2024       Plan:  Lukas Spain is doing well I would  like to see him back as needed. We have provided the necessary paperwork to support his worker's claim. All the patient's questions were answered while in the clinic. The patient is understanding of all instructions and agrees with the plan. Approximately 30 minutes was spent on patient education and coordinating care. Follow up in: Return in about 1 year (around 4/17/2024), or if symptoms worsen or fail to improve. Sincerely,    Gordon Bourne MD [address] [facility] [address] [place] [id number]  Email: [email or web address]  Office: [phone]    04/17/23  2:06 PM        The encounter with Soraya Mcgowan was carried out by myself, Dr William Nunez, who personally examined the patient and reviewed the plan. This dictation was performed with a verbal recognition program (DRAGON) and it was checked for errors. It is possible that there are still dictated errors within this office note. If so, please bring any errors to my attention for an addendum. All efforts were made to ensure that this office note is accurate.

## 2023-04-19 ENCOUNTER — ANESTHESIA EVENT (OUTPATIENT)
Dept: OPERATING ROOM | Age: 33
End: 2023-04-19
Payer: COMMERCIAL

## 2023-04-20 ENCOUNTER — HOSPITAL ENCOUNTER (OUTPATIENT)
Age: 33
Setting detail: OUTPATIENT SURGERY
Discharge: HOME | End: 2023-04-20
Attending: UROLOGY | Admitting: UROLOGY
Payer: COMMERCIAL

## 2023-04-20 ENCOUNTER — ANESTHESIA (OUTPATIENT)
Dept: OPERATING ROOM | Age: 33
End: 2023-04-20
Payer: COMMERCIAL

## 2023-04-20 VITALS
DIASTOLIC BLOOD PRESSURE: 81 MMHG | SYSTOLIC BLOOD PRESSURE: 126 MMHG | HEART RATE: 81 BPM | BODY MASS INDEX: 22.19 KG/M2 | RESPIRATION RATE: 16 BRPM | TEMPERATURE: 97.6 F | OXYGEN SATURATION: 97 % | WEIGHT: 155 LBS | HEIGHT: 70 IN

## 2023-04-20 PROCEDURE — 2580000003 HC RX 258: Performed by: NURSE ANESTHETIST, CERTIFIED REGISTERED

## 2023-04-20 PROCEDURE — 3700000000 HC ANESTHESIA ATTENDED CARE: Performed by: UROLOGY

## 2023-04-20 PROCEDURE — 6360000002 HC RX W HCPCS: Performed by: UROLOGY

## 2023-04-20 PROCEDURE — 2709999900 HC NON-CHARGEABLE SUPPLY: Performed by: UROLOGY

## 2023-04-20 PROCEDURE — 2500000003 HC RX 250 WO HCPCS: Performed by: UROLOGY

## 2023-04-20 PROCEDURE — 2580000003 HC RX 258: Performed by: ANESTHESIOLOGY

## 2023-04-20 PROCEDURE — 3700000001 HC ADD 15 MINUTES (ANESTHESIA): Performed by: UROLOGY

## 2023-04-20 PROCEDURE — 7100000011 HC PHASE II RECOVERY - ADDTL 15 MIN: Performed by: UROLOGY

## 2023-04-20 PROCEDURE — 6360000002 HC RX W HCPCS: Performed by: NURSE ANESTHETIST, CERTIFIED REGISTERED

## 2023-04-20 PROCEDURE — 7100000010 HC PHASE II RECOVERY - FIRST 15 MIN: Performed by: UROLOGY

## 2023-04-20 PROCEDURE — 6370000000 HC RX 637 (ALT 250 FOR IP): Performed by: ANESTHESIOLOGY

## 2023-04-20 PROCEDURE — 3600000003 HC SURGERY LEVEL 3 BASE: Performed by: UROLOGY

## 2023-04-20 PROCEDURE — 2500000003 HC RX 250 WO HCPCS: Performed by: ANESTHESIOLOGY

## 2023-04-20 PROCEDURE — 7100000001 HC PACU RECOVERY - ADDTL 15 MIN: Performed by: UROLOGY

## 2023-04-20 PROCEDURE — 3600000013 HC SURGERY LEVEL 3 ADDTL 15MIN: Performed by: UROLOGY

## 2023-04-20 PROCEDURE — 7100000000 HC PACU RECOVERY - FIRST 15 MIN: Performed by: UROLOGY

## 2023-04-20 PROCEDURE — 2500000003 HC RX 250 WO HCPCS: Performed by: NURSE ANESTHETIST, CERTIFIED REGISTERED

## 2023-04-20 RX ORDER — OXYCODONE HYDROCHLORIDE 5 MG/1
10 TABLET ORAL PRN
Status: COMPLETED | OUTPATIENT
Start: 2023-04-20 | End: 2023-04-20

## 2023-04-20 RX ORDER — METOCLOPRAMIDE HYDROCHLORIDE 5 MG/ML
10 INJECTION INTRAMUSCULAR; INTRAVENOUS
Status: DISCONTINUED | OUTPATIENT
Start: 2023-04-20 | End: 2023-04-20 | Stop reason: HOSPADM

## 2023-04-20 RX ORDER — PROPOFOL 10 MG/ML
INJECTION, EMULSION INTRAVENOUS PRN
Status: DISCONTINUED | OUTPATIENT
Start: 2023-04-20 | End: 2023-04-20 | Stop reason: SDUPTHER

## 2023-04-20 RX ORDER — EPHEDRINE SULFATE 50 MG/ML
INJECTION INTRAVENOUS PRN
Status: DISCONTINUED | OUTPATIENT
Start: 2023-04-20 | End: 2023-04-20 | Stop reason: SDUPTHER

## 2023-04-20 RX ORDER — ONDANSETRON 2 MG/ML
INJECTION INTRAMUSCULAR; INTRAVENOUS PRN
Status: DISCONTINUED | OUTPATIENT
Start: 2023-04-20 | End: 2023-04-20 | Stop reason: SDUPTHER

## 2023-04-20 RX ORDER — HYDRALAZINE HYDROCHLORIDE 20 MG/ML
10 INJECTION INTRAMUSCULAR; INTRAVENOUS
Status: DISCONTINUED | OUTPATIENT
Start: 2023-04-20 | End: 2023-04-20 | Stop reason: HOSPADM

## 2023-04-20 RX ORDER — FAMOTIDINE 10 MG/ML
20 INJECTION, SOLUTION INTRAVENOUS ONCE
Status: COMPLETED | OUTPATIENT
Start: 2023-04-20 | End: 2023-04-20

## 2023-04-20 RX ORDER — SODIUM CHLORIDE 9 MG/ML
INJECTION, SOLUTION INTRAVENOUS PRN
Status: DISCONTINUED | OUTPATIENT
Start: 2023-04-20 | End: 2023-04-20 | Stop reason: HOSPADM

## 2023-04-20 RX ORDER — IPRATROPIUM BROMIDE AND ALBUTEROL SULFATE 2.5; .5 MG/3ML; MG/3ML
1 SOLUTION RESPIRATORY (INHALATION)
Status: DISCONTINUED | OUTPATIENT
Start: 2023-04-20 | End: 2023-04-20 | Stop reason: HOSPADM

## 2023-04-20 RX ORDER — ONDANSETRON 2 MG/ML
4 INJECTION INTRAMUSCULAR; INTRAVENOUS
Status: DISCONTINUED | OUTPATIENT
Start: 2023-04-20 | End: 2023-04-20 | Stop reason: HOSPADM

## 2023-04-20 RX ORDER — SODIUM CHLORIDE 0.9 % (FLUSH) 0.9 %
5-40 SYRINGE (ML) INJECTION EVERY 12 HOURS SCHEDULED
Status: DISCONTINUED | OUTPATIENT
Start: 2023-04-20 | End: 2023-04-20 | Stop reason: HOSPADM

## 2023-04-20 RX ORDER — MIDAZOLAM HYDROCHLORIDE 1 MG/ML
INJECTION INTRAMUSCULAR; INTRAVENOUS PRN
Status: DISCONTINUED | OUTPATIENT
Start: 2023-04-20 | End: 2023-04-20 | Stop reason: SDUPTHER

## 2023-04-20 RX ORDER — DIPHENHYDRAMINE HYDROCHLORIDE 50 MG/ML
12.5 INJECTION INTRAMUSCULAR; INTRAVENOUS
Status: DISCONTINUED | OUTPATIENT
Start: 2023-04-20 | End: 2023-04-20 | Stop reason: HOSPADM

## 2023-04-20 RX ORDER — OXYCODONE HYDROCHLORIDE 5 MG/1
5 TABLET ORAL PRN
Status: COMPLETED | OUTPATIENT
Start: 2023-04-20 | End: 2023-04-20

## 2023-04-20 RX ORDER — DEXAMETHASONE SODIUM PHOSPHATE 4 MG/ML
INJECTION, SOLUTION INTRA-ARTICULAR; INTRALESIONAL; INTRAMUSCULAR; INTRAVENOUS; SOFT TISSUE PRN
Status: DISCONTINUED | OUTPATIENT
Start: 2023-04-20 | End: 2023-04-20 | Stop reason: SDUPTHER

## 2023-04-20 RX ORDER — LIDOCAINE HYDROCHLORIDE 20 MG/ML
INJECTION, SOLUTION EPIDURAL; INFILTRATION; INTRACAUDAL; PERINEURAL PRN
Status: DISCONTINUED | OUTPATIENT
Start: 2023-04-20 | End: 2023-04-20 | Stop reason: SDUPTHER

## 2023-04-20 RX ORDER — MEPERIDINE HYDROCHLORIDE 50 MG/ML
12.5 INJECTION INTRAMUSCULAR; INTRAVENOUS; SUBCUTANEOUS EVERY 5 MIN PRN
Status: DISCONTINUED | OUTPATIENT
Start: 2023-04-20 | End: 2023-04-20 | Stop reason: HOSPADM

## 2023-04-20 RX ORDER — HYDROMORPHONE HCL 110MG/55ML
PATIENT CONTROLLED ANALGESIA SYRINGE INTRAVENOUS PRN
Status: DISCONTINUED | OUTPATIENT
Start: 2023-04-20 | End: 2023-04-20 | Stop reason: SDUPTHER

## 2023-04-20 RX ORDER — ROCURONIUM BROMIDE 10 MG/ML
INJECTION, SOLUTION INTRAVENOUS PRN
Status: DISCONTINUED | OUTPATIENT
Start: 2023-04-20 | End: 2023-04-20 | Stop reason: SDUPTHER

## 2023-04-20 RX ORDER — SODIUM CHLORIDE, SODIUM LACTATE, POTASSIUM CHLORIDE, CALCIUM CHLORIDE 600; 310; 30; 20 MG/100ML; MG/100ML; MG/100ML; MG/100ML
INJECTION, SOLUTION INTRAVENOUS CONTINUOUS
Status: DISCONTINUED | OUTPATIENT
Start: 2023-04-20 | End: 2023-04-20 | Stop reason: HOSPADM

## 2023-04-20 RX ORDER — SODIUM CHLORIDE 0.9 % (FLUSH) 0.9 %
5-40 SYRINGE (ML) INJECTION PRN
Status: DISCONTINUED | OUTPATIENT
Start: 2023-04-20 | End: 2023-04-20 | Stop reason: HOSPADM

## 2023-04-20 RX ORDER — BUPIVACAINE HYDROCHLORIDE 5 MG/ML
INJECTION, SOLUTION EPIDURAL; INTRACAUDAL PRN
Status: DISCONTINUED | OUTPATIENT
Start: 2023-04-20 | End: 2023-04-20 | Stop reason: ALTCHOICE

## 2023-04-20 RX ORDER — CEFAZOLIN SODIUM IN 0.9 % NACL 2 G/100 ML
2000 PLASTIC BAG, INJECTION (ML) INTRAVENOUS
Status: COMPLETED | OUTPATIENT
Start: 2023-04-20 | End: 2023-04-20

## 2023-04-20 RX ADMIN — DEXMEDETOMIDINE HYDROCHLORIDE 8 MCG: 100 INJECTION, SOLUTION INTRAVENOUS at 09:33

## 2023-04-20 RX ADMIN — DEXMEDETOMIDINE HYDROCHLORIDE 4 MCG: 100 INJECTION, SOLUTION INTRAVENOUS at 10:16

## 2023-04-20 RX ADMIN — SUGAMMADEX 50 MG: 100 INJECTION, SOLUTION INTRAVENOUS at 10:43

## 2023-04-20 RX ADMIN — OXYCODONE 5 MG: 5 TABLET ORAL at 11:58

## 2023-04-20 RX ADMIN — EPHEDRINE SULFATE 10 MG: 50 INJECTION INTRAVENOUS at 10:24

## 2023-04-20 RX ADMIN — SUGAMMADEX 50 MG: 100 INJECTION, SOLUTION INTRAVENOUS at 10:40

## 2023-04-20 RX ADMIN — FAMOTIDINE 20 MG: 10 INJECTION, SOLUTION INTRAVENOUS at 09:11

## 2023-04-20 RX ADMIN — EPHEDRINE SULFATE 10 MG: 50 INJECTION INTRAVENOUS at 10:25

## 2023-04-20 RX ADMIN — PROPOFOL 200 MG: 10 INJECTION, EMULSION INTRAVENOUS at 09:34

## 2023-04-20 RX ADMIN — HYDROMORPHONE HYDROCHLORIDE 1 MG: 2 INJECTION, SOLUTION INTRAMUSCULAR; INTRAVENOUS; SUBCUTANEOUS at 09:33

## 2023-04-20 RX ADMIN — ROCURONIUM BROMIDE 40 MG: 10 SOLUTION INTRAVENOUS at 09:34

## 2023-04-20 RX ADMIN — DEXMEDETOMIDINE HYDROCHLORIDE 8 MCG: 100 INJECTION, SOLUTION INTRAVENOUS at 10:38

## 2023-04-20 RX ADMIN — SODIUM CHLORIDE, SODIUM LACTATE, POTASSIUM CHLORIDE, AND CALCIUM CHLORIDE: .6; .31; .03; .02 INJECTION, SOLUTION INTRAVENOUS at 09:31

## 2023-04-20 RX ADMIN — SUGAMMADEX 50 MG: 100 INJECTION, SOLUTION INTRAVENOUS at 10:35

## 2023-04-20 RX ADMIN — DEXAMETHASONE SODIUM PHOSPHATE 8 MG: 4 INJECTION, SOLUTION INTRAMUSCULAR; INTRAVENOUS at 09:43

## 2023-04-20 RX ADMIN — SUGAMMADEX 50 MG: 100 INJECTION, SOLUTION INTRAVENOUS at 10:45

## 2023-04-20 RX ADMIN — Medication 2000 MG: at 09:30

## 2023-04-20 RX ADMIN — DEXMEDETOMIDINE HYDROCHLORIDE 4 MCG: 100 INJECTION, SOLUTION INTRAVENOUS at 10:10

## 2023-04-20 RX ADMIN — EPHEDRINE SULFATE 10 MG: 50 INJECTION INTRAVENOUS at 10:23

## 2023-04-20 RX ADMIN — MIDAZOLAM HYDROCHLORIDE 2 MG: 2 INJECTION, SOLUTION INTRAMUSCULAR; INTRAVENOUS at 09:30

## 2023-04-20 RX ADMIN — PROPOFOL 100 MG: 10 INJECTION, EMULSION INTRAVENOUS at 10:38

## 2023-04-20 RX ADMIN — LIDOCAINE HYDROCHLORIDE 80 MG: 20 INJECTION, SOLUTION EPIDURAL; INFILTRATION; INTRACAUDAL; PERINEURAL at 09:33

## 2023-04-20 RX ADMIN — DEXMEDETOMIDINE HYDROCHLORIDE 4 MCG: 100 INJECTION, SOLUTION INTRAVENOUS at 10:36

## 2023-04-20 RX ADMIN — ONDANSETRON 4 MG: 2 INJECTION INTRAMUSCULAR; INTRAVENOUS at 09:43

## 2023-04-20 RX ADMIN — ROCURONIUM BROMIDE 10 MG: 10 SOLUTION INTRAVENOUS at 10:00

## 2023-04-20 NOTE — H&P
Urology Attending H&P Note      History: This is a 28 y.o. male who presents today for operative intervention for right mSCD    Family History, Social History, Review of Systems:  Reviewed and agreed to as per chart    Vitals: Wt 155 lb (70.3 kg)   BMI 22.24 kg/m²   No data recorded  No intake or output data in the 24 hours ending 04/20/23 0858      Physical:  Well developed, well nourished in no acute distress  Mood indicates no abnormalities. Pt doesnt appear depressed  Orientated to time and place  Neck is supple, trachea is midline  Respiratory effort is normal  Cardiovascular show no extremity swelling  Abdomen no masses or hernias are palpated, there is no tenderness. Liver and Spleen appear normal.  Skin show no abnormal lesions  Lymph nodes are not palpated in the inguinal, neck, or axillary area.     Labs:  WBC:    Lab Results   Component Value Date/Time    WBC 8.0 11/23/2020 05:44 AM     Hemoglobin/Hematocrit:    Lab Results   Component Value Date/Time    HGB 15.4 11/23/2020 05:44 AM    HCT 45.9 11/23/2020 05:44 AM     BMP:    Lab Results   Component Value Date/Time     11/23/2020 05:44 AM    K 4.1 11/23/2020 05:44 AM     11/23/2020 05:44 AM    CO2 32 11/23/2020 05:44 AM    BUN 8 11/23/2020 05:44 AM    LABALBU 5.0 11/18/2020 10:08 AM    CREATININE 0.8 11/23/2020 05:44 AM    CALCIUM 9.8 11/23/2020 05:44 AM    GFRAA >60 11/23/2020 05:44 AM    LABGLOM >60 11/23/2020 05:44 AM     PT/INR:  No results found for: PROTIME, INR  PTT:  No results found for: APTT[APTT      Impression/Plan:     -- to the OR   -- antibiotics on call  -- discussed with patient - all questions answered    Thank you for the opportunity to be involved in the care of this patient - please call with questions    Regulo Chong MD  [facility]  Office - [phone]

## 2023-04-20 NOTE — PROGRESS NOTES
Patient admitted to [address]. Consents verified. Patient NPO since 2100 last night. All patient belongings to remain in [facility]. iPad  used during patient pre-op check in.

## 2023-04-20 NOTE — ANESTHESIA PRE PROCEDURE
Department of Anesthesiology  Preprocedure Note       Name:  Valerie Pressley   Age:  28 y.o.  :  1990                                          MRN:  [id number]         Date:  2023      Surgeon: Collette All):  Bianca Burroughs MD    Procedure: Procedure(s):  RIGHT SPERMATIC CORD DENERVATION    Medications prior to admission:   Prior to Admission medications    Medication Sig Start Date End Date Taking? Authorizing Provider   venlafaxine (EFFEXOR) 75 MG tablet Take 1 tablet by mouth daily    Historical Provider, MD   fluticasone (FLOVENT HFA) 44 MCG/ACT inhaler Inhale 1 puff into the lungs 2 times daily    Historical Provider, MD   montelukast (SINGULAIR) 10 MG tablet TK 1 T PO QPM 3/2/20   Historical Provider, MD   VENTOLIN  (90 Base) MCG/ACT inhaler  20   Historical Provider, MD       Current medications:    Current Facility-Administered Medications   Medication Dose Route Frequency Provider Last Rate Last Admin    ceFAZolin (ANCEF) 2000 mg in 0.9% sodium chloride 100 mL IVPB  2,000 mg IntraVENous On Call to [place]. [address] Tammie Day MD        lactated ringers IV soln infusion   IntraVENous Continuous Tanvir Deleon MD        sodium chloride flush 0.9 % injection 5-40 mL  5-40 mL IntraVENous 2 times per day Tanvir Deleon MD        sodium chloride flush 0.9 % injection 5-40 mL  5-40 mL IntraVENous PRN Tanvir Deleon MD        0.9 % sodium chloride infusion   IntraVENous PRN Tanvir Deleon MD           Allergies:     Allergies   Allergen Reactions    Aspirin        Problem List:    Patient Active Problem List   Diagnosis Code    Moderate persistent asthma without complication M72.92    Anxiety F41.9    Nausea & vomiting R11.2    Headache R51.9    Dizziness R42    Acute metabolic encephalopathy S07.28    Leukocytosis D72.829    Diarrhea R19.7    Abnormal CT of the head R93.0    Septicemia ([address] A41.9    Subarachnoid cyst G93.0    Lactic acid

## 2023-04-20 NOTE — ANESTHESIA POSTPROCEDURE EVALUATION
Department of Anesthesiology  Postprocedure Note    Patient: Ramone Light  MRN: [id number]  YOB: 1990  Date of evaluation: 4/20/2023      Procedure Summary     Date: 04/20/23 Room / Location: [facility]    Anesthesia Start: 6739 Anesthesia Stop: 8595    Procedure: RIGHT SPERMATIC CORD DENERVATION (Right: Scrotum) Diagnosis:       Scrotal pain      (SCROTAL PAIN)    Surgeons: Lesli Nash MD Responsible Provider: Ward Agarwal DO    Anesthesia Type: general ASA Status: 2          Anesthesia Type: No value filed.     Jamie Phase I: Jamie Score: 5    Jamie Phase II: Jamie Score: 10      Anesthesia Post Evaluation    Patient location during evaluation: PACU  Patient participation: complete - patient participated  Level of consciousness: awake and alert  Pain score: 0  Airway patency: patent  Nausea & Vomiting: no nausea and no vomiting  Complications: no  Cardiovascular status: blood pressure returned to baseline and hemodynamically stable  Respiratory status: acceptable and room air  Hydration status: euvolemic

## 2023-04-20 NOTE — OP NOTE
used to gently elevate the cord into the operative field. The operative microscope was then brought over the surgical field and focused. The anterior cremasteric muscle was divided along a course parallel to its fibers revealing the spermatic cord. The underlying cord structures were gently freed from the cremasteric muscle, and cremasteric muscle was ligated using cautery. The vas deferens was normal to inspection. The patient was interested in fertility and hence we left the vas unharmed. Carefully dissection was used to free the veins from the surrounding structures. Once freed the veins were occluded and transected with 4-0 silk ties. Multiple veins were ligated, multiple veins were intentionally spared. The testicular artery was visably pulsating and its integrity was confirmed using Doppler ultrasound throughout the dissection and again at the end of the case. Additionally, multiple other arteries were maintained. Multiple lymphatic channels were left intact and spared. The penrose drain was removed and the cord returned to its anatomic location. The wound was irrigated and hemostasis was ensured. Ryan's was closed with a running 2-0 PDS. The subcutaneous tissues were approximated with interrupted 3-0 Monocryl and the skin edge approximated with a interrupted mattress 3-0 monocryl stitch followed by Dermabond. Gauze and supportive underwear were applied as a dressing. The patient was awakened without incident and transported to the PACU in a stable condition. There were no noted intraoperative complications and the patient tolerated the procedure well.      Estimated blood loss:  20cc     Specimen:   None      Drains:   None      Complications:   None     Accidental Puncture or Laceration:   None       Electronically signed by Marisol Valenzuela MD on 4/20/2023 at 10:48 AM

## 2023-04-20 NOTE — PROGRESS NOTES
Patient arrived to PACU bay 4, phase one initiated. Placed on bedside monitor, VSS. Report obtained from OR RN and anesthesia. Patient on O2 via 44 at 6L. Assessment WNL. Side rails in place, will monitor patient closely. Oral airway in place.

## 2023-04-20 NOTE — DISCHARGE INSTRUCTIONS
blurred vision  Nausea/vomiting can happen  Shivering, feeling cold, sore throat, cough and muscle aches should stop within 24-48 hours  Trouble urinating - call your surgeon if it has been more than 8 hrs  Bruising or soreness at the IV site - call if it remains red, firm or there is drainage             FEMALES OF CHILDBEARING AGE WHO ARE TAKING BIRTH CONTROL PILLS:  You may have received a medication during your procedure that interferes with the   actions of birth control pills (Bridion or Emend). Use some other kind of birth control in addition to your pills, like a condom, for 1 month after your procedure to prevent unwanted pregnancy. The following instructions are to be followed if you have a known history or diagnosis of sleep apnea: For all sleep apnea patients:  ? Sleep on your side or sitting up in a chair whenever possible, especially the first 24 hours after surgery. ? Use only medicines prescribed by your doctor. ? Do not drink alcohol. ? If you have a dental device to assist you while at rest, use it at all times for the first 24 hours. For patients using CPAP machines:  ? Use your CPAP machine during all periods of sleep as usual.  ? Use your CPAP machine during all periods of daytime rest while on pain medicines. ** Follow up with your primary care doctor for continued care. IF YOU DO NOT TAKE ALL OF YOUR NARCOTIC PAIN MEDICATION, please dispose of them responsibly. There are drop off boxes in the Emergency Departments 24/7 at both [facility] and [facility]. If these locations are not convenient, other options for discarding them can be found at:  [email or web address]    Hospital or office staff may NOT accept any medications to drop off in the cabinet for you.

## 2023-05-16 ENCOUNTER — HOSPITAL ENCOUNTER (OUTPATIENT)
Dept: PHYSICAL THERAPY | Age: 33
Setting detail: THERAPIES SERIES
Discharge: HOME | End: 2023-05-16

## 2023-06-02 ENCOUNTER — HOSPITAL ENCOUNTER (EMERGENCY)
Age: 33
Discharge: HOME | End: 2023-06-02
Payer: COMMERCIAL

## 2023-06-02 ENCOUNTER — APPOINTMENT (OUTPATIENT)
Dept: CT IMAGING | Age: 33
End: 2023-06-02
Payer: COMMERCIAL

## 2023-06-02 ENCOUNTER — APPOINTMENT (OUTPATIENT)
Dept: GENERAL RADIOLOGY | Age: 33
End: 2023-06-02
Payer: COMMERCIAL

## 2023-06-02 VITALS
OXYGEN SATURATION: 96 % | SYSTOLIC BLOOD PRESSURE: 117 MMHG | TEMPERATURE: 100.1 F | HEART RATE: 96 BPM | RESPIRATION RATE: 20 BRPM | DIASTOLIC BLOOD PRESSURE: 82 MMHG

## 2023-06-02 DIAGNOSIS — J45.901 EXACERBATION OF ASTHMA, UNSPECIFIED ASTHMA SEVERITY, UNSPECIFIED WHETHER PERSISTENT: Primary | ICD-10-CM

## 2023-06-02 LAB
ALBUMIN SERPL-MCNC: 4.7 G/DL (ref 3.4–5)
ALBUMIN/GLOB SERPL: 1.5 {RATIO} (ref 1.1–2.2)
ALP SERPL-CCNC: 100 U/L (ref 40–129)
ALT SERPL-CCNC: 22 U/L (ref 10–40)
ANION GAP SERPL CALCULATED.3IONS-SCNC: 9 MMOL/L (ref 3–16)
AST SERPL-CCNC: 25 U/L (ref 15–37)
BACTERIA URNS QL MICRO: NORMAL /HPF
BASOPHILS # BLD: 0 K/UL (ref 0–0.2)
BASOPHILS NFR BLD: 0.2 %
BILIRUB SERPL-MCNC: 1 MG/DL (ref 0–1)
BILIRUB UR QL STRIP.AUTO: NEGATIVE
BUN SERPL-MCNC: 14 MG/DL (ref 7–20)
CALCIUM SERPL-MCNC: 10.4 MG/DL (ref 8.3–10.6)
CHARACTER UR: NORMAL
CHLORIDE SERPL-SCNC: 100 MMOL/L (ref 99–110)
CLARITY UR: CLEAR
CO2 SERPL-SCNC: 29 MMOL/L (ref 21–32)
COLOR UR: YELLOW
CREAT SERPL-MCNC: 0.9 MG/DL (ref 0.9–1.3)
D DIMER: 0.34 UG/ML FEU (ref 0–0.6)
DEPRECATED RDW RBC AUTO: 13.4 % (ref 12.4–15.4)
EOSINOPHIL # BLD: 0.2 K/UL (ref 0–0.6)
EOSINOPHIL NFR BLD: 0.9 %
EPI CELLS #/AREA URNS HPF: NORMAL /HPF (ref 0–5)
GFR SERPLBLD CREATININE-BSD FMLA CKD-EPI: >60 ML/MIN/{1.73_M2}
GLUCOSE SERPL-MCNC: 113 MG/DL (ref 70–99)
GLUCOSE UR STRIP.AUTO-MCNC: NEGATIVE MG/DL
HCT VFR BLD AUTO: 50.1 % (ref 40.5–52.5)
HGB BLD-MCNC: 16.7 G/DL (ref 13.5–17.5)
HGB UR QL STRIP.AUTO: ABNORMAL
HYALINE CASTS #/AREA URNS AUTO: 0 /LPF (ref 0–8)
KETONES UR STRIP.AUTO-MCNC: NEGATIVE MG/DL
LACTATE BLDV-SCNC: 1.4 MMOL/L (ref 0.4–1.9)
LEUKOCYTE ESTERASE UR QL STRIP.AUTO: NEGATIVE
LYMPHOCYTES # BLD: 1.4 K/UL (ref 1–5.1)
LYMPHOCYTES NFR BLD: 7.9 %
MCH RBC QN AUTO: 30.4 PG (ref 26–34)
MCHC RBC AUTO-ENTMCNC: 33.3 G/DL (ref 31–36)
MCV RBC AUTO: 91.3 FL (ref 80–100)
MONOCYTES # BLD: 0.8 K/UL (ref 0–1.3)
MONOCYTES NFR BLD: 4.5 %
NEUTROPHILS # BLD: 14.8 K/UL (ref 1.7–7.7)
NEUTROPHILS NFR BLD: 86.5 %
NITRITE UR QL STRIP.AUTO: NEGATIVE
PH UR STRIP.AUTO: 6 [PH] (ref 5–8)
PLATELET # BLD AUTO: 227 K/UL (ref 135–450)
PMV BLD AUTO: 9.9 FL (ref 5–10.5)
POTASSIUM SERPL-SCNC: 4.3 MMOL/L (ref 3.5–5.1)
PROCALCITONIN SERPL IA-MCNC: 0.08 NG/ML (ref 0–0.15)
PROT SERPL-MCNC: 7.9 G/DL (ref 6.4–8.2)
PROT UR STRIP.AUTO-MCNC: NEGATIVE MG/DL
RBC # BLD AUTO: 5.49 M/UL (ref 4.2–5.9)
RBC #/AREA URNS HPF: NORMAL /HPF (ref 0–4)
SARS-COV-2 RDRP RESP QL NAA+PROBE: NOT DETECTED
SODIUM SERPL-SCNC: 138 MMOL/L (ref 136–145)
SP GR UR STRIP.AUTO: <=1.005 (ref 1–1.03)
TROPONIN, HIGH SENSITIVITY: <6 NG/L (ref 0–22)
UA COMPLETE W REFLEX CULTURE PNL UR: ABNORMAL
UA DIPSTICK W REFLEX MICRO PNL UR: YES
URN SPEC COLLECT METH UR: ABNORMAL
UROBILINOGEN UR STRIP-ACNC: 0.2 E.U./DL
WBC # BLD AUTO: 17.1 K/UL (ref 4–11)
WBC #/AREA URNS AUTO: 0 /HPF (ref 0–5)

## 2023-06-02 PROCEDURE — 2580000003 HC RX 258: Performed by: PHYSICIAN ASSISTANT

## 2023-06-02 PROCEDURE — 84145 PROCALCITONIN (PCT): CPT

## 2023-06-02 PROCEDURE — 94760 N-INVAS EAR/PLS OXIMETRY 1: CPT

## 2023-06-02 PROCEDURE — 6370000000 HC RX 637 (ALT 250 FOR IP): Performed by: PHYSICIAN ASSISTANT

## 2023-06-02 PROCEDURE — 93005 ELECTROCARDIOGRAM TRACING: CPT | Performed by: PHYSICIAN ASSISTANT

## 2023-06-02 PROCEDURE — 85379 FIBRIN DEGRADATION QUANT: CPT

## 2023-06-02 PROCEDURE — 81001 URINALYSIS AUTO W/SCOPE: CPT

## 2023-06-02 PROCEDURE — 80053 COMPREHEN METABOLIC PANEL: CPT

## 2023-06-02 PROCEDURE — 71046 X-RAY EXAM CHEST 2 VIEWS: CPT

## 2023-06-02 PROCEDURE — 87635 SARS-COV-2 COVID-19 AMP PRB: CPT

## 2023-06-02 PROCEDURE — 71260 CT THORAX DX C+: CPT

## 2023-06-02 PROCEDURE — 96360 HYDRATION IV INFUSION INIT: CPT

## 2023-06-02 PROCEDURE — 94640 AIRWAY INHALATION TREATMENT: CPT

## 2023-06-02 PROCEDURE — 83605 ASSAY OF LACTIC ACID: CPT

## 2023-06-02 PROCEDURE — 99285 EMERGENCY DEPT VISIT HI MDM: CPT

## 2023-06-02 PROCEDURE — 84484 ASSAY OF TROPONIN QUANT: CPT

## 2023-06-02 PROCEDURE — 6360000004 HC RX CONTRAST MEDICATION: Performed by: PHYSICIAN ASSISTANT

## 2023-06-02 PROCEDURE — 85025 COMPLETE CBC W/AUTO DIFF WBC: CPT

## 2023-06-02 PROCEDURE — 36415 COLL VENOUS BLD VENIPUNCTURE: CPT

## 2023-06-02 RX ORDER — 0.9 % SODIUM CHLORIDE 0.9 %
1000 INTRAVENOUS SOLUTION INTRAVENOUS ONCE
Status: COMPLETED | OUTPATIENT
Start: 2023-06-02 | End: 2023-06-02

## 2023-06-02 RX ORDER — HYDROXYZINE PAMOATE 50 MG/1
50 CAPSULE ORAL 3 TIMES DAILY PRN
COMMUNITY

## 2023-06-02 RX ORDER — ZIPRASIDONE HYDROCHLORIDE 20 MG/1
20 CAPSULE ORAL 2 TIMES DAILY WITH MEALS
COMMUNITY

## 2023-06-02 RX ORDER — ACETAMINOPHEN 500 MG
1000 TABLET ORAL ONCE
Status: COMPLETED | OUTPATIENT
Start: 2023-06-02 | End: 2023-06-02

## 2023-06-02 RX ORDER — IBUPROFEN 200 MG
400 TABLET ORAL EVERY 6 HOURS PRN
COMMUNITY

## 2023-06-02 RX ORDER — ALBUTEROL SULFATE 90 UG/1
2 AEROSOL, METERED RESPIRATORY (INHALATION) 4 TIMES DAILY PRN
Qty: 18 G | Refills: 0 | Status: SHIPPED | OUTPATIENT
Start: 2023-06-02

## 2023-06-02 RX ORDER — IPRATROPIUM BROMIDE AND ALBUTEROL SULFATE 2.5; .5 MG/3ML; MG/3ML
1 SOLUTION RESPIRATORY (INHALATION) ONCE
Status: COMPLETED | OUTPATIENT
Start: 2023-06-02 | End: 2023-06-02

## 2023-06-02 RX ORDER — PREDNISONE 10 MG/1
60 TABLET ORAL DAILY
Qty: 30 TABLET | Refills: 0 | Status: SHIPPED | OUTPATIENT
Start: 2023-06-02 | End: 2023-06-07

## 2023-06-02 RX ORDER — CETIRIZINE HYDROCHLORIDE 10 MG/1
10 TABLET ORAL DAILY
Qty: 30 TABLET | Refills: 0 | Status: SHIPPED | OUTPATIENT
Start: 2023-06-02 | End: 2023-07-02

## 2023-06-02 RX ORDER — PREDNISONE 20 MG/1
60 TABLET ORAL ONCE
Status: COMPLETED | OUTPATIENT
Start: 2023-06-02 | End: 2023-06-02

## 2023-06-02 RX ADMIN — IPRATROPIUM BROMIDE AND ALBUTEROL SULFATE 1 DOSE: .5; 3 SOLUTION RESPIRATORY (INHALATION) at 18:32

## 2023-06-02 RX ADMIN — SODIUM CHLORIDE 1000 ML: 9 INJECTION, SOLUTION INTRAVENOUS at 20:08

## 2023-06-02 RX ADMIN — ACETAMINOPHEN 1000 MG: 500 TABLET ORAL at 18:20

## 2023-06-02 RX ADMIN — IOPAMIDOL 75 ML: 755 INJECTION, SOLUTION INTRAVENOUS at 21:24

## 2023-06-02 RX ADMIN — PREDNISONE 60 MG: 20 TABLET ORAL at 18:20

## 2023-06-02 ASSESSMENT — ENCOUNTER SYMPTOMS
ABDOMINAL PAIN: 0
DIARRHEA: 0
COUGH: 0
RHINORRHEA: 0
SHORTNESS OF BREATH: 1
NAUSEA: 0
VOMITING: 0

## 2023-06-02 ASSESSMENT — LIFESTYLE VARIABLES
HOW OFTEN DO YOU HAVE A DRINK CONTAINING ALCOHOL: NEVER
HOW MANY STANDARD DRINKS CONTAINING ALCOHOL DO YOU HAVE ON A TYPICAL DAY: PATIENT DOES NOT DRINK

## 2023-06-02 ASSESSMENT — PAIN SCALES - GENERAL: PAINLEVEL_OUTOF10: 0

## 2023-06-02 NOTE — ED PROVIDER NOTES
[address] ENCOUNTER        Pt Name: Vasu Schaeffer  MRN: [id number]  [place] 1990  Date of evaluation: 6/2/2023  Provider: Man Knight PA-C  PCP: GALEN Patterson CNP  Note Started: 6:59 PM EDT 6/2/23      ADIS. I have evaluated this patient. CHIEF COMPLAINT       Chief Complaint   Patient presents with    Shortness of Breath     PT to ED c/o SOB x1 week. Pt reports history of asthma. Pt sts albuterol is not helping. Diminished lung sounds. PT tachy 140's. HISTORY OF PRESENT ILLNESS: 1 or more Elements     History From: Patient  Limitations to history : None    Vasu Schaeffer is a 28 y.o. male who presents to the emergency department today for evaluation for shortness of breath. Patient states that he has been experiencing shortness of breath and he states that this has been ongoing for 1 week. Patient states he does have a history of asthma, and he states that he has been using his inhaler multiple times without much improvement. Patient states he did notice a pain to his left side of his upper back, as well as a tightness across his chest, and this has been ongoing for the past couple of days, which prompted his visit to the ED. Patient has a low-grade fever here of 100.1, he states he has not noticed any fevers at home. He states that he has not really had much of a cough. No congestion. He denies any abdominal pain, nausea, vomiting or diarrhea. 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 He has no lower leg pain or swelling. He has no history of DVT or PE. Patient movement is not known sick contacts, no other complaints. Nursing Notes were all reviewed and agreed with or any disagreements were addressed in the HPI.     REVIEW OF SYSTEMS :      Review of Systems   Constitutional:  Negative for activity change, appetite change,

## 2023-06-03 LAB
EKG ATRIAL RATE: 113 BPM
EKG DIAGNOSIS: NORMAL
EKG P AXIS: 73 DEGREES
EKG P-R INTERVAL: 156 MS
EKG Q-T INTERVAL: 318 MS
EKG QRS DURATION: 70 MS
EKG QTC CALCULATION (BAZETT): 436 MS
EKG R AXIS: 73 DEGREES
EKG T AXIS: 55 DEGREES
EKG VENTRICULAR RATE: 113 BPM

## 2023-06-03 PROCEDURE — 93010 ELECTROCARDIOGRAM REPORT: CPT | Performed by: INTERNAL MEDICINE

## 2023-06-03 NOTE — PROGRESS NOTES
Pharmacy Home Medication Reconciliation Note    A medication reconciliation has been completed for Keshawn Oliveira 1990    Pharmacy: [address] [place] [address]., Curtis Locke  Information provided by: patient    The patient's home medication list is as follows: No current facility-administered medications on file prior to encounter. Current Outpatient Medications on File Prior to Encounter   Medication Sig Dispense Refill    ibuprofen (ADVIL;MOTRIN) 200 MG tablet Take 2 tablets by mouth every 6 hours as needed for Pain      hydrOXYzine pamoate (VISTARIL) 50 MG capsule Take 1 capsule by mouth 3 times daily as needed for Itching      ziprasidone (GEODON) 20 MG capsule Take 1 capsule by mouth 2 times daily (with meals)      venlafaxine (EFFEXOR) 75 MG tablet Take 1 tablet by mouth daily      fluticasone (FLOVENT HFA) 44 MCG/ACT inhaler Inhale 1 puff into the lungs 2 times daily      montelukast (SINGULAIR) 10 MG tablet Take 1 tablet by mouth nightly      VENTOLIN  (90 Base) MCG/ACT inhaler          Of note, patient started new scripts for Vistaril 50 mg TID and Geodon 20 mg BID WC yesterday. Patient took all AM meds today. Timing of last doses updated. Thank you,  Alejandro Liu

## 2023-06-20 ENCOUNTER — OFFICE VISIT (OUTPATIENT)
Dept: ORTHOPEDIC SURGERY | Age: 33
End: 2023-06-20
Payer: COMMERCIAL

## 2023-06-20 VITALS — WEIGHT: 162.7 LBS | BODY MASS INDEX: 23.35 KG/M2

## 2023-06-20 DIAGNOSIS — M53.3 SACROILIAC JOINT DYSFUNCTION OF LEFT SIDE: ICD-10-CM

## 2023-06-20 DIAGNOSIS — M51.37 DEGENERATION OF LUMBOSACRAL INTERVERTEBRAL DISC: ICD-10-CM

## 2023-06-20 DIAGNOSIS — M54.17 LUMBOSACRAL RADICULOPATHY: ICD-10-CM

## 2023-06-20 PROCEDURE — 99214 OFFICE O/P EST MOD 30 MIN: CPT | Performed by: INTERNAL MEDICINE

## 2023-06-23 NOTE — PROGRESS NOTES
(SINGULAIR) 10 MG tablet Take 1 tablet by mouth nightly      VENTOLIN  (90 Base) MCG/ACT inhaler        No current facility-administered medications for this visit. Allergies:      No Active Allergies        Review of Systems:    Pertinent items are noted in HPI        Vital Signs: There were no vitals filed for this visit. General Exam:     Constitutional: Patient is adequately groomed with no evidence of malnutrition    Physical Exam: lower back      Primary Exam:    Inspection: No deformity atrophy or appreciable curvature      Palpation: 90/10 pain with flexion      Range of Motion: Normal lower extremity      Strength: Normal lower extremity      Special Tests: Negative SLR      Skin: There are no rashes, ulcerations or lesions. Gait: Nonantalgic      Neurovascular - non focal and intact       Additional Comments:        Additional Examinations:               Office Imaging Results/Procedures PerformedToday:           Office Procedures:   No orders of the defined types were placed in this encounter. Other Outside Imaging and Testing Personally Reviewed:    No results found. Assessment   Impression: . Encounter Diagnoses   Name Primary?     Degeneration of lumbosacral intervertebral disc     Lumbosacral radiculopathy     Sacroiliac joint dysfunction of left side               Plan:     Remain off work as he cannot return to the physical demands of prior work position  [employer] approval for physical therapy and aqua therapy [facility]  [employer] approval for vocational rehabilitation  Continue lumbar stabilization home exercise program  Activity modification lumbar spine precautions activity     Approximately 30 minutes was spent related to previewing pertinent medical documentation prior to the patient's visit along with counseling during the patient's visit with respect to treatment options inclusive of alternatives to treatment and the complications and risks

## 2023-08-15 ENCOUNTER — TELEPHONE (OUTPATIENT)
Dept: ORTHOPEDIC SURGERY | Age: 33
End: 2023-08-15

## 2023-08-18 NOTE — TELEPHONE ENCOUNTER
Faxed a response back to James Quinn @ [phone] that patient was to work on PT but patient went out of the country to take care of a family member and has not followed up. Will not complete form at this time.

## 2023-09-15 ENCOUNTER — TELEPHONE (OUTPATIENT)
Dept: ORTHOPEDIC SURGERY | Age: 33
End: 2023-09-15

## 2023-09-15 NOTE — TELEPHONE ENCOUNTER
General Question     Subject: REQUESTING A CALL ABOUT A NOTE FOR WC.   Patient and /or Facility Request: [address] [facility] Number: [phone]

## 2023-10-03 ENCOUNTER — OFFICE VISIT (OUTPATIENT)
Dept: ORTHOPEDIC SURGERY | Age: 33
End: 2023-10-03

## 2023-10-03 DIAGNOSIS — M51.37 DEGENERATION OF LUMBOSACRAL INTERVERTEBRAL DISC: Primary | ICD-10-CM

## 2023-10-03 DIAGNOSIS — M53.3 SACROILIAC JOINT DYSFUNCTION OF LEFT SIDE: ICD-10-CM

## 2023-10-03 DIAGNOSIS — M54.17 LUMBOSACRAL RADICULOPATHY: ICD-10-CM

## 2023-10-03 RX ORDER — TIZANIDINE 4 MG/1
4 TABLET ORAL 3 TIMES DAILY PRN
Qty: 30 TABLET | Refills: 1 | Status: SHIPPED | OUTPATIENT
Start: 2023-10-03

## 2023-10-03 RX ORDER — MELOXICAM 15 MG/1
15 TABLET ORAL DAILY
Qty: 30 TABLET | Refills: 2 | Status: SHIPPED | OUTPATIENT
Start: 2023-10-03

## 2023-10-03 RX ORDER — LIDOCAINE 50 MG/G
1 PATCH TOPICAL EVERY 12 HOURS
Qty: 30 PATCH | Refills: 0 | Status: SHIPPED | OUTPATIENT
Start: 2023-10-03

## 2023-10-03 NOTE — PROGRESS NOTES
Medications   Medication Sig Dispense Refill    meloxicam (MOBIC) 15 MG tablet Take 1 tablet by mouth daily 2 weeks then daily as needed thereafter 30 tablet 2    tiZANidine (ZANAFLEX) 4 MG tablet Take 1 tablet by mouth 3 times daily as needed (Muscle tightness/spasm) 30 tablet 1    lidocaine (LIDODERM) 5 % Place 1 patch onto the skin in the morning and 1 patch in the evening. 12 hours on, 12 hours off. . 30 patch 0    ibuprofen (ADVIL;MOTRIN) 200 MG tablet Take 2 tablets by mouth every 6 hours as needed for Pain      hydrOXYzine pamoate (VISTARIL) 50 MG capsule Take 1 capsule by mouth 3 times daily as needed for Anxiety      ziprasidone (GEODON) 20 MG capsule Take 1 capsule by mouth 2 times daily (with meals)      albuterol sulfate HFA (VENTOLIN HFA) 108 (90 Base) MCG/ACT inhaler Inhale 2 puffs into the lungs 4 times daily as needed for Wheezing 18 g 0    venlafaxine (EFFEXOR) 75 MG tablet Take 1 tablet by mouth daily      fluticasone (FLOVENT HFA) 44 MCG/ACT inhaler Inhale 1 puff into the lungs 2 times daily      montelukast (SINGULAIR) 10 MG tablet Take 1 tablet by mouth nightly      VENTOLIN  (90 Base) MCG/ACT inhaler        No current facility-administered medications for this visit. Allergies:      No Active Allergies        Review of Systems:    Pertinent items are noted in HPI        Vital Signs: There were no vitals filed for this visit.      General Exam:     Constitutional: Patient is adequately groomed with no evidence of malnutrition    Physical Exam: lower back      Primary Exam:    Inspection: No deformity atrophy appreciable curvature      Palpation: No focal trigger point tenderness      Range of Motion: 45/0 pain with flexion greater than extension pain with left lateral flexion greater than right lateral flexion pain with leftward rotation greater than rightward rotation      Strength: Normal lower extremity      Special Tests: SLR negative      Skin: There are no rashes,

## 2024-12-23 ENCOUNTER — OFFICE VISIT (OUTPATIENT)
Dept: ORTHOPEDIC SURGERY | Age: 34
End: 2024-12-23

## 2024-12-23 VITALS — HEIGHT: 70 IN | BODY MASS INDEX: 23.19 KG/M2 | WEIGHT: 162 LBS

## 2024-12-23 DIAGNOSIS — M54.17 LUMBOSACRAL RADICULOPATHY: ICD-10-CM

## 2024-12-23 DIAGNOSIS — M53.3 SACROILIAC JOINT DYSFUNCTION OF LEFT SIDE: Primary | ICD-10-CM

## 2024-12-23 DIAGNOSIS — M51.379 DEGENERATION OF INTERVERTEBRAL DISC OF LUMBOSACRAL REGION, UNSPECIFIED WHETHER PAIN PRESENT: ICD-10-CM

## 2024-12-23 RX ORDER — LIDOCAINE 50 MG/G
PATCH TOPICAL
Qty: 30 PATCH | Refills: 2 | Status: SHIPPED | OUTPATIENT
Start: 2024-12-23

## 2024-12-23 NOTE — PROGRESS NOTES
SPINE WO CONTRAST     COMPARE TO PREVIOUS DONE AT [facility] 7/25/2022  DOL WORKERS COMP PT     Standing Status:   Future     Standing Expiration Date:   12/23/2025     Scheduling Instructions:      [facility], please contact pt to schedule, [phone]      [facility] will obtain auth and fwd to your facility.      Pt advised to f/u in clinic 2-3 days after MRI for results.     Order Specific Question:   Reason for exam:     Answer:   r/o PROGRESSION HNP, stenosis     Order Specific Question:   Reason for exam:     Answer:   COMPARE TO PREVIOUS DONE AT [facility] 7/25/2022     Order Specific Question:   What is the sedation requirement?     Answer:   None    [facility]     Referral Priority:   Routine     Referral Type:   Eval and Treat     Referral Reason:   Specialty Services Required     Requested Specialty:   Physical Therapy     Number of Visits Requested:   1           Disclaimer:    \"This note was dictated with voice recognition software. Though review and correction are routine, we apologize for any errors.\"    correction are routine, we apologize for any errors.\"

## 2025-04-29 ENCOUNTER — OFFICE VISIT (OUTPATIENT)
Dept: ORTHOPEDIC SURGERY | Age: 35
End: 2025-04-29

## 2025-04-29 VITALS — WEIGHT: 162 LBS | BODY MASS INDEX: 23.19 KG/M2 | HEIGHT: 70 IN

## 2025-04-29 DIAGNOSIS — M54.17 LUMBOSACRAL RADICULOPATHY: ICD-10-CM

## 2025-04-29 DIAGNOSIS — M51.379 DEGENERATION OF INTERVERTEBRAL DISC OF LUMBOSACRAL REGION, UNSPECIFIED WHETHER PAIN PRESENT: Primary | ICD-10-CM

## 2025-04-29 DIAGNOSIS — M53.3 SACROILIAC JOINT DYSFUNCTION OF LEFT SIDE: ICD-10-CM

## 2025-04-29 RX ORDER — MECLIZINE HCL 25MG 25 MG/1
25 TABLET, CHEWABLE ORAL 3 TIMES DAILY PRN
COMMUNITY

## 2025-04-29 RX ORDER — ALBUTEROL SULFATE 90 UG/1
2 INHALANT RESPIRATORY (INHALATION) EVERY 4 HOURS PRN
COMMUNITY

## 2025-04-29 RX ORDER — DIAZEPAM 5 MG/1
5 TABLET ORAL EVERY 6 HOURS PRN
COMMUNITY

## 2025-05-02 NOTE — PROGRESS NOTES
Chief Complaint:   Chief Complaint   Patient presents with    Lower Back Pain     Lumbar - MRI results. He has not returned to work. They are resubmitting for therapy. His back is not any better.          History of Present Illness:       Patient is a 34 y.o. male returns follow up for the above complaint. The patient was last seen approximately 4 monthsago. The symptoms show no change since the last visit. The patient has had further testing for the problem.       [id number] DOI 5- LT HIP, LUMBAR FEDERAL CA-17  DOL// PH: [phone] FX: [phone]  POR: NOT EST  LT HIP: M25.852 OTHER SPECIFIED JOINT DISORDERS// S73.192A SPRAIN  LUMBAR: M53.3 SACROCOCCYGEAL DISORDERS, NOT ELSEWHERE CLASSIFIED// M51.37 OTHER INTERVERTEBRAL DISC DEGENERATION, LUMBOSACRAL REGION// M54.17 RADICULOPATHY, LUMBOSACRAL REGION  Updated: 4.29.2025 ilj       Repeat MRI completed in the interim.    Back:Leftleg pain 70:30 . Pain back and left posterior lateral thigh is numbnessaching, burning, or numbness in quality.    The symptoms do not follow a neurogenic provocative pattern.    Pain levels:5.  Standing equally problematic as sitting.    The patient denies new onset or progressive weakness of the lower extremities.  The patient denies now onset bowel or bladder function.    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      meclizine (ANTIVERT) 25 MG CHEW Take 1 tablet by mouth 3 times daily as needed      albuterol sulfate HFA (VENTOLIN HFA) 108 (90 Base)

## 2025-05-09 ENCOUNTER — HOSPITAL ENCOUNTER (OUTPATIENT)
Dept: PHYSICAL THERAPY | Age: 35
Setting detail: THERAPIES SERIES
Discharge: HOME | End: 2025-05-09
Attending: INTERNAL MEDICINE
Payer: OTHER GOVERNMENT

## 2025-05-09 DIAGNOSIS — R29.898 DECREASED STRENGTH OF TRUNK AND BACK: ICD-10-CM

## 2025-05-09 DIAGNOSIS — R29.3 POOR POSTURE: Primary | ICD-10-CM

## 2025-05-09 DIAGNOSIS — M79.606 LOW BACK PAIN RADIATING DOWN LEG: ICD-10-CM

## 2025-05-09 DIAGNOSIS — M25.69 DECREASED RANGE OF MOTION OF TRUNK AND BACK: ICD-10-CM

## 2025-05-09 DIAGNOSIS — M62.89 HAMSTRING TIGHTNESS: ICD-10-CM

## 2025-05-09 DIAGNOSIS — M54.50 LOW BACK PAIN RADIATING DOWN LEG: ICD-10-CM

## 2025-05-09 DIAGNOSIS — R19.8 ABDOMINAL WEAKNESS: ICD-10-CM

## 2025-05-09 PROCEDURE — 97530 THERAPEUTIC ACTIVITIES: CPT

## 2025-05-09 PROCEDURE — 97161 PT EVAL LOW COMPLEX 20 MIN: CPT

## 2025-05-09 NOTE — PLAN OF CARE
sitting, bending, and lifting  Reduced ability to sleep  Reduced ability to perform lifting, reaching, carrying tasks  Reduced ability to squat  Reduced ability to forward bend  Reduced ability to ambulate prolonged functional periods/distances/surfaces  difficulty with self care    Participation Restrictions:   Reduced participation in self care  Reduced participation in home management   Reduced participation in work activities  Reduced participation in social activities  Reduced participation in sports/recreation    Classification :   Signs/symptoms consistent with Lumbar instability//motor control subgroup.     Signs/symptoms consistent with Lumbar direction specific/centralization subgroup  Signs/symptoms consistent with Lumbar traction subgroup  Signs/symptoms consistent with lumbar facet dysfunction  Signs/symptoms consistent with nerve root involvement including myotome & dermatome dysfunction  Signs/symptoms consistent with post-surgical status including: decreased ROM, strength and function.  Signs/symptoms consistent with pathology which may benefit from Dry needling         Barriers to/and or personal factors that will affect rehab potential:   Co-morbidities  Cognitive function, education/learning barriers  past PT/medical experience    Physical Therapy Evaluation Complexity Justification  [x] A history of present problem and no personal factors and/or co-morbidities that impact the plan of care  [x] A total of 1-2 elements  found upon examination of body systems using standardized tests and measures addressing any of the following: body structures, functions (impairments), activity limitations, and/or participation restrictions  [x] A clinical presentation with stable and/or uncomplicated characteristics   [x] Clinical decision making of LOW (77450 - Typically 20 minutes face-to-face) complexity using standardized patient assessment instrument and/or measurable assessment of functional

## 2025-05-13 ENCOUNTER — HOSPITAL ENCOUNTER (OUTPATIENT)
Dept: PHYSICAL THERAPY | Age: 35
Setting detail: THERAPIES SERIES
Discharge: HOME | End: 2025-05-13
Attending: INTERNAL MEDICINE
Payer: OTHER GOVERNMENT

## 2025-05-13 PROCEDURE — 97140 MANUAL THERAPY 1/> REGIONS: CPT | Performed by: PHYSICAL THERAPIST

## 2025-05-13 PROCEDURE — 97110 THERAPEUTIC EXERCISES: CPT | Performed by: PHYSICAL THERAPIST

## 2025-05-16 ENCOUNTER — HOSPITAL ENCOUNTER (OUTPATIENT)
Dept: PHYSICAL THERAPY | Age: 35
Setting detail: THERAPIES SERIES
Discharge: HOME | End: 2025-05-16
Attending: INTERNAL MEDICINE
Payer: OTHER GOVERNMENT

## 2025-05-16 PROCEDURE — 97140 MANUAL THERAPY 1/> REGIONS: CPT | Performed by: PHYSICAL THERAPIST

## 2025-05-16 PROCEDURE — 97110 THERAPEUTIC EXERCISES: CPT | Performed by: PHYSICAL THERAPIST

## 2025-05-16 NOTE — FLOWSHEET NOTE
[facility] - Outpatient Rehabilitation and Therapy: [facility], [address], [place] [address] office: [phone] fax: [phone]        Physical Therapy: TREATMENT/PROGRESS NOTE   Patient: Efraín Winter (34 y.o. male)   Examination Date: 2025   :  1990 MRN: [id number]   Visit #: 3   Insurance Allowable Auth Needed   Approved 2x/week x 6 weeks  [x]Yes    []No    Insurance: Payor: [employer] / Plan: [employer] / Product Type: *No Product type* /   Insurance ID: [id number] - (Worker's Comp)  Secondary Insurance (if applicable):        Treatment Diagnosis:     ICD-10-CM    1. Poor posture  R29.3       2. Hamstring tightness  M62.89       3. Low back pain radiating down leg  M54.50     M79.606       4. Decreased range of motion of trunk and back  M25.69       5. Decreased strength of trunk and back  R29.898       6. Abdominal weakness  R19.8          Medical Diagnosis:  Degeneration of intervertebral disc of lumbosacral region, unspecified whether pain present [M51.379]  Lumbosacral radiculopathy [M54.17]  Sacroiliac joint dysfunction of left side [M53.3]   Referring Physician: Yahir Sarabia*  PCP: Tanya Roque APRN - CNP     Plan of care signed (Y/N):     Date of Patient follow up with Physician:      Plan of Care Report: NO  POC update due: (10 visits /OR AUTH LIMITS, whichever is less)  2025                                             Medical History:  Comorbidities:  Anxiety  Depression  Relevant Medical History: Anxiety, Depression, Sleep Apnea                                         Precautions/ Contra-indications:           Latex allergy:  NO  Pacemaker:    NO  Contraindications for Manipulation: None  Date of Surgery:   Other:    Red Flags:  None    Suicide Screening:   The patient did not verbalize a primary behavioral concern, suicidal ideation, suicidal intent, or demonstrate suicidal behaviors.    Preferred Language for

## 2025-05-20 ENCOUNTER — HOSPITAL ENCOUNTER (OUTPATIENT)
Dept: PHYSICAL THERAPY | Age: 35
Setting detail: THERAPIES SERIES
Discharge: HOME | End: 2025-05-20
Attending: INTERNAL MEDICINE
Payer: OTHER GOVERNMENT

## 2025-05-20 PROCEDURE — 97110 THERAPEUTIC EXERCISES: CPT | Performed by: PHYSICAL THERAPIST

## 2025-05-20 PROCEDURE — 97140 MANUAL THERAPY 1/> REGIONS: CPT | Performed by: PHYSICAL THERAPIST

## 2025-05-20 NOTE — FLOWSHEET NOTE
Ther. Act (18402)      [facility]. Traction (80964)     Gait (87406)      Dry Needle 1-2 muscle (50233)     Aquatic Therex (02336)      Dry Needle 3+ muscle (10048)     Iontophoresis (57215)      VASO (66467)     Ultrasound (47512)      Group Therapy (19385)     Estim Attended (94832)      Canalith Repositioning (04692)     Other:      Other:    Totals   1 8:40 9:20 40       Total Treatment Minutes 40       Charge Justification:  (84817) THERAPEUTIC EXERCISE - Provided verbal/tactile cueing for HEP and/or activities related to strengthening, flexibility, endurance, ROM performed to prevent loss of range of motion, maintain or improve muscular strength or increase flexibility, following either an injury or surgery.   (96739) MANUAL THERAPY -  Manual therapy techniques, 1 or more regions, each 15 minutes (Mobilization/manipulation, manual lymphatic drainage, manual traction) for the purpose of modulating pain, promoting relaxation,  increasing ROM, reducing/eliminating soft tissue swelling/inflammation/restriction, improving soft tissue extensibility and allowing for proper ROM for normal function with self care, mobility, lifting and ambulation    GOALS     Patient stated goal: try to get back to normal, back to work   [] Progressing: [] Met: [] Not Met: [] Adjusted    Therapist goals for Patient:   Short Term Goals: To be achieved in: 2 weeks  1Independent in HEP and progression per patient tolerance, in order to prevent re-injury.   [] Progressing: [] Met: [] Not Met: [] Adjusted  Patient will have a decrease in pain to <4/10 to facilitate improvement in movement, function, and ADLs as indicated by Functional Deficits.  [] Progressing: [] Met: [] Not Met: [] Adjusted    Long Term Goals: To be achieved in: 6 weeks or discharge   Disability index score of TBD% or less for the Modified Oswestry to assist with reaching prior level of function with activities such as bending and walking.  [] Progressing: [] Met: [] Not Met:

## 2025-05-23 ENCOUNTER — TELEPHONE (OUTPATIENT)
Dept: PHYSICAL THERAPY | Age: 35
End: 2025-05-23

## 2025-05-23 ENCOUNTER — HOSPITAL ENCOUNTER (OUTPATIENT)
Dept: PHYSICAL THERAPY | Age: 35
Setting detail: THERAPIES SERIES
End: 2025-05-23
Attending: INTERNAL MEDICINE
Payer: OTHER GOVERNMENT

## 2025-05-23 NOTE — TELEPHONE ENCOUNTER
The patient presented for his 8:20 appointment this morning.  We agreed to cancel today's visit given he is not comfortable with the program we are providing that includes a dose of manual therapy and exercise.  He is not comfortable with the program given he is not able to recover from the visit for several days following the visit.  He emphasizes increased pain with each visit that is in his groin and left sacroiliac region.  This leaves him unable to perform his home management activities including caring for his 5 year old son.  He becomes emotional discussing his current situation.  I attempted to explain to him the manual therapy and exercises we are doing are the most basic and low dose options we have and that there really is no less intense alternative.  He is going to try to make an appointment with Dr. Sarabia as soon as possible to explore other options that he reports he has found on Google to include a possible injection.  Again, I tried to explain to the patient the graded movement we are providing is likely the only alternative we have at our disposal at this time.  He requested that we hold therapy until he is able to touch base with his physician.  We are going to keep his two scheduled therapy appointments for next week in case he is able to touch base with Dr. Sarabia and feels more comfortable continuing rehabilitation.

## 2025-05-27 ENCOUNTER — APPOINTMENT (OUTPATIENT)
Dept: PHYSICAL THERAPY | Age: 35
End: 2025-05-27
Attending: INTERNAL MEDICINE
Payer: OTHER GOVERNMENT

## 2025-05-30 ENCOUNTER — APPOINTMENT (OUTPATIENT)
Dept: PHYSICAL THERAPY | Age: 35
End: 2025-05-30
Attending: INTERNAL MEDICINE
Payer: OTHER GOVERNMENT

## 2025-06-03 ENCOUNTER — OFFICE VISIT (OUTPATIENT)
Dept: ORTHOPEDIC SURGERY | Age: 35
End: 2025-06-03

## 2025-06-03 VITALS — HEIGHT: 70 IN | BODY MASS INDEX: 23.2 KG/M2 | WEIGHT: 162.04 LBS

## 2025-06-03 DIAGNOSIS — M53.3 SACROILIAC JOINT DYSFUNCTION OF LEFT SIDE: ICD-10-CM

## 2025-06-03 DIAGNOSIS — M51.379 DEGENERATION OF INTERVERTEBRAL DISC OF LUMBOSACRAL REGION, UNSPECIFIED WHETHER PAIN PRESENT: Primary | ICD-10-CM

## 2025-06-03 DIAGNOSIS — M54.17 LUMBOSACRAL RADICULOPATHY: ICD-10-CM

## 2025-06-03 RX ORDER — METHYLPREDNISOLONE 4 MG/1
TABLET ORAL
Qty: 1 KIT | Refills: 0 | Status: SHIPPED | OUTPATIENT
Start: 2025-06-03 | End: 2025-06-03 | Stop reason: SDUPTHER

## 2025-06-03 RX ORDER — METHYLPREDNISOLONE 4 MG/1
TABLET ORAL
Qty: 1 KIT | Refills: 0 | Status: SHIPPED | OUTPATIENT
Start: 2025-06-03

## 2025-06-03 NOTE — PROGRESS NOTES
Chief Complaint:   Chief Complaint   Patient presents with    Lower Back Pain     PT is making his pain worse, it takes days to recover from the PT sessions, PT thinks they can help if pain is under control, needs refills on medications, TR MRI          History of Present Illness:       Patient is a 34 y.o. male who returns in follow up for the above complaint. The patient was last seen approximately 1 monthsago. The symptoms   are worsening since the last visit. The patient has had no further testing for the problem.      [id number] DOI 5- LT HIP, LUMBAR FEDERAL CA-17  DOL// PH: [phone] FX: [phone]  POR: NOT EST  LT HIP: M25.852 OTHER SPECIFIED JOINT DISORDERS// S73.192A SPRAIN  LUMBAR: M53.3 SACROCOCCYGEAL DISORDERS, NOT ELSEWHERE CLASSIFIED// M51.37 OTHER INTERVERTEBRAL DISC DEGENERATION, LUMBOSACRAL REGION// M54.17 RADICULOPATHY, LUMBOSACRAL REGION  Updated: 4.29.2025 [facility]     He started therapy in the interim however his physical therapist has recommended against continuation at this time related to elevated pain levels after PT sessions.    Back:Left lateral thigh pain 85: 15. Pain in back and leg is aching in quality.    Pain levels:6.  Low back pain is left paraxial.    The patient denies new onset or progressive weakness of the lower extremities.  The patient denies new onset bowel or bladder dysfunction.    He continues on medical pain management as per previous inclusive of intermittent use of NSAID-sulindac, muscle relaxant       Present Medications:         Current Outpatient Medications   Medication Sig Dispense Refill    methylPREDNISolone (MEDROL, OLLIE,) 4 MG tablet By mouth. 1 kit 0    diclofenac (VOLTAREN) 50 MG EC tablet Take 1 tablet by mouth 2 times daily Start after completing Medrol 60 tablet 1    diazePAM (VALIUM) 5 MG tablet Take 1 tablet by mouth every 6 hours as needed.      meclizine (ANTIVERT) 25 MG CHEW Take 1 tablet by mouth 3 times daily as needed      albuterol

## (undated) DEVICE — 450 ML BOTTLE OF 0.05% CHLORHEXIDINE GLUCONATE IN 99.95% STERILE WATER FOR IRRIGATION, USP AND APPLICATOR.: Brand: IRRISEPT ANTIMICROBIAL WOUND LAVAGE

## (undated) DEVICE — PHOENIX DRILL BIT: Brand: PHOENIX

## (undated) DEVICE — KIT,ANTI FOG,W/SPONGE & FLUID,SOFT PACK: Brand: MEDLINE

## (undated) DEVICE — SUTURE N ABSRB BRAIDED 2-0 MO-6 39 IN 26 MM 1/2 CIR BLU BLK 3910900023

## (undated) DEVICE — YANKAUER,OPEN TIP,W/O VENT,STERILE: Brand: MEDLINE INDUSTRIES, INC.

## (undated) DEVICE — GOWN,REINF,POLY,AURORA,XLNG/XXL,STRL: Brand: MEDLINE

## (undated) DEVICE — PAD,ABDOMINAL,5"X9",ST,LF,25/BX: Brand: MEDLINE INDUSTRIES, INC.

## (undated) DEVICE — NANOPASS REACH CRESCENT: Brand: NANOPASS

## (undated) DEVICE — I-SPEAR OPHTHALMIC SPONGE: Brand: I-SPEAR

## (undated) DEVICE — TOWEL,STOP FLAG GOLD N-W: Brand: MEDLINE

## (undated) DEVICE — XL, ARTHROSCOPIC SHAVER BLADES, DIAMOND ROUND BUR.  DO NOT RESTERILIZE, DO NOT USE IF PACKAGE IS DAMAGED, KEEP DRY, KEEP AWAY FROM SUNLIGHT: Brand: CROSSBLADE

## (undated) DEVICE — SYRINGE MED 10ML TRNSLUC BRL PLUNG BLK MRK POLYPR CTRL

## (undated) DEVICE — RAZOR PREP TWO SIDED

## (undated) DEVICE — DRAPE SURG UTIL 26X15 IN W/ TAPE N INVASIVE MULT LAYR DISP

## (undated) DEVICE — GLOVE,SURG,SENSICARE SLT,LF,PF,7: Brand: MEDLINE

## (undated) DEVICE — TRANSPORT CANNULA 8MM LENGTH 4, 5, 6: Brand: TRANSPORT

## (undated) DEVICE — WIPE INSTR W73XL73CM VISITEC

## (undated) DEVICE — TIP SUCT STD FLNG RIG RIB 5IN1 CONN NVENT W/ SECUR GRP HNDL

## (undated) DEVICE — 50-S XL SUCTION PROBE, NON-BENDABLE, MAX CUT LEVEL 11: Brand: SERFAS ENERGY

## (undated) DEVICE — BLADE 1882940HR 5PK M4 INF TURB 2.9MM: Brand: STRAIGHTSHOT

## (undated) DEVICE — XL, HIP 8-FLUTE PEAR BUR. ARTHROSCOPIC SHAVER BLADE. FOR USE WITH: REF 0375-701-500, 0375-704-500, 0375-708-500. DO NOT USE IF PACKAGE IS DAMAGED, KEEP DRY, KEEP AWAY FROM SUNLIGHT: Brand: FORMULA

## (undated) DEVICE — SPONGE,DISSECTOR,K,XRAY,9/16"X1/4",STRL: Brand: MEDLINE

## (undated) DEVICE — TOWEL,OR,DSP,ST,BLUE,STD,4/PK,20PK/CS: Brand: MEDLINE

## (undated) DEVICE — SPLINT 1524055 DOYLE II AIRWAY SET: Brand: DOYLE II ™

## (undated) DEVICE — GLOVE,SURG,SENSICARE SLT,LF,PF,7.5: Brand: MEDLINE

## (undated) DEVICE — TUBING PMP L6FT CONT WAVE EXTN

## (undated) DEVICE — WAX SURG 2.5GM HEMSTAT BNE BEESWAX PARAFFIN ISO PALMITATE

## (undated) DEVICE — GLOVE SURG SZ 85 L12IN FNGR ORTHO 126MIL CRM LTX FREE

## (undated) DEVICE — COAGULATOR SUCT 10FR L6IN HND FT SWCH VALLEYLAB

## (undated) DEVICE — STAPLER SKIN H3.9MM WIRE DIA0.58MM CRWN 6.9MM 35 STPL ROT

## (undated) DEVICE — SOLUTION IV IRRIG LACTATED RINGERS 3000ML 2B7487

## (undated) DEVICE — INJECTOR II NEEDLE CARTRIDGE: Brand: INJECTOR

## (undated) DEVICE — SUTURE ETHLN SZ 3-0 L18IN NONABSORBABLE BLK PS-2 L19MM 3/8 1669H

## (undated) DEVICE — SUTURE PLN GUT SZ 4-0 L18IN ABSRB YELLOWISH TAN L13MM SC-1 1828H

## (undated) DEVICE — BLANKET WRM W29.9XL79.1IN UP BODY FORC AIR MISTRAL-AIR

## (undated) DEVICE — TUBING, SUCTION, 1/4" X 12', STRAIGHT: Brand: MEDLINE

## (undated) DEVICE — MINOR SET UP PK

## (undated) DEVICE — MASTISOL ADHESIVE LIQ 2/3ML

## (undated) DEVICE — GLOVE ORANGE PI 7   MSG9070

## (undated) DEVICE — SOLUTION IRRIG 3000ML LAC R FLX CONT

## (undated) DEVICE — DRAIN SURG PENROSE 0.25X12 IN CLOSED WND DRAINAGE PREM SIL

## (undated) DEVICE — SAMURAI CURVED BLADE: Brand: SAMURAI

## (undated) DEVICE — XL TOMCAT, ANGLED HIP CUTTER. ARTHROSCOPIC SHAVER BLADE. FOR USE WITH: REF 0375-701-500, 0375-704-500, 0375-708-500. DO NOT USE IF PACKAGE IS DAMAGED, KEEP DRY, KEEP AWAY FROM SUNLIGHT: Brand: FORMULA

## (undated) DEVICE — DRAPE,EENT,SPLIT,STERILE: Brand: MEDLINE

## (undated) DEVICE — YANKAUER SUCTION INSTRUMENT NO CONTROL VENT, BULB TIP, CLEAR: Brand: YANKAUER

## (undated) DEVICE — SLINGSHOT 70 UP: Brand: SLINGSHOT

## (undated) DEVICE — 1010 S-DRAPE TOWEL DRAPE 10/BX: Brand: STERI-DRAPE™

## (undated) DEVICE — ADHESIVE SKIN CLSR 0.7ML TOP DERMBND ADV

## (undated) DEVICE — DRESSING THERABOND 3D ANTIMIC CNTCT SYS 15INCHX10INCH

## (undated) DEVICE — GLOVE SURG SZ 7 L12IN THK7.5MIL DK GRN LTX FREE MSG6570] MEDLINE INDUSTRIES INC]

## (undated) DEVICE — BLADE ARTHSCP CRV HIP SHRP TIP

## (undated) DEVICE — NEEDLE SPNL 25GA L2IN BLU SHT NEO LUM PUNC DISP

## (undated) DEVICE — SHEET, T, LAPAROTOMY, STERILE: Brand: MEDLINE

## (undated) DEVICE — GLOVE,SURG,SENSICARE,ALOE,LF,PF,7: Brand: MEDLINE

## (undated) DEVICE — CLEANER ES TIP W2XL2IN ADH BK RADPQ FOR S STL ELECTRD

## (undated) DEVICE — SUTURE ETHLN SZ 3-0 L18IN NONABSORBABLE BLK FS-1 L24MM 3/8 663H

## (undated) DEVICE — 1.4MM ICONIX DISPOSABLE DRILL: Brand: ICONIX

## (undated) DEVICE — SUTURE PDS + SZ 2 0 L27IN ABSRB VLT L26MM SH 1 2 CIR PDP317H

## (undated) DEVICE — CUFF RESTRN WRST OR ANK 45FT AD FOAM

## (undated) DEVICE — CODMAN® SURGICAL PATTIES 1/2" X 3" (1.27CM X 7.62CM): Brand: CODMAN®

## (undated) DEVICE — APPLICATOR MEDICATED 26 CC SOLUTION HI LT ORNG CHLORAPREP

## (undated) DEVICE — BANDAGE,GAUZE,4.5"X4.1YD,STERILE,LF: Brand: MEDLINE

## (undated) DEVICE — PAD DRY FLOOR ABS 32X58IN GRN

## (undated) DEVICE — JEWISH HOSPITAL TURNOVER KIT: Brand: MEDLINE INDUSTRIES, INC.

## (undated) DEVICE — SUTURE PERMAHAND SZ 4-0 L12X30IN NONABSORBABLE BLK SILK A303H

## (undated) DEVICE — ELECTRODE NDL L2.8IN COAT LO PWR SET EDGE

## (undated) DEVICE — Z DISCONTINUED USE 2272114 DRAPE SURG UTIL 26X15 IN W/ TAPE N INVASIVE MULTLYR DISP

## (undated) DEVICE — SURGICAL SET UP - SURE SET: Brand: MEDLINE INDUSTRIES, INC.

## (undated) DEVICE — AMBIENT HIPVAC 50 IFS: Brand: AMBIENT

## (undated) DEVICE — PORTAL ENTRY KIT

## (undated) DEVICE — Device

## (undated) DEVICE — SPONGE GZ W4XL8IN COT WVN 12 PLY

## (undated) DEVICE — PART NUMBER 108200, VTI 20 MHZ DISPOSABLE DOPPLER PROBE, STRAIGHT, BOX: Brand: VTI 20 MHZ DISPOSABLE DOPPLER PROBE, STRAIGHT, BOX

## (undated) DEVICE — SOLUTION IV 500ML 0.9% SOD CHL PH 5 INJ USP VIAFLX PLAS

## (undated) DEVICE — CORD,CAUTERY,BIPOLAR,STERILE: Brand: MEDLINE

## (undated) DEVICE — PACK PROCEDURE SURG EXTREMITY MFFOP CUST

## (undated) DEVICE — SPONGE,NEURO,0.5"X3",XR,STRL,LF,10/PK: Brand: MEDLINE

## (undated) DEVICE — COVER LT HNDL BLU PLAS

## (undated) DEVICE — GLOVE SURG SZ 7 L12IN FNGR THK79MIL GRN LTX FREE

## (undated) DEVICE — GOWN,REINFORCED,POLY,SIRUS,XLNG/XXLG: Brand: MEDLINE

## (undated) DEVICE — CLIP LIG M BLU TI HRT SHP WIRE HORZ 600 PER BX

## (undated) DEVICE — BLADE ES ELASTOMERIC COAT INSUL DURABLE BEND UPTO 90DEG

## (undated) DEVICE — SURE SET-DOUBLE BASIN-LF: Brand: MEDLINE INDUSTRIES, INC.

## (undated) DEVICE — PLATE ES AD W 9FT CRD 2

## (undated) DEVICE — Z DISCONTINUED USE 2429233 DRESSING FOAM W10XL10CM 5 LAYR SELF ADH VERSATILE SAFETAC

## (undated) DEVICE — C-ARM: Brand: UNBRANDED

## (undated) DEVICE — DRAPE MICSCP W132XL406CM LENS DIA68MM W VARI LENS2 FOR LEICA

## (undated) DEVICE — SUTURE CHROMIC GUT SZ 4-0 L18IN ABSRB BRN L13MM P-3 3/8 CIR 1654G

## (undated) DEVICE — 3M™ STERI-DRAPE™ INSTRUMENT POUCH 1018: Brand: STERI-DRAPE™

## (undated) DEVICE — MEDICINE CUP, GRADUATED, STER: Brand: MEDLINE

## (undated) DEVICE — GLOVE ORTHO 7   MSG9470

## (undated) DEVICE — STERILE POLYISOPRENE POWDER-FREE SURGICAL GLOVES: Brand: PROTEXIS

## (undated) DEVICE — SOLUTION IRRIG 500ML 0.9% SOD CHL USP POUR PLAS BTL

## (undated) DEVICE — XL AGGRESSIVE PLUS, HIP CUTTER. ARTHROSCOPIC SHAVER BLADE. FOR USE WITH: REF 0375-701-500, 0375-704-500, 0375-708-500. DO NOT USE IF PACKAGE IS DAMAGED, KEEP DRY, KEEP AWAY FROM SUNLIGHT: Brand: FORMULA

## (undated) DEVICE — 6619 2 PTNT ISO SYS INCISE AREA&LT;(&GT;&&LT;)&GT;P: Brand: STERI-DRAPE™ IOBAN™ 2

## (undated) DEVICE — 3M™ IOBAN™ 2 ANTIMICROBIAL INCISE DRAPE 6650EZ: Brand: IOBAN™ 2

## (undated) DEVICE — UNDERPANTS INCONT XL 45-70IN KNIT SEAMLESS DSGN COLOR-CODED